# Patient Record
Sex: FEMALE | Race: WHITE | Employment: FULL TIME | URBAN - NONMETROPOLITAN AREA
[De-identification: names, ages, dates, MRNs, and addresses within clinical notes are randomized per-mention and may not be internally consistent; named-entity substitution may affect disease eponyms.]

---

## 2017-06-20 ENCOUNTER — OFFICE VISIT (OUTPATIENT)
Dept: NEUROLOGY | Age: 57
End: 2017-06-20
Payer: COMMERCIAL

## 2017-06-20 VITALS
OXYGEN SATURATION: 95 % | HEART RATE: 55 BPM | SYSTOLIC BLOOD PRESSURE: 106 MMHG | HEIGHT: 67 IN | BODY MASS INDEX: 45.99 KG/M2 | WEIGHT: 293 LBS | DIASTOLIC BLOOD PRESSURE: 73 MMHG

## 2017-06-20 DIAGNOSIS — Z99.89 CPAP (CONTINUOUS POSITIVE AIRWAY PRESSURE) DEPENDENCE: ICD-10-CM

## 2017-06-20 DIAGNOSIS — G47.61 PLMD (PERIODIC LIMB MOVEMENT DISORDER): ICD-10-CM

## 2017-06-20 DIAGNOSIS — G47.33 OSA (OBSTRUCTIVE SLEEP APNEA): Primary | ICD-10-CM

## 2017-06-20 PROCEDURE — 99213 OFFICE O/P EST LOW 20 MIN: CPT | Performed by: PHYSICIAN ASSISTANT

## 2017-08-21 RX ORDER — ATENOLOL 50 MG/1
50 TABLET ORAL DAILY
Qty: 30 TABLET | Refills: 3 | Status: SHIPPED | OUTPATIENT
Start: 2017-08-21 | End: 2017-12-15 | Stop reason: SDUPTHER

## 2017-09-07 ENCOUNTER — OFFICE VISIT (OUTPATIENT)
Dept: FAMILY MEDICINE CLINIC | Age: 57
End: 2017-09-07
Payer: COMMERCIAL

## 2017-09-07 ENCOUNTER — TELEPHONE (OUTPATIENT)
Dept: FAMILY MEDICINE CLINIC | Age: 57
End: 2017-09-07

## 2017-09-07 VITALS
HEART RATE: 56 BPM | SYSTOLIC BLOOD PRESSURE: 102 MMHG | HEIGHT: 67 IN | OXYGEN SATURATION: 98 % | DIASTOLIC BLOOD PRESSURE: 78 MMHG | BODY MASS INDEX: 45.99 KG/M2 | TEMPERATURE: 98 F | WEIGHT: 293 LBS | RESPIRATION RATE: 18 BRPM

## 2017-09-07 DIAGNOSIS — R19.7 DIARRHEA OF PRESUMED INFECTIOUS ORIGIN: ICD-10-CM

## 2017-09-07 DIAGNOSIS — R42 DIZZINESS: ICD-10-CM

## 2017-09-07 DIAGNOSIS — R11.0 NAUSEA: Primary | ICD-10-CM

## 2017-09-07 DIAGNOSIS — R11.0 NAUSEA: ICD-10-CM

## 2017-09-07 LAB
ALBUMIN SERPL-MCNC: 4.4 G/DL (ref 3.5–5.2)
ALP BLD-CCNC: 63 U/L (ref 35–104)
ALT SERPL-CCNC: 28 U/L (ref 5–33)
AMYLASE: 29 U/L (ref 28–100)
ANION GAP SERPL CALCULATED.3IONS-SCNC: 16 MMOL/L (ref 7–19)
AST SERPL-CCNC: 24 U/L (ref 5–32)
BILIRUB SERPL-MCNC: 0.6 MG/DL (ref 0.2–1.2)
BUN BLDV-MCNC: 13 MG/DL (ref 6–20)
CALCIUM SERPL-MCNC: 10.1 MG/DL (ref 8.6–10)
CHLORIDE BLD-SCNC: 101 MMOL/L (ref 98–111)
CO2: 26 MMOL/L (ref 22–29)
CREAT SERPL-MCNC: 0.8 MG/DL (ref 0.5–0.9)
GFR NON-AFRICAN AMERICAN: >60
GLUCOSE BLD-MCNC: 112 MG/DL (ref 74–109)
HCT VFR BLD CALC: 45.6 % (ref 37–47)
HEMOGLOBIN: 14.8 G/DL (ref 12–16)
LIPASE: 16 U/L (ref 13–60)
MCH RBC QN AUTO: 29.6 PG (ref 27–31)
MCHC RBC AUTO-ENTMCNC: 32.5 G/DL (ref 33–37)
MCV RBC AUTO: 91.2 FL (ref 81–99)
PDW BLD-RTO: 13.7 % (ref 11.5–14.5)
PLATELET # BLD: 248 K/UL (ref 130–400)
PMV BLD AUTO: 11.3 FL (ref 9.4–12.3)
POTASSIUM SERPL-SCNC: 3.9 MMOL/L (ref 3.5–5)
RBC # BLD: 5 M/UL (ref 4.2–5.4)
SODIUM BLD-SCNC: 143 MMOL/L (ref 136–145)
TOTAL PROTEIN: 8.2 G/DL (ref 6.6–8.7)
WBC # BLD: 6.6 K/UL (ref 4.8–10.8)

## 2017-09-07 PROCEDURE — 99213 OFFICE O/P EST LOW 20 MIN: CPT | Performed by: CLINICAL NURSE SPECIALIST

## 2017-09-07 ASSESSMENT — ENCOUNTER SYMPTOMS
FACIAL SWELLING: 0
SINUS PRESSURE: 0
EYE REDNESS: 0
DIARRHEA: 1
CHEST TIGHTNESS: 0
CONSTIPATION: 0
NAUSEA: 1
ABDOMINAL PAIN: 0
COUGH: 0
SHORTNESS OF BREATH: 0
ABDOMINAL DISTENTION: 0
WHEEZING: 0
VOMITING: 0
SORE THROAT: 1
EYE DISCHARGE: 0
EYE PAIN: 0
COLOR CHANGE: 0
BLOOD IN STOOL: 0
TROUBLE SWALLOWING: 0
BACK PAIN: 0

## 2017-09-12 ENCOUNTER — TELEPHONE (OUTPATIENT)
Dept: FAMILY MEDICINE CLINIC | Age: 57
End: 2017-09-12

## 2017-09-13 ENCOUNTER — OFFICE VISIT (OUTPATIENT)
Dept: FAMILY MEDICINE CLINIC | Age: 57
End: 2017-09-13
Payer: COMMERCIAL

## 2017-09-13 VITALS
DIASTOLIC BLOOD PRESSURE: 100 MMHG | SYSTOLIC BLOOD PRESSURE: 134 MMHG | TEMPERATURE: 98.2 F | OXYGEN SATURATION: 98 % | HEART RATE: 50 BPM

## 2017-09-13 DIAGNOSIS — K52.9 ACUTE GASTROENTERITIS: Primary | ICD-10-CM

## 2017-09-13 PROCEDURE — 99213 OFFICE O/P EST LOW 20 MIN: CPT | Performed by: CLINICAL NURSE SPECIALIST

## 2017-09-13 RX ORDER — METRONIDAZOLE 500 MG/1
500 TABLET ORAL 3 TIMES DAILY
Qty: 21 TABLET | Refills: 0 | Status: SHIPPED | OUTPATIENT
Start: 2017-09-13 | End: 2017-09-20

## 2017-09-13 RX ORDER — ONDANSETRON 4 MG/1
4 TABLET, FILM COATED ORAL EVERY 8 HOURS PRN
Qty: 30 TABLET | Refills: 0 | Status: SHIPPED | OUTPATIENT
Start: 2017-09-13 | End: 2018-04-04

## 2017-09-13 RX ORDER — PANTOPRAZOLE SODIUM 40 MG/1
40 TABLET, DELAYED RELEASE ORAL 2 TIMES DAILY
Qty: 28 TABLET | Refills: 0 | Status: SHIPPED | OUTPATIENT
Start: 2017-09-13 | End: 2017-09-28 | Stop reason: SDUPTHER

## 2017-09-13 ASSESSMENT — ENCOUNTER SYMPTOMS
SORE THROAT: 0
EYE PAIN: 0
BACK PAIN: 0
ABDOMINAL PAIN: 0
SINUS PRESSURE: 0
EYE REDNESS: 0
CHEST TIGHTNESS: 0
DIARRHEA: 1
EYE DISCHARGE: 0
COLOR CHANGE: 0
COUGH: 0
NAUSEA: 1
WHEEZING: 0
VOMITING: 1
TROUBLE SWALLOWING: 0
ABDOMINAL DISTENTION: 0
SHORTNESS OF BREATH: 0
CONSTIPATION: 0

## 2017-09-20 DIAGNOSIS — R11.0 NAUSEA: ICD-10-CM

## 2017-09-20 DIAGNOSIS — R19.7 DIARRHEA OF PRESUMED INFECTIOUS ORIGIN: Primary | ICD-10-CM

## 2017-09-27 ENCOUNTER — HOSPITAL ENCOUNTER (OUTPATIENT)
Dept: GENERAL RADIOLOGY | Age: 57
Discharge: HOME | End: 2017-09-27
Payer: COMMERCIAL

## 2017-09-27 ENCOUNTER — HOSPITAL ENCOUNTER (OUTPATIENT)
Dept: ULTRASOUND IMAGING | Age: 57
Discharge: HOME | End: 2017-09-27
Payer: COMMERCIAL

## 2017-09-27 DIAGNOSIS — R11.0 NAUSEA: ICD-10-CM

## 2017-09-27 DIAGNOSIS — R19.7 DIARRHEA OF PRESUMED INFECTIOUS ORIGIN: ICD-10-CM

## 2017-09-27 PROBLEM — K44.9 HIATAL HERNIA: Status: ACTIVE | Noted: 2017-09-27

## 2017-09-27 PROBLEM — K80.20 CALCULUS OF GALLBLADDER WITHOUT CHOLECYSTITIS: Status: ACTIVE | Noted: 2017-09-27

## 2017-09-27 PROCEDURE — 76705 ECHO EXAM OF ABDOMEN: CPT

## 2017-09-27 PROCEDURE — 74241 FL UGI WITH KUB: CPT

## 2017-09-28 DIAGNOSIS — K80.21 CALCULUS OF GALLBLADDER WITH BILIARY OBSTRUCTION BUT WITHOUT CHOLECYSTITIS: Primary | ICD-10-CM

## 2017-09-28 DIAGNOSIS — K52.9 ACUTE GASTROENTERITIS: ICD-10-CM

## 2017-09-28 DIAGNOSIS — K44.9 HIATAL HERNIA: ICD-10-CM

## 2017-09-28 RX ORDER — PANTOPRAZOLE SODIUM 40 MG/1
40 TABLET, DELAYED RELEASE ORAL DAILY
Qty: 90 TABLET | Refills: 3 | Status: SHIPPED | OUTPATIENT
Start: 2017-09-28 | End: 2018-09-07 | Stop reason: SDUPTHER

## 2017-10-13 ENCOUNTER — OFFICE VISIT (OUTPATIENT)
Dept: SURGERY | Age: 57
End: 2017-10-13
Payer: COMMERCIAL

## 2017-10-13 VITALS
HEIGHT: 67 IN | DIASTOLIC BLOOD PRESSURE: 80 MMHG | WEIGHT: 293 LBS | SYSTOLIC BLOOD PRESSURE: 138 MMHG | BODY MASS INDEX: 45.99 KG/M2 | HEART RATE: 82 BPM | TEMPERATURE: 98 F

## 2017-10-13 DIAGNOSIS — K80.21 CALCULUS OF GALLBLADDER WITH BILIARY OBSTRUCTION BUT WITHOUT CHOLECYSTITIS: Primary | ICD-10-CM

## 2017-10-13 DIAGNOSIS — G47.33 OSA (OBSTRUCTIVE SLEEP APNEA): ICD-10-CM

## 2017-10-13 PROCEDURE — 99203 OFFICE O/P NEW LOW 30 MIN: CPT | Performed by: PHYSICIAN ASSISTANT

## 2017-10-20 ENCOUNTER — HOSPITAL ENCOUNTER (OUTPATIENT)
Dept: PREADMISSION TESTING | Age: 57
Discharge: HOME | End: 2017-10-20
Payer: COMMERCIAL

## 2017-10-20 VITALS — BODY MASS INDEX: 45.99 KG/M2 | HEIGHT: 67 IN | WEIGHT: 293 LBS

## 2017-10-20 PROCEDURE — 93005 ELECTROCARDIOGRAM TRACING: CPT

## 2017-10-20 RX ORDER — FEXOFENADINE HCL 180 MG/1
180 TABLET ORAL DAILY
COMMUNITY

## 2017-10-20 RX ORDER — LISINOPRIL 2.5 MG/1
2.5 TABLET ORAL DAILY
COMMUNITY
End: 2018-02-19 | Stop reason: SDUPTHER

## 2017-10-21 LAB
EKG P AXIS: 15 DEGREES
EKG P-R INTERVAL: 156 MS
EKG Q-T INTERVAL: 462 MS
EKG QRS DURATION: 100 MS
EKG QTC CALCULATION (BAZETT): 447 MS
EKG T AXIS: 23 DEGREES

## 2017-10-24 PROBLEM — K80.21 CALCULUS OF GALLBLADDER WITH BILIARY OBSTRUCTION BUT WITHOUT CHOLECYSTITIS: Status: ACTIVE | Noted: 2017-10-24

## 2017-10-24 NOTE — PROGRESS NOTES
 lisinopril (PRINIVIL;ZESTRIL) 2.5 MG tablet Take 2.5 mg by mouth daily      fexofenadine (ALLEGRA ALLERGY) 180 MG tablet Take 180 mg by mouth daily      atorvastatin (LIPITOR) 10 MG tablet TAKE 1/2 TABLET BY MOUTH DAILY (Patient taking differently: TAKE 1/2 TABLET BY MOUTH DAILY in the evening) 15 tablet 11     No current facility-administered medications for this visit. Allergies: Review of patient's allergies indicates no known allergies. Past Medical History:   Diagnosis Date    Allergic rhinitis     Calculus of gallbladder without cholecystitis 9/27/2017    CPAP (continuous positive airway pressure) dependence     12cm    Diabetes ([address]     Hiatal hernia 9/27/2017    Hypertension     Hypothyroidism     Murmur     Obesity     ZOILA (obstructive sleep apnea)     AHI:  28.5 per PSG, 9/2014    PLMD (periodic limb movement disorder)     Thyroid disease     Type II or unspecified type diabetes mellitus without mention of complication, not stated as uncontrolled      Past Surgical History:   Procedure Laterality Date    BREAST BIOPSY Right 2012    CARPAL TUNNEL RELEASE      bilateral    HYSTERECTOMY       Family History   Problem Relation Age of Onset    Diabetes Mother     Cancer Mother      breast    Heart Disease Father     Cancer Sister      breast    Cancer Maternal Grandmother      breast    Diabetes Maternal Grandfather     Hypertension Other     Elevated Lipids Other     Coronary Art Dis Other      Social History   Substance Use Topics    Smoking status: Never Smoker    Smokeless tobacco: Never Used    Alcohol use No       REVIEW OF SYSTEMS:  14 point ROS reviewed and positive for the above    PHYSICAL EXAMINATION:    Blood pressure 138/80, pulse 82, temperature 98 °F (36.7 °C), temperature source Temporal, height 5' 7\" (1.702 m), weight (!) 354 lb 11.2 oz (160.9 kg), not currently breastfeeding.     GENERAL:  Reveals a 65 y.o. female that  appears to be in no acute distress. HEENT:  Head is normocephalic and atraumatic. NECK:  Neck is supple without masses or carotid bruits. No obvious thyromegaly is grossly noted. CHEST:  Patient has normal respiratory effort. Chest is clear bilaterally with good thoracic expansion. HEART:  Heart demonstrated a regular rhythm and rate with no cardiac murmurs, gallops or rubs noted to auscultation. ABDOMEN:  Inspection of the abdomen demonstrated the patient to have normal bowel sounds present. The abdomen is protuberant, soft and nontender. EXTREMITIES:  Extremities demonstrated no cyanosis or pitting edema bilaterally. PSYCHIATRIC:  Patient is oriented to time, place and person. The patient's mood and affect are normal.    IMPRESSION:  Chronic Cholecystitis with Cholelithiasis  Obesity  Obstructive sleep apnea  Diabetes mellitus  Hypertension      PLAN:  The risks, benefits, and options were discussed with the patient. She  is willing to proceed with surgery.

## 2017-10-25 ENCOUNTER — TELEPHONE (OUTPATIENT)
Dept: SURGERY | Age: 57
End: 2017-10-25

## 2017-10-25 NOTE — TELEPHONE ENCOUNTER
Left a message that 10/26/17 sx has been rs'd to 11/3/17.   Rs'd per Dr Bailey Yee and Gabriel Persaud

## 2017-10-31 ENCOUNTER — ANESTHESIA (OUTPATIENT)
Dept: OPERATING ROOM | Age: 57
End: 2017-10-31
Payer: COMMERCIAL

## 2017-10-31 ENCOUNTER — HOSPITAL ENCOUNTER (OUTPATIENT)
Age: 57
Setting detail: OUTPATIENT SURGERY
Discharge: HOME | End: 2017-10-31
Attending: SURGERY | Admitting: SURGERY
Payer: COMMERCIAL

## 2017-10-31 ENCOUNTER — ANESTHESIA EVENT (OUTPATIENT)
Dept: OPERATING ROOM | Age: 57
End: 2017-10-31
Payer: COMMERCIAL

## 2017-10-31 ENCOUNTER — APPOINTMENT (OUTPATIENT)
Dept: GENERAL RADIOLOGY | Age: 57
End: 2017-10-31
Attending: SURGERY
Payer: COMMERCIAL

## 2017-10-31 VITALS
TEMPERATURE: 98 F | HEIGHT: 67 IN | HEART RATE: 58 BPM | SYSTOLIC BLOOD PRESSURE: 111 MMHG | BODY MASS INDEX: 45.99 KG/M2 | RESPIRATION RATE: 20 BRPM | WEIGHT: 293 LBS | OXYGEN SATURATION: 100 % | DIASTOLIC BLOOD PRESSURE: 53 MMHG

## 2017-10-31 VITALS
DIASTOLIC BLOOD PRESSURE: 43 MMHG | SYSTOLIC BLOOD PRESSURE: 109 MMHG | OXYGEN SATURATION: 85 % | RESPIRATION RATE: 2 BRPM | TEMPERATURE: 96 F

## 2017-10-31 LAB
GLUCOSE BLD-MCNC: 149 MG/DL (ref 70–99)
PERFORMED ON: ABNORMAL

## 2017-10-31 PROCEDURE — 6360000002 HC RX W HCPCS: Performed by: ANESTHESIOLOGY

## 2017-10-31 PROCEDURE — 7100000011 HC PHASE II RECOVERY - ADDTL 15 MIN: Performed by: SURGERY

## 2017-10-31 PROCEDURE — 7100000000 HC PACU RECOVERY - FIRST 15 MIN: Performed by: SURGERY

## 2017-10-31 PROCEDURE — 3209999900 FLUORO FOR SURGICAL PROCEDURES

## 2017-10-31 PROCEDURE — 7100000010 HC PHASE II RECOVERY - FIRST 15 MIN: Performed by: SURGERY

## 2017-10-31 PROCEDURE — 94640 AIRWAY INHALATION TREATMENT: CPT

## 2017-10-31 PROCEDURE — 2720000001 HC MISC SURG SUPPLY STERILE $51-500: Performed by: SURGERY

## 2017-10-31 PROCEDURE — A4364 ADHESIVE, LIQUID OR EQUAL: HCPCS | Performed by: SURGERY

## 2017-10-31 PROCEDURE — 3700000001 HC ADD 15 MINUTES (ANESTHESIA): Performed by: SURGERY

## 2017-10-31 PROCEDURE — 6370000000 HC RX 637 (ALT 250 FOR IP): Performed by: ANESTHESIOLOGY

## 2017-10-31 PROCEDURE — 3700000000 HC ANESTHESIA ATTENDED CARE: Performed by: SURGERY

## 2017-10-31 PROCEDURE — 6360000002 HC RX W HCPCS: Performed by: NURSE ANESTHETIST, CERTIFIED REGISTERED

## 2017-10-31 PROCEDURE — 2500000003 HC RX 250 WO HCPCS: Performed by: SURGERY

## 2017-10-31 PROCEDURE — 2720000010 HC SURG SUPPLY STERILE: Performed by: SURGERY

## 2017-10-31 PROCEDURE — 2580000003 HC RX 258: Performed by: NURSE ANESTHETIST, CERTIFIED REGISTERED

## 2017-10-31 PROCEDURE — 2500000003 HC RX 250 WO HCPCS: Performed by: NURSE ANESTHETIST, CERTIFIED REGISTERED

## 2017-10-31 PROCEDURE — 2500000003 HC RX 250 WO HCPCS: Performed by: ANESTHESIOLOGY

## 2017-10-31 PROCEDURE — C1729 CATH, DRAINAGE: HCPCS | Performed by: SURGERY

## 2017-10-31 PROCEDURE — 88304 TISSUE EXAM BY PATHOLOGIST: CPT

## 2017-10-31 PROCEDURE — 82948 REAGENT STRIP/BLOOD GLUCOSE: CPT

## 2017-10-31 PROCEDURE — 47563 LAPARO CHOLECYSTECTOMY/GRAPH: CPT | Performed by: PHYSICIAN ASSISTANT

## 2017-10-31 PROCEDURE — 3600000004 HC SURGERY LEVEL 4 BASE: Performed by: SURGERY

## 2017-10-31 PROCEDURE — 3600000014 HC SURGERY LEVEL 4 ADDTL 15MIN: Performed by: SURGERY

## 2017-10-31 PROCEDURE — 6370000000 HC RX 637 (ALT 250 FOR IP): Performed by: SURGERY

## 2017-10-31 PROCEDURE — 47563 LAPARO CHOLECYSTECTOMY/GRAPH: CPT | Performed by: SURGERY

## 2017-10-31 PROCEDURE — 7100000001 HC PACU RECOVERY - ADDTL 15 MIN: Performed by: SURGERY

## 2017-10-31 PROCEDURE — S0028 INJECTION, FAMOTIDINE, 20 MG: HCPCS | Performed by: ANESTHESIOLOGY

## 2017-10-31 PROCEDURE — 74300 X-RAY BILE DUCTS/PANCREAS: CPT

## 2017-10-31 RX ORDER — SODIUM CHLORIDE, SODIUM LACTATE, POTASSIUM CHLORIDE, CALCIUM CHLORIDE 600; 310; 30; 20 MG/100ML; MG/100ML; MG/100ML; MG/100ML
INJECTION, SOLUTION INTRAVENOUS CONTINUOUS PRN
Status: DISCONTINUED | OUTPATIENT
Start: 2017-10-31 | End: 2017-10-31 | Stop reason: SDUPTHER

## 2017-10-31 RX ORDER — SODIUM CHLORIDE, SODIUM LACTATE, POTASSIUM CHLORIDE, CALCIUM CHLORIDE 600; 310; 30; 20 MG/100ML; MG/100ML; MG/100ML; MG/100ML
INJECTION, SOLUTION INTRAVENOUS CONTINUOUS
Status: DISCONTINUED | OUTPATIENT
Start: 2017-10-31 | End: 2017-10-31 | Stop reason: HOSPADM

## 2017-10-31 RX ORDER — MIDAZOLAM HYDROCHLORIDE 1 MG/ML
INJECTION INTRAMUSCULAR; INTRAVENOUS PRN
Status: DISCONTINUED | OUTPATIENT
Start: 2017-10-31 | End: 2017-10-31 | Stop reason: SDUPTHER

## 2017-10-31 RX ORDER — HYDROCODONE BITARTRATE AND ACETAMINOPHEN 5; 325 MG/1; MG/1
TABLET ORAL
Qty: 30 TABLET | Refills: 0 | Status: SHIPPED | OUTPATIENT
Start: 2017-10-31 | End: 2017-11-22 | Stop reason: ALTCHOICE

## 2017-10-31 RX ORDER — PROMETHAZINE HYDROCHLORIDE 25 MG/ML
6.25 INJECTION, SOLUTION INTRAMUSCULAR; INTRAVENOUS
Status: DISCONTINUED | OUTPATIENT
Start: 2017-10-31 | End: 2017-10-31 | Stop reason: HOSPADM

## 2017-10-31 RX ORDER — HYDRALAZINE HYDROCHLORIDE 20 MG/ML
5 INJECTION INTRAMUSCULAR; INTRAVENOUS EVERY 10 MIN PRN
Status: DISCONTINUED | OUTPATIENT
Start: 2017-10-31 | End: 2017-10-31 | Stop reason: HOSPADM

## 2017-10-31 RX ORDER — METOCLOPRAMIDE HYDROCHLORIDE 5 MG/ML
10 INJECTION INTRAMUSCULAR; INTRAVENOUS
Status: COMPLETED | OUTPATIENT
Start: 2017-10-31 | End: 2017-10-31

## 2017-10-31 RX ORDER — MIDAZOLAM HYDROCHLORIDE 1 MG/ML
2 INJECTION INTRAMUSCULAR; INTRAVENOUS
Status: DISCONTINUED | OUTPATIENT
Start: 2017-10-31 | End: 2017-10-31 | Stop reason: HOSPADM

## 2017-10-31 RX ORDER — LIDOCAINE HYDROCHLORIDE 10 MG/ML
INJECTION, SOLUTION EPIDURAL; INFILTRATION; INTRACAUDAL; PERINEURAL PRN
Status: DISCONTINUED | OUTPATIENT
Start: 2017-10-31 | End: 2017-10-31 | Stop reason: SDUPTHER

## 2017-10-31 RX ORDER — LABETALOL HYDROCHLORIDE 5 MG/ML
5 INJECTION, SOLUTION INTRAVENOUS EVERY 10 MIN PRN
Status: DISCONTINUED | OUTPATIENT
Start: 2017-10-31 | End: 2017-10-31 | Stop reason: HOSPADM

## 2017-10-31 RX ORDER — SODIUM CHLORIDE 0.9 % (FLUSH) 0.9 %
10 SYRINGE (ML) INJECTION EVERY 12 HOURS SCHEDULED
Status: DISCONTINUED | OUTPATIENT
Start: 2017-10-31 | End: 2017-10-31 | Stop reason: HOSPADM

## 2017-10-31 RX ORDER — MORPHINE SULFATE 4 MG/ML
4 INJECTION, SOLUTION INTRAMUSCULAR; INTRAVENOUS
Status: DISCONTINUED | OUTPATIENT
Start: 2017-10-31 | End: 2017-10-31 | Stop reason: HOSPADM

## 2017-10-31 RX ORDER — MEPERIDINE HYDROCHLORIDE 50 MG/ML
12.5 INJECTION INTRAMUSCULAR; INTRAVENOUS; SUBCUTANEOUS EVERY 5 MIN PRN
Status: DISCONTINUED | OUTPATIENT
Start: 2017-10-31 | End: 2017-10-31 | Stop reason: HOSPADM

## 2017-10-31 RX ORDER — SODIUM CHLORIDE 0.9 % (FLUSH) 0.9 %
10 SYRINGE (ML) INJECTION PRN
Status: DISCONTINUED | OUTPATIENT
Start: 2017-10-31 | End: 2017-10-31 | Stop reason: HOSPADM

## 2017-10-31 RX ORDER — SUCCINYLCHOLINE CHLORIDE 20 MG/ML
INJECTION INTRAMUSCULAR; INTRAVENOUS PRN
Status: DISCONTINUED | OUTPATIENT
Start: 2017-10-31 | End: 2017-10-31 | Stop reason: SDUPTHER

## 2017-10-31 RX ORDER — MORPHINE SULFATE 10 MG/ML
4 INJECTION, SOLUTION INTRAMUSCULAR; INTRAVENOUS EVERY 5 MIN PRN
Status: DISCONTINUED | OUTPATIENT
Start: 2017-10-31 | End: 2017-10-31 | Stop reason: HOSPADM

## 2017-10-31 RX ORDER — PROPOFOL 10 MG/ML
INJECTION, EMULSION INTRAVENOUS PRN
Status: DISCONTINUED | OUTPATIENT
Start: 2017-10-31 | End: 2017-10-31 | Stop reason: SDUPTHER

## 2017-10-31 RX ORDER — ONDANSETRON 2 MG/ML
INJECTION INTRAMUSCULAR; INTRAVENOUS PRN
Status: DISCONTINUED | OUTPATIENT
Start: 2017-10-31 | End: 2017-10-31 | Stop reason: SDUPTHER

## 2017-10-31 RX ORDER — SCOLOPAMINE TRANSDERMAL SYSTEM 1 MG/1
1 PATCH, EXTENDED RELEASE TRANSDERMAL
Status: DISCONTINUED | OUTPATIENT
Start: 2017-10-31 | End: 2017-10-31 | Stop reason: HOSPADM

## 2017-10-31 RX ORDER — DIPHENHYDRAMINE HYDROCHLORIDE 50 MG/ML
12.5 INJECTION INTRAMUSCULAR; INTRAVENOUS
Status: DISCONTINUED | OUTPATIENT
Start: 2017-10-31 | End: 2017-10-31 | Stop reason: HOSPADM

## 2017-10-31 RX ORDER — MORPHINE SULFATE 10 MG/ML
2 INJECTION, SOLUTION INTRAMUSCULAR; INTRAVENOUS EVERY 5 MIN PRN
Status: DISCONTINUED | OUTPATIENT
Start: 2017-10-31 | End: 2017-10-31 | Stop reason: HOSPADM

## 2017-10-31 RX ORDER — LIDOCAINE HYDROCHLORIDE 10 MG/ML
1 INJECTION, SOLUTION EPIDURAL; INFILTRATION; INTRACAUDAL; PERINEURAL
Status: DISCONTINUED | OUTPATIENT
Start: 2017-10-31 | End: 2017-10-31 | Stop reason: HOSPADM

## 2017-10-31 RX ORDER — HYDROCODONE BITARTRATE AND ACETAMINOPHEN 5; 325 MG/1; MG/1
2 TABLET ORAL
Status: COMPLETED | OUTPATIENT
Start: 2017-10-31 | End: 2017-10-31

## 2017-10-31 RX ORDER — FAMOTIDINE 20 MG/1
20 TABLET, FILM COATED ORAL
Status: DISCONTINUED | OUTPATIENT
Start: 2017-10-31 | End: 2017-10-31 | Stop reason: HOSPADM

## 2017-10-31 RX ORDER — ROCURONIUM BROMIDE 10 MG/ML
INJECTION, SOLUTION INTRAVENOUS PRN
Status: DISCONTINUED | OUTPATIENT
Start: 2017-10-31 | End: 2017-10-31 | Stop reason: SDUPTHER

## 2017-10-31 RX ORDER — FENTANYL CITRATE 50 UG/ML
50 INJECTION, SOLUTION INTRAMUSCULAR; INTRAVENOUS
Status: DISCONTINUED | OUTPATIENT
Start: 2017-10-31 | End: 2017-10-31 | Stop reason: HOSPADM

## 2017-10-31 RX ORDER — FENTANYL CITRATE 50 UG/ML
INJECTION, SOLUTION INTRAMUSCULAR; INTRAVENOUS PRN
Status: DISCONTINUED | OUTPATIENT
Start: 2017-10-31 | End: 2017-10-31 | Stop reason: SDUPTHER

## 2017-10-31 RX ORDER — ALBUTEROL SULFATE 2.5 MG/3ML
2.5 SOLUTION RESPIRATORY (INHALATION) ONCE
Status: COMPLETED | OUTPATIENT
Start: 2017-10-31 | End: 2017-10-31

## 2017-10-31 RX ORDER — BUPIVACAINE HYDROCHLORIDE 2.5 MG/ML
INJECTION, SOLUTION INFILTRATION; PERINEURAL PRN
Status: DISCONTINUED | OUTPATIENT
Start: 2017-10-31 | End: 2017-10-31 | Stop reason: HOSPADM

## 2017-10-31 RX ORDER — METOCLOPRAMIDE 10 MG/1
10 TABLET ORAL ONCE
Status: DISCONTINUED | OUTPATIENT
Start: 2017-10-31 | End: 2017-10-31 | Stop reason: HOSPADM

## 2017-10-31 RX ADMIN — SODIUM CHLORIDE, SODIUM LACTATE, POTASSIUM CHLORIDE, AND CALCIUM CHLORIDE: 600; 310; 30; 20 INJECTION, SOLUTION INTRAVENOUS at 11:06

## 2017-10-31 RX ADMIN — FENTANYL CITRATE 50 MCG: 50 INJECTION, SOLUTION INTRAMUSCULAR; INTRAVENOUS at 11:34

## 2017-10-31 RX ADMIN — FAMOTIDINE 20 MG: 10 INJECTION, SOLUTION INTRAVENOUS at 09:29

## 2017-10-31 RX ADMIN — ROCURONIUM BROMIDE 25 MG: 10 INJECTION INTRAVENOUS at 11:16

## 2017-10-31 RX ADMIN — ONDANSETRON HYDROCHLORIDE 4 MG: 2 SOLUTION INTRAMUSCULAR; INTRAVENOUS at 11:58

## 2017-10-31 RX ADMIN — ALBUTEROL SULFATE 2.5 MG: 2.5 SOLUTION RESPIRATORY (INHALATION) at 13:05

## 2017-10-31 RX ADMIN — MIDAZOLAM HYDROCHLORIDE 2 MG: 1 INJECTION, SOLUTION INTRAMUSCULAR; INTRAVENOUS at 11:03

## 2017-10-31 RX ADMIN — SUCCINYLCHOLINE CHLORIDE 140 MG: 20 INJECTION, SOLUTION INTRAMUSCULAR; INTRAVENOUS; PARENTERAL at 11:13

## 2017-10-31 RX ADMIN — METOCLOPRAMIDE 10 MG: 5 INJECTION, SOLUTION INTRAMUSCULAR; INTRAVENOUS at 09:29

## 2017-10-31 RX ADMIN — HYDROCODONE BITARTRATE AND ACETAMINOPHEN 2 TABLET: 5; 325 TABLET ORAL at 14:06

## 2017-10-31 RX ADMIN — FENTANYL CITRATE 100 MCG: 50 INJECTION, SOLUTION INTRAMUSCULAR; INTRAVENOUS at 11:13

## 2017-10-31 RX ADMIN — PROPOFOL 50 MG: 10 INJECTION, EMULSION INTRAVENOUS at 12:05

## 2017-10-31 RX ADMIN — PROPOFOL 200 MG: 10 INJECTION, EMULSION INTRAVENOUS at 11:13

## 2017-10-31 RX ADMIN — SUGAMMADEX 320 MG: 100 INJECTION, SOLUTION INTRAVENOUS at 12:21

## 2017-10-31 RX ADMIN — ROCURONIUM BROMIDE 5 MG: 10 INJECTION INTRAVENOUS at 11:13

## 2017-10-31 RX ADMIN — Medication 2 G: at 11:21

## 2017-10-31 RX ADMIN — ROCURONIUM BROMIDE 20 MG: 10 INJECTION INTRAVENOUS at 11:25

## 2017-10-31 RX ADMIN — FENTANYL CITRATE 50 MCG: 50 INJECTION, SOLUTION INTRAMUSCULAR; INTRAVENOUS at 12:00

## 2017-10-31 RX ADMIN — SODIUM CHLORIDE, SODIUM LACTATE, POTASSIUM CHLORIDE, AND CALCIUM CHLORIDE: 600; 310; 30; 20 INJECTION, SOLUTION INTRAVENOUS at 11:56

## 2017-10-31 RX ADMIN — LIDOCAINE HYDROCHLORIDE 50 MG: 10 INJECTION, SOLUTION EPIDURAL; INFILTRATION; INTRACAUDAL; PERINEURAL at 11:13

## 2017-10-31 ASSESSMENT — PAIN DESCRIPTION - LOCATION: LOCATION: ABDOMEN;UMBILICUS

## 2017-10-31 ASSESSMENT — PAIN SCALES - GENERAL
PAINLEVEL_OUTOF10: 3
PAINLEVEL_OUTOF10: 5
PAINLEVEL_OUTOF10: 4

## 2017-10-31 ASSESSMENT — PAIN DESCRIPTION - DESCRIPTORS: DESCRIPTORS: BURNING;SORE;TENDER

## 2017-10-31 ASSESSMENT — PAIN DESCRIPTION - PAIN TYPE
TYPE: SURGICAL PAIN
TYPE: SURGICAL PAIN

## 2017-10-31 NOTE — H&P
HISTORY OF PRESENT ILLNESS:  Gonzalo Matias comes to the office today to discuss possible cholecystectomy. She reports that in September she experienced some  postprandial abdominal pain and it was associated with nausea and vomiting. She denied fevers chills or jaundice. She saw her primary care physician and subsequent studies have been ordered. Ultrasound of the gallbladder demonstrated cholelithiasis. The common bile duct was normal in diameter and there is no mention of gallbladder wall thickening.  She was noted to have a fatty liver.          Patient Active Problem List     Diagnosis Date Noted    Calculus of gallbladder without cholecystitis 09/27/2017    Hiatal hernia 09/27/2017    Acute gastroenteritis 09/13/2017    Nausea 09/07/2017    Diarrhea of presumed infectious origin 09/07/2017    Dizziness 09/07/2017    ZOILA (obstructive sleep apnea)      PLMD (periodic limb movement disorder)      CPAP (continuous positive airway pressure) dependence        Current Facility-Administered Medications   Current Outpatient Prescriptions   Medication Sig Dispense Refill    pantoprazole (PROTONIX) 40 MG tablet Take 1 tablet by mouth daily 90 tablet 3    ondansetron (ZOFRAN) 4 MG tablet Take 1 tablet by mouth every 8 hours as needed for Nausea or Vomiting 30 tablet 0    atenolol (TENORMIN) 50 MG tablet Take 1 tablet by mouth daily 1/2 every am 1/2 every pm 30 tablet 3    CARTIA  MG extended release capsule TAKE ONE CAPSULE BY MOUTH EVERY DAY 30 capsule 5    levothyroxine (SYNTHROID) 100 MCG tablet TAKE 1 TABLET BY MOUTH EVERY DAY FOR THYROID 30 tablet 5    furosemide (LASIX) 20 MG tablet TAKE 1 TABLET BY MOUTH DAILY AS NEEDED 30 tablet 5    metFORMIN ER (GLUCOPHAGE-XR) 500 MG XR tablet Take 1,000 mg by mouth 2 times daily    11    Omega-3 Fatty Acids (FISH OIL) 1000 MG CAPS Take 3,000 mg by mouth 2 times daily        aspirin 81 MG tablet Take 81 mg by mouth daily 3 days a wk        multivitamin [facility]) per tablet Take 1 tablet by mouth daily.          lisinopril (PRINIVIL;ZESTRIL) 2.5 MG tablet Take 2.5 mg by mouth daily        fexofenadine (ALLEGRA ALLERGY) 180 MG tablet Take 180 mg by mouth daily        atorvastatin (LIPITOR) 10 MG tablet TAKE 1/2 TABLET BY MOUTH DAILY (Patient taking differently: TAKE 1/2 TABLET BY MOUTH DAILY in the evening) 15 tablet 11      No current facility-administered medications for this visit.          Allergies: Review of patient's allergies indicates no known allergies.    Past Medical History        Past Medical History:   Diagnosis Date    Allergic rhinitis      Calculus of gallbladder without cholecystitis 9/27/2017    CPAP (continuous positive airway pressure) dependence       12cm    Diabetes ([address]      Hiatal hernia 9/27/2017    Hypertension      Hypothyroidism      Murmur      Obesity      ZOILA (obstructive sleep apnea)       AHI:  28.5 per PSG, 9/2014    PLMD (periodic limb movement disorder)      Thyroid disease      Type II or unspecified type diabetes mellitus without mention of complication, not stated as uncontrolled           Past Surgical History         Past Surgical History:   Procedure Laterality Date    BREAST BIOPSY Right 2012    CARPAL TUNNEL RELEASE         bilateral    HYSTERECTOMY             Family History          Family History   Problem Relation Age of Onset    Diabetes Mother      Cancer Mother         breast    Heart Disease Father      Cancer Sister         breast    Cancer Maternal Grandmother         breast    Diabetes Maternal Grandfather      Hypertension Other      Elevated Lipids Other      Coronary Art Dis Other                Social History   Substance Use Topics    Smoking status: Never Smoker    Smokeless tobacco: Never Used    Alcohol use No         REVIEW OF SYSTEMS:  14 point ROS reviewed and positive for the above     PHYSICAL EXAMINATION:     Blood pressure 138/80, pulse 82, temperature 98 °F

## 2017-10-31 NOTE — ANESTHESIA POSTPROCEDURE EVALUATION
Department of Anesthesiology  Postprocedure Note    Patient: [place]  MRN: [id number]  YOB: 1960  Date of evaluation: 10/31/2017  Time:  1:04 PM     Procedure Summary     Date:  10/31/17 Room / Location:  [facility] OR  / [facility] OR    Anesthesia Start:  1106 Anesthesia Stop:  1303    Procedure:  CHOLECYSTECTOMY LAPAROSCOPIC (N/A Abdomen) Diagnosis:  (CHOLECYSTITIS WITH CHOLELITHIASIS)    Surgeon:  Celia Marroquin MD Responsible Provider:  Donnell Isaac CRNA    Anesthesia Type:  general ASA Status:  3          Anesthesia Type: general    Mick Phase I: Mick Score: 8    Mick Phase II:      Last vitals: Reviewed and per EMR flowsheets. Anesthesia Post Evaluation    Patient location during evaluation: PACU  Patient participation: complete - patient participated  Level of consciousness: awake  Pain score: 0  Airway patency: patent  Nausea & Vomiting: no nausea and no vomiting  Complications: no  Cardiovascular status: blood pressure returned to baseline  Respiratory status: acceptable  Hydration status: stable  Comments: Patient awake and alert; ordered neb treatment; patient comfortable; alert and oriented.

## 2017-10-31 NOTE — DISCHARGE INSTR - ACTIVITY
TAKE IT EASY FOR 1-2 WEEKS, AVOID LIFTING OVER 15 POUNDS. YOUR INCISIONS WILL STING AND BURN IF YOU DO TOO MUCH.

## 2017-11-09 DIAGNOSIS — L53.9 ERYTHEMA: Primary | ICD-10-CM

## 2017-11-09 RX ORDER — SULFAMETHOXAZOLE AND TRIMETHOPRIM 800; 160 MG/1; MG/1
1 TABLET ORAL 2 TIMES DAILY
Qty: 20 TABLET | Refills: 0 | Status: SHIPPED | OUTPATIENT
Start: 2017-11-09 | End: 2017-11-19

## 2017-11-22 ENCOUNTER — TELEPHONE (OUTPATIENT)
Dept: FAMILY MEDICINE CLINIC | Age: 57
End: 2017-11-22

## 2017-11-22 ENCOUNTER — OFFICE VISIT (OUTPATIENT)
Dept: SURGERY | Age: 57
End: 2017-11-22

## 2017-11-22 VITALS
TEMPERATURE: 97.8 F | HEIGHT: 67 IN | BODY MASS INDEX: 45.99 KG/M2 | DIASTOLIC BLOOD PRESSURE: 74 MMHG | WEIGHT: 293 LBS | SYSTOLIC BLOOD PRESSURE: 120 MMHG

## 2017-11-22 DIAGNOSIS — Z00.00 ANNUAL PHYSICAL EXAM: Primary | ICD-10-CM

## 2017-11-22 DIAGNOSIS — Z90.49 S/P LAPAROSCOPIC CHOLECYSTECTOMY: Primary | ICD-10-CM

## 2017-11-22 PROCEDURE — 99024 POSTOP FOLLOW-UP VISIT: CPT | Performed by: PHYSICIAN ASSISTANT

## 2017-11-27 DIAGNOSIS — E11.42 WELL CONTROLLED TYPE 2 DIABETES MELLITUS WITH PERIPHERAL NEUROPATHY (HCC): Primary | ICD-10-CM

## 2017-11-27 DIAGNOSIS — I10 ESSENTIAL (PRIMARY) HYPERTENSION: ICD-10-CM

## 2017-11-27 DIAGNOSIS — E78.01 FAMILIAL HYPERCHOLESTEROLEMIA: ICD-10-CM

## 2017-11-27 DIAGNOSIS — E03.9 PRIMARY HYPOTHYROIDISM: ICD-10-CM

## 2017-12-06 NOTE — PROGRESS NOTES
11    Omega-3 Fatty Acids (FISH OIL) 1000 MG CAPS Take 3,000 mg by mouth 2 times daily      aspirin 81 MG tablet Take 81 mg by mouth daily 3 days a wk      multivitamin (THERAGRAN) per tablet Take 1 tablet by mouth daily. No current facility-administered medications for this visit. Allergies: Review of patient's allergies indicates no known allergies. Past Medical History:   Diagnosis Date    Allergic rhinitis     Calculus of gallbladder without cholecystitis 9/27/2017    CPAP (continuous positive airway pressure) dependence     12cm    Diabetes ([address]     Hiatal hernia 9/27/2017    Hypertension     Hypothyroidism     Murmur     Obesity     ZOILA (obstructive sleep apnea)     AHI:  28.5 per PSG, 9/2014    PLMD (periodic limb movement disorder)     Thyroid disease     Type II or unspecified type diabetes mellitus without mention of complication, not stated as uncontrolled      Past Surgical History:   Procedure Laterality Date    BREAST BIOPSY Right 2012    CARPAL TUNNEL RELEASE      bilateral    HYSTERECTOMY      SC LAP,CHOLECYSTECTOMY/GRAPH N/A 10/31/2017    CHOLECYSTECTOMY LAPAROSCOPIC performed by Shailesh Garcia MD at [facility] History   Problem Relation Age of Onset    Diabetes Mother     Cancer Mother      breast    Heart Disease Father     Cancer Sister      breast    Cancer Maternal Grandmother      breast    Diabetes Maternal Grandfather     Hypertension Other     Elevated Lipids Other     Coronary Art Dis Other      Social History   Substance Use Topics    Smoking status: Never Smoker    Smokeless tobacco: Never Used    Alcohol use No       EXAMINATION:   Blood pressure 120/74, temperature 97.8 °F (36.6 °C), temperature source Temporal, height 5' 7\" (1.702 m), weight (!) 351 lb (159.2 kg), not currently breastfeeding. On examination her incisions have healed nicely with no evidence of hernia defects.   She has a large hernia of the umbilicus with no evidence of strangulation    IMPRESSION:    1. Doing well status post laparoscopic cholecystectomy for chronic cholecystitis with cholelithiasis. 2. umbilical hernia     RECOMMENDATION:  We will see [place] back in the office on a prn basis. She will contact us if she becomes symptomatic from the umbilical hernia.

## 2017-12-18 RX ORDER — ATENOLOL 50 MG/1
TABLET ORAL
Qty: 30 TABLET | Refills: 2 | Status: SHIPPED | OUTPATIENT
Start: 2017-12-18 | End: 2018-03-09 | Stop reason: SDUPTHER

## 2017-12-29 ENCOUNTER — TELEPHONE (OUTPATIENT)
Dept: FAMILY MEDICINE CLINIC | Age: 57
End: 2017-12-29

## 2018-02-12 DIAGNOSIS — E78.01 FAMILIAL HYPERCHOLESTEROLEMIA: ICD-10-CM

## 2018-02-12 DIAGNOSIS — E03.9 PRIMARY HYPOTHYROIDISM: ICD-10-CM

## 2018-02-12 DIAGNOSIS — E11.42 WELL CONTROLLED TYPE 2 DIABETES MELLITUS WITH PERIPHERAL NEUROPATHY (HCC): ICD-10-CM

## 2018-02-12 LAB
ALBUMIN SERPL-MCNC: 3.8 G/DL (ref 3.5–5.2)
ALP BLD-CCNC: 79 U/L (ref 35–104)
ALT SERPL-CCNC: 29 U/L (ref 5–33)
ANION GAP SERPL CALCULATED.3IONS-SCNC: 16 MMOL/L (ref 7–19)
AST SERPL-CCNC: 26 U/L (ref 5–32)
BILIRUB SERPL-MCNC: 0.5 MG/DL (ref 0.2–1.2)
BUN BLDV-MCNC: 16 MG/DL (ref 6–20)
CALCIUM SERPL-MCNC: 9.8 MG/DL (ref 8.6–10)
CHLORIDE BLD-SCNC: 103 MMOL/L (ref 98–111)
CHOLESTEROL, TOTAL: 152 MG/DL (ref 160–199)
CO2: 25 MMOL/L (ref 22–29)
CREAT SERPL-MCNC: 0.8 MG/DL (ref 0.5–0.9)
CREATININE URINE: 158.3 MG/DL (ref 4.2–622)
GFR NON-AFRICAN AMERICAN: >60
GLUCOSE BLD-MCNC: 161 MG/DL (ref 74–109)
HBA1C MFR BLD: 7.7 %
HDLC SERPL-MCNC: 52 MG/DL (ref 65–121)
LDL CHOLESTEROL CALCULATED: 78 MG/DL
MICROALBUMIN UR-MCNC: <1.2 MG/DL (ref 0–19)
MICROALBUMIN/CREAT UR-RTO: NORMAL MG/G
POTASSIUM SERPL-SCNC: 4.1 MMOL/L (ref 3.5–5)
SODIUM BLD-SCNC: 144 MMOL/L (ref 136–145)
TOTAL PROTEIN: 7.5 G/DL (ref 6.6–8.7)
TRIGL SERPL-MCNC: 111 MG/DL (ref 0–149)
TSH SERPL DL<=0.05 MIU/L-ACNC: 5.1 UIU/ML (ref 0.27–4.2)

## 2018-02-12 RX ORDER — DILTIAZEM HYDROCHLORIDE 180 MG/1
180 CAPSULE, COATED, EXTENDED RELEASE ORAL DAILY
Qty: 30 CAPSULE | Refills: 5 | Status: SHIPPED | OUTPATIENT
Start: 2018-02-12 | End: 2018-07-24 | Stop reason: SDUPTHER

## 2018-02-12 RX ORDER — FUROSEMIDE 20 MG/1
20 TABLET ORAL DAILY
Qty: 30 TABLET | Refills: 5 | Status: SHIPPED | OUTPATIENT
Start: 2018-02-12 | End: 2018-08-09 | Stop reason: SDUPTHER

## 2018-02-16 RX ORDER — LEVOTHYROXINE SODIUM 0.1 MG/1
100 TABLET ORAL DAILY
Qty: 30 TABLET | Refills: 5 | Status: SHIPPED | OUTPATIENT
Start: 2018-02-16 | End: 2018-02-19 | Stop reason: SDUPTHER

## 2018-02-19 ENCOUNTER — OFFICE VISIT (OUTPATIENT)
Dept: FAMILY MEDICINE CLINIC | Age: 58
End: 2018-02-19
Payer: COMMERCIAL

## 2018-02-19 ENCOUNTER — TELEPHONE (OUTPATIENT)
Dept: FAMILY MEDICINE CLINIC | Age: 58
End: 2018-02-19

## 2018-02-19 VITALS
TEMPERATURE: 97.6 F | DIASTOLIC BLOOD PRESSURE: 72 MMHG | HEART RATE: 54 BPM | RESPIRATION RATE: 18 BRPM | OXYGEN SATURATION: 95 % | SYSTOLIC BLOOD PRESSURE: 112 MMHG | WEIGHT: 293 LBS | BODY MASS INDEX: 55.44 KG/M2

## 2018-02-19 DIAGNOSIS — E78.01 FAMILIAL HYPERCHOLESTEROLEMIA: ICD-10-CM

## 2018-02-19 DIAGNOSIS — I10 ESSENTIAL (PRIMARY) HYPERTENSION: ICD-10-CM

## 2018-02-19 DIAGNOSIS — K21.9 GASTROESOPHAGEAL REFLUX DISEASE WITHOUT ESOPHAGITIS: ICD-10-CM

## 2018-02-19 DIAGNOSIS — E03.9 PRIMARY HYPOTHYROIDISM: ICD-10-CM

## 2018-02-19 DIAGNOSIS — E11.42 WELL CONTROLLED TYPE 2 DIABETES MELLITUS WITH PERIPHERAL NEUROPATHY (HCC): Primary | ICD-10-CM

## 2018-02-19 PROCEDURE — 99214 OFFICE O/P EST MOD 30 MIN: CPT | Performed by: FAMILY MEDICINE

## 2018-02-19 RX ORDER — LISINOPRIL 2.5 MG/1
2.5 TABLET ORAL DAILY
Qty: 30 TABLET | Refills: 11 | Status: SHIPPED | OUTPATIENT
Start: 2018-02-19 | End: 2019-03-01 | Stop reason: SDUPTHER

## 2018-02-19 RX ORDER — LEVOTHYROXINE SODIUM 112 UG/1
112 TABLET ORAL DAILY
Qty: 30 TABLET | Refills: 11 | Status: SHIPPED | OUTPATIENT
Start: 2018-02-19 | End: 2018-11-20 | Stop reason: SDUPTHER

## 2018-02-19 ASSESSMENT — ENCOUNTER SYMPTOMS
COUGH: 0
SHORTNESS OF BREATH: 0
CONSTIPATION: 0
ANAL BLEEDING: 0
DIARRHEA: 0
NAUSEA: 0
CHEST TIGHTNESS: 0
ABDOMINAL PAIN: 0

## 2018-02-19 NOTE — PROGRESS NOTES
mouth daily. No current facility-administered medications for this visit. Allergies   Allergen Reactions    Adhesive Tape        Health Maintenance   Topic Date Due    Hepatitis C screen  1960    Diabetic foot exam  02/28/1971    HIV screen  02/28/1976    Pneumococcal med risk (1 of 1 - PPSV23) 02/28/1980    Cervical cancer screen  02/28/1982    Colon cancer screen colonoscopy  02/28/2011    DTaP/Tdap/Td vaccine (1 - Tdap) 01/01/2019 (Originally 2/28/1980)    Diabetic retinal exam  01/15/2019    A1C test (Diabetic or Prediabetic)  02/12/2019    Diabetic microalbuminuria test  02/12/2019    Lipid screen  02/12/2019    Potassium monitoring  02/12/2019    Creatinine monitoring  02/12/2019    Breast cancer screen  12/22/2019    Flu vaccine  Completed       Subjective:      Review of Systems   Constitutional: Negative for chills and fever. HENT: Negative for congestion. Respiratory: Negative for cough, chest tightness and shortness of breath. Cardiovascular: Negative for chest pain, palpitations and leg swelling. Gastrointestinal: Negative for abdominal pain, anal bleeding, constipation, diarrhea and nausea. Genitourinary: Negative for difficulty urinating. Psychiatric/Behavioral: Negative. See HPI for visit specific review of symptoms. All others negative      Objective:   /72   Pulse 54   Temp 97.6 °F (36.4 °C) (Temporal)   Resp 18   Wt (!) 354 lb (160.6 kg)   SpO2 95%   BMI 55.44 kg/m²   Physical Exam   Constitutional: She appears well-developed. She does not appear ill. Eyes: Pupils are equal, round, and reactive to light. Neck: Normal range of motion. Neck supple. Cardiovascular: Normal rate and regular rhythm. Exam reveals no friction rub. No murmur heard. Pulmonary/Chest: Effort normal and breath sounds normal. No respiratory distress. She has no wheezes. She has no rales. Abdominal: Soft.  Bowel sounds are normal. She exhibits no

## 2018-03-19 ENCOUNTER — TELEPHONE (OUTPATIENT)
Dept: FAMILY MEDICINE CLINIC | Age: 58
End: 2018-03-19

## 2018-03-19 NOTE — TELEPHONE ENCOUNTER
Blood sugar has been high after cortisone shot in ankle on 2/26. Readings have been Over 300 on some days after eating.  Do you want he to discuss with NP?

## 2018-03-20 ENCOUNTER — OFFICE VISIT (OUTPATIENT)
Dept: FAMILY MEDICINE CLINIC | Age: 58
End: 2018-03-20
Payer: COMMERCIAL

## 2018-03-20 VITALS
OXYGEN SATURATION: 98 % | DIASTOLIC BLOOD PRESSURE: 74 MMHG | HEART RATE: 64 BPM | BODY MASS INDEX: 45.99 KG/M2 | HEIGHT: 67 IN | SYSTOLIC BLOOD PRESSURE: 124 MMHG | WEIGHT: 293 LBS

## 2018-03-20 DIAGNOSIS — E11.9 TYPE 2 DIABETES MELLITUS WITHOUT COMPLICATION, WITHOUT LONG-TERM CURRENT USE OF INSULIN (HCC): Primary | ICD-10-CM

## 2018-03-20 PROCEDURE — 99213 OFFICE O/P EST LOW 20 MIN: CPT | Performed by: NURSE PRACTITIONER

## 2018-03-20 RX ORDER — METFORMIN HYDROCHLORIDE 500 MG/1
TABLET, EXTENDED RELEASE ORAL
Qty: 90 TABLET | Refills: 5 | Status: SHIPPED | OUTPATIENT
Start: 2018-03-20 | End: 2018-04-04 | Stop reason: SDUPTHER

## 2018-03-20 ASSESSMENT — ENCOUNTER SYMPTOMS
SORE THROAT: 0
DIARRHEA: 0
CHEST TIGHTNESS: 0
COUGH: 0
WHEEZING: 0
NAUSEA: 0
SHORTNESS OF BREATH: 0
ABDOMINAL PAIN: 0

## 2018-04-04 ENCOUNTER — OFFICE VISIT (OUTPATIENT)
Dept: OBGYN | Age: 58
End: 2018-04-04
Payer: COMMERCIAL

## 2018-04-04 ENCOUNTER — OFFICE VISIT (OUTPATIENT)
Dept: FAMILY MEDICINE CLINIC | Age: 58
End: 2018-04-04
Payer: COMMERCIAL

## 2018-04-04 VITALS
DIASTOLIC BLOOD PRESSURE: 78 MMHG | HEART RATE: 58 BPM | WEIGHT: 293 LBS | SYSTOLIC BLOOD PRESSURE: 120 MMHG | BODY MASS INDEX: 45.99 KG/M2 | OXYGEN SATURATION: 98 % | HEIGHT: 67 IN | TEMPERATURE: 97.6 F

## 2018-04-04 VITALS
WEIGHT: 293 LBS | HEART RATE: 59 BPM | SYSTOLIC BLOOD PRESSURE: 151 MMHG | HEIGHT: 67 IN | DIASTOLIC BLOOD PRESSURE: 84 MMHG | BODY MASS INDEX: 45.99 KG/M2

## 2018-04-04 DIAGNOSIS — Z01.419 WOMEN'S ANNUAL ROUTINE GYNECOLOGICAL EXAMINATION: Primary | ICD-10-CM

## 2018-04-04 DIAGNOSIS — E11.9 TYPE 2 DIABETES MELLITUS WITHOUT COMPLICATION, WITHOUT LONG-TERM CURRENT USE OF INSULIN (HCC): Primary | ICD-10-CM

## 2018-04-04 DIAGNOSIS — Z12.4 SCREENING FOR CERVICAL CANCER: ICD-10-CM

## 2018-04-04 DIAGNOSIS — Z80.3 FAMILY HISTORY OF BREAST CANCER: ICD-10-CM

## 2018-04-04 PROCEDURE — 99213 OFFICE O/P EST LOW 20 MIN: CPT | Performed by: NURSE PRACTITIONER

## 2018-04-04 PROCEDURE — 99386 PREV VISIT NEW AGE 40-64: CPT | Performed by: OBSTETRICS & GYNECOLOGY

## 2018-04-04 RX ORDER — METFORMIN HYDROCHLORIDE 1000 MG/1
1000 TABLET, FILM COATED, EXTENDED RELEASE ORAL 2 TIMES DAILY
Qty: 60 TABLET | Refills: 5 | Status: SHIPPED | OUTPATIENT
Start: 2018-04-04 | End: 2018-04-11 | Stop reason: ALTCHOICE

## 2018-04-04 ASSESSMENT — ENCOUNTER SYMPTOMS
EYES NEGATIVE: 1
GASTROINTESTINAL NEGATIVE: 1
SORE THROAT: 0
SHORTNESS OF BREATH: 0
ABDOMINAL PAIN: 0
RESPIRATORY NEGATIVE: 1
NAUSEA: 0
CHEST TIGHTNESS: 0
COUGH: 0
DIARRHEA: 0
WHEEZING: 0

## 2018-04-06 ENCOUNTER — TELEPHONE (OUTPATIENT)
Dept: FAMILY MEDICINE CLINIC | Age: 58
End: 2018-04-06

## 2018-04-16 ENCOUNTER — PATIENT MESSAGE (OUTPATIENT)
Dept: FAMILY MEDICINE CLINIC | Age: 58
End: 2018-04-16

## 2018-04-16 ENCOUNTER — OFFICE VISIT (OUTPATIENT)
Dept: FAMILY MEDICINE CLINIC | Age: 58
End: 2018-04-16
Payer: COMMERCIAL

## 2018-04-16 VITALS
BODY MASS INDEX: 55.91 KG/M2 | SYSTOLIC BLOOD PRESSURE: 122 MMHG | TEMPERATURE: 97.7 F | HEART RATE: 68 BPM | OXYGEN SATURATION: 98 % | WEIGHT: 293 LBS | RESPIRATION RATE: 18 BRPM | DIASTOLIC BLOOD PRESSURE: 72 MMHG

## 2018-04-16 DIAGNOSIS — I10 ESSENTIAL (PRIMARY) HYPERTENSION: ICD-10-CM

## 2018-04-16 DIAGNOSIS — E11.9 TYPE 2 DIABETES MELLITUS WITHOUT COMPLICATION, WITHOUT LONG-TERM CURRENT USE OF INSULIN (HCC): Primary | ICD-10-CM

## 2018-04-16 DIAGNOSIS — N30.01 ACUTE CYSTITIS WITH HEMATURIA: ICD-10-CM

## 2018-04-16 DIAGNOSIS — K21.9 GASTROESOPHAGEAL REFLUX DISEASE WITHOUT ESOPHAGITIS: ICD-10-CM

## 2018-04-16 LAB
APPEARANCE FLUID: ABNORMAL
BILIRUBIN, POC: ABNORMAL
BLOOD URINE, POC: ABNORMAL
CLARITY, POC: ABNORMAL
COLOR, POC: YELLOW
GLUCOSE URINE, POC: ABNORMAL
KETONES, POC: ABNORMAL
LEUKOCYTE EST, POC: ABNORMAL
NITRITE, POC: ABNORMAL
PH, POC: 6.5
PROTEIN, POC: ABNORMAL
SPECIFIC GRAVITY, POC: 1.01
UROBILINOGEN, POC: 0.2

## 2018-04-16 PROCEDURE — 99214 OFFICE O/P EST MOD 30 MIN: CPT | Performed by: FAMILY MEDICINE

## 2018-04-16 RX ORDER — GLIMEPIRIDE 2 MG/1
2 TABLET ORAL EVERY MORNING
Qty: 30 TABLET | Refills: 3 | Status: SHIPPED | OUTPATIENT
Start: 2018-04-16 | End: 2018-04-30 | Stop reason: SDUPTHER

## 2018-04-16 RX ORDER — CIPROFLOXACIN 500 MG/1
500 TABLET, FILM COATED ORAL 2 TIMES DAILY
Qty: 14 TABLET | Refills: 0 | Status: SHIPPED | OUTPATIENT
Start: 2018-04-16 | End: 2018-04-23

## 2018-04-16 ASSESSMENT — ENCOUNTER SYMPTOMS
CONSTIPATION: 0
NAUSEA: 0
CHEST TIGHTNESS: 0
COUGH: 0
ANAL BLEEDING: 0
ABDOMINAL PAIN: 0
DIARRHEA: 0
SHORTNESS OF BREATH: 0

## 2018-04-30 DIAGNOSIS — E11.9 TYPE 2 DIABETES MELLITUS WITHOUT COMPLICATION, WITHOUT LONG-TERM CURRENT USE OF INSULIN (HCC): ICD-10-CM

## 2018-04-30 RX ORDER — GLIMEPIRIDE 4 MG/1
4 TABLET ORAL EVERY MORNING
Qty: 30 TABLET | Refills: 5 | Status: SHIPPED | OUTPATIENT
Start: 2018-04-30 | End: 2018-10-25

## 2018-05-03 ENCOUNTER — OFFICE VISIT (OUTPATIENT)
Dept: FAMILY MEDICINE CLINIC | Age: 58
End: 2018-05-03
Payer: COMMERCIAL

## 2018-05-03 VITALS
HEART RATE: 94 BPM | RESPIRATION RATE: 20 BRPM | TEMPERATURE: 96.7 F | WEIGHT: 293 LBS | DIASTOLIC BLOOD PRESSURE: 72 MMHG | BODY MASS INDEX: 55.44 KG/M2 | SYSTOLIC BLOOD PRESSURE: 110 MMHG | OXYGEN SATURATION: 96 %

## 2018-05-03 DIAGNOSIS — I10 ESSENTIAL (PRIMARY) HYPERTENSION: ICD-10-CM

## 2018-05-03 DIAGNOSIS — E11.9 TYPE 2 DIABETES MELLITUS WITHOUT COMPLICATION, WITHOUT LONG-TERM CURRENT USE OF INSULIN (HCC): Primary | ICD-10-CM

## 2018-05-03 PROCEDURE — 99214 OFFICE O/P EST MOD 30 MIN: CPT | Performed by: FAMILY MEDICINE

## 2018-05-18 ENCOUNTER — TELEPHONE (OUTPATIENT)
Dept: FAMILY MEDICINE CLINIC | Age: 58
End: 2018-05-18

## 2018-06-21 ENCOUNTER — OFFICE VISIT (OUTPATIENT)
Dept: NEUROLOGY | Age: 58
End: 2018-06-21
Payer: COMMERCIAL

## 2018-06-21 VITALS
WEIGHT: 293 LBS | DIASTOLIC BLOOD PRESSURE: 80 MMHG | BODY MASS INDEX: 45.99 KG/M2 | HEIGHT: 67 IN | OXYGEN SATURATION: 94 % | SYSTOLIC BLOOD PRESSURE: 125 MMHG | HEART RATE: 63 BPM

## 2018-06-21 DIAGNOSIS — Z99.89 CPAP (CONTINUOUS POSITIVE AIRWAY PRESSURE) DEPENDENCE: ICD-10-CM

## 2018-06-21 DIAGNOSIS — G47.61 PLMD (PERIODIC LIMB MOVEMENT DISORDER): ICD-10-CM

## 2018-06-21 DIAGNOSIS — G47.33 OSA (OBSTRUCTIVE SLEEP APNEA): Primary | ICD-10-CM

## 2018-06-21 PROCEDURE — 99213 OFFICE O/P EST LOW 20 MIN: CPT | Performed by: PHYSICIAN ASSISTANT

## 2018-08-08 ENCOUNTER — OFFICE VISIT (OUTPATIENT)
Dept: FAMILY MEDICINE CLINIC | Age: 58
End: 2018-08-08
Payer: COMMERCIAL

## 2018-08-08 VITALS
TEMPERATURE: 97.7 F | SYSTOLIC BLOOD PRESSURE: 114 MMHG | WEIGHT: 293 LBS | BODY MASS INDEX: 56.01 KG/M2 | OXYGEN SATURATION: 97 % | DIASTOLIC BLOOD PRESSURE: 76 MMHG | HEART RATE: 50 BPM

## 2018-08-08 DIAGNOSIS — K42.9 UMBILICAL HERNIA WITHOUT OBSTRUCTION AND WITHOUT GANGRENE: Primary | ICD-10-CM

## 2018-08-08 DIAGNOSIS — R10.33 PERIUMBILICAL ABDOMINAL PAIN: ICD-10-CM

## 2018-08-08 PROCEDURE — 99214 OFFICE O/P EST MOD 30 MIN: CPT | Performed by: CLINICAL NURSE SPECIALIST

## 2018-08-08 ASSESSMENT — ENCOUNTER SYMPTOMS
ABDOMINAL PAIN: 1
SORE THROAT: 0
BACK PAIN: 0
TROUBLE SWALLOWING: 0
ABDOMINAL DISTENTION: 0
WHEEZING: 0
COLOR CHANGE: 0
VOMITING: 0
CONSTIPATION: 0
SINUS PRESSURE: 0
DIARRHEA: 1
NAUSEA: 1
CHEST TIGHTNESS: 0
SHORTNESS OF BREATH: 0
COUGH: 0

## 2018-08-08 NOTE — PROGRESS NOTES
SUBJECTIVE:  Han Momin is a 63 y.o. who presents today for Abdominal Pain (Patient reports she is having pressure and discomfort around her navel. Patient has history of umbilical hernia.)      HPI  Ms Osmel Galvez presents today with periumbilical abdominal pain. She reports for the last 1.5 weeks she has had abdominal pain, increased gas, some urgency with BM and looser stools. She noticed increased pain after coughing the other night. Typically she could lay down and her pain would improve but this week pain is not resolved with rest.  She had chills one day, but no known fever. She underwent lap cholecystectomy last year and was noted to have a large umbilical hernia but was asymptomatic at the time. She has had some bleeding from her umbilicus this week as well, once. No urinary symptoms.      Past Medical History:   Diagnosis Date    Allergic rhinitis     Calculus of gallbladder without cholecystitis 9/27/2017    CPAP (continuous positive airway pressure) dependence     12cm    Diabetes ([address]     Hiatal hernia 9/27/2017    Hyperlipidemia     Hypertension     Hypothyroidism     Murmur     Obesity     ZOILA (obstructive sleep apnea)     AHI:  28.5 per PSG, 9/2014    PLMD (periodic limb movement disorder)     Thyroid disease     Type 2 diabetes mellitus without complication ([address]     Type II or unspecified type diabetes mellitus without mention of complication, not stated as uncontrolled      Past Surgical History:   Procedure Laterality Date    BREAST BIOPSY Right 2012    CARPAL TUNNEL RELEASE      bilateral    GALLBLADDER SURGERY      HYSTERECTOMY      NM LAP,CHOLECYSTECTOMY/GRAPH N/A 10/31/2017    CHOLECYSTECTOMY LAPAROSCOPIC performed by Prabhakar Chaudhari MD at [facility] History   Problem Relation Age of Onset    Diabetes Mother     Cancer Mother         breast    Heart Disease Father     Cancer Father         colon, over 79    Cancer Sister 43        breast    Cancer Maternal Grandmother 76        breast    Diabetes Maternal Grandmother     Heart Attack Maternal Grandfather     Hypertension Other     Elevated Lipids Other     Coronary Art Dis Other     Heart Attack Paternal Grandmother      Social History   Substance Use Topics    Smoking status: Never Smoker    Smokeless tobacco: Never Used    Alcohol use No     Current Outpatient Prescriptions   Medication Sig Dispense Refill    CARTIA  MG extended release capsule TAKE 1 CAPSULE BY MOUTH DAILY 30 capsule 2    insulin detemir (LEVEMIR FLEXTOUCH) 100 UNIT/ML injection pen Inject 20 Units into the skin nightly 5 pen 3    Multiple Vitamins-Minerals (MULTIPLE VITAMINS/WOMENS PO) Take by mouth      Insulin Pen Needle 31G X 6 MM MISC 1 each by Does not apply route daily 100 each 3    glucose blood VI test strips (PAULINE CONTOUR NEXT TEST) strip 1 each by In Vitro route 5 times daily As needed. 200 each 11    glimepiride (AMARYL) 4 MG tablet Take 1 tablet by mouth every morning 30 tablet 5    glucose blood VI test strips (ASCENSIA AUTODISC VI;ONE TOUCH ULTRA TEST VI) strip 1 each by In Vitro route daily As needed.  100 each 3    atenolol (TENORMIN) 50 MG tablet TAKE ONE-HALF TABLET BY MOUTH EVERY MORNING AND ONE-HALF TABLET IN THE EVENING 30 tablet 5    lisinopril (PRINIVIL;ZESTRIL) 2.5 MG tablet Take 1 tablet by mouth daily 30 tablet 11    levothyroxine (SYNTHROID) 112 MCG tablet Take 1 tablet by mouth daily 30 tablet 11    furosemide (LASIX) 20 MG tablet Take 1 tablet by mouth daily 30 tablet 5    fexofenadine (ALLEGRA ALLERGY) 180 MG tablet Take 180 mg by mouth daily      atorvastatin (LIPITOR) 10 MG tablet TAKE 1/2 TABLET BY MOUTH DAILY (Patient taking differently: TAKE 1/2 TABLET BY MOUTH DAILY in the evening) 15 tablet 11    pantoprazole (PROTONIX) 40 MG tablet Take 1 tablet by mouth daily 90 tablet 3    Omega-3 Fatty Acids (FISH OIL) 1000 MG CAPS Take 3,000 mg by mouth 2 times daily      aspirin 81 MG tablet Take 81 mg by mouth daily 3 days a wk       No current facility-administered medications for this visit. Allergies   Allergen Reactions    Adhesive Tape        Review of Systems   Constitutional: Positive for chills. Negative for appetite change, diaphoresis, fatigue and fever. HENT: Negative for congestion, postnasal drip, sinus pressure, sore throat and trouble swallowing. Respiratory: Negative for cough, chest tightness, shortness of breath and wheezing. Cardiovascular: Negative for chest pain and palpitations. Gastrointestinal: Positive for abdominal pain, diarrhea and nausea. Negative for abdominal distention, constipation and vomiting. Genitourinary: Negative for difficulty urinating, dysuria, frequency and urgency. Musculoskeletal: Negative for arthralgias, back pain, joint swelling and neck pain. Skin: Negative for color change and rash. Neurological: Negative for dizziness, syncope, weakness, light-headedness and headaches. Hematological: Negative for adenopathy. Psychiatric/Behavioral: Negative for confusion. The patient is not nervous/anxious. OBJECTIVE:  /76   Pulse 50   Temp 97.7 °F (36.5 °C) (Tympanic)   Wt (!) 357 lb 9.6 oz (162.2 kg)   LMP 12/01/2001   SpO2 97%   BMI 56.01 kg/m²    Physical Exam   Constitutional: She is oriented to person, place, and time. She appears well-developed and well-nourished. HENT:   Head: Normocephalic and atraumatic. Mouth/Throat: Oropharynx is clear and moist.   Eyes: Conjunctivae are normal. Pupils are equal, round, and reactive to light. Right eye exhibits no discharge. Left eye exhibits no discharge. Cardiovascular: Normal rate and regular rhythm. No murmur heard. Pulmonary/Chest: Effort normal and breath sounds normal. No respiratory distress. She has no wheezes. She has no rales. Abdominal: Soft. Bowel sounds are normal. She exhibits no distension. There is tenderness in the periumbilical area.

## 2018-08-15 ENCOUNTER — HOSPITAL ENCOUNTER (OUTPATIENT)
Dept: CT IMAGING | Age: 58
Discharge: HOME | End: 2018-08-15
Payer: COMMERCIAL

## 2018-08-15 DIAGNOSIS — N28.89 RENAL MASS: ICD-10-CM

## 2018-08-15 DIAGNOSIS — K42.9 UMBILICAL HERNIA WITHOUT OBSTRUCTION AND WITHOUT GANGRENE: ICD-10-CM

## 2018-08-15 DIAGNOSIS — R10.33 PERIUMBILICAL ABDOMINAL PAIN: ICD-10-CM

## 2018-08-15 DIAGNOSIS — E11.42 WELL CONTROLLED TYPE 2 DIABETES MELLITUS WITH PERIPHERAL NEUROPATHY (HCC): ICD-10-CM

## 2018-08-15 LAB
ALBUMIN SERPL-MCNC: 4 G/DL (ref 3.5–5.2)
ALP BLD-CCNC: 72 U/L (ref 35–104)
ALT SERPL-CCNC: 18 U/L (ref 5–33)
ANION GAP SERPL CALCULATED.3IONS-SCNC: 21 MMOL/L (ref 7–19)
AST SERPL-CCNC: 18 U/L (ref 5–32)
BILIRUB SERPL-MCNC: 0.6 MG/DL (ref 0.2–1.2)
BUN BLDV-MCNC: 14 MG/DL (ref 6–20)
CALCIUM SERPL-MCNC: 9.4 MG/DL (ref 8.6–10)
CHLORIDE BLD-SCNC: 103 MMOL/L (ref 98–111)
CO2: 21 MMOL/L (ref 22–29)
CREAT SERPL-MCNC: 0.8 MG/DL (ref 0.5–0.9)
CREATININE URINE: 33.6 MG/DL (ref 4.2–622)
GFR NON-AFRICAN AMERICAN: >60
GLUCOSE BLD-MCNC: 118 MG/DL (ref 74–109)
HBA1C MFR BLD: 7.3 % (ref 4–6)
MICROALBUMIN UR-MCNC: <1.2 MG/DL (ref 0–19)
MICROALBUMIN/CREAT UR-RTO: NORMAL MG/G
POTASSIUM SERPL-SCNC: 4 MMOL/L (ref 3.5–5)
SODIUM BLD-SCNC: 145 MMOL/L (ref 136–145)
TOTAL PROTEIN: 7.2 G/DL (ref 6.6–8.7)

## 2018-08-15 PROCEDURE — 74177 CT ABD & PELVIS W/CONTRAST: CPT

## 2018-08-15 PROCEDURE — 6360000004 HC RX CONTRAST MEDICATION: Performed by: CLINICAL NURSE SPECIALIST

## 2018-08-15 RX ADMIN — IOPAMIDOL 75 ML: 755 INJECTION, SOLUTION INTRAVENOUS at 09:58

## 2018-08-20 ENCOUNTER — OFFICE VISIT (OUTPATIENT)
Dept: FAMILY MEDICINE CLINIC | Age: 58
End: 2018-08-20
Payer: COMMERCIAL

## 2018-08-20 VITALS
DIASTOLIC BLOOD PRESSURE: 86 MMHG | TEMPERATURE: 96.8 F | BODY MASS INDEX: 55.44 KG/M2 | SYSTOLIC BLOOD PRESSURE: 124 MMHG | RESPIRATION RATE: 18 BRPM | OXYGEN SATURATION: 94 % | HEART RATE: 74 BPM | WEIGHT: 293 LBS

## 2018-08-20 DIAGNOSIS — E11.9 TYPE 2 DIABETES MELLITUS WITHOUT COMPLICATION, WITHOUT LONG-TERM CURRENT USE OF INSULIN (HCC): Primary | ICD-10-CM

## 2018-08-20 DIAGNOSIS — K21.9 GASTROESOPHAGEAL REFLUX DISEASE WITHOUT ESOPHAGITIS: ICD-10-CM

## 2018-08-20 DIAGNOSIS — E78.01 FAMILIAL HYPERCHOLESTEROLEMIA: ICD-10-CM

## 2018-08-20 DIAGNOSIS — L84 CALLUS: ICD-10-CM

## 2018-08-20 DIAGNOSIS — E03.9 PRIMARY HYPOTHYROIDISM: ICD-10-CM

## 2018-08-20 DIAGNOSIS — E11.42 TYPE 2 DIABETES MELLITUS WITH DIABETIC POLYNEUROPATHY, WITHOUT LONG-TERM CURRENT USE OF INSULIN (HCC): ICD-10-CM

## 2018-08-20 DIAGNOSIS — E11.42 DIABETIC PERIPHERAL NEUROPATHY (HCC): ICD-10-CM

## 2018-08-20 DIAGNOSIS — I10 ESSENTIAL (PRIMARY) HYPERTENSION: ICD-10-CM

## 2018-08-20 PROCEDURE — 99214 OFFICE O/P EST MOD 30 MIN: CPT | Performed by: FAMILY MEDICINE

## 2018-08-25 PROBLEM — E11.42 TYPE 2 DIABETES MELLITUS WITH DIABETIC POLYNEUROPATHY, WITHOUT LONG-TERM CURRENT USE OF INSULIN (HCC): Status: ACTIVE | Noted: 2018-03-20

## 2018-08-29 ENCOUNTER — OFFICE VISIT (OUTPATIENT)
Dept: SURGERY | Age: 58
End: 2018-08-29
Payer: COMMERCIAL

## 2018-08-29 VITALS
HEIGHT: 67 IN | SYSTOLIC BLOOD PRESSURE: 136 MMHG | BODY MASS INDEX: 45.99 KG/M2 | DIASTOLIC BLOOD PRESSURE: 76 MMHG | WEIGHT: 293 LBS

## 2018-08-29 DIAGNOSIS — K42.9 UMBILICAL HERNIA WITHOUT OBSTRUCTION AND WITHOUT GANGRENE: Primary | ICD-10-CM

## 2018-08-29 PROCEDURE — 99213 OFFICE O/P EST LOW 20 MIN: CPT | Performed by: PHYSICIAN ASSISTANT

## 2018-08-30 ENCOUNTER — OFFICE VISIT (OUTPATIENT)
Dept: UROLOGY | Age: 58
End: 2018-08-30
Payer: COMMERCIAL

## 2018-08-30 ENCOUNTER — TELEPHONE (OUTPATIENT)
Dept: SURGERY | Age: 58
End: 2018-08-30

## 2018-08-30 VITALS
HEIGHT: 67 IN | HEART RATE: 53 BPM | BODY MASS INDEX: 45.99 KG/M2 | WEIGHT: 293 LBS | DIASTOLIC BLOOD PRESSURE: 79 MMHG | SYSTOLIC BLOOD PRESSURE: 134 MMHG | TEMPERATURE: 97.1 F

## 2018-08-30 DIAGNOSIS — Q63.1: ICD-10-CM

## 2018-08-30 DIAGNOSIS — N28.89 LEFT RENAL MASS: Primary | ICD-10-CM

## 2018-08-30 DIAGNOSIS — Z80.51 FAMILY HISTORY OF KIDNEY CANCER: ICD-10-CM

## 2018-08-30 PROCEDURE — 99244 OFF/OP CNSLTJ NEW/EST MOD 40: CPT | Performed by: UROLOGY

## 2018-08-30 ASSESSMENT — ENCOUNTER SYMPTOMS
HEARTBURN: 0
DOUBLE VISION: 0
BACK PAIN: 1
SORE THROAT: 0
SHORTNESS OF BREATH: 0
NAUSEA: 0
BLURRED VISION: 0
WHEEZING: 0

## 2018-08-30 NOTE — PROGRESS NOTES
Social History    Marital status:      Spouse name: N/A    Number of children: N/A    Years of education: N/A     Social History Main Topics    Smoking status: Never Smoker    Smokeless tobacco: Never Used    Alcohol use No    Drug use: No    Sexual activity: Yes     Other Topics Concern    None     Social History Narrative    None       Family History   Problem Relation Age of Onset    Diabetes Mother     Cancer Mother         breast    Heart Disease Father     Cancer Father         colon, over 79    Cancer Sister 43        breast    Cancer Maternal Grandmother 76        breast    Diabetes Maternal Grandmother     Heart Attack Maternal Grandfather     Hypertension Other     Elevated Lipids Other     Coronary Art Dis Other     Heart Attack Paternal Grandmother        REVIEW OF SYSTEMS:  Review of Systems   Constitutional: Negative for chills and fever. HENT: Negative for congestion and sore throat. Eyes: Negative for blurred vision and double vision. Respiratory: Negative for shortness of breath and wheezing. Cardiovascular: Negative for chest pain and palpitations. Gastrointestinal: Negative for heartburn and nausea. Genitourinary: Negative for dysuria, flank pain, frequency, hematuria and urgency. Musculoskeletal: Positive for back pain. Negative for falls and neck pain. Skin: Negative for itching and rash. Neurological: Negative for dizziness and tingling. Endo/Heme/Allergies: Does not bruise/bleed easily. Psychiatric/Behavioral: The patient does not have insomnia. PHYSICAL EXAM:  /79   Pulse 53   Temp 97.1 °F (36.2 °C) (Temporal)   Ht 5' 7\" (1.702 m)   Wt (!) 352 lb (159.7 kg)   LMP 12/01/2001   BMI 55.13 kg/m²   Physical Exam   Constitutional: She is oriented to person, place, and time. She appears well-developed and well-nourished. Obese   HENT:   Head: Normocephalic and atraumatic.    Eyes: Pupils are equal, round, and reactive to light. Conjunctivae and EOM are normal. No scleral icterus. Neck: Normal range of motion. Neck supple. Cardiovascular: Normal rate, regular rhythm and intact distal pulses. Pulmonary/Chest: Effort normal and breath sounds normal. No respiratory distress. She exhibits no tenderness. Abdominal: Soft. She exhibits no distension and no mass. There is no tenderness. A hernia is present. Hernia confirmed positive in the ventral area. Musculoskeletal: Normal range of motion. She exhibits no edema or tenderness. Lymphadenopathy:     She has no cervical adenopathy. Neurological: She is alert and oriented to person, place, and time. Skin: Skin is warm and dry. Psychiatric: She has a normal mood and affect. Her behavior is normal.   Vitals reviewed. DATA:  CMP:    Lab Results   Component Value Date     08/15/2018    K 4.0 08/15/2018     08/15/2018    CO2 21 08/15/2018    BUN 14 08/15/2018    CREATININE 0.8 08/15/2018    LABGLOM >60 08/15/2018    GLUCOSE 118 08/15/2018    PROT 7.2 08/15/2018    LABALBU 4.0 08/15/2018    CALCIUM 9.4 08/15/2018    BILITOT 0.6 08/15/2018    ALKPHOS 72 08/15/2018    AST 18 08/15/2018    ALT 18 08/15/2018     No results found for this visit on 08/30/18. No results found for: PSA          IMAGING:   I reviewed the CT scan done with contrast done 08/15/18 this was a single phase contrasted study it does show a what appears to be her hypertrophied lobulation of the upper pole of the left kidney. This is not well demarcated from the rest renal parenchyma is isointense comparative dresses of the renal parenchyma and is centrally located laterally on the coronal images appears to be consistent with hypertrophied 5 lobulation not a true renal mass however this disease be further characterized with a CT scan for renal mass protocol.     1. Left renal mass  Probably just   hypertrophied  lobulation of normal renal parenchyma and not a true mass however this disease be

## 2018-08-30 NOTE — TELEPHONE ENCOUNTER
Patient calls today stating she met with Dr Lyssa Ventura and it is okay to proceed with hernia surgery    Cc:  Enrique Sandoval PA-C

## 2018-09-07 DIAGNOSIS — K44.9 HIATAL HERNIA: ICD-10-CM

## 2018-09-10 RX ORDER — PANTOPRAZOLE SODIUM 40 MG/1
40 TABLET, DELAYED RELEASE ORAL DAILY
Qty: 90 TABLET | Refills: 0 | Status: SHIPPED | OUTPATIENT
Start: 2018-09-10 | End: 2018-12-05 | Stop reason: SDUPTHER

## 2018-09-13 ENCOUNTER — HOSPITAL ENCOUNTER (OUTPATIENT)
Dept: CT IMAGING | Age: 58
Discharge: HOME | End: 2018-09-13
Payer: COMMERCIAL

## 2018-09-13 ENCOUNTER — HOSPITAL ENCOUNTER (OUTPATIENT)
Dept: PREADMISSION TESTING | Age: 58
Discharge: HOME | End: 2018-09-17
Payer: COMMERCIAL

## 2018-09-13 VITALS — WEIGHT: 293 LBS | HEIGHT: 67 IN | BODY MASS INDEX: 45.99 KG/M2

## 2018-09-13 DIAGNOSIS — Q63.1: ICD-10-CM

## 2018-09-13 DIAGNOSIS — Z80.51 FAMILY HISTORY OF KIDNEY CANCER: ICD-10-CM

## 2018-09-13 DIAGNOSIS — N28.89 LEFT RENAL MASS: ICD-10-CM

## 2018-09-13 LAB
BASOPHILS ABSOLUTE: 0 K/UL (ref 0–0.2)
BASOPHILS RELATIVE PERCENT: 0.6 % (ref 0–1)
EOSINOPHILS ABSOLUTE: 0.1 K/UL (ref 0–0.6)
EOSINOPHILS RELATIVE PERCENT: 2 % (ref 0–5)
HCT VFR BLD CALC: 42.5 % (ref 37–47)
HEMOGLOBIN: 13.5 G/DL (ref 12–16)
LYMPHOCYTES ABSOLUTE: 1.6 K/UL (ref 1.1–4.5)
LYMPHOCYTES RELATIVE PERCENT: 23.1 % (ref 20–40)
MCH RBC QN AUTO: 28.6 PG (ref 27–31)
MCHC RBC AUTO-ENTMCNC: 31.8 G/DL (ref 33–37)
MCV RBC AUTO: 90 FL (ref 81–99)
MONOCYTES ABSOLUTE: 0.4 K/UL (ref 0–0.9)
MONOCYTES RELATIVE PERCENT: 5.4 % (ref 0–10)
NEUTROPHILS ABSOLUTE: 4.7 K/UL (ref 1.5–7.5)
NEUTROPHILS RELATIVE PERCENT: 68.6 % (ref 50–65)
PDW BLD-RTO: 13.4 % (ref 11.5–14.5)
PLATELET # BLD: 192 K/UL (ref 130–400)
PMV BLD AUTO: 11.6 FL (ref 9.4–12.3)
RBC # BLD: 4.72 M/UL (ref 4.2–5.4)
WBC # BLD: 6.8 K/UL (ref 4.8–10.8)

## 2018-09-13 PROCEDURE — 6360000004 HC RX CONTRAST MEDICATION: Performed by: UROLOGY

## 2018-09-13 PROCEDURE — 93005 ELECTROCARDIOGRAM TRACING: CPT

## 2018-09-13 PROCEDURE — 74178 CT ABD&PLV WO CNTR FLWD CNTR: CPT

## 2018-09-13 PROCEDURE — 87081 CULTURE SCREEN ONLY: CPT

## 2018-09-13 PROCEDURE — 85025 COMPLETE CBC W/AUTO DIFF WBC: CPT

## 2018-09-13 RX ADMIN — IOPAMIDOL 75 ML: 755 INJECTION, SOLUTION INTRAVENOUS at 09:20

## 2018-09-14 LAB
EKG P AXIS: 0 DEGREES
EKG P-R INTERVAL: 152 MS
EKG Q-T INTERVAL: 498 MS
EKG QRS DURATION: 102 MS
EKG QTC CALCULATION (BAZETT): 481 MS
EKG T AXIS: 11 DEGREES
MRSA CULTURE ONLY: NORMAL

## 2018-09-14 RX ORDER — ATENOLOL 50 MG/1
TABLET ORAL
Qty: 30 TABLET | Refills: 4 | Status: SHIPPED | OUTPATIENT
Start: 2018-09-14 | End: 2019-02-16 | Stop reason: SDUPTHER

## 2018-09-17 ENCOUNTER — OFFICE VISIT (OUTPATIENT)
Dept: UROLOGY | Age: 58
End: 2018-09-17
Payer: COMMERCIAL

## 2018-09-17 VITALS — TEMPERATURE: 98.1 F | WEIGHT: 293 LBS | BODY MASS INDEX: 45.99 KG/M2 | HEIGHT: 67 IN

## 2018-09-17 DIAGNOSIS — N28.89 LEFT RENAL MASS: ICD-10-CM

## 2018-09-17 DIAGNOSIS — N28.89 LEFT RENAL MASS: Primary | ICD-10-CM

## 2018-09-17 LAB
APTT: 27.8 SEC (ref 26–36.2)
BACTERIA URINE, POC: ABNORMAL
BILIRUBIN URINE: 0 MG/DL
BLOOD, URINE: POSITIVE
CASTS URINE, POC: 0
CLARITY: CLEAR
COLOR: YELLOW
CRYSTALS URINE, POC: 0
EPI CELLS URINE, POC: 4
GLUCOSE URINE: ABNORMAL
INR BLD: 0.93 (ref 0.88–1.18)
KETONES, URINE: NEGATIVE
LEUKOCYTE EST, POC: ABNORMAL
NITRITE, URINE: NEGATIVE
PH UA: 6.5 (ref 4.5–8)
PROTEIN UA: NEGATIVE
PROTHROMBIN TIME: 12.4 SEC (ref 12–14.6)
RBC URINE, POC: 0
SPECIFIC GRAVITY UA: 1.02 (ref 1–1.03)
UROBILINOGEN, URINE: NORMAL
WBC URINE, POC: 4
YEAST URINE, POC: 0

## 2018-09-17 PROCEDURE — 81001 URINALYSIS AUTO W/SCOPE: CPT | Performed by: UROLOGY

## 2018-09-17 PROCEDURE — 99214 OFFICE O/P EST MOD 30 MIN: CPT | Performed by: UROLOGY

## 2018-09-17 ASSESSMENT — ENCOUNTER SYMPTOMS
SHORTNESS OF BREATH: 0
BLURRED VISION: 0
DOUBLE VISION: 0
NAUSEA: 0
HEARTBURN: 0
SORE THROAT: 0
WHEEZING: 0

## 2018-09-17 NOTE — LETTER
[facility]  [address] [place] [address], [address] [address]  Phone: [phone]  Fax: [phone]    Catarina Landaverde MD        September 20, 2018     Jenny Cerda MD  [address] [place] [address] [facility]      Patient: Lubna Coronel   MR Number: [id number]   YOB: 1960   Date of Visit: 9/17/2018       Dear Provider: Thank you for referring Lubna Coronel to me for evaluation. Below are the relevant portions of my assessment and plan of care. If you have questions, please do not hesitate to call me. I look forward to following [place] along with you.     Sincerely,        Catarina Landaverde MD

## 2018-09-17 NOTE — PROGRESS NOTES
Jian Wright is a 63 y.o. female who presents today   Chief Complaint   Patient presents with    Follow-up     I'm here for f/u on left renal mass with xray study     Renal mass:  The patient is here for evaluation of Left Renal Mass(es) that was diagnosed 1  month(s). The mass is  solid. The mass is not cystic. The mass is not fat containing. The mass is not simple. The mass is not complex appears to be very homogeneous and not well demarcated from the normal renal parenchyma except for an early arterial phase is. . The mass does enhance with contrast on imaging studies. The mass can be described as 3.8 x 3.3 x 3.1 cm center of a discrete oval area centrally located in the upper pole the left kidney . The Patient has has had a previous biopsy showing this mass is   Malignant consistent with renal cell carcinoma this was done after this visit therefore we made the documentation pending this was done on September 19. . The patient does not have  associated symptoms of flank pain,  The patient  does not have gross hematuria. The patient  does not have weight loss, and  does not have  bone pain. The  Patient has a ventral hernia which prompted the CT scan and had seen Dr. Yazmin Rahman for plans for repair of a ventral hernia. I spoke with Dr. Yazmin Rahman this is been canceled given the findings of renal mass.          Past Medical History:   Diagnosis Date    Allergic rhinitis     Ankle fracture     3 year ago; increasing difficulty walking; has bone fragments    Arthritis     sees dr. Rosemary Steinberg as ambulation aid     right foot per dr. Dangelo Mercado    CPAP (continuous positive airway pressure) dependence     12cm    Diabetes ([address]     Hiatal hernia 9/27/2017    Hyperlipidemia     Hypertension     Hypothyroidism     Murmur     Obesity     ZOILA (obstructive sleep apnea)     AHI:  28.5 per PSG, 9/2014    PLMD (periodic limb movement disorder)     Renal mass     cat scan done 9/13/18; to see dr. Dagoberto Crews coming up Negative for shortness of breath and wheezing. Cardiovascular: Negative for chest pain and palpitations. Gastrointestinal: Negative for heartburn and nausea. Genitourinary: Negative for dysuria, flank pain, frequency, hematuria and urgency. Musculoskeletal: Negative for falls and neck pain. Skin: Negative for itching and rash. Neurological: Negative for dizziness and tingling. Endo/Heme/Allergies: Does not bruise/bleed easily. Psychiatric/Behavioral: The patient does not have insomnia. PHYSICAL EXAM:  Temp 98.1 °F (36.7 °C) (Temporal)   Ht 5' 7\" (1.702 m)   Wt (!) 355 lb (161 kg)   LMP 12/01/2001   BMI 55.60 kg/m²   Physical Exam   Constitutional: She is oriented to person, place, and time. She appears well-developed and well-nourished. No distress. Obese   HENT:   Head: Normocephalic and atraumatic. Eyes: EOM are normal. No scleral icterus. Neck: Neck supple. Cardiovascular: Normal rate, regular rhythm and intact distal pulses. Pulmonary/Chest: Effort normal. No respiratory distress. Abdominal: Soft. Bowel sounds are normal. She exhibits no distension and no mass. There is no tenderness. A hernia is present. Hernia confirmed positive in the ventral area (right at and below the umbilicus). Hernia confirmed negative in the right inguinal area and confirmed negative in the left inguinal area. Genitourinary: Rectum normal and vagina normal. There is no lesion on the right labia. There is no lesion on the left labia. Genitourinary Comments: Adnexa, cervix, uterus surgically abscent   Neurological: She is alert and oriented to person, place, and time. Skin: Skin is warm and dry. Psychiatric: She has a normal mood and affect. Her behavior is normal.   Nursing note and vitals reviewed.           DATA:  CMP:    Lab Results   Component Value Date     08/15/2018    K 4.0 08/15/2018     08/15/2018    CO2 21 08/15/2018    BUN 14 08/15/2018    CREATININE 0.8 08/15/2018 MARTINEZ ROBERTS               Completed:     09/20/2018  Perform Phys: Mignondimitris Jael    FINAL DIAGNOSIS:    Kidney, left renal fine-needle core biopsy with touch imprints: Renal  cell carcinoma, conventional/clear cell type, Elliott nuclear grade 2. COMMENT:   The histologic grade of the tumor is based upon a relatively  limited sample and may change upon receipt of the final excision  specimen.      CBG/CBG    1. Left renal mass  Suspicious for renal cell carcinoma however I felt a biopsy would be helpful as this does not have the typical appearance because should this be benign she go forward with her ventral hernia repair we can watch it however after this visit she did have a biopsy done September 19 which confirmed renal cell carcinoma therefore she was contacted this is a telephone note and she'll be scheduled for a left hand-assisted laparoscopic nephrectomy. I've also discussed with Dr. Davis Love previously that he does not want to do the ventral hernia repair at the same time so he'll be contacted to postpone this. She will need to see me for preop visit to go over the procedure as well as informed consent regarding the surgery. She was contacted in these regards and this will be arranged  - POCT Urinalysis Dipstick w/ Micro (Auto)  - Protime-INR; Future  - APTT; Future      Orders Placed This Encounter   Procedures    Protime-INR     Standing Status:   Future     Number of Occurrences:   1     Standing Expiration Date:   9/17/2019     Order Specific Question:   Daily Coumadin Dose? Answer:   0    APTT     Standing Status:   Future     Number of Occurrences:   1     Standing Expiration Date:   9/17/2019     Order Specific Question:   Daily Heparin Dose? Answer:   0    POCT Urinalysis Dipstick w/ Micro (Auto)        Return for PT to be scheduled for Surgery.

## 2018-09-18 ENCOUNTER — TELEPHONE (OUTPATIENT)
Dept: UROLOGY | Age: 58
End: 2018-09-18

## 2018-09-18 NOTE — TELEPHONE ENCOUNTER
Per Dr Moris Sloan orders I scheduled pt for a ct guided renal biopsy tomorrow( 9/19/18) at 9:00 . I called patient and let her know to register at outpatient @ 8:30 and nothing to eat or drink after midnight. Pt voiced understanding. I also let her know that pending the results of the bx Dr April Peters will postpone her hernia surgery.

## 2018-09-18 NOTE — TELEPHONE ENCOUNTER
Patient left voicemail wanting you to call her to go over meds to stop taking prior to bx. Wants to know if its the same as what she was told for sx.  Please call

## 2018-09-19 ENCOUNTER — HOSPITAL ENCOUNTER (OUTPATIENT)
Dept: CT IMAGING | Age: 58
Discharge: HOME | End: 2018-09-19
Payer: COMMERCIAL

## 2018-09-19 VITALS
WEIGHT: 293 LBS | OXYGEN SATURATION: 100 % | TEMPERATURE: 97.8 F | SYSTOLIC BLOOD PRESSURE: 118 MMHG | RESPIRATION RATE: 18 BRPM | DIASTOLIC BLOOD PRESSURE: 55 MMHG | HEART RATE: 51 BPM | BODY MASS INDEX: 54.82 KG/M2

## 2018-09-19 DIAGNOSIS — N28.89 KIDNEY MASS: Primary | ICD-10-CM

## 2018-09-19 DIAGNOSIS — N28.89 KIDNEY MASS: ICD-10-CM

## 2018-09-19 LAB
APTT: 28.9 SEC (ref 26–36.2)
INR BLD: 0.92 (ref 0.88–1.18)
PLATELET # BLD: 204 K/UL (ref 130–400)
PROTHROMBIN TIME: 12.3 SEC (ref 12–14.6)

## 2018-09-19 PROCEDURE — 88333 PATH CONSLTJ SURG CYTO XM 1: CPT

## 2018-09-19 PROCEDURE — 88329 PATH CONSLTJ DRG SURG: CPT

## 2018-09-19 PROCEDURE — 88334 PATH CONSLTJ SURG CYTO XM EA: CPT

## 2018-09-19 PROCEDURE — 6360000002 HC RX W HCPCS: Performed by: RADIOLOGY

## 2018-09-19 PROCEDURE — 77012 CT SCAN FOR NEEDLE BIOPSY: CPT

## 2018-09-19 PROCEDURE — 85730 THROMBOPLASTIN TIME PARTIAL: CPT

## 2018-09-19 PROCEDURE — 88305 TISSUE EXAM BY PATHOLOGIST: CPT

## 2018-09-19 PROCEDURE — 85610 PROTHROMBIN TIME: CPT

## 2018-09-19 PROCEDURE — 85049 AUTOMATED PLATELET COUNT: CPT

## 2018-09-19 RX ORDER — FENTANYL CITRATE 50 UG/ML
INJECTION, SOLUTION INTRAMUSCULAR; INTRAVENOUS
Status: COMPLETED | OUTPATIENT
Start: 2018-09-19 | End: 2018-09-19

## 2018-09-19 RX ORDER — MIDAZOLAM HYDROCHLORIDE 1 MG/ML
INJECTION INTRAMUSCULAR; INTRAVENOUS
Status: DISPENSED
Start: 2018-09-19 | End: 2018-09-19

## 2018-09-19 RX ORDER — SODIUM CHLORIDE 0.9 % (FLUSH) 0.9 %
10 SYRINGE (ML) INJECTION PRN
Status: DISCONTINUED | OUTPATIENT
Start: 2018-09-19 | End: 2018-09-21 | Stop reason: HOSPADM

## 2018-09-19 RX ORDER — FENTANYL CITRATE 50 UG/ML
INJECTION, SOLUTION INTRAMUSCULAR; INTRAVENOUS
Status: DISPENSED
Start: 2018-09-19 | End: 2018-09-19

## 2018-09-19 RX ORDER — MIDAZOLAM HYDROCHLORIDE 1 MG/ML
INJECTION INTRAMUSCULAR; INTRAVENOUS
Status: COMPLETED | OUTPATIENT
Start: 2018-09-19 | End: 2018-09-19

## 2018-09-19 RX ADMIN — MIDAZOLAM HYDROCHLORIDE 1 MG: 1 INJECTION, SOLUTION INTRAMUSCULAR; INTRAVENOUS at 10:48

## 2018-09-19 RX ADMIN — FENTANYL CITRATE 100 MCG: 50 INJECTION INTRAMUSCULAR; INTRAVENOUS at 10:48

## 2018-09-19 ASSESSMENT — PAIN - FUNCTIONAL ASSESSMENT
PAIN_FUNCTIONAL_ASSESSMENT: 0-10

## 2018-09-19 ASSESSMENT — PAIN SCALES - GENERAL: PAINLEVEL_OUTOF10: 0

## 2018-09-19 NOTE — PROGRESS NOTES
Patient here for CT guided renal biopsy. Patient oriented to room and call light and procedure confirmed and explained. Patient labs drawn from iv and sent awaiting results. kaykay score completed and allergies verified. Activity level for home instruction reviewed with the patient and she verbalized understanding.  here and left to eat will return.   ANIBAL TRAVIS

## 2018-09-19 NOTE — PROGRESS NOTES
Patient tolerated procedure well. Patient back to room and connected to bedside monitor that is monitored at nurses desk. Patient  at bedside and activity level has been instructed to patient and spouse and both verbalized understanding. Vitals are stable and puncture site without complications. Report given to felipe at bedside.   ANIBAL TRAVIS

## 2018-09-20 ENCOUNTER — TELEPHONE (OUTPATIENT)
Dept: UROLOGY | Age: 58
End: 2018-09-20

## 2018-09-20 ENCOUNTER — TELEPHONE (OUTPATIENT)
Dept: SURGERY | Age: 58
End: 2018-09-20

## 2018-09-20 DIAGNOSIS — C64.2 RENAL CELL CARCINOMA OF LEFT KIDNEY (HCC): ICD-10-CM

## 2018-09-20 DIAGNOSIS — N28.89 LEFT RENAL MASS: Primary | ICD-10-CM

## 2018-09-21 ENCOUNTER — TELEPHONE (OUTPATIENT)
Dept: UROLOGY | Age: 58
End: 2018-09-21

## 2018-09-24 ENCOUNTER — OFFICE VISIT (OUTPATIENT)
Dept: UROLOGY | Age: 58
End: 2018-09-24
Payer: COMMERCIAL

## 2018-09-24 ENCOUNTER — HOSPITAL ENCOUNTER (OUTPATIENT)
Dept: GENERAL RADIOLOGY | Age: 58
Discharge: HOME | End: 2018-09-24
Payer: COMMERCIAL

## 2018-09-24 VITALS — TEMPERATURE: 96.6 F | BODY MASS INDEX: 45.99 KG/M2 | WEIGHT: 293 LBS | HEIGHT: 67 IN

## 2018-09-24 DIAGNOSIS — N28.89 LEFT RENAL MASS: ICD-10-CM

## 2018-09-24 DIAGNOSIS — C64.2 RENAL CELL CARCINOMA OF LEFT KIDNEY (HCC): Primary | ICD-10-CM

## 2018-09-24 PROCEDURE — 71046 X-RAY EXAM CHEST 2 VIEWS: CPT

## 2018-09-24 PROCEDURE — 99214 OFFICE O/P EST MOD 30 MIN: CPT | Performed by: UROLOGY

## 2018-09-24 ASSESSMENT — ENCOUNTER SYMPTOMS
HEARTBURN: 0
BLURRED VISION: 0
DOUBLE VISION: 0
WHEEZING: 0
SORE THROAT: 0
NAUSEA: 0
SHORTNESS OF BREATH: 0

## 2018-09-24 NOTE — PROGRESS NOTES
tablet Take 180 mg by mouth daily      Omega-3 Fatty Acids (FISH OIL) 1000 MG CAPS Take 3,000 mg by mouth 2 times daily      aspirin 81 MG tablet Take 81 mg by mouth daily 3 days a wk      atorvastatin (LIPITOR) 10 MG tablet TAKE 1/2 TABLET BY MOUTH DAILY 15 tablet 5     No current facility-administered medications for this visit. Allergies   Allergen Reactions    Adhesive Tape Other (See Comments)     Post op incision infection    Dermabond Other (See Comments)     Post op incision infection       Social History     Social History    Marital status:      Spouse name: N/A    Number of children: N/A    Years of education: N/A     Social History Main Topics    Smoking status: Never Smoker    Smokeless tobacco: Never Used    Alcohol use No    Drug use: No    Sexual activity: Yes     Other Topics Concern    None     Social History Narrative    None       Family History   Problem Relation Age of Onset    Diabetes Mother     Cancer Mother         breast    Heart Disease Father     Cancer Father         colon, over 79    Cancer Sister 43        breast    Cancer Maternal Grandmother 76        breast    Diabetes Maternal Grandmother     Heart Attack Maternal Grandfather     Hypertension Other     Elevated Lipids Other     Coronary Art Dis Other     Heart Attack Paternal Grandmother        REVIEW OF SYSTEMS:  Review of Systems   Constitutional: Negative for chills and fever. HENT: Negative for congestion and sore throat. Eyes: Negative for blurred vision and double vision. Respiratory: Negative for shortness of breath and wheezing. Cardiovascular: Negative for chest pain and palpitations. Gastrointestinal: Negative for heartburn and nausea. Genitourinary: Negative for dysuria, flank pain, frequency, hematuria and urgency. Musculoskeletal: Negative for falls and neck pain. Skin: Negative for itching and rash. Neurological: Negative for dizziness and tingling. 08/15/2018    BILITOT 0.6 08/15/2018    ALKPHOS 72 08/15/2018    AST 18 08/15/2018    ALT 18 08/15/2018       IMAGING:   CT scan for renal mass protocol done on 16 deftly shows a area of central located upper pole left kidney concerning for enhancing solid mass of 3.8 x 3.3 x 3.1 cm. It does not have the typical radiographic appearance of a renal cell carcinoma however as on the later phases almost homogeneous or almost similar contrast enhancement is a surrounding parenchyma.     Therefore I ordered a CT-guided renal biopsy which was performed after this visit that I put into the record now this was done on 18. Pathology is as above below  Lyla Farrell  Department of Pathology  FINAL SURGICAL PATHOLOGY REPORT  Patient Name: Roger Machado      Accession No:  [id number]   Age Sex:   1960    58 Y F        Pt Type: I     KLCTS                                             Location:  Account #     [de-identified]                 Collected:     2018  Med Rec #      [id number]                    Received:      2018  Attend Phys:   MARTINEZ ROBERTS               Completed:     2018  Perform Phys: Chrissy Araya    FINAL DIAGNOSIS:    Kidney, left renal fine-needle core biopsy with touch imprints: Renal  cell carcinoma, conventional/clear cell type, Elliott nuclear grade 2. COMMENT:   The histologic grade of the tumor is based upon a relatively  limited sample and may change upon receipt of the final excision  specimen.      CBG/CBG     1. Renal cell carcinoma of left kidney ([address]  Biopsy proven left renal mass consistent with clear cell type renal cell carcinoma. She has normal contralateral right kidney normal creatinine and no evidence of distant metastasis therefore we'll plan for left hand-assisted laparoscopic nephrectomy.  Because she has an umbilical hernia that contains fat is in the umbilicus will make her hand port incision up high above this in the midline    We discussed the risks and complications including the possibility that we may need to convert this to an open procedure. She does understand that her obesity and of itself does complicate the procedure. And make sure at increased risk for perioperative and postoperative complications such as pneumonia and DVT wound infection, pulmonary or cardiac issues. We discussed the risks of trocar injury injury to adjacent organs and need for transfusion etc. she understands and agrees to proceed      No orders of the defined types were placed in this encounter. Return for PT to be scheduled for Surgery.

## 2018-09-25 RX ORDER — ATORVASTATIN CALCIUM 10 MG/1
TABLET, FILM COATED ORAL
Qty: 15 TABLET | Refills: 5 | Status: SHIPPED | OUTPATIENT
Start: 2018-09-25 | End: 2019-03-01 | Stop reason: SDUPTHER

## 2018-09-27 ENCOUNTER — TELEPHONE (OUTPATIENT)
Dept: UROLOGY | Age: 58
End: 2018-09-27

## 2018-10-01 ENCOUNTER — ANESTHESIA EVENT (OUTPATIENT)
Dept: OPERATING ROOM | Age: 58
DRG: 657 | End: 2018-10-01
Payer: COMMERCIAL

## 2018-10-03 ENCOUNTER — HOSPITAL ENCOUNTER (INPATIENT)
Age: 58
LOS: 4 days | Discharge: HOME | DRG: 657 | End: 2018-10-07
Attending: UROLOGY | Admitting: UROLOGY
Payer: COMMERCIAL

## 2018-10-03 ENCOUNTER — ANESTHESIA (OUTPATIENT)
Dept: OPERATING ROOM | Age: 58
DRG: 657 | End: 2018-10-03
Payer: COMMERCIAL

## 2018-10-03 VITALS
RESPIRATION RATE: 1 BRPM | DIASTOLIC BLOOD PRESSURE: 46 MMHG | SYSTOLIC BLOOD PRESSURE: 114 MMHG | OXYGEN SATURATION: 99 % | TEMPERATURE: 97.7 F

## 2018-10-03 DIAGNOSIS — G89.18 POST-OP PAIN: Primary | ICD-10-CM

## 2018-10-03 PROBLEM — N28.89 LEFT RENAL MASS: Status: ACTIVE | Noted: 2018-10-03

## 2018-10-03 LAB
ABO/RH: NORMAL
ANION GAP SERPL CALCULATED.3IONS-SCNC: 11 MMOL/L (ref 7–19)
ANTIBODY SCREEN: NORMAL
BASOPHILS ABSOLUTE: 0 K/UL (ref 0–0.2)
BASOPHILS RELATIVE PERCENT: 0.1 % (ref 0–1)
BUN BLDV-MCNC: 9 MG/DL (ref 6–20)
CALCIUM SERPL-MCNC: 9.8 MG/DL (ref 8.6–10)
CHLORIDE BLD-SCNC: 105 MMOL/L (ref 98–111)
CO2: 26 MMOL/L (ref 22–29)
CREAT SERPL-MCNC: 0.8 MG/DL (ref 0.5–0.9)
EOSINOPHILS ABSOLUTE: 0 K/UL (ref 0–0.6)
EOSINOPHILS RELATIVE PERCENT: 0 % (ref 0–5)
GFR NON-AFRICAN AMERICAN: >60
GLUCOSE BLD-MCNC: 136 MG/DL (ref 74–109)
GLUCOSE BLD-MCNC: 156 MG/DL (ref 70–99)
HCT VFR BLD CALC: 40.3 % (ref 37–47)
HEMOGLOBIN: 12.8 G/DL (ref 12–16)
LYMPHOCYTES ABSOLUTE: 0.3 K/UL (ref 1.1–4.5)
LYMPHOCYTES RELATIVE PERCENT: 2.5 % (ref 20–40)
MCH RBC QN AUTO: 28.8 PG (ref 27–31)
MCHC RBC AUTO-ENTMCNC: 31.8 G/DL (ref 33–37)
MCV RBC AUTO: 90.8 FL (ref 81–99)
MONOCYTES ABSOLUTE: 0.8 K/UL (ref 0–0.9)
MONOCYTES RELATIVE PERCENT: 5.5 % (ref 0–10)
NEUTROPHILS ABSOLUTE: 12.4 K/UL (ref 1.5–7.5)
NEUTROPHILS RELATIVE PERCENT: 91.5 % (ref 50–65)
PDW BLD-RTO: 13.4 % (ref 11.5–14.5)
PERFORMED ON: ABNORMAL
PLATELET # BLD: 201 K/UL (ref 130–400)
PMV BLD AUTO: 10.8 FL (ref 9.4–12.3)
POTASSIUM SERPL-SCNC: 3.8 MMOL/L (ref 3.5–4.9)
RBC # BLD: 4.44 M/UL (ref 4.2–5.4)
SODIUM BLD-SCNC: 142 MMOL/L (ref 136–145)
TSH SERPL DL<=0.05 MIU/L-ACNC: 1.96 UIU/ML (ref 0.27–4.2)
WBC # BLD: 13.6 K/UL (ref 4.8–10.8)

## 2018-10-03 PROCEDURE — 6370000000 HC RX 637 (ALT 250 FOR IP): Performed by: UROLOGY

## 2018-10-03 PROCEDURE — 86900 BLOOD TYPING SEROLOGIC ABO: CPT

## 2018-10-03 PROCEDURE — 2780000010 HC IMPLANT OTHER: Performed by: UROLOGY

## 2018-10-03 PROCEDURE — C1760 CLOSURE DEV, VASC: HCPCS | Performed by: UROLOGY

## 2018-10-03 PROCEDURE — 2700000000 HC OXYGEN THERAPY PER DAY

## 2018-10-03 PROCEDURE — 6360000002 HC RX W HCPCS: Performed by: UROLOGY

## 2018-10-03 PROCEDURE — 2500000003 HC RX 250 WO HCPCS: Performed by: NURSE ANESTHETIST, CERTIFIED REGISTERED

## 2018-10-03 PROCEDURE — 1210000000 HC MED SURG R&B

## 2018-10-03 PROCEDURE — 85025 COMPLETE CBC W/AUTO DIFF WBC: CPT

## 2018-10-03 PROCEDURE — 6360000002 HC RX W HCPCS: Performed by: NURSE ANESTHETIST, CERTIFIED REGISTERED

## 2018-10-03 PROCEDURE — 6370000000 HC RX 637 (ALT 250 FOR IP): Performed by: ANESTHESIOLOGY

## 2018-10-03 PROCEDURE — 2709999900 HC NON-CHARGEABLE SUPPLY: Performed by: UROLOGY

## 2018-10-03 PROCEDURE — 3600000004 HC SURGERY LEVEL 4 BASE: Performed by: UROLOGY

## 2018-10-03 PROCEDURE — 88307 TISSUE EXAM BY PATHOLOGIST: CPT

## 2018-10-03 PROCEDURE — 7100000001 HC PACU RECOVERY - ADDTL 15 MIN: Performed by: UROLOGY

## 2018-10-03 PROCEDURE — 99222 1ST HOSP IP/OBS MODERATE 55: CPT | Performed by: INTERNAL MEDICINE

## 2018-10-03 PROCEDURE — 36415 COLL VENOUS BLD VENIPUNCTURE: CPT

## 2018-10-03 PROCEDURE — 3700000000 HC ANESTHESIA ATTENDED CARE: Performed by: UROLOGY

## 2018-10-03 PROCEDURE — 3700000001 HC ADD 15 MINUTES (ANESTHESIA): Performed by: UROLOGY

## 2018-10-03 PROCEDURE — 50545 LAPARO RADICAL NEPHRECTOMY: CPT | Performed by: UROLOGY

## 2018-10-03 PROCEDURE — 7100000000 HC PACU RECOVERY - FIRST 15 MIN: Performed by: UROLOGY

## 2018-10-03 PROCEDURE — 6360000002 HC RX W HCPCS: Performed by: ANESTHESIOLOGY

## 2018-10-03 PROCEDURE — 86901 BLOOD TYPING SEROLOGIC RH(D): CPT

## 2018-10-03 PROCEDURE — 2580000003 HC RX 258: Performed by: NURSE ANESTHETIST, CERTIFIED REGISTERED

## 2018-10-03 PROCEDURE — 2500000003 HC RX 250 WO HCPCS: Performed by: UROLOGY

## 2018-10-03 PROCEDURE — 86850 RBC ANTIBODY SCREEN: CPT

## 2018-10-03 PROCEDURE — 80048 BASIC METABOLIC PNL TOTAL CA: CPT

## 2018-10-03 PROCEDURE — 94664 DEMO&/EVAL PT USE INHALER: CPT

## 2018-10-03 PROCEDURE — 0TT10ZZ RESECTION OF LEFT KIDNEY, OPEN APPROACH: ICD-10-PCS | Performed by: UROLOGY

## 2018-10-03 PROCEDURE — 2580000003 HC RX 258: Performed by: UROLOGY

## 2018-10-03 PROCEDURE — 82948 REAGENT STRIP/BLOOD GLUCOSE: CPT

## 2018-10-03 PROCEDURE — 2580000003 HC RX 258: Performed by: ANESTHESIOLOGY

## 2018-10-03 PROCEDURE — 3600000014 HC SURGERY LEVEL 4 ADDTL 15MIN: Performed by: UROLOGY

## 2018-10-03 PROCEDURE — 2720000010 HC SURG SUPPLY STERILE: Performed by: UROLOGY

## 2018-10-03 PROCEDURE — 84443 ASSAY THYROID STIM HORMONE: CPT

## 2018-10-03 DEVICE — IMPLANTABLE DEVICE: Type: IMPLANTABLE DEVICE | Site: ABDOMEN | Status: FUNCTIONAL

## 2018-10-03 DEVICE — AGENT HEMSTAT 8ML FLX TIP MTRX + DISP SURGIFLO: Type: IMPLANTABLE DEVICE | Site: ABDOMEN | Status: FUNCTIONAL

## 2018-10-03 RX ORDER — ASPIRIN 81 MG/1
81 TABLET ORAL DAILY
Status: DISCONTINUED | OUTPATIENT
Start: 2018-10-04 | End: 2018-10-07 | Stop reason: HOSPADM

## 2018-10-03 RX ORDER — OXYCODONE HYDROCHLORIDE AND ACETAMINOPHEN 5; 325 MG/1; MG/1
2 TABLET ORAL EVERY 4 HOURS PRN
Status: DISCONTINUED | OUTPATIENT
Start: 2018-10-03 | End: 2018-10-07 | Stop reason: HOSPADM

## 2018-10-03 RX ORDER — SODIUM CHLORIDE, SODIUM LACTATE, POTASSIUM CHLORIDE, CALCIUM CHLORIDE 600; 310; 30; 20 MG/100ML; MG/100ML; MG/100ML; MG/100ML
INJECTION, SOLUTION INTRAVENOUS CONTINUOUS
Status: DISCONTINUED | OUTPATIENT
Start: 2018-10-03 | End: 2018-10-03

## 2018-10-03 RX ORDER — LABETALOL HYDROCHLORIDE 5 MG/ML
5 INJECTION, SOLUTION INTRAVENOUS EVERY 10 MIN PRN
Status: DISCONTINUED | OUTPATIENT
Start: 2018-10-03 | End: 2018-10-03 | Stop reason: HOSPADM

## 2018-10-03 RX ORDER — LEVOTHYROXINE SODIUM 112 UG/1
112 TABLET ORAL DAILY
Status: DISCONTINUED | OUTPATIENT
Start: 2018-10-04 | End: 2018-10-07 | Stop reason: HOSPADM

## 2018-10-03 RX ORDER — SODIUM CHLORIDE 0.9 % (FLUSH) 0.9 %
10 SYRINGE (ML) INJECTION PRN
Status: DISCONTINUED | OUTPATIENT
Start: 2018-10-03 | End: 2018-10-03 | Stop reason: HOSPADM

## 2018-10-03 RX ORDER — NICOTINE POLACRILEX 4 MG
15 LOZENGE BUCCAL PRN
Status: DISCONTINUED | OUTPATIENT
Start: 2018-10-03 | End: 2018-10-07 | Stop reason: HOSPADM

## 2018-10-03 RX ORDER — MEPERIDINE HYDROCHLORIDE 50 MG/ML
12.5 INJECTION INTRAMUSCULAR; INTRAVENOUS; SUBCUTANEOUS EVERY 5 MIN PRN
Status: DISCONTINUED | OUTPATIENT
Start: 2018-10-03 | End: 2018-10-03 | Stop reason: HOSPADM

## 2018-10-03 RX ORDER — FENTANYL CITRATE 50 UG/ML
50 INJECTION, SOLUTION INTRAMUSCULAR; INTRAVENOUS
Status: DISCONTINUED | OUTPATIENT
Start: 2018-10-03 | End: 2018-10-03 | Stop reason: HOSPADM

## 2018-10-03 RX ORDER — DEXTROSE MONOHYDRATE 50 MG/ML
100 INJECTION, SOLUTION INTRAVENOUS PRN
Status: DISCONTINUED | OUTPATIENT
Start: 2018-10-03 | End: 2018-10-07 | Stop reason: HOSPADM

## 2018-10-03 RX ORDER — PANTOPRAZOLE SODIUM 40 MG/1
40 TABLET, DELAYED RELEASE ORAL DAILY
Status: DISCONTINUED | OUTPATIENT
Start: 2018-10-03 | End: 2018-10-07 | Stop reason: HOSPADM

## 2018-10-03 RX ORDER — DEXTROSE MONOHYDRATE 25 G/50ML
12.5 INJECTION, SOLUTION INTRAVENOUS PRN
Status: DISCONTINUED | OUTPATIENT
Start: 2018-10-03 | End: 2018-10-07 | Stop reason: HOSPADM

## 2018-10-03 RX ORDER — SODIUM CHLORIDE 9 MG/ML
INJECTION, SOLUTION INTRAVENOUS CONTINUOUS
Status: DISCONTINUED | OUTPATIENT
Start: 2018-10-03 | End: 2018-10-06

## 2018-10-03 RX ORDER — ONDANSETRON 2 MG/ML
4 INJECTION INTRAMUSCULAR; INTRAVENOUS EVERY 4 HOURS PRN
Status: DISCONTINUED | OUTPATIENT
Start: 2018-10-03 | End: 2018-10-07 | Stop reason: HOSPADM

## 2018-10-03 RX ORDER — ACETAMINOPHEN 500 MG
1000 TABLET ORAL EVERY 6 HOURS PRN
Status: DISCONTINUED | OUTPATIENT
Start: 2018-10-03 | End: 2018-10-07 | Stop reason: HOSPADM

## 2018-10-03 RX ORDER — DIPHENHYDRAMINE HYDROCHLORIDE 50 MG/ML
12.5 INJECTION INTRAMUSCULAR; INTRAVENOUS
Status: DISCONTINUED | OUTPATIENT
Start: 2018-10-03 | End: 2018-10-03 | Stop reason: HOSPADM

## 2018-10-03 RX ORDER — DILTIAZEM HYDROCHLORIDE 180 MG/1
180 CAPSULE, COATED, EXTENDED RELEASE ORAL DAILY
Status: DISCONTINUED | OUTPATIENT
Start: 2018-10-04 | End: 2018-10-05

## 2018-10-03 RX ORDER — ONDANSETRON 2 MG/ML
INJECTION INTRAMUSCULAR; INTRAVENOUS PRN
Status: DISCONTINUED | OUTPATIENT
Start: 2018-10-03 | End: 2018-10-03 | Stop reason: SDUPTHER

## 2018-10-03 RX ORDER — MIDAZOLAM HYDROCHLORIDE 1 MG/ML
INJECTION INTRAMUSCULAR; INTRAVENOUS PRN
Status: DISCONTINUED | OUTPATIENT
Start: 2018-10-03 | End: 2018-10-03 | Stop reason: SDUPTHER

## 2018-10-03 RX ORDER — LISINOPRIL 2.5 MG/1
2.5 TABLET ORAL DAILY
Status: DISCONTINUED | OUTPATIENT
Start: 2018-10-03 | End: 2018-10-07 | Stop reason: HOSPADM

## 2018-10-03 RX ORDER — FUROSEMIDE 20 MG/1
20 TABLET ORAL DAILY
Status: DISCONTINUED | OUTPATIENT
Start: 2018-10-03 | End: 2018-10-07 | Stop reason: HOSPADM

## 2018-10-03 RX ORDER — DEXAMETHASONE SODIUM PHOSPHATE 10 MG/ML
INJECTION INTRAMUSCULAR; INTRAVENOUS PRN
Status: DISCONTINUED | OUTPATIENT
Start: 2018-10-03 | End: 2018-10-03 | Stop reason: SDUPTHER

## 2018-10-03 RX ORDER — MIDAZOLAM HYDROCHLORIDE 1 MG/ML
2 INJECTION INTRAMUSCULAR; INTRAVENOUS
Status: DISCONTINUED | OUTPATIENT
Start: 2018-10-03 | End: 2018-10-03 | Stop reason: HOSPADM

## 2018-10-03 RX ORDER — SUCCINYLCHOLINE CHLORIDE 20 MG/ML
INJECTION INTRAMUSCULAR; INTRAVENOUS PRN
Status: DISCONTINUED | OUTPATIENT
Start: 2018-10-03 | End: 2018-10-03 | Stop reason: SDUPTHER

## 2018-10-03 RX ORDER — ATORVASTATIN CALCIUM 10 MG/1
10 TABLET, FILM COATED ORAL DAILY
Status: DISCONTINUED | OUTPATIENT
Start: 2018-10-03 | End: 2018-10-07 | Stop reason: HOSPADM

## 2018-10-03 RX ORDER — CEFAZOLIN SODIUM 1 G/50ML
1 INJECTION, SOLUTION INTRAVENOUS EVERY 8 HOURS
Status: COMPLETED | OUTPATIENT
Start: 2018-10-03 | End: 2018-10-04

## 2018-10-03 RX ORDER — SODIUM CHLORIDE 0.9 % (FLUSH) 0.9 %
10 SYRINGE (ML) INJECTION EVERY 12 HOURS SCHEDULED
Status: DISCONTINUED | OUTPATIENT
Start: 2018-10-03 | End: 2018-10-03 | Stop reason: HOSPADM

## 2018-10-03 RX ORDER — LIDOCAINE HYDROCHLORIDE 10 MG/ML
INJECTION, SOLUTION INFILTRATION; PERINEURAL PRN
Status: DISCONTINUED | OUTPATIENT
Start: 2018-10-03 | End: 2018-10-03 | Stop reason: SDUPTHER

## 2018-10-03 RX ORDER — PROMETHAZINE HYDROCHLORIDE 25 MG/ML
6.25 INJECTION, SOLUTION INTRAMUSCULAR; INTRAVENOUS
Status: DISCONTINUED | OUTPATIENT
Start: 2018-10-03 | End: 2018-10-03 | Stop reason: HOSPADM

## 2018-10-03 RX ORDER — ROCURONIUM BROMIDE 10 MG/ML
INJECTION, SOLUTION INTRAVENOUS PRN
Status: DISCONTINUED | OUTPATIENT
Start: 2018-10-03 | End: 2018-10-03 | Stop reason: SDUPTHER

## 2018-10-03 RX ORDER — LIDOCAINE HYDROCHLORIDE 10 MG/ML
1 INJECTION, SOLUTION EPIDURAL; INFILTRATION; INTRACAUDAL; PERINEURAL
Status: DISCONTINUED | OUTPATIENT
Start: 2018-10-03 | End: 2018-10-03 | Stop reason: HOSPADM

## 2018-10-03 RX ORDER — SCOLOPAMINE TRANSDERMAL SYSTEM 1 MG/1
1 PATCH, EXTENDED RELEASE TRANSDERMAL ONCE
Status: DISCONTINUED | OUTPATIENT
Start: 2018-10-03 | End: 2018-10-06

## 2018-10-03 RX ORDER — PROPOFOL 10 MG/ML
INJECTION, EMULSION INTRAVENOUS PRN
Status: DISCONTINUED | OUTPATIENT
Start: 2018-10-03 | End: 2018-10-03 | Stop reason: SDUPTHER

## 2018-10-03 RX ORDER — HYDRALAZINE HYDROCHLORIDE 20 MG/ML
5 INJECTION INTRAMUSCULAR; INTRAVENOUS EVERY 10 MIN PRN
Status: DISCONTINUED | OUTPATIENT
Start: 2018-10-03 | End: 2018-10-03 | Stop reason: HOSPADM

## 2018-10-03 RX ORDER — CETIRIZINE HYDROCHLORIDE 10 MG/1
10 TABLET ORAL DAILY
Status: DISCONTINUED | OUTPATIENT
Start: 2018-10-03 | End: 2018-10-03

## 2018-10-03 RX ORDER — MORPHINE SULFATE/0.9% NACL/PF 1 MG/ML
4 SYRINGE (ML) INJECTION EVERY 5 MIN PRN
Status: DISCONTINUED | OUTPATIENT
Start: 2018-10-03 | End: 2018-10-03 | Stop reason: HOSPADM

## 2018-10-03 RX ORDER — METOCLOPRAMIDE HYDROCHLORIDE 5 MG/ML
10 INJECTION INTRAMUSCULAR; INTRAVENOUS
Status: DISCONTINUED | OUTPATIENT
Start: 2018-10-03 | End: 2018-10-03 | Stop reason: HOSPADM

## 2018-10-03 RX ORDER — MORPHINE SULFATE 4 MG/ML
4 INJECTION, SOLUTION INTRAMUSCULAR; INTRAVENOUS
Status: DISCONTINUED | OUTPATIENT
Start: 2018-10-03 | End: 2018-10-03 | Stop reason: HOSPADM

## 2018-10-03 RX ORDER — GLYCOPYRROLATE 0.2 MG/ML
INJECTION INTRAMUSCULAR; INTRAVENOUS PRN
Status: DISCONTINUED | OUTPATIENT
Start: 2018-10-03 | End: 2018-10-03 | Stop reason: SDUPTHER

## 2018-10-03 RX ORDER — FENTANYL CITRATE 50 UG/ML
INJECTION, SOLUTION INTRAMUSCULAR; INTRAVENOUS PRN
Status: DISCONTINUED | OUTPATIENT
Start: 2018-10-03 | End: 2018-10-03 | Stop reason: SDUPTHER

## 2018-10-03 RX ORDER — SODIUM CHLORIDE, SODIUM LACTATE, POTASSIUM CHLORIDE, CALCIUM CHLORIDE 600; 310; 30; 20 MG/100ML; MG/100ML; MG/100ML; MG/100ML
INJECTION, SOLUTION INTRAVENOUS CONTINUOUS PRN
Status: DISCONTINUED | OUTPATIENT
Start: 2018-10-03 | End: 2018-10-03 | Stop reason: SDUPTHER

## 2018-10-03 RX ORDER — ATENOLOL 50 MG/1
50 TABLET ORAL DAILY
Status: DISCONTINUED | OUTPATIENT
Start: 2018-10-04 | End: 2018-10-05

## 2018-10-03 RX ORDER — MORPHINE SULFATE/0.9% NACL/PF 1 MG/ML
2 SYRINGE (ML) INJECTION EVERY 5 MIN PRN
Status: DISCONTINUED | OUTPATIENT
Start: 2018-10-03 | End: 2018-10-03 | Stop reason: HOSPADM

## 2018-10-03 RX ORDER — SODIUM CHLORIDE 0.9 % (FLUSH) 0.9 %
10 SYRINGE (ML) INJECTION PRN
Status: DISCONTINUED | OUTPATIENT
Start: 2018-10-03 | End: 2018-10-07 | Stop reason: HOSPADM

## 2018-10-03 RX ORDER — OXYCODONE HYDROCHLORIDE AND ACETAMINOPHEN 5; 325 MG/1; MG/1
1 TABLET ORAL EVERY 4 HOURS PRN
Status: DISCONTINUED | OUTPATIENT
Start: 2018-10-03 | End: 2018-10-07 | Stop reason: HOSPADM

## 2018-10-03 RX ORDER — SODIUM CHLORIDE 0.9 % (FLUSH) 0.9 %
10 SYRINGE (ML) INJECTION EVERY 12 HOURS SCHEDULED
Status: DISCONTINUED | OUTPATIENT
Start: 2018-10-03 | End: 2018-10-07 | Stop reason: HOSPADM

## 2018-10-03 RX ADMIN — Medication 0.5 MG: at 13:16

## 2018-10-03 RX ADMIN — SODIUM CHLORIDE: 9 INJECTION, SOLUTION INTRAVENOUS at 15:32

## 2018-10-03 RX ADMIN — ROCURONIUM BROMIDE 45 MG: 10 INJECTION INTRAVENOUS at 08:10

## 2018-10-03 RX ADMIN — ONDANSETRON HYDROCHLORIDE 4 MG: 2 INJECTION, SOLUTION INTRAMUSCULAR; INTRAVENOUS at 12:12

## 2018-10-03 RX ADMIN — SODIUM CHLORIDE, POTASSIUM CHLORIDE, SODIUM LACTATE AND CALCIUM CHLORIDE: 600; 310; 30; 20 INJECTION, SOLUTION INTRAVENOUS at 07:18

## 2018-10-03 RX ADMIN — Medication 0.5 MG: at 13:26

## 2018-10-03 RX ADMIN — CEFAZOLIN SODIUM 1 G: 1 INJECTION, SOLUTION INTRAVENOUS at 22:08

## 2018-10-03 RX ADMIN — MIDAZOLAM 2 MG: 1 INJECTION INTRAMUSCULAR; INTRAVENOUS at 07:55

## 2018-10-03 RX ADMIN — CEFAZOLIN SODIUM 1 G: 1 INJECTION, SOLUTION INTRAVENOUS at 16:03

## 2018-10-03 RX ADMIN — ROCURONIUM BROMIDE 10 MG: 10 INJECTION INTRAVENOUS at 09:20

## 2018-10-03 RX ADMIN — SUCCINYLCHOLINE CHLORIDE 120 MG: 20 INJECTION, SOLUTION INTRAMUSCULAR; INTRAVENOUS; PARENTERAL at 08:02

## 2018-10-03 RX ADMIN — ATORVASTATIN CALCIUM 10 MG: 10 TABLET, FILM COATED ORAL at 22:07

## 2018-10-03 RX ADMIN — OXYCODONE AND ACETAMINOPHEN 2 TABLET: 5; 325 TABLET ORAL at 15:33

## 2018-10-03 RX ADMIN — ROCURONIUM BROMIDE 10 MG: 10 INJECTION INTRAVENOUS at 10:36

## 2018-10-03 RX ADMIN — Medication 2 G: at 08:06

## 2018-10-03 RX ADMIN — DEXAMETHASONE SODIUM PHOSPHATE 10 MG: 10 INJECTION INTRAMUSCULAR; INTRAVENOUS at 08:08

## 2018-10-03 RX ADMIN — FENTANYL CITRATE 100 MCG: 50 INJECTION INTRAMUSCULAR; INTRAVENOUS at 08:02

## 2018-10-03 RX ADMIN — SODIUM CHLORIDE, SODIUM LACTATE, POTASSIUM CHLORIDE, AND CALCIUM CHLORIDE: 600; 310; 30; 20 INJECTION, SOLUTION INTRAVENOUS at 07:57

## 2018-10-03 RX ADMIN — FENTANYL CITRATE 50 MCG: 50 INJECTION INTRAMUSCULAR; INTRAVENOUS at 10:50

## 2018-10-03 RX ADMIN — PROPOFOL 200 MG: 10 INJECTION, EMULSION INTRAVENOUS at 08:02

## 2018-10-03 RX ADMIN — GLYCOPYRROLATE 0.2 MG: 0.2 INJECTION INTRAMUSCULAR; INTRAVENOUS at 09:22

## 2018-10-03 RX ADMIN — FENTANYL CITRATE 50 MCG: 50 INJECTION INTRAMUSCULAR; INTRAVENOUS at 08:56

## 2018-10-03 RX ADMIN — SUGAMMADEX 300 MG: 100 INJECTION, SOLUTION INTRAVENOUS at 12:41

## 2018-10-03 RX ADMIN — PANTOPRAZOLE SODIUM 40 MG: 40 TABLET, DELAYED RELEASE ORAL at 22:07

## 2018-10-03 RX ADMIN — Medication 1 MG: at 13:40

## 2018-10-03 RX ADMIN — ROCURONIUM BROMIDE 10 MG: 10 INJECTION INTRAVENOUS at 09:52

## 2018-10-03 RX ADMIN — FENTANYL CITRATE 50 MCG: 50 INJECTION INTRAMUSCULAR; INTRAVENOUS at 09:06

## 2018-10-03 RX ADMIN — ROCURONIUM BROMIDE 5 MG: 10 INJECTION INTRAVENOUS at 08:02

## 2018-10-03 RX ADMIN — LIDOCAINE HYDROCHLORIDE 50 MG: 10 INJECTION, SOLUTION INFILTRATION; PERINEURAL at 08:02

## 2018-10-03 RX ADMIN — HYDROMORPHONE HYDROCHLORIDE 1 MG: 1 INJECTION, SOLUTION INTRAMUSCULAR; INTRAVENOUS; SUBCUTANEOUS at 12:33

## 2018-10-03 RX ADMIN — SODIUM CHLORIDE, SODIUM LACTATE, POTASSIUM CHLORIDE, AND CALCIUM CHLORIDE: 600; 310; 30; 20 INJECTION, SOLUTION INTRAVENOUS at 08:46

## 2018-10-03 RX ADMIN — Medication 0.5 MG: at 22:53

## 2018-10-03 RX ADMIN — OXYCODONE AND ACETAMINOPHEN 2 TABLET: 5; 325 TABLET ORAL at 22:07

## 2018-10-03 RX ADMIN — ROCURONIUM BROMIDE 40 MG: 10 INJECTION INTRAVENOUS at 08:53

## 2018-10-03 ASSESSMENT — LIFESTYLE VARIABLES: SMOKING_STATUS: 0

## 2018-10-03 ASSESSMENT — PAIN SCALES - GENERAL
PAINLEVEL_OUTOF10: 7
PAINLEVEL_OUTOF10: 9
PAINLEVEL_OUTOF10: 7
PAINLEVEL_OUTOF10: 10
PAINLEVEL_OUTOF10: 10
PAINLEVEL_OUTOF10: 7

## 2018-10-03 ASSESSMENT — ENCOUNTER SYMPTOMS: SHORTNESS OF BREATH: 0

## 2018-10-03 ASSESSMENT — PAIN - FUNCTIONAL ASSESSMENT: PAIN_FUNCTIONAL_ASSESSMENT: 0-10

## 2018-10-03 NOTE — OP NOTE
the umbilicus. Then this would allow  getting a camera port. I then could put my hand with a mini lap pad  through the gel port and hand port. I then placed a second 12 mm trocar  just lateral to this, about the axillary line, slightly above the  previously placed trocar and then a 5 mm trocar was placed for suction  irrigation and retraction. I was able to get enough room with insufflation  and I was then able to mobilize the left colon off the anterior surface of  Gerota's fascia in the perinephric fat and once the colon was mobilized, I  was able then to mobilize this medially, the splenic flexure was also  mobilized to expose the upper pole. I then was able to get a nice plane  and extend the dissection down to the hilum. I then was able to identify  the gonadal vein and this was dissected down and then I doubly clipped it  and divided the gonadal vein. Then behind this, I was able to identify the  ureter, which was doubly clipped and divided. Then I was able to divide  the tail of Gerota's fascia, the perinephric fat off the lower pole and  extended this laterally, tried to mobilize the lower pole and lateral  portion of the kidney; somewhat of a challenge again just because of her  size and the length of my arm trying to reach up to this point. Once I got  this mobilized then I then followed the gonadal vein to the left renal  vein, which was easily identified. There was some part of unusual  branching of the left renal vein, there was a large branch coming off the  vein just at the margin of the aorta towards the hilum of the kidney and  then this actually had a posterior lumbar and then this actually re-entered  the renal vein, and the left renal artery was behind all of this, so it was  somewhat difficult to dissect. Once I had kind of freed up and dissected  the vein and got the anatomy identified, I then I was able to divide the  lumbar vein.   I did use a suction  to place lateral

## 2018-10-03 NOTE — H&P
RDW 13.4 2018      2018     MPV 11.6 2018      CMP:          Lab Results   Component Value Date      08/15/2018     K 4.0 08/15/2018      08/15/2018     CO2 21 08/15/2018     BUN 14 08/15/2018     CREATININE 0.8 08/15/2018     LABGLOM >60 08/15/2018     GLUCOSE 118 08/15/2018     PROT 7.2 08/15/2018     LABALBU 4.0 08/15/2018     CALCIUM 9.4 08/15/2018     BILITOT 0.6 08/15/2018     ALKPHOS 72 08/15/2018     AST 18 08/15/2018     ALT 18 08/15/2018         IMAGING:   CT scan for renal mass protocol done on 16 deftly shows a area of central located upper pole left kidney concerning for enhancing solid mass of 3.8 x 3.3 x 3.1 cm. It does not have the typical radiographic appearance of a renal cell carcinoma however as on the later phases almost homogeneous or almost similar contrast enhancement is a surrounding parenchyma.     Therefore I ordered a CT-guided renal biopsy which was performed after this visit that I put into the record now this was done on 18. Pathology is as above below  [address] [place] [address]  [address]  Department of Pathology  FINAL SURGICAL PATHOLOGY REPORT  Patient Name: Everlina Angelucci          Accession No:  [id number]   Age Sex:   1960    59 Y F        Pt Type: Hakan Wharton                                             UEXOUNCI:  Account # [de-identified]                 AUGVWTNFR:     2018  [facility] Rec # [id number]                    TBZPOOYH:      2018  Attend Phys: Jeremiah Cochran               VGNFCVUBT:     2018  Perform Phys: Chrissy Araya    FINAL DIAGNOSIS:    Kidney, left renal fine-needle core biopsy with touch imprints: Renal  cell carcinoma, conventional/clear cell type, Elliott nuclear grade 2.     COMMENT:   The histologic grade of the tumor is based upon a relatively  limited sample

## 2018-10-03 NOTE — CONSULTS
reports that she has never smoked. She has never used smokeless tobacco.  Alcohol:   reports that she does not drink alcohol. Illicit Drugs: Denies use      Family History:  Family History   Problem Relation Age of Onset    Diabetes Mother     Cancer Mother         breast    Heart Disease Father     Cancer Father         colon, over 79    Cancer Sister 43        breast    Cancer Maternal Grandmother 76        breast    Diabetes Maternal Grandmother     Heart Attack Maternal Grandfather     Hypertension Other     Elevated Lipids Other     Coronary Art Dis Other     Heart Attack Paternal Grandmother          Allergies:    Adhesive tape and Dermabond      Physical Examination:  /74   Pulse 91   Temp 99.5 °F (37.5 °C) (Temporal)   Resp 16   Ht 5' 7\" (1.702 m)   Wt (!) 352 lb (159.7 kg)   LMP 12/01/2001   SpO2 91%   Breastfeeding?  No   BMI 55.13 kg/m²   Constitutional - Obese,  Appropriately groomed, good nutritional status  Psychiatric - AOX3, baseline mood  Eyes - EOMI, conjunctivae and lids intact  ENMT - lips, teeth, gums intact; hearing intact  Respiratory - CTAB, no accessory muscle use  Cardiovascular - Clear S1, S2 no gross m/r/g; pedal pulses intact  Abd - Soft, Mild L sided tenderness  MSK - UE and LE joints intact, no gross clubbing  Skin - No rashes or wounds; no gross capillary refill defects  Neurologic - CN 2-12 grossly intact, sensation intact      Labs/Diagnostic imaging:  Cr 0.8    Problem list:  Active Problems:    ZOILA (obstructive sleep apnea)    PLMD (periodic limb movement disorder)    CPAP (continuous positive airway pressure) dependence    Nausea    Diarrhea of presumed infectious origin    Dizziness    Acute gastroenteritis    Calculus of gallbladder without cholecystitis    Hiatal hernia    Calculus of gallbladder with biliary obstruction but without cholecystitis    Type 2 diabetes mellitus with diabetic polyneuropathy, without long-term current use of insulin

## 2018-10-03 NOTE — BRIEF OP NOTE
Urology Brief Op Note    Patient Name: Virginia    : 1960    MRN: [id number]    Pre-operative Diagnosis: LEFT RENAL MASS    Post-operative Diagnosis: Same     Procedure: Procedure(s):  LEFT HAND ASSISTED LAPAROSCOPIC NEPHRECTOMY. Anesthesia: General    Surgeon: Wilian Yan MD    Assistants: Suzanne Scrub: Hillary Davies  Scrub Person First: Surekha Flores  Scrub Person Second: ANDRADE Vargas; Ce Melissa RN    Estimated Blood Loss: 250 (units unknown)    Complications: none    Findings: GROSSLY CONFINED TO KDINEY SOME BENIGN APPEARING [address]IL[address]    Specimens:    ID Type Source Tests Collected by Time Destination   A : LEFT KIDNEY Tissue Kidney SURGICAL PATHOLOGY Wilian Yan MD 10/3/2018 0932    B : LEFT HILAR LYMPH NODE Tissue Abdomen SURGICAL PATHOLOGY Wilian Yan MD 10/3/2018 1114        Disposition/Plan: to PACU then to [facility].      Wilian Yan MD  10/3/2018  12:48 PM

## 2018-10-03 NOTE — ANESTHESIA POSTPROCEDURE EVALUATION
Department of Anesthesiology  Postprocedure Note    Patient: Virginia  MRN: [id number]  YOB: 1960  Date of evaluation: 10/3/2018  Time:  12:59 PM     Procedure Summary     Date:  10/03/18 Room / Location:  [facility] OR  / [facility] OR    Anesthesia Start:  1671 Anesthesia Stop:      Procedure:  NEPHRECTOMY LAPAROSCOPIC HAND ASSISTED (Left ) Diagnosis:  (LEFT RENAL MASS)    Surgeon:  Chuyita Ford MD Responsible Provider:  CHERYLE Barry CRNA    Anesthesia Type:  general ASA Status:  3          Anesthesia Type: general    Mick Phase I:      Mick Phase II:      Last vitals: Reviewed and per EMR flowsheets.        Anesthesia Post Evaluation    Patient location during evaluation: PACU  Patient participation: complete - patient participated  Level of consciousness: awake and alert  Pain score: 0  Airway patency: patent  Nausea & Vomiting: no nausea and no vomiting  Complications: no  Cardiovascular status: blood pressure returned to baseline  Respiratory status: acceptable and spontaneous ventilation  Hydration status: stable

## 2018-10-04 LAB
ALBUMIN SERPL-MCNC: 3.4 G/DL (ref 3.5–5.2)
ALP BLD-CCNC: 65 U/L (ref 35–104)
ALT SERPL-CCNC: 17 U/L (ref 5–33)
ANION GAP SERPL CALCULATED.3IONS-SCNC: 11 MMOL/L (ref 7–19)
AST SERPL-CCNC: 25 U/L (ref 5–32)
BASOPHILS ABSOLUTE: 0 K/UL (ref 0–0.2)
BASOPHILS RELATIVE PERCENT: 0.2 % (ref 0–1)
BILIRUB SERPL-MCNC: 0.6 MG/DL (ref 0.2–1.2)
BUN BLDV-MCNC: 15 MG/DL (ref 6–20)
CALCIUM SERPL-MCNC: 8.8 MG/DL (ref 8.6–10)
CHLORIDE BLD-SCNC: 102 MMOL/L (ref 98–111)
CO2: 25 MMOL/L (ref 22–29)
CREAT SERPL-MCNC: 1.3 MG/DL (ref 0.5–0.9)
EOSINOPHILS ABSOLUTE: 0 K/UL (ref 0–0.6)
EOSINOPHILS RELATIVE PERCENT: 0 % (ref 0–5)
GFR NON-AFRICAN AMERICAN: 42
GLUCOSE BLD-MCNC: 122 MG/DL (ref 70–99)
GLUCOSE BLD-MCNC: 126 MG/DL (ref 70–99)
GLUCOSE BLD-MCNC: 132 MG/DL (ref 70–99)
GLUCOSE BLD-MCNC: 149 MG/DL (ref 70–99)
GLUCOSE BLD-MCNC: 175 MG/DL (ref 74–109)
HBA1C MFR BLD: 7.1 % (ref 4–6)
HCT VFR BLD CALC: 38.9 % (ref 37–47)
HEMOGLOBIN: 12.5 G/DL (ref 12–16)
LYMPHOCYTES ABSOLUTE: 0.5 K/UL (ref 1.1–4.5)
LYMPHOCYTES RELATIVE PERCENT: 4.3 % (ref 20–40)
MCH RBC QN AUTO: 29 PG (ref 27–31)
MCHC RBC AUTO-ENTMCNC: 32.1 G/DL (ref 33–37)
MCV RBC AUTO: 90.3 FL (ref 81–99)
MONOCYTES ABSOLUTE: 1.1 K/UL (ref 0–0.9)
MONOCYTES RELATIVE PERCENT: 8.9 % (ref 0–10)
NEUTROPHILS ABSOLUTE: 10.6 K/UL (ref 1.5–7.5)
NEUTROPHILS RELATIVE PERCENT: 86.3 % (ref 50–65)
PDW BLD-RTO: 13.4 % (ref 11.5–14.5)
PERFORMED ON: ABNORMAL
PLATELET # BLD: 195 K/UL (ref 130–400)
PMV BLD AUTO: 10.7 FL (ref 9.4–12.3)
POTASSIUM REFLEX MAGNESIUM: 4.4 MMOL/L (ref 3.5–5)
POTASSIUM SERPL-SCNC: 4.4 MMOL/L (ref 3.5–5)
RBC # BLD: 4.31 M/UL (ref 4.2–5.4)
SODIUM BLD-SCNC: 138 MMOL/L (ref 136–145)
TOTAL PROTEIN: 6.6 G/DL (ref 6.6–8.7)
WBC # BLD: 12.2 K/UL (ref 4.8–10.8)

## 2018-10-04 PROCEDURE — 85025 COMPLETE CBC W/AUTO DIFF WBC: CPT

## 2018-10-04 PROCEDURE — 36415 COLL VENOUS BLD VENIPUNCTURE: CPT

## 2018-10-04 PROCEDURE — 1210000000 HC MED SURG R&B

## 2018-10-04 PROCEDURE — 82948 REAGENT STRIP/BLOOD GLUCOSE: CPT

## 2018-10-04 PROCEDURE — 6360000002 HC RX W HCPCS: Performed by: UROLOGY

## 2018-10-04 PROCEDURE — 6370000000 HC RX 637 (ALT 250 FOR IP): Performed by: UROLOGY

## 2018-10-04 PROCEDURE — 83036 HEMOGLOBIN GLYCOSYLATED A1C: CPT

## 2018-10-04 PROCEDURE — 2580000003 HC RX 258: Performed by: UROLOGY

## 2018-10-04 PROCEDURE — 80053 COMPREHEN METABOLIC PANEL: CPT

## 2018-10-04 PROCEDURE — 99024 POSTOP FOLLOW-UP VISIT: CPT | Performed by: UROLOGY

## 2018-10-04 PROCEDURE — 99232 SBSQ HOSP IP/OBS MODERATE 35: CPT | Performed by: HOSPITALIST

## 2018-10-04 RX ADMIN — OXYCODONE AND ACETAMINOPHEN 2 TABLET: 5; 325 TABLET ORAL at 06:05

## 2018-10-04 RX ADMIN — LEVOTHYROXINE SODIUM 112 MCG: 112 TABLET ORAL at 09:37

## 2018-10-04 RX ADMIN — SODIUM CHLORIDE: 9 INJECTION, SOLUTION INTRAVENOUS at 23:26

## 2018-10-04 RX ADMIN — CEFAZOLIN SODIUM 1 G: 1 INJECTION, SOLUTION INTRAVENOUS at 07:49

## 2018-10-04 RX ADMIN — OXYCODONE AND ACETAMINOPHEN 2 TABLET: 5; 325 TABLET ORAL at 18:18

## 2018-10-04 RX ADMIN — Medication 0.5 MG: at 15:01

## 2018-10-04 RX ADMIN — Medication 0.5 MG: at 11:09

## 2018-10-04 RX ADMIN — LISINOPRIL 2.5 MG: 2.5 TABLET ORAL at 09:37

## 2018-10-04 RX ADMIN — ATENOLOL 25 MG: 50 TABLET ORAL at 09:39

## 2018-10-04 RX ADMIN — SODIUM CHLORIDE: 9 INJECTION, SOLUTION INTRAVENOUS at 07:49

## 2018-10-04 RX ADMIN — ATORVASTATIN CALCIUM 10 MG: 10 TABLET, FILM COATED ORAL at 22:26

## 2018-10-04 RX ADMIN — PANTOPRAZOLE SODIUM 40 MG: 40 TABLET, DELAYED RELEASE ORAL at 22:26

## 2018-10-04 RX ADMIN — Medication 0.5 MG: at 03:14

## 2018-10-04 RX ADMIN — OXYCODONE AND ACETAMINOPHEN 2 TABLET: 5; 325 TABLET ORAL at 10:04

## 2018-10-04 RX ADMIN — OXYCODONE AND ACETAMINOPHEN 2 TABLET: 5; 325 TABLET ORAL at 14:18

## 2018-10-04 RX ADMIN — OXYCODONE AND ACETAMINOPHEN 2 TABLET: 5; 325 TABLET ORAL at 01:53

## 2018-10-04 RX ADMIN — DILTIAZEM HYDROCHLORIDE 180 MG: 180 CAPSULE, COATED, EXTENDED RELEASE ORAL at 09:39

## 2018-10-04 RX ADMIN — Medication 0.5 MG: at 07:12

## 2018-10-04 RX ADMIN — FUROSEMIDE 20 MG: 20 TABLET ORAL at 09:39

## 2018-10-04 RX ADMIN — OXYCODONE AND ACETAMINOPHEN 2 TABLET: 5; 325 TABLET ORAL at 22:26

## 2018-10-04 RX ADMIN — ENOXAPARIN SODIUM 40 MG: 40 INJECTION SUBCUTANEOUS at 09:39

## 2018-10-04 ASSESSMENT — PAIN SCALES - GENERAL
PAINLEVEL_OUTOF10: 7
PAINLEVEL_OUTOF10: 6
PAINLEVEL_OUTOF10: 7
PAINLEVEL_OUTOF10: 8
PAINLEVEL_OUTOF10: 7
PAINLEVEL_OUTOF10: 6
PAINLEVEL_OUTOF10: 7
PAINLEVEL_OUTOF10: 6
PAINLEVEL_OUTOF10: 7

## 2018-10-05 LAB
ALBUMIN SERPL-MCNC: 3 G/DL (ref 3.5–5.2)
ALP BLD-CCNC: 63 U/L (ref 35–104)
ALT SERPL-CCNC: 12 U/L (ref 5–33)
ANION GAP SERPL CALCULATED.3IONS-SCNC: 12 MMOL/L (ref 7–19)
AST SERPL-CCNC: 20 U/L (ref 5–32)
BASOPHILS ABSOLUTE: 0.1 K/UL (ref 0–0.2)
BASOPHILS RELATIVE PERCENT: 0.4 % (ref 0–1)
BILIRUB SERPL-MCNC: 0.8 MG/DL (ref 0.2–1.2)
BUN BLDV-MCNC: 13 MG/DL (ref 6–20)
CALCIUM SERPL-MCNC: 8.4 MG/DL (ref 8.6–10)
CHLORIDE BLD-SCNC: 103 MMOL/L (ref 98–111)
CO2: 23 MMOL/L (ref 22–29)
CREAT SERPL-MCNC: 1.4 MG/DL (ref 0.5–0.9)
EOSINOPHILS ABSOLUTE: 0.2 K/UL (ref 0–0.6)
EOSINOPHILS RELATIVE PERCENT: 1.6 % (ref 0–5)
GFR NON-AFRICAN AMERICAN: 39
GLUCOSE BLD-MCNC: 106 MG/DL (ref 70–99)
GLUCOSE BLD-MCNC: 119 MG/DL (ref 70–99)
GLUCOSE BLD-MCNC: 119 MG/DL (ref 74–109)
GLUCOSE BLD-MCNC: 130 MG/DL (ref 70–99)
GLUCOSE BLD-MCNC: 134 MG/DL (ref 70–99)
HCT VFR BLD CALC: 33.2 % (ref 37–47)
HEMOGLOBIN: 10.5 G/DL (ref 12–16)
LYMPHOCYTES ABSOLUTE: 1.3 K/UL (ref 1.1–4.5)
LYMPHOCYTES RELATIVE PERCENT: 10.4 % (ref 20–40)
MCH RBC QN AUTO: 29.2 PG (ref 27–31)
MCHC RBC AUTO-ENTMCNC: 31.6 G/DL (ref 33–37)
MCV RBC AUTO: 92.2 FL (ref 81–99)
MONOCYTES ABSOLUTE: 1 K/UL (ref 0–0.9)
MONOCYTES RELATIVE PERCENT: 8.3 % (ref 0–10)
NEUTROPHILS ABSOLUTE: 9.6 K/UL (ref 1.5–7.5)
NEUTROPHILS RELATIVE PERCENT: 78.8 % (ref 50–65)
PDW BLD-RTO: 13.7 % (ref 11.5–14.5)
PERFORMED ON: ABNORMAL
PLATELET # BLD: 164 K/UL (ref 130–400)
PMV BLD AUTO: 10.9 FL (ref 9.4–12.3)
POTASSIUM REFLEX MAGNESIUM: 4.3 MMOL/L (ref 3.5–5)
POTASSIUM SERPL-SCNC: 4.2 MMOL/L (ref 3.5–5)
RBC # BLD: 3.6 M/UL (ref 4.2–5.4)
SODIUM BLD-SCNC: 138 MMOL/L (ref 136–145)
TOTAL PROTEIN: 5.9 G/DL (ref 6.6–8.7)
WBC # BLD: 12.2 K/UL (ref 4.8–10.8)

## 2018-10-05 PROCEDURE — 85025 COMPLETE CBC W/AUTO DIFF WBC: CPT

## 2018-10-05 PROCEDURE — 80053 COMPREHEN METABOLIC PANEL: CPT

## 2018-10-05 PROCEDURE — 6370000000 HC RX 637 (ALT 250 FOR IP): Performed by: HOSPITALIST

## 2018-10-05 PROCEDURE — 2580000003 HC RX 258: Performed by: UROLOGY

## 2018-10-05 PROCEDURE — 6360000002 HC RX W HCPCS: Performed by: UROLOGY

## 2018-10-05 PROCEDURE — 36415 COLL VENOUS BLD VENIPUNCTURE: CPT

## 2018-10-05 PROCEDURE — 6370000000 HC RX 637 (ALT 250 FOR IP): Performed by: UROLOGY

## 2018-10-05 PROCEDURE — 1210000000 HC MED SURG R&B

## 2018-10-05 PROCEDURE — 99024 POSTOP FOLLOW-UP VISIT: CPT | Performed by: UROLOGY

## 2018-10-05 PROCEDURE — 82948 REAGENT STRIP/BLOOD GLUCOSE: CPT

## 2018-10-05 RX ORDER — ATENOLOL 25 MG/1
12.5 TABLET ORAL DAILY
Status: DISCONTINUED | OUTPATIENT
Start: 2018-10-05 | End: 2018-10-07 | Stop reason: HOSPADM

## 2018-10-05 RX ORDER — FUROSEMIDE 10 MG/ML
20 INJECTION INTRAMUSCULAR; INTRAVENOUS ONCE
Status: COMPLETED | OUTPATIENT
Start: 2018-10-05 | End: 2018-10-05

## 2018-10-05 RX ORDER — DILTIAZEM HYDROCHLORIDE 120 MG/1
120 CAPSULE, COATED, EXTENDED RELEASE ORAL DAILY
Status: DISCONTINUED | OUTPATIENT
Start: 2018-10-05 | End: 2018-10-07 | Stop reason: HOSPADM

## 2018-10-05 RX ADMIN — DILTIAZEM HYDROCHLORIDE 120 MG: 120 CAPSULE, COATED, EXTENDED RELEASE ORAL at 09:44

## 2018-10-05 RX ADMIN — OXYCODONE AND ACETAMINOPHEN 2 TABLET: 5; 325 TABLET ORAL at 02:40

## 2018-10-05 RX ADMIN — ENOXAPARIN SODIUM 40 MG: 40 INJECTION SUBCUTANEOUS at 09:45

## 2018-10-05 RX ADMIN — Medication 0.25 MG: at 06:00

## 2018-10-05 RX ADMIN — Medication 10 ML: at 21:27

## 2018-10-05 RX ADMIN — PANTOPRAZOLE SODIUM 40 MG: 40 TABLET, DELAYED RELEASE ORAL at 21:27

## 2018-10-05 RX ADMIN — OXYCODONE AND ACETAMINOPHEN 2 TABLET: 5; 325 TABLET ORAL at 23:25

## 2018-10-05 RX ADMIN — Medication 0.25 MG: at 01:21

## 2018-10-05 RX ADMIN — ASPIRIN 81 MG: 81 TABLET, COATED ORAL at 09:43

## 2018-10-05 RX ADMIN — ATORVASTATIN CALCIUM 10 MG: 10 TABLET, FILM COATED ORAL at 21:26

## 2018-10-05 RX ADMIN — ATENOLOL 12.5 MG: 25 TABLET ORAL at 09:44

## 2018-10-05 RX ADMIN — LEVOTHYROXINE SODIUM 112 MCG: 112 TABLET ORAL at 09:43

## 2018-10-05 RX ADMIN — OXYCODONE AND ACETAMINOPHEN 2 TABLET: 5; 325 TABLET ORAL at 09:44

## 2018-10-05 RX ADMIN — SODIUM CHLORIDE: 9 INJECTION, SOLUTION INTRAVENOUS at 19:05

## 2018-10-05 RX ADMIN — OXYCODONE AND ACETAMINOPHEN 2 TABLET: 5; 325 TABLET ORAL at 19:05

## 2018-10-05 RX ADMIN — FUROSEMIDE 20 MG: 10 INJECTION, SOLUTION INTRAMUSCULAR; INTRAVENOUS at 09:45

## 2018-10-05 ASSESSMENT — PAIN SCALES - GENERAL
PAINLEVEL_OUTOF10: 6
PAINLEVEL_OUTOF10: 4
PAINLEVEL_OUTOF10: 4
PAINLEVEL_OUTOF10: 6

## 2018-10-06 LAB
ALBUMIN SERPL-MCNC: 2.8 G/DL (ref 3.5–5.2)
ALP BLD-CCNC: 75 U/L (ref 35–104)
ALT SERPL-CCNC: 12 U/L (ref 5–33)
ANION GAP SERPL CALCULATED.3IONS-SCNC: 11 MMOL/L (ref 7–19)
AST SERPL-CCNC: 21 U/L (ref 5–32)
BASOPHILS ABSOLUTE: 0 K/UL (ref 0–0.2)
BASOPHILS RELATIVE PERCENT: 0.4 % (ref 0–1)
BILIRUB SERPL-MCNC: 0.7 MG/DL (ref 0.2–1.2)
BUN BLDV-MCNC: 10 MG/DL (ref 6–20)
CALCIUM SERPL-MCNC: 8.3 MG/DL (ref 8.6–10)
CHLORIDE BLD-SCNC: 102 MMOL/L (ref 98–111)
CO2: 25 MMOL/L (ref 22–29)
CREAT SERPL-MCNC: 1.2 MG/DL (ref 0.5–0.9)
EOSINOPHILS ABSOLUTE: 0.3 K/UL (ref 0–0.6)
EOSINOPHILS RELATIVE PERCENT: 3.3 % (ref 0–5)
GFR NON-AFRICAN AMERICAN: 46
GLUCOSE BLD-MCNC: 117 MG/DL (ref 74–109)
GLUCOSE BLD-MCNC: 119 MG/DL (ref 70–99)
GLUCOSE BLD-MCNC: 138 MG/DL (ref 70–99)
GLUCOSE BLD-MCNC: 160 MG/DL (ref 70–99)
GLUCOSE BLD-MCNC: 166 MG/DL (ref 70–99)
HCT VFR BLD CALC: 32.7 % (ref 37–47)
HEMOGLOBIN: 10.5 G/DL (ref 12–16)
LYMPHOCYTES ABSOLUTE: 1.4 K/UL (ref 1.1–4.5)
LYMPHOCYTES RELATIVE PERCENT: 14 % (ref 20–40)
MCH RBC QN AUTO: 29.1 PG (ref 27–31)
MCHC RBC AUTO-ENTMCNC: 32.1 G/DL (ref 33–37)
MCV RBC AUTO: 90.6 FL (ref 81–99)
MONOCYTES ABSOLUTE: 0.7 K/UL (ref 0–0.9)
MONOCYTES RELATIVE PERCENT: 6.9 % (ref 0–10)
NEUTROPHILS ABSOLUTE: 7.6 K/UL (ref 1.5–7.5)
NEUTROPHILS RELATIVE PERCENT: 74.9 % (ref 50–65)
PDW BLD-RTO: 13.2 % (ref 11.5–14.5)
PERFORMED ON: ABNORMAL
PLATELET # BLD: 160 K/UL (ref 130–400)
PMV BLD AUTO: 11 FL (ref 9.4–12.3)
POTASSIUM SERPL-SCNC: 3.4 MMOL/L (ref 3.5–5)
RBC # BLD: 3.61 M/UL (ref 4.2–5.4)
SODIUM BLD-SCNC: 138 MMOL/L (ref 136–145)
TOTAL PROTEIN: 6.1 G/DL (ref 6.6–8.7)
WBC # BLD: 10.2 K/UL (ref 4.8–10.8)

## 2018-10-06 PROCEDURE — 1210000000 HC MED SURG R&B

## 2018-10-06 PROCEDURE — 2580000003 HC RX 258: Performed by: UROLOGY

## 2018-10-06 PROCEDURE — 6360000002 HC RX W HCPCS: Performed by: UROLOGY

## 2018-10-06 PROCEDURE — 99024 POSTOP FOLLOW-UP VISIT: CPT | Performed by: UROLOGY

## 2018-10-06 PROCEDURE — 99232 SBSQ HOSP IP/OBS MODERATE 35: CPT | Performed by: INTERNAL MEDICINE

## 2018-10-06 PROCEDURE — 36415 COLL VENOUS BLD VENIPUNCTURE: CPT

## 2018-10-06 PROCEDURE — 82948 REAGENT STRIP/BLOOD GLUCOSE: CPT

## 2018-10-06 PROCEDURE — 80053 COMPREHEN METABOLIC PANEL: CPT

## 2018-10-06 PROCEDURE — 85025 COMPLETE CBC W/AUTO DIFF WBC: CPT

## 2018-10-06 PROCEDURE — 6370000000 HC RX 637 (ALT 250 FOR IP): Performed by: UROLOGY

## 2018-10-06 PROCEDURE — 6370000000 HC RX 637 (ALT 250 FOR IP): Performed by: HOSPITALIST

## 2018-10-06 RX ORDER — SODIUM CHLORIDE AND POTASSIUM CHLORIDE .9; .15 G/100ML; G/100ML
SOLUTION INTRAVENOUS CONTINUOUS
Status: DISCONTINUED | OUTPATIENT
Start: 2018-10-06 | End: 2018-10-07 | Stop reason: HOSPADM

## 2018-10-06 RX ADMIN — Medication 10 ML: at 20:34

## 2018-10-06 RX ADMIN — FUROSEMIDE 20 MG: 20 TABLET ORAL at 10:29

## 2018-10-06 RX ADMIN — ATENOLOL 1.5 MG: 25 TABLET ORAL at 10:29

## 2018-10-06 RX ADMIN — POTASSIUM CHLORIDE AND SODIUM CHLORIDE: 900; 150 INJECTION, SOLUTION INTRAVENOUS at 10:29

## 2018-10-06 RX ADMIN — OXYCODONE AND ACETAMINOPHEN 1 TABLET: 5; 325 TABLET ORAL at 16:28

## 2018-10-06 RX ADMIN — PANTOPRAZOLE SODIUM 40 MG: 40 TABLET, DELAYED RELEASE ORAL at 20:34

## 2018-10-06 RX ADMIN — OXYCODONE AND ACETAMINOPHEN 2 TABLET: 5; 325 TABLET ORAL at 05:40

## 2018-10-06 RX ADMIN — OXYCODONE AND ACETAMINOPHEN 2 TABLET: 5; 325 TABLET ORAL at 20:34

## 2018-10-06 RX ADMIN — ASPIRIN 81 MG: 81 TABLET, COATED ORAL at 10:41

## 2018-10-06 RX ADMIN — LISINOPRIL 2.5 MG: 2.5 TABLET ORAL at 10:29

## 2018-10-06 RX ADMIN — ATORVASTATIN CALCIUM 10 MG: 10 TABLET, FILM COATED ORAL at 20:34

## 2018-10-06 RX ADMIN — LEVOTHYROXINE SODIUM 112 MCG: 112 TABLET ORAL at 10:29

## 2018-10-06 RX ADMIN — DILTIAZEM HYDROCHLORIDE 120 MG: 120 CAPSULE, COATED, EXTENDED RELEASE ORAL at 10:29

## 2018-10-06 RX ADMIN — ENOXAPARIN SODIUM 40 MG: 40 INJECTION SUBCUTANEOUS at 10:29

## 2018-10-06 RX ADMIN — OXYCODONE AND ACETAMINOPHEN 2 TABLET: 5; 325 TABLET ORAL at 10:41

## 2018-10-06 ASSESSMENT — PAIN SCALES - GENERAL
PAINLEVEL_OUTOF10: 7
PAINLEVEL_OUTOF10: 5
PAINLEVEL_OUTOF10: 3
PAINLEVEL_OUTOF10: 3

## 2018-10-06 NOTE — PROGRESS NOTES
Urology Progress Note      SUBJECTIVE:  Tolerating Diet having flatus    OBJECTIVE:  POD #3    REVIEW OF SYSTEMS:   ROS      Physical  VITALS:  /82   Pulse 84   Temp 97.3 °F (36.3 °C) (Temporal)   Resp 16   Ht 5' 7\" (1.702 m)   Wt (!) 352 lb (159.7 kg)   LMP 2001   SpO2 97%   Breastfeeding? No   BMI 55.13 kg/m²   TEMPERATURE:  Current - Temp: 97.3 °F (36.3 °C); Max - Temp  Av.7 °F (37.1 °C)  Min: 97.3 °F (36.3 °C)  Max: 99.5 °F (37.5 °C)   24 HR I&O   Intake/Output Summary (Last 24 hours) at 10/06/18 1337  Last data filed at 10/06/18 1205   Gross per 24 hour   Intake          3344.98 ml   Output             5800 ml   Net         -2455.02 ml     BACK: inspection of back is normal  ABDOMEN:  normal bowel sounds, soft, non-distended and incisional tenderness  HEART:  normal rate and regular rhythm  CHEST:  Clear Auscultation  GENITAL/URINARY:  normal    Data       CBC:   Recent Labs      10/04/18   0317  10/05/18   0328  10/06/18   0215   WBC  12.2*  12.2*  10.2   HGB  12.5  10.5*  10.5*   HCT  38.9  33.2*  32.7*   PLT  195  164  160     BMP:  Recent Labs      10/04/18   0317  10/05/18   0328  10/06/18   0215   NA  138  138  138   K  4.4  4.4  4.2  4.3  3.4*   CL  102  103  102   CO2  25  23  25   BUN  15  13  10   CREATININE  1.3*  1.4*  1.2*   GLUCOSE  175*  119*  [address] [place] [address]  Department of Pathology  FINAL SURGICAL PATHOLOGY REPORT  Patient Name: Cuba Ramos No:  [id number]   Age Sex:   1960    58 Y F        Pt Type:  I     KLORT 01                                             Location:  Account #     [de-identified]                 Collected:     10/03/2018  Med Rec #      [id number]                    Received:      10/04/2018  Attend Phys: Anabel Burton             Completed:     10/05/2018  Perform Phys: Deborah Culp but without cholecystitis    Type 2 diabetes mellitus with diabetic polyneuropathy, without long-term current use of insulin ([address]    Umbilical hernia without obstruction and without gangrene    Periumbilical abdominal pain    Renal mass    Renal cell carcinoma of left kidney ([address]    Left renal mass    BRE (acute kidney injury) ([address]       1. POD # 3 s/p Left HALN doing well pain OK, UOP ok, Creat down to 1.2, advanced to regular diet. probably home tomorrow. 2. Renal Cell CA final path reviewed focal trans capsular extension to renal sinus fat makes this T3. She may be a candidate for adjuvant Sutent. Will need Out Pt Oncology consult.      3. Hypokalemia add K+ to IVFs     Paulette House MD

## 2018-10-06 NOTE — PROGRESS NOTES
Subcutaneous Daily Kalyan Trent MD   40 mg at 10/05/18 0945    acetaminophen (TYLENOL) tablet 1,000 mg  1,000 mg Oral Q6H PRN Kalyan Trent MD        oxyCODONE-acetaminophen (PERCOCET) 5-325 MG per tablet 1 tablet  1 tablet Oral Q4H PRN Kalyan Trent MD        Or    oxyCODONE-acetaminophen (PERCOCET) 5-325 MG per tablet 2 tablet  2 tablet Oral Q4H PRN Kalyan Trent MD   2 tablet at 10/05/18 1905    HYDROmorphone (DILAUDID) injection 0.25 mg  0.25 mg Intravenous Q3H PRN Kalyan Trent MD   0.25 mg at 10/05/18 0600    Or    HYDROmorphone (DILAUDID) injection 0.5 mg  0.5 mg Intravenous Q3H PRN Kalyan Trent MD   0.5 mg at 10/04/18 1501    insulin lispro (HUMALOG) injection vial 0-6 Units  0-6 Units Subcutaneous TID WC Robin Romeo MD        insulin lispro (HUMALOG) injection vial 0-3 Units  0-3 Units Subcutaneous Nightly Robin Romeo MD        glucose (GLUTOSE) 40 % oral gel 15 g  15 g Oral PRN Robin Romeo MD        dextrose 50 % solution 12.5 g  12.5 g Intravenous PRN Roibn Romeo MD        glucagon (rDNA) injection 1 mg  1 mg Intramuscular PRN Robin Romeo MD        dextrose 5 % solution  100 mL/hr Intravenous PRN Robin Romeo MD        influenza quadrivalent split vaccine (FLUZONE;FLUARIX;FLULAVAL;AFLURIA) injection 0.5 mL  0.5 mL Intramuscular Once Shannon Engel MD         DVT Prophylaxis: {Blank multiple:42185::\"Sequential Compression Devices\",\"RON hose\",\"Lovenox 30 mg sq daily\",\"Heparin 5000 units sq q 8h\",\"Lovenox 40 mg sq daily\"}    Continuous Infusions:   sodium chloride 100 mL/hr at 10/05/18 1905    dextrose         Intake/Output Summary (Last 24 hours) at 10/05/18 2109  Last data filed at 10/05/18 2049   Gross per 24 hour   Intake             3236 ml   Output             4775 ml   Net            -1539 ml     CBC:   Recent Labs      10/03/18   1914  10/04/18   0317  10/05/18   0328   WBC  13.6*  12.2*  12.2*   HGB  12.8  12.5  10.5*   PLT  201

## 2018-10-07 VITALS
WEIGHT: 293 LBS | TEMPERATURE: 98.2 F | HEART RATE: 77 BPM | SYSTOLIC BLOOD PRESSURE: 124 MMHG | DIASTOLIC BLOOD PRESSURE: 78 MMHG | HEIGHT: 67 IN | RESPIRATION RATE: 18 BRPM | BODY MASS INDEX: 45.99 KG/M2 | OXYGEN SATURATION: 99 %

## 2018-10-07 PROBLEM — G89.18 POST-OP PAIN: Status: ACTIVE | Noted: 2018-10-07

## 2018-10-07 LAB
ALBUMIN SERPL-MCNC: 2.7 G/DL (ref 3.5–5.2)
ALP BLD-CCNC: 70 U/L (ref 35–104)
ALT SERPL-CCNC: 12 U/L (ref 5–33)
ANION GAP SERPL CALCULATED.3IONS-SCNC: 12 MMOL/L (ref 7–19)
AST SERPL-CCNC: 17 U/L (ref 5–32)
BASOPHILS ABSOLUTE: 0 K/UL (ref 0–0.2)
BASOPHILS RELATIVE PERCENT: 0.4 % (ref 0–1)
BILIRUB SERPL-MCNC: 0.5 MG/DL (ref 0.2–1.2)
BUN BLDV-MCNC: 10 MG/DL (ref 6–20)
CALCIUM SERPL-MCNC: 8.3 MG/DL (ref 8.6–10)
CHLORIDE BLD-SCNC: 103 MMOL/L (ref 98–111)
CO2: 25 MMOL/L (ref 22–29)
CREAT SERPL-MCNC: 1.3 MG/DL (ref 0.5–0.9)
EOSINOPHILS ABSOLUTE: 0.4 K/UL (ref 0–0.6)
EOSINOPHILS RELATIVE PERCENT: 5.1 % (ref 0–5)
GFR NON-AFRICAN AMERICAN: 42
GLUCOSE BLD-MCNC: 122 MG/DL (ref 70–99)
GLUCOSE BLD-MCNC: 123 MG/DL (ref 70–99)
GLUCOSE BLD-MCNC: 126 MG/DL (ref 74–109)
HCT VFR BLD CALC: 30.3 % (ref 37–47)
HEMOGLOBIN: 10 G/DL (ref 12–16)
LYMPHOCYTES ABSOLUTE: 1.4 K/UL (ref 1.1–4.5)
LYMPHOCYTES RELATIVE PERCENT: 17 % (ref 20–40)
MCH RBC QN AUTO: 28.9 PG (ref 27–31)
MCHC RBC AUTO-ENTMCNC: 33 G/DL (ref 33–37)
MCV RBC AUTO: 87.6 FL (ref 81–99)
MONOCYTES ABSOLUTE: 0.6 K/UL (ref 0–0.9)
MONOCYTES RELATIVE PERCENT: 7.4 % (ref 0–10)
NEUTROPHILS ABSOLUTE: 5.7 K/UL (ref 1.5–7.5)
NEUTROPHILS RELATIVE PERCENT: 69.6 % (ref 50–65)
PDW BLD-RTO: 13.1 % (ref 11.5–14.5)
PERFORMED ON: ABNORMAL
PERFORMED ON: ABNORMAL
PLATELET # BLD: 176 K/UL (ref 130–400)
PMV BLD AUTO: 10.3 FL (ref 9.4–12.3)
POTASSIUM SERPL-SCNC: 3.4 MMOL/L (ref 3.5–5)
RBC # BLD: 3.46 M/UL (ref 4.2–5.4)
SODIUM BLD-SCNC: 140 MMOL/L (ref 136–145)
TOTAL PROTEIN: 5.9 G/DL (ref 6.6–8.7)
WBC # BLD: 8.1 K/UL (ref 4.8–10.8)

## 2018-10-07 PROCEDURE — 99024 POSTOP FOLLOW-UP VISIT: CPT | Performed by: UROLOGY

## 2018-10-07 PROCEDURE — 85025 COMPLETE CBC W/AUTO DIFF WBC: CPT

## 2018-10-07 PROCEDURE — 6370000000 HC RX 637 (ALT 250 FOR IP): Performed by: INTERNAL MEDICINE

## 2018-10-07 PROCEDURE — 99232 SBSQ HOSP IP/OBS MODERATE 35: CPT | Performed by: INTERNAL MEDICINE

## 2018-10-07 PROCEDURE — 6360000002 HC RX W HCPCS: Performed by: HOSPITALIST

## 2018-10-07 PROCEDURE — 90686 IIV4 VACC NO PRSV 0.5 ML IM: CPT | Performed by: HOSPITALIST

## 2018-10-07 PROCEDURE — 80053 COMPREHEN METABOLIC PANEL: CPT

## 2018-10-07 PROCEDURE — 36415 COLL VENOUS BLD VENIPUNCTURE: CPT

## 2018-10-07 PROCEDURE — 6360000002 HC RX W HCPCS: Performed by: UROLOGY

## 2018-10-07 PROCEDURE — 6370000000 HC RX 637 (ALT 250 FOR IP): Performed by: HOSPITALIST

## 2018-10-07 PROCEDURE — G0008 ADMIN INFLUENZA VIRUS VAC: HCPCS | Performed by: HOSPITALIST

## 2018-10-07 PROCEDURE — 6370000000 HC RX 637 (ALT 250 FOR IP): Performed by: UROLOGY

## 2018-10-07 PROCEDURE — 82948 REAGENT STRIP/BLOOD GLUCOSE: CPT

## 2018-10-07 RX ORDER — OXYCODONE HYDROCHLORIDE AND ACETAMINOPHEN 5; 325 MG/1; MG/1
1 TABLET ORAL EVERY 6 HOURS PRN
Qty: 30 TABLET | Refills: 0 | Status: SHIPPED | OUTPATIENT
Start: 2018-10-07 | End: 2018-10-14

## 2018-10-07 RX ORDER — POTASSIUM BICARBONATE 25 MEQ/1
25 TABLET, EFFERVESCENT ORAL ONCE
Status: COMPLETED | OUTPATIENT
Start: 2018-10-07 | End: 2018-10-07

## 2018-10-07 RX ADMIN — ATENOLOL 12.5 MG: 25 TABLET ORAL at 09:02

## 2018-10-07 RX ADMIN — LEVOTHYROXINE SODIUM 112 MCG: 112 TABLET ORAL at 09:02

## 2018-10-07 RX ADMIN — OXYCODONE AND ACETAMINOPHEN 2 TABLET: 5; 325 TABLET ORAL at 11:26

## 2018-10-07 RX ADMIN — POTASSIUM BICARBONATE 25 MEQ: 25 TABLET, EFFERVESCENT ORAL at 10:29

## 2018-10-07 RX ADMIN — ASPIRIN 81 MG: 81 TABLET, COATED ORAL at 09:03

## 2018-10-07 RX ADMIN — LISINOPRIL 2.5 MG: 2.5 TABLET ORAL at 09:03

## 2018-10-07 RX ADMIN — OXYCODONE AND ACETAMINOPHEN 2 TABLET: 5; 325 TABLET ORAL at 05:15

## 2018-10-07 RX ADMIN — DILTIAZEM HYDROCHLORIDE 120 MG: 120 CAPSULE, COATED, EXTENDED RELEASE ORAL at 09:04

## 2018-10-07 RX ADMIN — FUROSEMIDE 20 MG: 20 TABLET ORAL at 09:02

## 2018-10-07 RX ADMIN — ENOXAPARIN SODIUM 40 MG: 40 INJECTION SUBCUTANEOUS at 09:02

## 2018-10-07 RX ADMIN — INFLUENZA A VIRUS A/MICHIGAN/45/2015 X-275 (H1N1) ANTIGEN (FORMALDEHYDE INACTIVATED), INFLUENZA A VIRUS A/SINGAPORE/INFIMH-16-0019/2016 IVR-186 (H3N2) ANTIGEN (FORMALDEHYDE INACTIVATED), INFLUENZA B VIRUS B/PHUKET/3073/2013 ANTIGEN (FORMALDEHYDE INACTIVATED), AND INFLUENZA B VIRUS B/MARYLAND/15/2016 BX-69A ANTIGEN (FORMALDEHYDE INACTIVATED) 0.5 ML: 15; 15; 15; 15 INJECTION, SUSPENSION INTRAMUSCULAR at 13:55

## 2018-10-07 ASSESSMENT — PAIN SCALES - GENERAL
PAINLEVEL_OUTOF10: 4
PAINLEVEL_OUTOF10: 4

## 2018-10-07 NOTE — PROGRESS NOTES
(LOVENOX) injection 40 mg  40 mg Subcutaneous Daily Kassi Wilks MD   40 mg at 10/07/18 0902    acetaminophen (TYLENOL) tablet 1,000 mg  1,000 mg Oral Q6H PRN Kassi Wilks MD        oxyCODONE-acetaminophen (PERCOCET) 5-325 MG per tablet 1 tablet  1 tablet Oral Q4H PRN Kassi Wilks MD   1 tablet at 10/06/18 1628    Or    oxyCODONE-acetaminophen (PERCOCET) 5-325 MG per tablet 2 tablet  2 tablet Oral Q4H PRN Kassi Wilks MD   2 tablet at 10/07/18 0515    HYDROmorphone (DILAUDID) injection 0.25 mg  0.25 mg Intravenous Q3H PRN Kassi Wilks MD   0.25 mg at 10/05/18 0600    Or    HYDROmorphone (DILAUDID) injection 0.5 mg  0.5 mg Intravenous Q3H PRN Kassi Wilks MD   0.5 mg at 10/04/18 1501    insulin lispro (HUMALOG) injection vial 0-6 Units  0-6 Units Subcutaneous TID WC Caity Leone MD        insulin lispro (HUMALOG) injection vial 0-3 Units  0-3 Units Subcutaneous Nightly Caity Leone MD        glucose (GLUTOSE) 40 % oral gel 15 g  15 g Oral PRN Caity Leone MD        dextrose 50 % solution 12.5 g  12.5 g Intravenous PRN Caity Leone MD        glucagon (rDNA) injection 1 mg  1 mg Intramuscular PRN Caity Leone MD        dextrose 5 % solution  100 mL/hr Intravenous PRQUINN Leone MD        influenza quadrivalent split vaccine (FLUZONE;FLUARIX;FLULAVAL;AFLURIA) injection 0.5 mL  0.5 mL Intramuscular Once Kal Alvarez MD            Objective:   Vitals: BP (!) 148/86   Pulse 68   Temp 98.1 °F (36.7 °C) (Temporal)   Resp 18   Ht 5' 7\" (1.702 m)   Wt (!) 352 lb (159.7 kg)   LMP 12/01/2001   SpO2 92%   Breastfeeding?  No   BMI 55.13 kg/m²   24HR INTAKE/OUTPUT:      Intake/Output Summary (Last 24 hours) at 10/07/18 1011  Last data filed at 10/07/18 0855   Gross per 24 hour   Intake          1424.49 ml   Output             4650 ml   Net         -3225.51 ml     DIET GENERAL;  General appearance: alert, cooperative and no distress  Head:

## 2018-10-10 ENCOUNTER — NURSE ONLY (OUTPATIENT)
Dept: UROLOGY | Age: 58
End: 2018-10-10

## 2018-10-10 ENCOUNTER — OFFICE VISIT (OUTPATIENT)
Dept: FAMILY MEDICINE CLINIC | Age: 58
End: 2018-10-10
Payer: COMMERCIAL

## 2018-10-10 VITALS
WEIGHT: 293 LBS | DIASTOLIC BLOOD PRESSURE: 82 MMHG | BODY MASS INDEX: 54.82 KG/M2 | SYSTOLIC BLOOD PRESSURE: 118 MMHG | HEART RATE: 66 BPM | TEMPERATURE: 97.1 F | OXYGEN SATURATION: 95 % | RESPIRATION RATE: 20 BRPM

## 2018-10-10 DIAGNOSIS — Z79.4 TYPE 2 DIABETES MELLITUS WITH DIABETIC POLYNEUROPATHY, WITH LONG-TERM CURRENT USE OF INSULIN (HCC): ICD-10-CM

## 2018-10-10 DIAGNOSIS — C64.2 RENAL CELL CARCINOMA OF LEFT KIDNEY (HCC): Primary | ICD-10-CM

## 2018-10-10 DIAGNOSIS — D62 POSTOPERATIVE ANEMIA DUE TO ACUTE BLOOD LOSS: ICD-10-CM

## 2018-10-10 DIAGNOSIS — R30.0 DYSURIA: ICD-10-CM

## 2018-10-10 DIAGNOSIS — N17.9 AKI (ACUTE KIDNEY INJURY) (HCC): ICD-10-CM

## 2018-10-10 DIAGNOSIS — E11.42 TYPE 2 DIABETES MELLITUS WITH DIABETIC POLYNEUROPATHY, WITH LONG-TERM CURRENT USE OF INSULIN (HCC): ICD-10-CM

## 2018-10-10 DIAGNOSIS — I10 ESSENTIAL HYPERTENSION: ICD-10-CM

## 2018-10-10 DIAGNOSIS — N30.00 ACUTE CYSTITIS WITHOUT HEMATURIA: ICD-10-CM

## 2018-10-10 PROBLEM — R42 DIZZINESS: Status: RESOLVED | Noted: 2017-09-07 | Resolved: 2018-10-10

## 2018-10-10 PROBLEM — R19.7 DIARRHEA OF PRESUMED INFECTIOUS ORIGIN: Status: RESOLVED | Noted: 2017-09-07 | Resolved: 2018-10-10

## 2018-10-10 PROBLEM — R11.0 NAUSEA: Status: RESOLVED | Noted: 2017-09-07 | Resolved: 2018-10-10

## 2018-10-10 PROBLEM — K52.9 ACUTE GASTROENTERITIS: Status: RESOLVED | Noted: 2017-09-13 | Resolved: 2018-10-10

## 2018-10-10 PROBLEM — K80.21 CALCULUS OF GALLBLADDER WITH BILIARY OBSTRUCTION BUT WITHOUT CHOLECYSTITIS: Status: RESOLVED | Noted: 2017-10-24 | Resolved: 2018-10-10

## 2018-10-10 LAB
ALBUMIN SERPL-MCNC: 3.4 G/DL (ref 3.5–5.2)
ALP BLD-CCNC: 107 U/L (ref 35–104)
ALT SERPL-CCNC: 14 U/L (ref 5–33)
ANION GAP SERPL CALCULATED.3IONS-SCNC: 13 MMOL/L (ref 7–19)
APPEARANCE FLUID: ABNORMAL
AST SERPL-CCNC: 15 U/L (ref 5–32)
BILIRUB SERPL-MCNC: 0.5 MG/DL (ref 0.2–1.2)
BILIRUBIN, POC: ABNORMAL
BLOOD URINE, POC: ABNORMAL
BUN BLDV-MCNC: 14 MG/DL (ref 6–20)
CALCIUM SERPL-MCNC: 9.3 MG/DL (ref 8.6–10)
CHLORIDE BLD-SCNC: 98 MMOL/L (ref 98–111)
CLARITY, POC: ABNORMAL
CO2: 29 MMOL/L (ref 22–29)
COLOR, POC: ABNORMAL
CREAT SERPL-MCNC: 1.4 MG/DL (ref 0.5–0.9)
GFR NON-AFRICAN AMERICAN: 39
GLUCOSE BLD-MCNC: 151 MG/DL (ref 74–109)
GLUCOSE URINE, POC: 250
HCT VFR BLD CALC: 37.8 % (ref 37–47)
HEMOGLOBIN: 12.4 G/DL (ref 12–16)
KETONES, POC: 15
LEUKOCYTE EST, POC: ABNORMAL
MCH RBC QN AUTO: 29.1 PG (ref 27–31)
MCHC RBC AUTO-ENTMCNC: 32.8 G/DL (ref 33–37)
MCV RBC AUTO: 88.7 FL (ref 81–99)
NITRITE, POC: POSITIVE
PDW BLD-RTO: 13.4 % (ref 11.5–14.5)
PH, POC: 5.5
PLATELET # BLD: 294 K/UL (ref 130–400)
PMV BLD AUTO: 9.5 FL (ref 9.4–12.3)
POTASSIUM SERPL-SCNC: 4.2 MMOL/L (ref 3.5–5)
PROTEIN, POC: >=300
RBC # BLD: 4.26 M/UL (ref 4.2–5.4)
SODIUM BLD-SCNC: 140 MMOL/L (ref 136–145)
SPECIFIC GRAVITY, POC: 1.01
TOTAL PROTEIN: 7.5 G/DL (ref 6.6–8.7)
UROBILINOGEN, POC: >=8
WBC # BLD: 13.7 K/UL (ref 4.8–10.8)

## 2018-10-10 PROCEDURE — 81002 URINALYSIS NONAUTO W/O SCOPE: CPT | Performed by: CLINICAL NURSE SPECIALIST

## 2018-10-10 PROCEDURE — 99214 OFFICE O/P EST MOD 30 MIN: CPT | Performed by: CLINICAL NURSE SPECIALIST

## 2018-10-10 PROCEDURE — 99999 PR OFFICE/OUTPT VISIT,PROCEDURE ONLY: CPT | Performed by: UROLOGY

## 2018-10-10 RX ORDER — CIPROFLOXACIN 500 MG/1
500 TABLET, FILM COATED ORAL 2 TIMES DAILY
Qty: 10 TABLET | Refills: 0 | Status: SHIPPED | OUTPATIENT
Start: 2018-10-10 | End: 2018-10-15

## 2018-10-10 ASSESSMENT — ENCOUNTER SYMPTOMS
BACK PAIN: 0
EYE REDNESS: 0
ABDOMINAL PAIN: 1
CHEST TIGHTNESS: 0
TROUBLE SWALLOWING: 0
DIARRHEA: 0
SINUS PRESSURE: 0
EYE PAIN: 0
SHORTNESS OF BREATH: 0
COUGH: 1
COLOR CHANGE: 0
WHEEZING: 0
SORE THROAT: 0
CONSTIPATION: 0
NAUSEA: 1
FACIAL SWELLING: 0
EYE DISCHARGE: 0
VOMITING: 0

## 2018-10-10 NOTE — PROGRESS NOTES
Pt  Came in today for staple removal, I used a sterile technique removed pt staples and applied sterile strips .  Pt tolerated  Procedure well and will follow up with  Dr Yasmine Castillo

## 2018-10-12 LAB
ORGANISM: ABNORMAL
URINE CULTURE, ROUTINE: ABNORMAL
URINE CULTURE, ROUTINE: ABNORMAL

## 2018-10-25 ENCOUNTER — OFFICE VISIT (OUTPATIENT)
Dept: UROLOGY | Age: 58
End: 2018-10-25
Payer: COMMERCIAL

## 2018-10-25 VITALS — HEIGHT: 67 IN | BODY MASS INDEX: 45.99 KG/M2 | TEMPERATURE: 97.4 F | WEIGHT: 293 LBS

## 2018-10-25 DIAGNOSIS — C64.2 RENAL CELL CARCINOMA OF LEFT KIDNEY (HCC): ICD-10-CM

## 2018-10-25 DIAGNOSIS — C64.2 RENAL CELL CARCINOMA OF LEFT KIDNEY (HCC): Primary | ICD-10-CM

## 2018-10-25 LAB
ANION GAP SERPL CALCULATED.3IONS-SCNC: 18 MMOL/L (ref 7–19)
BASOPHILS ABSOLUTE: 0.1 K/UL (ref 0–0.2)
BASOPHILS RELATIVE PERCENT: 0.9 % (ref 0–1)
BILIRUBIN, POC: NORMAL
BLOOD URINE, POC: NORMAL
BUN BLDV-MCNC: 23 MG/DL (ref 6–20)
CALCIUM SERPL-MCNC: 10.1 MG/DL (ref 8.6–10)
CHLORIDE BLD-SCNC: 100 MMOL/L (ref 98–111)
CLARITY, POC: CLEAR
CO2: 24 MMOL/L (ref 22–29)
COLOR, POC: YELLOW
CREAT SERPL-MCNC: 1.5 MG/DL (ref 0.5–0.9)
EOSINOPHILS ABSOLUTE: 0.2 K/UL (ref 0–0.6)
EOSINOPHILS RELATIVE PERCENT: 2.2 % (ref 0–5)
GFR NON-AFRICAN AMERICAN: 36
GLUCOSE BLD-MCNC: 170 MG/DL (ref 74–109)
GLUCOSE URINE, POC: NORMAL
HCT VFR BLD CALC: 41.8 % (ref 37–47)
HEMOGLOBIN: 13.4 G/DL (ref 12–16)
KETONES, POC: NORMAL
LEUKOCYTE EST, POC: NORMAL
LYMPHOCYTES ABSOLUTE: 1.6 K/UL (ref 1.1–4.5)
LYMPHOCYTES RELATIVE PERCENT: 20.6 % (ref 20–40)
MCH RBC QN AUTO: 28.9 PG (ref 27–31)
MCHC RBC AUTO-ENTMCNC: 32.1 G/DL (ref 33–37)
MCV RBC AUTO: 90.3 FL (ref 81–99)
MONOCYTES ABSOLUTE: 0.5 K/UL (ref 0–0.9)
MONOCYTES RELATIVE PERCENT: 6.5 % (ref 0–10)
NEUTROPHILS ABSOLUTE: 5.3 K/UL (ref 1.5–7.5)
NEUTROPHILS RELATIVE PERCENT: 69.5 % (ref 50–65)
NITRITE, POC: NORMAL
PDW BLD-RTO: 14.2 % (ref 11.5–14.5)
PH, POC: 6
PLATELET # BLD: 312 K/UL (ref 130–400)
PMV BLD AUTO: 10.6 FL (ref 9.4–12.3)
POTASSIUM SERPL-SCNC: 4.9 MMOL/L (ref 3.5–5)
PROTEIN, POC: NORMAL
RBC # BLD: 4.63 M/UL (ref 4.2–5.4)
SODIUM BLD-SCNC: 142 MMOL/L (ref 136–145)
SPECIFIC GRAVITY, POC: 1.02
UROBILINOGEN, POC: 0.2
WBC # BLD: 7.6 K/UL (ref 4.8–10.8)

## 2018-10-25 PROCEDURE — 81003 URINALYSIS AUTO W/O SCOPE: CPT | Performed by: UROLOGY

## 2018-10-25 PROCEDURE — 99024 POSTOP FOLLOW-UP VISIT: CPT | Performed by: UROLOGY

## 2018-10-25 ASSESSMENT — ENCOUNTER SYMPTOMS
CONSTIPATION: 0
COLOR CHANGE: 0
ABDOMINAL DISTENTION: 0
SINUS PRESSURE: 0
COUGH: 0
VOMITING: 0
BACK PAIN: 1
EYE PAIN: 0
SORE THROAT: 0
DIARRHEA: 0
EYE REDNESS: 0
BLOOD IN STOOL: 0
NAUSEA: 0
FACIAL SWELLING: 0
RHINORRHEA: 0
ABDOMINAL PAIN: 0
WHEEZING: 0
EYE DISCHARGE: 0
SHORTNESS OF BREATH: 0

## 2018-10-25 NOTE — LETTER
[facility]  [address] [place] [address] Drive, [address] [address]  Phone: [phone]  Fax: [phone]    Mane Strickland MD        October 25, 2018     Sandra Weir MD  [address] [place] [address] Dr Jammie Alexander [address] [facility] [address]      Patient: Godfrey Domínguez   MR Number: [id number]   YOB: 1960   Date of Visit: 10/25/2018       Dear Provider: Thank you for referring Godfrey Domínguez to me for evaluation. Below are the relevant portions of my assessment and plan of care. If you have questions, please do not hesitate to call me. I look forward to following [facility] along with you.     Sincerely,        Mane Strickland MD

## 2018-10-26 DIAGNOSIS — C64.2 RENAL CELL CARCINOMA, LEFT (HCC): Primary | ICD-10-CM

## 2018-11-08 DIAGNOSIS — E11.9 TYPE 2 DIABETES MELLITUS WITHOUT COMPLICATION, WITHOUT LONG-TERM CURRENT USE OF INSULIN (HCC): ICD-10-CM

## 2018-11-08 DIAGNOSIS — E03.9 PRIMARY HYPOTHYROIDISM: ICD-10-CM

## 2018-11-08 DIAGNOSIS — E78.01 FAMILIAL HYPERCHOLESTEROLEMIA: ICD-10-CM

## 2018-11-08 LAB
ALBUMIN SERPL-MCNC: 3.8 G/DL (ref 3.5–5.2)
ALP BLD-CCNC: 81 U/L (ref 35–104)
ALT SERPL-CCNC: 14 U/L (ref 5–33)
ANION GAP SERPL CALCULATED.3IONS-SCNC: 15 MMOL/L (ref 7–19)
AST SERPL-CCNC: 16 U/L (ref 5–32)
BILIRUB SERPL-MCNC: 0.5 MG/DL (ref 0.2–1.2)
BUN BLDV-MCNC: 20 MG/DL (ref 6–20)
CALCIUM SERPL-MCNC: 9.9 MG/DL (ref 8.6–10)
CHLORIDE BLD-SCNC: 104 MMOL/L (ref 98–111)
CHOLESTEROL, TOTAL: 172 MG/DL (ref 160–199)
CO2: 24 MMOL/L (ref 22–29)
CREAT SERPL-MCNC: 1.4 MG/DL (ref 0.5–0.9)
GFR NON-AFRICAN AMERICAN: 39
GLUCOSE BLD-MCNC: 158 MG/DL (ref 74–109)
HBA1C MFR BLD: 6.9 % (ref 4–6)
HDLC SERPL-MCNC: 50 MG/DL (ref 65–121)
LDL CHOLESTEROL CALCULATED: 95 MG/DL
POTASSIUM SERPL-SCNC: 4.7 MMOL/L (ref 3.5–5)
SODIUM BLD-SCNC: 143 MMOL/L (ref 136–145)
TOTAL PROTEIN: 7.6 G/DL (ref 6.6–8.7)
TRIGL SERPL-MCNC: 134 MG/DL (ref 0–149)
TSH SERPL DL<=0.05 MIU/L-ACNC: 6.2 UIU/ML (ref 0.27–4.2)

## 2018-11-20 ENCOUNTER — OFFICE VISIT (OUTPATIENT)
Dept: FAMILY MEDICINE CLINIC | Age: 58
End: 2018-11-20
Payer: COMMERCIAL

## 2018-11-20 VITALS
SYSTOLIC BLOOD PRESSURE: 128 MMHG | BODY MASS INDEX: 56.7 KG/M2 | WEIGHT: 293 LBS | HEART RATE: 59 BPM | OXYGEN SATURATION: 98 % | DIASTOLIC BLOOD PRESSURE: 82 MMHG | TEMPERATURE: 98 F | RESPIRATION RATE: 20 BRPM

## 2018-11-20 DIAGNOSIS — K21.9 GASTROESOPHAGEAL REFLUX DISEASE WITHOUT ESOPHAGITIS: ICD-10-CM

## 2018-11-20 DIAGNOSIS — Z79.4 TYPE 2 DIABETES MELLITUS WITH DIABETIC POLYNEUROPATHY, WITH LONG-TERM CURRENT USE OF INSULIN (HCC): ICD-10-CM

## 2018-11-20 DIAGNOSIS — E03.9 PRIMARY HYPOTHYROIDISM: ICD-10-CM

## 2018-11-20 DIAGNOSIS — R53.83 OTHER FATIGUE: Primary | ICD-10-CM

## 2018-11-20 DIAGNOSIS — I10 ESSENTIAL (PRIMARY) HYPERTENSION: ICD-10-CM

## 2018-11-20 DIAGNOSIS — E11.42 TYPE 2 DIABETES MELLITUS WITH DIABETIC POLYNEUROPATHY, WITH LONG-TERM CURRENT USE OF INSULIN (HCC): ICD-10-CM

## 2018-11-20 DIAGNOSIS — I10 ESSENTIAL HYPERTENSION: ICD-10-CM

## 2018-11-20 DIAGNOSIS — D62 POSTOPERATIVE ANEMIA DUE TO ACUTE BLOOD LOSS: ICD-10-CM

## 2018-11-20 DIAGNOSIS — N17.9 AKI (ACUTE KIDNEY INJURY) (HCC): ICD-10-CM

## 2018-11-20 DIAGNOSIS — E78.01 FAMILIAL HYPERCHOLESTEROLEMIA: ICD-10-CM

## 2018-11-20 PROCEDURE — 99214 OFFICE O/P EST MOD 30 MIN: CPT | Performed by: FAMILY MEDICINE

## 2018-11-20 RX ORDER — LEVOTHYROXINE SODIUM 137 UG/1
137 TABLET ORAL DAILY
Qty: 30 TABLET | Refills: 5 | Status: SHIPPED | OUTPATIENT
Start: 2018-11-20 | End: 2019-04-11 | Stop reason: SDUPTHER

## 2018-11-20 ASSESSMENT — PATIENT HEALTH QUESTIONNAIRE - PHQ9
2. FEELING DOWN, DEPRESSED OR HOPELESS: 0
SUM OF ALL RESPONSES TO PHQ9 QUESTIONS 1 & 2: 0
1. LITTLE INTEREST OR PLEASURE IN DOING THINGS: 0
SUM OF ALL RESPONSES TO PHQ QUESTIONS 1-9: 0
SUM OF ALL RESPONSES TO PHQ QUESTIONS 1-9: 0

## 2018-11-20 NOTE — PROGRESS NOTES
Result Value Ref Range    Hemoglobin A1C 6.9 (H) 4.0 - 6.0 %   Lipid Panel    Collection Time: 11/08/18  8:31 AM   Result Value Ref Range    Cholesterol, Total 172 160 - 199 mg/dL    Triglycerides 134 0 - 149 mg/dL    HDL 50 (L) 65 - 121 mg/dL    LDL Calculated 95 <100 mg/dL   TSH without Reflex    Collection Time: 11/08/18  8:31 AM   Result Value Ref Range    TSH 6.200 (H) 0.270 - 4.200 uIU/mL               Assessment & Plan: The following diagnoses and conditions are stable with no further orders unlessindicated:  1. Primary hypothyroidism  Lab Results   Component Value Date    TSH 6.200 (H) 11/08/2018     Increase Synthroid to 137 µg daily  - levothyroxine (SYNTHROID) 137 MCG tablet; Take 1 tablet by mouth daily  Dispense: 30 tablet; Refill: 5  - TSH without Reflex; Future  - T4, Free; Future    2. Other fatigue  Check the following labs  - Comprehensive Metabolic Panel; Future    3. Type 2 diabetes mellitus with diabetic polyneuropathy, with long-term current use of insulin ([facility])  Lab Results   Component Value Date    LABA1C 6.9 (H) 11/08/2018     No results found for: EAG  Blood sugar improved considerably. Encouraged ADA diet and weight loss. Stable  Discontinue glimepiride  4. Postoperative anemia due to acute blood loss  Lab Results   Component Value Date    WBC 7.6 10/25/2018    HGB 13.4 10/25/2018    HCT 41.8 10/25/2018    MCV 90.3 10/25/2018     10/25/2018       5. BRE (acute kidney injury) [facility])  Lab Results   Component Value Date    CREATININE 1.4 (H) 11/08/2018     Lab Results   Component Value Date    BUN 20 11/08/2018         Reinforced goals of adequate hydration. Recommended he avoid NSAIDs such as naproxen and ibuprofen. 6. Essential hypertension  BP Readings from Last 3 Encounters:   11/20/18 128/82   10/10/18 118/82   10/07/18 124/78     Stable    7. Gastroesophageal reflux disease without esophagitis  Stable    8. Essential (primary) hypertension  Stable    9.  Familial

## 2018-12-20 DIAGNOSIS — E03.9 PRIMARY HYPOTHYROIDISM: ICD-10-CM

## 2018-12-20 DIAGNOSIS — R53.83 OTHER FATIGUE: ICD-10-CM

## 2018-12-20 LAB
ALBUMIN SERPL-MCNC: 3.8 G/DL (ref 3.5–5.2)
ALP BLD-CCNC: 84 U/L (ref 35–104)
ALT SERPL-CCNC: 19 U/L (ref 5–33)
ANION GAP SERPL CALCULATED.3IONS-SCNC: 15 MMOL/L (ref 7–19)
AST SERPL-CCNC: 16 U/L (ref 5–32)
BILIRUB SERPL-MCNC: 0.4 MG/DL (ref 0.2–1.2)
BUN BLDV-MCNC: 18 MG/DL (ref 6–20)
CALCIUM SERPL-MCNC: 9.3 MG/DL (ref 8.6–10)
CHLORIDE BLD-SCNC: 100 MMOL/L (ref 98–111)
CO2: 25 MMOL/L (ref 22–29)
CREAT SERPL-MCNC: 1.3 MG/DL (ref 0.5–0.9)
GFR NON-AFRICAN AMERICAN: 42
GLUCOSE BLD-MCNC: 171 MG/DL (ref 74–109)
POTASSIUM SERPL-SCNC: 4.1 MMOL/L (ref 3.5–5)
SODIUM BLD-SCNC: 140 MMOL/L (ref 136–145)
T4 FREE: 1.5 NG/DL (ref 0.9–1.7)
TOTAL PROTEIN: 7.6 G/DL (ref 6.6–8.7)
TSH SERPL DL<=0.05 MIU/L-ACNC: 1.26 UIU/ML (ref 0.27–4.2)

## 2018-12-24 ENCOUNTER — OFFICE VISIT (OUTPATIENT)
Dept: URGENT CARE | Age: 58
End: 2018-12-24
Payer: COMMERCIAL

## 2018-12-24 VITALS
HEIGHT: 67 IN | HEART RATE: 64 BPM | OXYGEN SATURATION: 97 % | DIASTOLIC BLOOD PRESSURE: 88 MMHG | TEMPERATURE: 98.5 F | SYSTOLIC BLOOD PRESSURE: 139 MMHG | RESPIRATION RATE: 20 BRPM | WEIGHT: 293 LBS | BODY MASS INDEX: 45.99 KG/M2

## 2018-12-24 DIAGNOSIS — J02.0 ACUTE STREPTOCOCCAL PHARYNGITIS: Primary | ICD-10-CM

## 2018-12-24 DIAGNOSIS — J02.9 SORE THROAT: ICD-10-CM

## 2018-12-24 LAB — S PYO AG THROAT QL: POSITIVE

## 2018-12-24 PROCEDURE — 87880 STREP A ASSAY W/OPTIC: CPT | Performed by: NURSE PRACTITIONER

## 2018-12-24 PROCEDURE — 99213 OFFICE O/P EST LOW 20 MIN: CPT | Performed by: NURSE PRACTITIONER

## 2018-12-24 RX ORDER — AMOXICILLIN 500 MG/1
500 CAPSULE ORAL 2 TIMES DAILY
Qty: 20 CAPSULE | Refills: 0 | Status: SHIPPED | OUTPATIENT
Start: 2018-12-24 | End: 2019-01-03

## 2018-12-24 ASSESSMENT — ENCOUNTER SYMPTOMS
SORE THROAT: 1
RESPIRATORY NEGATIVE: 1
EYES NEGATIVE: 1
GASTROINTESTINAL NEGATIVE: 1

## 2018-12-24 NOTE — PROGRESS NOTES
[address] [address]  Dept: [phone]  Loc: [phone]    Leesa Cordero is a 63 y.o. female who presents today for her medical conditions/complaintsas noted below.   Leesa Cordero is c/o of Pharyngitis and Cough        HPI:     HPI  61yo WF comes today with a 3 day history of severe sore throat, nasal congesiton and malaise, she denies fever, chills, difficulty swallowing, chest pain, shortness of breath, nausea, vomiting or diarrhea, she has treated with OTC meds without improvement,  Past Medical History:   Diagnosis Date    Allergic rhinitis     Ankle fracture     3 year ago; increasing difficulty walking; has bone fragments    Arthritis     sees dr. Girard Litten as ambulation aid     right foot per dr. Ethel Hoff    CPAP (continuous positive airway pressure) dependence     12cm    Diabetes ([facility] [address]     Hiatal hernia 9/27/2017    Hyperlipidemia     Hypertension     Hypothyroidism     Murmur     Obesity     ZOILA (obstructive sleep apnea)     AHI:  28.5 per PS[facility], 9/2014    PLMD (periodic limb movement disorder)     Renal mass     cat scan done 9/13/18; to see dr. Adriano Bearden coming up    Thyroid disease     Type 2 diabetes mellitus without complication ([facility] [address]     Type II or unspecified type diabetes mellitus without mention of complication, not stated as uncontrolled      Past Surgical History:   Procedure Laterality Date    BREAST BIOPSY Right 2012    CARPAL TUNNEL RELEASE      bilateral    CHOLECYSTECTOMY      HYSTERECTOMY      NE LAP, RADICAL NEPHRECTOMY Left 10/3/2018    NEPHRECTOMY LAPAROSCOPIC HAND ASSISTED performed by Jose Meneses MD at [address] LAP,CHOLECYSTECTOMY/GRAPH N/A 10/31/2017    CHOLECYSTECTOMY LAPAROSCOPIC performed by Luisana Knox MD at [facility] History   Problem Relation Age of Onset    Diabetes Mother     Cancer Mother         breast    Heart Disease 81 MG tablet Take 81 mg by mouth daily 3 days a wk       No current facility-administered medications for this visit. Allergies   Allergen Reactions    Adhesive Tape Other (See Comments)     Post op incision infection    Dermabond Other (See Comments)     Post op incision infection       Health Maintenance   Topic Date Due    Hepatitis C screen  1960    HIV screen  02/28/1976    Shingles Vaccine (1 of 2 - 2 Dose Series) 02/28/2011    Diabetic foot exam  01/01/2019 (Originally 2/28/1971)    DTaP/Tdap/Td vaccine (1 - Tdap) 01/01/2019 (Originally 2/28/1980)    Diabetic retinal exam  01/15/2019    Diabetic microalbuminuria test  08/15/2019    A1C test (Diabetic or Prediabetic)  11/08/2019    Lipid screen  11/08/2019    Potassium monitoring  12/20/2019    Creatinine monitoring  12/20/2019    Breast cancer screen  12/22/2019    Cervical cancer screen  04/08/2021    Colon cancer screen colonoscopy  07/18/2021    Flu vaccine  Completed    Pneumococcal med risk  Completed       Subjective:     Review of Systems   Constitutional: Positive for fatigue. HENT: Positive for congestion and sore throat. Eyes: Negative. Respiratory: Negative. Cardiovascular: Negative. Gastrointestinal: Negative. Musculoskeletal: Negative. Objective:     Physical Exam   Constitutional: Vital signs are normal. She appears well-developed and well-nourished. HENT:   Head: Normocephalic and atraumatic. Right Ear: Hearing, tympanic membrane, external ear and ear canal normal.   Left Ear: Hearing, tympanic membrane, external ear and ear canal normal.   Nose: Mucosal edema and rhinorrhea present. Mouth/Throat: Uvula is midline and mucous membranes are normal. Posterior oropharyngeal edema and posterior oropharyngeal erythema present. Tonsils are 1+ on the right. Tonsils are 1+ on the left. Cardiovascular: Normal rate, regular rhythm, S1 normal, S2 normal, normal heart sounds and normal pulses.

## 2018-12-27 DIAGNOSIS — N18.30 CKD (CHRONIC KIDNEY DISEASE) STAGE 3, GFR 30-59 ML/MIN (HCC): Primary | ICD-10-CM

## 2019-01-25 ENCOUNTER — HOSPITAL ENCOUNTER (OUTPATIENT)
Dept: GENERAL RADIOLOGY | Age: 59
Discharge: HOME | End: 2019-01-25
Payer: COMMERCIAL

## 2019-01-25 ENCOUNTER — HOSPITAL ENCOUNTER (OUTPATIENT)
Dept: CT IMAGING | Age: 59
Discharge: HOME | End: 2019-01-25
Payer: COMMERCIAL

## 2019-01-25 DIAGNOSIS — C64.2 RENAL CELL CARCINOMA OF LEFT KIDNEY (HCC): ICD-10-CM

## 2019-01-25 LAB
ALBUMIN SERPL-MCNC: 4.1 G/DL (ref 3.5–5.2)
ALP BLD-CCNC: 82 U/L (ref 35–104)
ALT SERPL-CCNC: 17 U/L (ref 5–33)
ANION GAP SERPL CALCULATED.3IONS-SCNC: 14 MMOL/L (ref 7–19)
AST SERPL-CCNC: 15 U/L (ref 5–32)
BILIRUB SERPL-MCNC: 0.4 MG/DL (ref 0.2–1.2)
BUN BLDV-MCNC: 24 MG/DL (ref 6–20)
CALCIUM SERPL-MCNC: 9.6 MG/DL (ref 8.6–10)
CHLORIDE BLD-SCNC: 109 MMOL/L (ref 98–111)
CO2: 24 MMOL/L (ref 22–29)
CREAT SERPL-MCNC: 1.5 MG/DL (ref 0.5–0.9)
GFR NON-AFRICAN AMERICAN: 36
GLUCOSE BLD-MCNC: 177 MG/DL (ref 74–109)
POTASSIUM SERPL-SCNC: 4.9 MMOL/L (ref 3.5–5)
SODIUM BLD-SCNC: 147 MMOL/L (ref 136–145)
TOTAL PROTEIN: 7.4 G/DL (ref 6.6–8.7)

## 2019-01-25 PROCEDURE — 71046 X-RAY EXAM CHEST 2 VIEWS: CPT

## 2019-01-25 PROCEDURE — 74176 CT ABD & PELVIS W/O CONTRAST: CPT

## 2019-02-01 ENCOUNTER — OFFICE VISIT (OUTPATIENT)
Dept: UROLOGY | Age: 59
End: 2019-02-01
Payer: COMMERCIAL

## 2019-02-01 VITALS — HEIGHT: 67 IN | BODY MASS INDEX: 45.99 KG/M2 | WEIGHT: 293 LBS | TEMPERATURE: 98.1 F

## 2019-02-01 DIAGNOSIS — C64.2 RENAL CELL CARCINOMA OF LEFT KIDNEY (HCC): Primary | ICD-10-CM

## 2019-02-01 LAB
BACTERIA URINE, POC: NORMAL
BILIRUBIN URINE: 0 MG/DL
BLOOD, URINE: NEGATIVE
CASTS URINE, POC: 0
CLARITY: NORMAL
COLOR: YELLOW
CRYSTALS URINE, POC: 0
EPI CELLS URINE, POC: 10
GLUCOSE URINE: NORMAL
KETONES, URINE: NEGATIVE
LEUKOCYTE EST, POC: POSITIVE
NITRITE, URINE: NEGATIVE
PH UA: 6.5 (ref 4.5–8)
PROTEIN UA: NEGATIVE
RBC URINE, POC: 0
SPECIFIC GRAVITY UA: 1.02 (ref 1–1.03)
UROBILINOGEN, URINE: NORMAL
WBC URINE, POC: 20
YEAST URINE, POC: 0

## 2019-02-01 PROCEDURE — 81001 URINALYSIS AUTO W/SCOPE: CPT | Performed by: UROLOGY

## 2019-02-01 PROCEDURE — 99214 OFFICE O/P EST MOD 30 MIN: CPT | Performed by: UROLOGY

## 2019-02-01 ASSESSMENT — ENCOUNTER SYMPTOMS
BACK PAIN: 0
COLOR CHANGE: 0
EYE REDNESS: 0
COUGH: 0
DIARRHEA: 0
CONSTIPATION: 0
RHINORRHEA: 0
ABDOMINAL PAIN: 0
BLOOD IN STOOL: 0
WHEEZING: 0
VOMITING: 0
NAUSEA: 0
EYE PAIN: 0
SHORTNESS OF BREATH: 0
SORE THROAT: 0
EYE DISCHARGE: 0
ABDOMINAL DISTENTION: 0
SINUS PRESSURE: 0
FACIAL SWELLING: 0

## 2019-02-14 DIAGNOSIS — C64.2 RENAL CELL CARCINOMA OF LEFT KIDNEY (HCC): ICD-10-CM

## 2019-02-14 LAB
ALBUMIN SERPL-MCNC: 4.1 G/DL (ref 3.5–5.2)
ALP BLD-CCNC: 90 U/L (ref 35–104)
ALT SERPL-CCNC: 20 U/L (ref 5–33)
ANION GAP SERPL CALCULATED.3IONS-SCNC: 15 MMOL/L (ref 7–19)
AST SERPL-CCNC: 17 U/L (ref 5–32)
BILIRUB SERPL-MCNC: 0.5 MG/DL (ref 0.2–1.2)
BUN BLDV-MCNC: 20 MG/DL (ref 6–20)
CALCIUM SERPL-MCNC: 9.4 MG/DL (ref 8.6–10)
CHLORIDE BLD-SCNC: 102 MMOL/L (ref 98–111)
CO2: 25 MMOL/L (ref 22–29)
CREAT SERPL-MCNC: 1.4 MG/DL (ref 0.5–0.9)
GFR NON-AFRICAN AMERICAN: 39
GLUCOSE BLD-MCNC: 185 MG/DL (ref 74–109)
POTASSIUM SERPL-SCNC: 4.6 MMOL/L (ref 3.5–5)
SODIUM BLD-SCNC: 142 MMOL/L (ref 136–145)
TOTAL PROTEIN: 7.3 G/DL (ref 6.6–8.7)

## 2019-02-18 RX ORDER — ATENOLOL 50 MG/1
TABLET ORAL
Qty: 30 TABLET | Refills: 3 | Status: SHIPPED | OUTPATIENT
Start: 2019-02-18 | End: 2019-04-11 | Stop reason: SDUPTHER

## 2019-02-21 ENCOUNTER — OFFICE VISIT (OUTPATIENT)
Dept: FAMILY MEDICINE CLINIC | Age: 59
End: 2019-02-21
Payer: COMMERCIAL

## 2019-02-21 VITALS
TEMPERATURE: 98.3 F | WEIGHT: 293 LBS | BODY MASS INDEX: 57.32 KG/M2 | HEART RATE: 54 BPM | OXYGEN SATURATION: 98 % | SYSTOLIC BLOOD PRESSURE: 132 MMHG | DIASTOLIC BLOOD PRESSURE: 86 MMHG

## 2019-02-21 DIAGNOSIS — N18.30 CKD (CHRONIC KIDNEY DISEASE) STAGE 3, GFR 30-59 ML/MIN (HCC): ICD-10-CM

## 2019-02-21 DIAGNOSIS — Z79.4 TYPE 2 DIABETES MELLITUS WITH DIABETIC POLYNEUROPATHY, WITH LONG-TERM CURRENT USE OF INSULIN (HCC): ICD-10-CM

## 2019-02-21 DIAGNOSIS — R06.00 DYSPNEA, UNSPECIFIED TYPE: Primary | ICD-10-CM

## 2019-02-21 DIAGNOSIS — E11.42 TYPE 2 DIABETES MELLITUS WITH DIABETIC POLYNEUROPATHY, WITH LONG-TERM CURRENT USE OF INSULIN (HCC): ICD-10-CM

## 2019-02-21 DIAGNOSIS — I10 ESSENTIAL HYPERTENSION: ICD-10-CM

## 2019-02-21 DIAGNOSIS — E03.9 PRIMARY HYPOTHYROIDISM: ICD-10-CM

## 2019-02-21 PROCEDURE — 99214 OFFICE O/P EST MOD 30 MIN: CPT | Performed by: FAMILY MEDICINE

## 2019-02-23 ASSESSMENT — ENCOUNTER SYMPTOMS
NAUSEA: 0
CHEST TIGHTNESS: 0
ANAL BLEEDING: 0
CONSTIPATION: 0
COUGH: 0
SHORTNESS OF BREATH: 1
ABDOMINAL PAIN: 0
DIARRHEA: 0

## 2019-03-01 ENCOUNTER — HOSPITAL ENCOUNTER (OUTPATIENT)
Dept: NON INVASIVE DIAGNOSTICS | Age: 59
Discharge: HOME | End: 2019-03-01
Payer: COMMERCIAL

## 2019-03-01 VITALS
DIASTOLIC BLOOD PRESSURE: 86 MMHG | SYSTOLIC BLOOD PRESSURE: 130 MMHG | WEIGHT: 293 LBS | BODY MASS INDEX: 54.9 KG/M2 | HEART RATE: 109 BPM

## 2019-03-01 DIAGNOSIS — R06.00 DYSPNEA, UNSPECIFIED TYPE: ICD-10-CM

## 2019-03-01 DIAGNOSIS — I10 ESSENTIAL (PRIMARY) HYPERTENSION: ICD-10-CM

## 2019-03-01 PROCEDURE — C8928 TTE W OR W/O FOL W/CON,STRES: HCPCS

## 2019-03-01 PROCEDURE — 2580000003 HC RX 258: Performed by: INTERNAL MEDICINE

## 2019-03-01 PROCEDURE — 6360000004 HC RX CONTRAST MEDICATION: Performed by: INTERNAL MEDICINE

## 2019-03-01 PROCEDURE — 2500000003 HC RX 250 WO HCPCS: Performed by: INTERNAL MEDICINE

## 2019-03-01 PROCEDURE — 6360000002 HC RX W HCPCS: Performed by: INTERNAL MEDICINE

## 2019-03-01 RX ORDER — PEN NEEDLE, DIABETIC 31 GX3/16"
NEEDLE, DISPOSABLE MISCELLANEOUS
Qty: 100 EACH | Refills: 1 | Status: SHIPPED | OUTPATIENT
Start: 2019-03-01 | End: 2019-09-23 | Stop reason: SDUPTHER

## 2019-03-01 RX ORDER — ATROPINE SULFATE 0.1 MG/ML
1 INJECTION INTRAVENOUS PRN
Status: DISCONTINUED | OUTPATIENT
Start: 2019-03-01 | End: 2019-03-02 | Stop reason: HOSPADM

## 2019-03-01 RX ORDER — METOPROLOL TARTRATE 5 MG/5ML
5 INJECTION INTRAVENOUS PRN
Status: DISCONTINUED | OUTPATIENT
Start: 2019-03-01 | End: 2019-03-02 | Stop reason: HOSPADM

## 2019-03-01 RX ORDER — LISINOPRIL 2.5 MG/1
2.5 TABLET ORAL DAILY
Qty: 30 TABLET | Refills: 0 | Status: SHIPPED | OUTPATIENT
Start: 2019-03-01 | End: 2019-04-11 | Stop reason: SDUPTHER

## 2019-03-01 RX ORDER — DOBUTAMINE HYDROCHLORIDE 200 MG/100ML
10 INJECTION INTRAVENOUS CONTINUOUS PRN
Status: DISCONTINUED | OUTPATIENT
Start: 2019-03-01 | End: 2019-03-02 | Stop reason: HOSPADM

## 2019-03-01 RX ORDER — INSULIN DETEMIR 100 [IU]/ML
INJECTION, SOLUTION SUBCUTANEOUS
Qty: 15 ML | Refills: 1 | Status: SHIPPED | OUTPATIENT
Start: 2019-03-01 | End: 2019-03-22

## 2019-03-01 RX ORDER — SODIUM CHLORIDE 9 MG/ML
INJECTION, SOLUTION INTRAVENOUS
Status: COMPLETED | OUTPATIENT
Start: 2019-03-01 | End: 2019-03-01

## 2019-03-01 RX ORDER — LISINOPRIL 2.5 MG/1
2.5 TABLET ORAL DAILY
Qty: 30 TABLET | Refills: 1 | Status: SHIPPED | OUTPATIENT
Start: 2019-03-01 | End: 2019-05-23 | Stop reason: SDUPTHER

## 2019-03-01 RX ORDER — ATORVASTATIN CALCIUM 10 MG/1
TABLET, FILM COATED ORAL
Qty: 15 TABLET | Refills: 1 | Status: SHIPPED | OUTPATIENT
Start: 2019-03-01 | End: 2019-05-23 | Stop reason: SDUPTHER

## 2019-03-01 RX ADMIN — SODIUM CHLORIDE: 9 INJECTION, SOLUTION INTRAVENOUS at 15:45

## 2019-03-01 RX ADMIN — METOPROLOL TARTRATE 1 MG: 1 INJECTION, SOLUTION INTRAVENOUS at 16:05

## 2019-03-01 RX ADMIN — DOBUTAMINE HYDROCHLORIDE 10 MCG/KG/MIN: 200 INJECTION INTRAVENOUS at 15:45

## 2019-03-01 RX ADMIN — PERFLUTREN 1.65 MG: 6.52 INJECTION, SUSPENSION INTRAVENOUS at 15:40

## 2019-03-01 RX ADMIN — ATROPINE SULFATE 1 MG: 0.1 INJECTION PARENTERAL at 15:56

## 2019-03-07 ENCOUNTER — HOSPITAL ENCOUNTER (OUTPATIENT)
Dept: GENERAL RADIOLOGY | Age: 59
Discharge: HOME | End: 2019-03-07
Payer: COMMERCIAL

## 2019-03-07 DIAGNOSIS — C64.2 RENAL CELL CARCINOMA OF LEFT KIDNEY (HCC): ICD-10-CM

## 2019-03-19 ENCOUNTER — OFFICE VISIT (OUTPATIENT)
Dept: FAMILY MEDICINE CLINIC | Age: 59
End: 2019-03-19
Payer: COMMERCIAL

## 2019-03-19 VITALS
OXYGEN SATURATION: 99 % | BODY MASS INDEX: 57.14 KG/M2 | WEIGHT: 293 LBS | HEART RATE: 54 BPM | TEMPERATURE: 98 F | SYSTOLIC BLOOD PRESSURE: 110 MMHG | DIASTOLIC BLOOD PRESSURE: 88 MMHG

## 2019-03-19 DIAGNOSIS — R30.0 DYSURIA: ICD-10-CM

## 2019-03-19 DIAGNOSIS — N39.0 URINARY TRACT INFECTION WITHOUT HEMATURIA, SITE UNSPECIFIED: Primary | ICD-10-CM

## 2019-03-19 LAB
APPEARANCE FLUID: NORMAL
BILIRUBIN, POC: NEGATIVE
BLOOD URINE, POC: NEGATIVE
CLARITY, POC: NORMAL
COLOR, POC: NORMAL
GLUCOSE URINE, POC: NORMAL
KETONES, POC: NEGATIVE
LEUKOCYTE EST, POC: NORMAL
NITRITE, POC: POSITIVE
PH, POC: 5.5
PROTEIN, POC: NORMAL
SPECIFIC GRAVITY, POC: 1.01
UROBILINOGEN, POC: NORMAL

## 2019-03-19 PROCEDURE — 99213 OFFICE O/P EST LOW 20 MIN: CPT | Performed by: CLINICAL NURSE SPECIALIST

## 2019-03-19 PROCEDURE — 81002 URINALYSIS NONAUTO W/O SCOPE: CPT | Performed by: CLINICAL NURSE SPECIALIST

## 2019-03-19 RX ORDER — CIPROFLOXACIN 500 MG/1
500 TABLET, FILM COATED ORAL 2 TIMES DAILY
Qty: 14 TABLET | Refills: 0 | Status: SHIPPED | OUTPATIENT
Start: 2019-03-19 | End: 2019-07-08 | Stop reason: SDUPTHER

## 2019-03-19 RX ORDER — PHENAZOPYRIDINE HYDROCHLORIDE 200 MG/1
200 TABLET, FILM COATED ORAL 3 TIMES DAILY PRN
Qty: 9 TABLET | Refills: 2 | Status: SHIPPED | OUTPATIENT
Start: 2019-03-19 | End: 2019-03-22

## 2019-03-19 ASSESSMENT — ENCOUNTER SYMPTOMS
VOMITING: 0
BACK PAIN: 1
ABDOMINAL PAIN: 0
NAUSEA: 0

## 2019-03-21 LAB
ORGANISM: ABNORMAL
URINE CULTURE, ROUTINE: ABNORMAL
URINE CULTURE, ROUTINE: ABNORMAL

## 2019-04-08 ENCOUNTER — OFFICE VISIT (OUTPATIENT)
Dept: OBGYN | Age: 59
End: 2019-04-08
Payer: COMMERCIAL

## 2019-04-08 VITALS — WEIGHT: 293 LBS | HEIGHT: 67 IN | BODY MASS INDEX: 45.99 KG/M2

## 2019-04-08 DIAGNOSIS — Z12.39 BREAST CANCER SCREENING OTHER THAN MAMMOGRAM: ICD-10-CM

## 2019-04-08 DIAGNOSIS — N89.8 VAGINAL ITCHING: ICD-10-CM

## 2019-04-08 DIAGNOSIS — Z12.4 SCREENING FOR CERVICAL CANCER: ICD-10-CM

## 2019-04-08 DIAGNOSIS — Z01.419 WOMEN'S ANNUAL ROUTINE GYNECOLOGICAL EXAMINATION: Primary | ICD-10-CM

## 2019-04-08 LAB
CLUE CELLS: NORMAL
HYPHAL YEAST: NORMAL
TRICHOMONAS: NORMAL
WET PREP: NORMAL
WHITE BLOOD CELLS: NORMAL

## 2019-04-08 PROCEDURE — 99396 PREV VISIT EST AGE 40-64: CPT | Performed by: OBSTETRICS & GYNECOLOGY

## 2019-04-08 PROCEDURE — 87210 SMEAR WET MOUNT SALINE/INK: CPT | Performed by: OBSTETRICS & GYNECOLOGY

## 2019-04-08 ASSESSMENT — ENCOUNTER SYMPTOMS
ALLERGIC/IMMUNOLOGIC NEGATIVE: 1
EYES NEGATIVE: 1
RESPIRATORY NEGATIVE: 1
GASTROINTESTINAL NEGATIVE: 1

## 2019-04-08 NOTE — PROGRESS NOTES
Pt is here for breast exam and pap smear. Last mammogram:    Last pap smear:    Contraception:  hyst due to fibroid tumor, has partial cervix  :  2  Para:  2  AB:  0  Last bone density:  na  Last colonoscopy: On a schedule    GYN:  10 / 30 / 6.

## 2019-04-08 NOTE — PATIENT INSTRUCTIONS
feel your breast tissue before moving on to the next spot. ? Check your entire breast, moving up and down as if following a strip from the collarbone to the bra line, and from the armpit to the ribs. Repeat until you have covered the entire breast.  ? Repeat this procedure for your left breast, using the pads of the 3 middle fingers of your right hand. · To examine your breasts while in the shower:  ? Place one arm over your head and lightly soap your breast on that side. ? Using the pads of your fingers, gently move your hand over your breast (in the strip pattern described above), feeling carefully for any lumps or changes. ? Repeat for the other breast.  · Have your doctor inspect anything you notice to see if you need further testing. Where can you learn more? Go to [email or web address]"Hey, [email or web address]!"[email or web address].[email or web address] org and sign in to your [employer] account. Enter P148 in the [employer] box to learn more about \"Breast Self-Exam: Care Instructions. \"     If you do not have an account, please click on the \"Sign Up Now\" link. Current as of: March 27, 2018  Content Version: 11.9  © 9320-8175 [employer]. Care instructions adapted under license by [facility] ([facility]). If you have questions about a medical condition or this instruction, always ask your healthcare professional. [address] any warranty or liability for your use of this information.

## 2019-04-08 NOTE — PROGRESS NOTES
of Onset    Diabetes Mother     Cancer Mother         breast    Heart Disease Father     Cancer Father         colon, over 79    Cancer Sister 43        breast, negative genetic testing    Cancer Maternal Grandmother 76        breast    Diabetes Maternal Grandmother     Heart Attack Maternal Grandfather     Hypertension Other     Elevated Lipids Other     Coronary Art Dis Other     Heart Attack Paternal Grandmother        Social History     Socioeconomic History    Marital status:      Spouse name: Not on file    Number of children: Not on file    Years of education: Not on file    Highest education level: Not on file   Occupational History    Not on file   Social Needs    Financial resource strain: Not on file    Food insecurity:     Worry: Not on file     Inability: Not on file    Transportation needs:     Medical: Not on file     Non-medical: Not on file   Tobacco Use    Smoking status: Never Smoker    Smokeless tobacco: Never Used   Substance and Sexual Activity    Alcohol use: No    Drug use: No    Sexual activity: Yes   Lifestyle    Physical activity:     Days per week: Not on file     Minutes per session: Not on file    Stress: Not on file   Relationships    Social connections:     Talks on phone: Not on file     Gets together: Not on file     Attends Hindu service: Not on file     Active member of club or organization: Not on file     Attends meetings of clubs or organizations: Not on file     Relationship status: Not on file    Intimate partner violence:     Fear of current or ex partner: Not on file     Emotionally abused: Not on file     Physically abused: Not on file     Forced sexual activity: Not on file   Other Topics Concern    Not on file   Social History Narrative    Not on file         Current Outpatient Medications:     atorvastatin (LIPITOR) 10 MG tablet, TAKE 1/2 TABLET BY MOUTH DAILY, Disp: 15 tablet, Rfl: 1    TRUEPLUS PEN NEEDLES 31G X 5 MM MISC, USE DAILY AS DIRECTED, Disp: 100 each, Rfl: 1    lisinopril (PRINIVIL;ZESTRIL) 2.5 MG tablet, TAKE 1 TABLET BY MOUTH DAILY, Disp: 30 tablet, Rfl: 1    pantoprazole (PROTONIX) 40 MG tablet, TAKE 1 TABLET BY MOUTH DAILY, Disp: 90 tablet, Rfl: 3    Misc. Devices MISC, Diabetic Shotes ICD 10: E11.9, Disp: 1 Device, Rfl: 0    Multiple Vitamins-Minerals (MULTIPLE VITAMINS/WOMENS PO), Take 1 tablet by mouth daily , Disp: , Rfl:     glucose blood VI test strips (PAULINE CONTOUR NEXT TEST) strip, 1 each by In Vitro route 5 times daily As needed. , Disp: 200 each, Rfl: 11    glucose blood VI test strips (ASCENSIA AUTODISC VI;ONE TOUCH ULTRA TEST VI) strip, 1 each by In Vitro route daily As needed. , Disp: 100 each, Rfl: 3    fexofenadine (ALLEGRA ALLERGY) 180 MG tablet, Take 180 mg by mouth daily, Disp: , Rfl:     Omega-3 Fatty Acids (FISH OIL) 1000 MG CAPS, Take 1,000 mg by mouth 2 times daily , Disp: , Rfl:     aspirin 81 MG tablet, Take 81 mg by mouth daily 3 days a wk, Disp: , Rfl:     insulin aspart (NOVOLOG FLEXPEN) 100 UNIT/ML injection pen, 6 units with bf, lunch, 10 units with dinner., Disp: 5 pen, Rfl: 3    insulin detemir (LEVEMIR FLEXTOUCH) 100 UNIT/ML injection pen, Inject 46 Units into the skin nightly, Disp: 15 mL, Rfl: 1    diltiazem (CARTIA XT) 180 MG extended release capsule, Take 1 capsule by mouth daily, Disp: 30 capsule, Rfl: 5    levothyroxine (SYNTHROID) 137 MCG tablet, Take 1 tablet by mouth daily Indications: Underactive Thyroid, Disp: 30 tablet, Rfl: 5    atenolol (TENORMIN) 50 MG tablet, TAKE ONE-HALF TABLET BY MOUTH EVERY MORNING AND ONE-HALF TABLET IN THE EVENING, Disp: 30 tablet, Rfl: 5    Allergies   Allergen Reactions    Adhesive Tape Other (See Comments)     Post op incision infection    Dermabond Other (See Comments)     Post op incision infection       Ht 5' 7\" (1.702 m)   Wt (!) 363 lb (164.7 kg)   LMP 01/01/2002   BMI 56.85 kg/m²   Physical Exam   Constitutional: She is oriented to person, place, and time. She appears well-developed and well-nourished. No distress. HENT:   Head: Normocephalic and atraumatic. Mouth/Throat: Oropharynx is clear and moist.   Eyes: Pupils are equal, round, and reactive to light. Conjunctivae and EOM are normal.   Neck: Normal range of motion. Neck supple. No tracheal deviation present. No thyromegaly present. Cardiovascular: Normal rate and regular rhythm. Pulmonary/Chest: Effort normal and breath sounds normal. No respiratory distress. Right breast exhibits no inverted nipple, no mass, no nipple discharge, no skin change and no tenderness. Left breast exhibits no inverted nipple, no mass, no nipple discharge, no skin change and no tenderness. Breasts are symmetrical.   Abdominal: Soft. Bowel sounds are normal. She exhibits no distension and no mass. There is no tenderness. There is no rebound and no guarding. Genitourinary: Rectum normal and vagina normal. There is no rash, tenderness or lesion on the right labia. There is no rash, tenderness or lesion on the left labia. Right adnexum displays no mass, no tenderness and no fullness. Left adnexum displays no mass, no tenderness and no fullness. Genitourinary Comments: Uterus and cervix surgically absent   Musculoskeletal: Normal range of motion. She exhibits no edema or tenderness. Lymphadenopathy:     She has no cervical adenopathy. She has no axillary adenopathy. Neurological: She is alert and oriented to person, place, and time. No cranial nerve deficit. Skin: Skin is warm and dry. No rash noted. Irritation of inner thighs noted. Psychiatric: She has a normal mood and affect. Her speech is normal and behavior is normal. Judgment and thought content normal. Cognition and memory are normal.       Assessment   Diagnosis Orders   1. Women's annual routine gynecological examination  Human papillomavirus (HPV) DNA probe thin prep high risk    PAP SMEAR   2.  Breast cancer screening other than mammogram     3. Vaginal itching  POCT Wet Prep    Miscellaneous Sendout 1   4. Screening for cervical cancer  Human papillomavirus (HPV) DNA probe thin prep high risk    PAP SMEAR       Plan     1. Pap smear and HPV, vaginitis panel  2. Pt up to date on mammogram  3. RTC one year or prn.   4. Wet prep  I Gilda Koo, am scribing for and in the presence of Dr. Jeanna Lyn. I, Dr. Jeanna Lyn, personally performed the services described in this documentation as scribed by Gilda Koo in my presence, and it is both accurate and complete.

## 2019-04-11 ENCOUNTER — OFFICE VISIT (OUTPATIENT)
Dept: FAMILY MEDICINE CLINIC | Age: 59
End: 2019-04-11
Payer: COMMERCIAL

## 2019-04-11 VITALS
TEMPERATURE: 97.4 F | DIASTOLIC BLOOD PRESSURE: 80 MMHG | SYSTOLIC BLOOD PRESSURE: 122 MMHG | WEIGHT: 293 LBS | BODY MASS INDEX: 57.64 KG/M2 | HEART RATE: 60 BPM | OXYGEN SATURATION: 98 %

## 2019-04-11 DIAGNOSIS — E11.42 TYPE 2 DIABETES MELLITUS WITH DIABETIC POLYNEUROPATHY, WITH LONG-TERM CURRENT USE OF INSULIN (HCC): Primary | ICD-10-CM

## 2019-04-11 DIAGNOSIS — N18.30 CKD (CHRONIC KIDNEY DISEASE) STAGE 3, GFR 30-59 ML/MIN (HCC): ICD-10-CM

## 2019-04-11 DIAGNOSIS — K21.9 GASTROESOPHAGEAL REFLUX DISEASE WITHOUT ESOPHAGITIS: ICD-10-CM

## 2019-04-11 DIAGNOSIS — Z79.4 TYPE 2 DIABETES MELLITUS WITH DIABETIC POLYNEUROPATHY, WITH LONG-TERM CURRENT USE OF INSULIN (HCC): Primary | ICD-10-CM

## 2019-04-11 DIAGNOSIS — E78.01 FAMILIAL HYPERCHOLESTEROLEMIA: ICD-10-CM

## 2019-04-11 DIAGNOSIS — I10 ESSENTIAL (PRIMARY) HYPERTENSION: ICD-10-CM

## 2019-04-11 DIAGNOSIS — E03.9 PRIMARY HYPOTHYROIDISM: ICD-10-CM

## 2019-04-11 LAB — HBA1C MFR BLD: 8.8 %

## 2019-04-11 PROCEDURE — 99214 OFFICE O/P EST MOD 30 MIN: CPT | Performed by: FAMILY MEDICINE

## 2019-04-11 PROCEDURE — 83036 HEMOGLOBIN GLYCOSYLATED A1C: CPT | Performed by: FAMILY MEDICINE

## 2019-04-11 RX ORDER — LEVOTHYROXINE SODIUM 137 UG/1
137 TABLET ORAL DAILY
Qty: 30 TABLET | Refills: 5 | Status: SHIPPED | OUTPATIENT
Start: 2019-04-11 | End: 2019-09-27 | Stop reason: SDUPTHER

## 2019-04-11 RX ORDER — ATENOLOL 50 MG/1
TABLET ORAL
Qty: 30 TABLET | Refills: 5 | Status: SHIPPED | OUTPATIENT
Start: 2019-04-11 | End: 2019-05-13 | Stop reason: SDUPTHER

## 2019-04-11 RX ORDER — ATENOLOL 50 MG/1
TABLET ORAL
Qty: 30 TABLET | Refills: 5 | Status: SHIPPED | OUTPATIENT
Start: 2019-04-11 | End: 2019-04-11 | Stop reason: SDUPTHER

## 2019-04-11 RX ORDER — DILTIAZEM HYDROCHLORIDE 180 MG/1
180 CAPSULE, COATED, EXTENDED RELEASE ORAL DAILY
Qty: 30 CAPSULE | Refills: 5 | Status: SHIPPED | OUTPATIENT
Start: 2019-04-11 | End: 2019-10-08 | Stop reason: SDUPTHER

## 2019-04-11 ASSESSMENT — PATIENT HEALTH QUESTIONNAIRE - PHQ9
SUM OF ALL RESPONSES TO PHQ9 QUESTIONS 1 & 2: 0
SUM OF ALL RESPONSES TO PHQ QUESTIONS 1-9: 0
1. LITTLE INTEREST OR PLEASURE IN DOING THINGS: 0
2. FEELING DOWN, DEPRESSED OR HOPELESS: 0
SUM OF ALL RESPONSES TO PHQ QUESTIONS 1-9: 0

## 2019-04-11 NOTE — PROGRESS NOTES
[address] [place] [address] [facility]  Dept: [phone]  Dept Fax: [phone]  Loc: [phone]    Cash May is a 63 y.o. female who presents today for her medical conditions/complaints as noted below. Cash May is here for No chief complaint on file. HPI:   CC: Here today to discuss the following:    Diabetes Mellitus  Has been compliant with medications. No side effects of medications since last visit. No polyuria, polydipsia, or vision changes since last visit. No symptomatic episodes of hypoglycemia. Confirm Levemir 42 units at bedtime and   Novolog 6 units before largest meal:       Hypothyroidism  Symptoms are currently well controlled. No temperature intolerance, mood issues, or fatigue reported. Tolerating current medication without adverse effects. Hypertension  Compliant with medications. No adverse effects from medication. No lightheadedness, palpitations, or chest pain. Gastroesophageal Reflux Disease  Symptoms currently under control. Medication adequately controls his symptoms. No hematochezia or melena. Hyperlipidemia  Tolerating current cholesterol medication without side effects. No body aches. Attempting to reduce processed sugar and cholesterol from diet.                   HPI    Past Medical History:   Diagnosis Date    Allergic rhinitis     Ankle fracture     3 year ago; increasing difficulty walking; has bone fragments    Arthritis     sees dr. Casey Interiano as ambulation aid     right foot per dr. Rao Weir CPAP (continuous positive airway pressure) dependence     12cm    Diabetes ([facility] [address]     Hiatal hernia 9/27/2017    History of kidney cancer     Hyperlipidemia     Hypertension     Hypothyroidism     Murmur     Obesity     ZOILA (obstructive sleep apnea)     AHI:  28.5 per PSG, 9/2014    PLMD (periodic limb movement disorder)     Renal mass cat scan done 9/13/18; to see dr. Srikanth Bolanos coming up    Thyroid disease     Type 2 diabetes mellitus without complication ([facility] [address]     Type II or unspecified type diabetes mellitus without mention of complication, not stated as uncontrolled       Past Surgical History:   Procedure Laterality Date    BREAST BIOPSY Right 2012    CARPAL TUNNEL RELEASE      bilateral    CHOLECYSTECTOMY      HYSTERECTOMY      [place] LAP, RADICAL NEPHRECTOMY Left 10/3/2018    NEPHRECTOMY LAPAROSCOPIC HAND ASSISTED performed by Kaitlin Short MD at [facility] [address] LAP,CHOLECYSTECTOMY/GRAPH N/A 10/31/2017    CHOLECYSTECTOMY LAPAROSCOPIC performed by Osiris Campbell MD at [facility] History   Problem Relation Age of Onset    Diabetes Mother     Cancer Mother         breast    Heart Disease Father     Cancer Father         colon, over 79    Cancer Sister 43        breast, negative genetic testing    Cancer Maternal Grandmother 76        breast    Diabetes Maternal Grandmother     Heart Attack Maternal Grandfather     Hypertension Other     Elevated Lipids Other     Coronary Art Dis Other     Heart Attack Paternal Grandmother        Social History     Tobacco Use    Smoking status: Never Smoker    Smokeless tobacco: Never Used   Substance Use Topics    Alcohol use: No     Current Outpatient Medications   Medication Sig Dispense Refill    diltiazem (CARTIA XT) 180 MG extended release capsule Take 1 capsule by mouth daily 30 capsule 5    levothyroxine (SYNTHROID) 137 MCG tablet Take 1 tablet by mouth daily Indications: Underactive Thyroid 30 tablet 5    atenolol (TENORMIN) 50 MG tablet TAKE ONE-HALF TABLET BY MOUTH EVERY MORNING AND ONE-HALF TABLET IN THE EVENING 30 tablet 5    atorvastatin (LIPITOR) 10 MG tablet TAKE 1/2 TABLET BY MOUTH DAILY 15 tablet 1    TRUEPLUS PEN NEEDLES 31G X 5 MM MISC USE DAILY AS DIRECTED 100 each 1    lisinopril (PRINIVIL;ZESTRIL) 2.5 MG tablet TAKE 1 TABLET BY MOUTH DAILY 30 tablet 1    pantoprazole (PROTONIX) 40 MG tablet TAKE 1 TABLET BY MOUTH DAILY 90 tablet 3    Misc. Devices MISC Diabetic Shotes  ICD 10: E11.9 1 Device 0    Multiple Vitamins-Minerals (MULTIPLE VITAMINS/WOMENS PO) Take 1 tablet by mouth daily       glucose blood VI test strips (PAULINE CONTOUR NEXT TEST) strip 1 each by In Vitro route 5 times daily As needed. 200 each 11    glucose blood VI test strips (ASCENSIA AUTODISC VI;ONE TOUCH ULTRA TEST VI) strip 1 each by In Vitro route daily As needed. 100 each 3    fexofenadine (ALLEGRA ALLERGY) 180 MG tablet Take 180 mg by mouth daily      Omega-3 Fatty Acids (FISH OIL) 1000 MG CAPS Take 1,000 mg by mouth 2 times daily       aspirin 81 MG tablet Take 81 mg by mouth daily 3 days a wk      insulin aspart (NOVOLOG FLEXPEN) 100 UNIT/ML injection pen 6 units with bf, lunch, 10 units with dinner. 5 pen 3    insulin detemir (LEVEMIR FLEXTOUCH) 100 UNIT/ML injection pen Inject 46 Units into the skin nightly 15 mL 1     No current facility-administered medications for this visit.       Allergies   Allergen Reactions    Adhesive Tape Other (See Comments)     Post op incision infection    Dermabond Other (See Comments)     Post op incision infection       Health Maintenance   Topic Date Due    Hepatitis C screen  1960    Diabetic foot exam  02/28/1971    HIV screen  02/28/1976    Hepatitis B Vaccine (1 of 3 - Risk 3-dose series) 02/28/1980    DTaP/Tdap/Td vaccine (1 - Tdap) 02/28/1980    Shingles Vaccine (1 of 2) 02/28/2011    Diabetic microalbuminuria test  08/15/2019    Lipid screen  11/08/2019    Diabetic retinal exam  01/22/2020    Potassium monitoring  02/14/2020    Creatinine monitoring  02/14/2020    A1C test (Diabetic or Prediabetic)  04/11/2020    Breast cancer screen  01/02/2021    Colon cancer screen colonoscopy  07/18/2021    Cervical cancer screen  04/08/2024    Flu vaccine  Completed    Pneumococcal 0-64 years Vaccine  Completed Subjective:      Review of Systems  Review of Systems   Constitutional: Negative for chills and fever. HENT: Negative for congestion. Respiratory: Negative for cough, chest tightness and shortness of breath. Cardiovascular: Negative for chest pain, palpitations and leg swelling. Gastrointestinal: Negative for abdominal pain, anal bleeding, constipation, diarrhea and nausea. Genitourinary: Negative for difficulty urinating. Psychiatric/Behavioral: Negative. SeeHPI for visit specific review of symptoms. All others negative      Objective:   /80   Pulse 60   Temp 97.4 °F (36.3 °C)   Wt (!) 368 lb (166.9 kg)   LMP 01/01/2002   SpO2 98%   BMI 57.64 kg/m²   Physical Exam  Physical Exam   Constitutional: She appears well-developed. Does not appear ill. Eyes: Pupils are equal, round, and reactive to light. Conjunctiva and Lids normal.  Neck: Normal range of motion. Neck supple. No masses. Neck Symmetric. Normal tracheal position. No thyroid enlargement  Cardiovascular: Normal rate and regular rhythm. Exam reveals no friction rub. Carotid arteries: no bruit observed. No murmur heard. Respiratory:  Effort normal and breath sounds normal. No respiratory distress. No wheezes. No rales. No use of accessory muscles or intercostal retractions. Abdominal: Soft. Bowel sounds are normal. exhibits no distension. There is no tenderness. There is no rebound and no guarding. Musculoskeletal: exhibits no edema. Normal gait. Neurological: alert. Psychiatric: normal mood and affect. Her behavior is normal. Normal judgement and insight observed.       Recent Results (from the past 672 hour(s))   Human papillomavirus (HPV) DNA probe thin prep high risk    Collection Time: 04/08/19  2:16 PM   Result Value Ref Range    Source endo     Other High Risk HPV Not Detected NOTDET    HPV TYPE 16 Not Detected NOTDET    HPV TYPE 18 Not Detected NOTDET    Interpretation SEE BELOW    POCT Wet Prep hydration. Recommended he avoid NSAIDs such as naproxen and ibuprofen. 5. Gastroesophageal reflux disease without esophagitis  Stable    6. Familial hypercholesterolemia  Stable check lipids next visit  - Lipid Panel; Future            Return in about 3 months (around 7/11/2019) for Routine follow up - 15 minute visit. Discussed use, benefit, and side effects of prescribed medications. All patient questions answered. Pt voiced understanding. Reviewed health maintenance. Instructedto continue current medications, diet and exercise. Patient agreed with treatmentplan. Follow up as directed.

## 2019-04-11 NOTE — PATIENT INSTRUCTIONS
Levemir 46 units at night  Novolo units with small meal,  6 units with medium meal, 10 units with large meal.

## 2019-04-15 DIAGNOSIS — E11.42 TYPE 2 DIABETES MELLITUS WITH DIABETIC POLYNEUROPATHY, WITH LONG-TERM CURRENT USE OF INSULIN (HCC): ICD-10-CM

## 2019-04-15 DIAGNOSIS — Z79.4 TYPE 2 DIABETES MELLITUS WITH DIABETIC POLYNEUROPATHY, WITH LONG-TERM CURRENT USE OF INSULIN (HCC): ICD-10-CM

## 2019-04-15 LAB
HPV TYPE 16: NOT DETECTED
HPV TYPE 18: NOT DETECTED
INTERPRETATION: NORMAL
OTHER HIGH RISK HPV: NOT DETECTED
SOURCE: NORMAL

## 2019-04-16 DIAGNOSIS — E11.42 TYPE 2 DIABETES MELLITUS WITH DIABETIC POLYNEUROPATHY, WITH LONG-TERM CURRENT USE OF INSULIN (HCC): ICD-10-CM

## 2019-04-16 DIAGNOSIS — Z79.4 TYPE 2 DIABETES MELLITUS WITH DIABETIC POLYNEUROPATHY, WITH LONG-TERM CURRENT USE OF INSULIN (HCC): ICD-10-CM

## 2019-05-13 DIAGNOSIS — I10 ESSENTIAL (PRIMARY) HYPERTENSION: ICD-10-CM

## 2019-05-13 RX ORDER — ATENOLOL 50 MG/1
TABLET ORAL
Qty: 30 TABLET | Refills: 5 | Status: SHIPPED | OUTPATIENT
Start: 2019-05-13 | End: 2019-10-25 | Stop reason: SDUPTHER

## 2019-06-14 DIAGNOSIS — Z79.4 TYPE 2 DIABETES MELLITUS WITH DIABETIC POLYNEUROPATHY, WITH LONG-TERM CURRENT USE OF INSULIN (HCC): ICD-10-CM

## 2019-06-14 DIAGNOSIS — E11.42 TYPE 2 DIABETES MELLITUS WITH DIABETIC POLYNEUROPATHY, WITH LONG-TERM CURRENT USE OF INSULIN (HCC): ICD-10-CM

## 2019-06-14 RX ORDER — INSULIN DETEMIR 100 [IU]/ML
INJECTION, SOLUTION SUBCUTANEOUS
Qty: 15 ML | Refills: 0 | Status: SHIPPED | OUTPATIENT
Start: 2019-06-14 | End: 2019-07-20 | Stop reason: SDUPTHER

## 2019-06-20 ENCOUNTER — TELEPHONE (OUTPATIENT)
Dept: NEUROLOGY | Age: 59
End: 2019-06-20

## 2019-06-20 NOTE — PROGRESS NOTES
Additional Contrast? Radiologist Recommendation; Future  - CBC Auto Differential; Future  - Basic Metabolic Panel; Future  - Comprehensive Metabolic Panel; Future  - XR CHEST STANDARD (2 VW); Future      Orders Placed This Encounter   Procedures    CT ABDOMEN PELVIS WO CONTRAST Additional Contrast? Radiologist Recommendation     Standing Status:   Future     Standing Expiration Date:   10/25/2019     Order Specific Question:   Additional Contrast?     Answer:   Radiologist Recommendation     Order Specific Question:   Reason for exam:     Answer:   Kidney cancer status post nephrectomy    XR CHEST STANDARD (2 VW)     Standing Status:   Future     Standing Expiration Date:   10/25/2019     Scheduling Instructions: In 3 months prior to next visit     Order Specific Question:   Reason for exam:     Answer:   Kidney cancer status post nephrectomy    CBC Auto Differential     Standing Status:   Future     Number of Occurrences:   1     Standing Expiration Date:   10/25/2019    Basic Metabolic Panel     Standing Status:   Future     Number of Occurrences:   1     Standing Expiration Date:   10/25/2019    Comprehensive Metabolic Panel     In 3 months prior to next visit     Standing Status:   Future     Standing Expiration Date:   10/25/2019    POCT Urinalysis No Micro (Auto)        Return in about 3 months (around 1/25/2019) for office visit after xray study, F/U after lab test.. EMR Dragon/transcription disclaimer: Much of this documentt is electronic  transcription/translation of spoken language to printed text. The  electronic translation of spoken language may be erroneous, or at times,  nonsensical words or phrases may be inadvertently transcribed.  Although I  have reviewed the document for such errors, some may still exist.
Pt arrives via EMS transfer from [place] for stroke rescue. Pt assessed at triage desk while on stretcher by examiner. Stroke code activated on arrival at triage desk and pt taken directly to CT for perfusion imaging. Afebrile. Awake and Alert. +HTN. Lungs CTA (Comfortable respirations on BiPAP). Heart tachy regular. Abdomen soft NTND. GCS 15. +2/5 strength LUE, +4/5 remaining limbs. No gross facial asymmetry noted although BiPAP limiting exam. Speech clear and articulate. Pupils equal and reactive to light. Pt admitted to Dr. Heard stroke unit after CT per protocol. Stroke team contacted regarding BP goals.

## 2019-06-20 NOTE — TELEPHONE ENCOUNTER
Called patient to confirm appointment for 06-21-19 with Pb Infante and to get a current DME dwnld report from Elizabet Evans on her sleep machine and bring the report to her appointment. Left a VM regarding this information.

## 2019-06-21 ENCOUNTER — OFFICE VISIT (OUTPATIENT)
Dept: NEUROLOGY | Age: 59
End: 2019-06-21
Payer: COMMERCIAL

## 2019-06-21 VITALS
OXYGEN SATURATION: 98 % | DIASTOLIC BLOOD PRESSURE: 86 MMHG | HEIGHT: 67 IN | SYSTOLIC BLOOD PRESSURE: 151 MMHG | BODY MASS INDEX: 57.64 KG/M2 | HEART RATE: 51 BPM

## 2019-06-21 DIAGNOSIS — G47.33 OSA (OBSTRUCTIVE SLEEP APNEA): Primary | ICD-10-CM

## 2019-06-21 DIAGNOSIS — Z99.89 CPAP (CONTINUOUS POSITIVE AIRWAY PRESSURE) DEPENDENCE: ICD-10-CM

## 2019-06-21 DIAGNOSIS — G47.61 PLMD (PERIODIC LIMB MOVEMENT DISORDER): ICD-10-CM

## 2019-06-21 PROCEDURE — 99213 OFFICE O/P EST LOW 20 MIN: CPT | Performed by: PHYSICIAN ASSISTANT

## 2019-06-21 NOTE — PROGRESS NOTES
[facility] Sleep Follow Up Encounter      Information:   Patient Name: Virginia  :   1960  Age:   63 y.o. MRN:   [id number]  Account #:  [de-identified]  Today:                19    Provider:  Santa Donnelly PA-C    Chief Complaint   Patient presents with    Sleep Apnea     pt states everything is going ok        Subjective:   Virginia is a 63 y.o. female  with a history of ZOILA and PLMD who comes in for a sleep clinic follow up. The PSG, 2014 revealed an AHI of 28.5. She is prescribed CPAP at 12cm of pressure. The compliance report indicates that she is averaging 9 hours of CPAP use per day. She averages 9 hours of sleep per night. She reports that consistent PAP use has alleviated the previous ZOILA symptoms.     Location or symptom:  ZOILA  Onset:  PS  Timing:  q hs  Severity:  Moderate  Associated:  Snoring, witnessed apneas, and excessive daytime somnolence  Alleviated:  CPAP      Objective:     Past Medical History:   Diagnosis Date    Allergic rhinitis     Ankle fracture     3 year ago; increasing difficulty walking; has bone fragments    Arthritis     sees dr. Mario Torres as ambulation aid     right foot per dr. Georgiana Carmichael    CPAP (continuous positive airway pressure) dependence     12cm    Diabetes ([facility] [address]     Hiatal hernia 2017    History of kidney cancer     Hyperlipidemia     Hypertension     Hypothyroidism     Murmur     Obesity     ZOILA (obstructive sleep apnea)     AHI:  28.5 per PSG, 2014    PLMD (periodic limb movement disorder)     Renal mass     cat scan done 18; to see dr. Chalo Mooney coming up    Thyroid disease     Type 2 diabetes mellitus without complication ([facility] [address]     Type II or unspecified type diabetes mellitus without mention of complication, not stated as uncontrolled        Past Surgical History:   Procedure Laterality Date    BREAST BIOPSY Right     CARPAL TUNNEL RELEASE      bilateral    CHOLECYSTECTOMY      HYSTERECTOMY      [place] LAP, RADICAL NEPHRECTOMY Left 10/3/2018    NEPHRECTOMY LAPAROSCOPIC HAND ASSISTED performed by Moises Ayala MD at [facility] LAP,CHOLECYSTECTOMY/GRAPH N/A 10/31/2017    CHOLECYSTECTOMY LAPAROSCOPIC performed by Jimi Yip MD at [facility]  ·     Significant Injuries  ·     Family History   Problem Relation Age of Onset    Diabetes Mother     Cancer Mother         breast    Heart Disease Father     Cancer Father         colon, over 79    Cancer Sister 43        breast, negative genetic testing    Cancer Maternal Grandmother 76        breast    Diabetes Maternal Grandmother     Heart Attack Maternal Grandfather     Hypertension Other     Elevated Lipids Other     Coronary Art Dis Other     Heart Attack Paternal Grandmother        Social History  Social History     Tobacco Use   Smoking Status Never Smoker   Smokeless Tobacco Never Used     Social History     Substance and Sexual Activity   Alcohol Use No     Social History     Substance and Sexual Activity   Drug Use No         Current Outpatient Medications   Medication Sig Dispense Refill    LEVEMIR FLEXTOUCH 100 UNIT/ML injection pen ADMINISTER 46 UNITS UNDER THE SKIN EVERY NIGHT 15 mL 0    lisinopril (PRINIVIL;ZESTRIL) 2.5 MG tablet TAKE 1 TABLET BY MOUTH DAILY 30 tablet 5    atorvastatin (LIPITOR) 10 MG tablet TAKE 1/2 TABLET BY MOUTH DAILY 15 tablet 5    CONTOUR NEXT TEST strip TEST FIVE TIMES DAILY 500 strip 5    atenolol (TENORMIN) 50 MG tablet TAKE ONE-HALF TABLET BY MOUTH EVERY MORNING AND ONE-HALF TABLET IN THE EVENING 30 tablet 5    insulin aspart (NOVOLOG FLEXPEN) 100 UNIT/ML injection pen 6 units with bf, lunch, 10 units with dinner.  5 pen 3    diltiazem (CARTIA XT) 180 MG extended release capsule Take 1 capsule by mouth daily 30 capsule 5    levothyroxine (SYNTHROID) 137 MCG tablet Take 1 tablet by mouth daily Indications: Underactive Thyroid 30 tablet 5    TRUEPLUS PEN NEEDLES 31G X 5 MM MISC USE DAILY AS DIRECTED 100 each 1    pantoprazole (PROTONIX) 40 MG tablet TAKE 1 TABLET BY MOUTH DAILY 90 tablet 3    Misc. Devices MISC Diabetic Shotes  ICD 10: E11.9 1 Device 0    Multiple Vitamins-Minerals (MULTIPLE VITAMINS/WOMENS PO) Take 1 tablet by mouth daily       fexofenadine (ALLEGRA ALLERGY) 180 MG tablet Take 180 mg by mouth daily      Omega-3 Fatty Acids (FISH OIL) 1000 MG CAPS Take 1,000 mg by mouth 2 times daily       aspirin 81 MG tablet Take 81 mg by mouth daily 3 days a wk       No current facility-administered medications for this visit.         Allergies:  Adhesive tape and Dermabond    REVIEW OF SYSTEMS     Constitutional: []Fever []Sweats []Chills [] Recent Injury   [x] Denies all unless marked  HENT:[]Headache  [] Head Injury  [] Sore Throat  [] Ear Pain  [] Dizziness [] Hearing Loss   [x] Denies all unless marked  Spine:  [] Neck pain  [] Back pain  [] Sciaticia  [x] Denies all unless marked  Cardiovascular:[]Chest Pain []Palpitations [] Heart Disease  [x] Denies all unless marked  Pulmonary: []Shortness of Breath []Cough   [x] Denies all unless marked  Gastrointestinal:  []Abdominal Pain  []Blood in Stool  []Diarrhea []Constipation []Nausea  []Vomiting  [x] Denies all unless marked  Genitourinary:  [] Dysuria [] Frequency  [] Incontinence [] Urgency   [x] Denies all unless marked  Musculoskeletal: [] Arthralgia  [] Myalgias [] Muscle cramps  [] Muscle twitches   [x] Denies all unless marked   Extremities:   [] Pain   [] Swelling   [x] Denies all unless marked  Skin:[] Rash  [] Color Change  [x] Denies all unless marked  Neurological:[] Visual Disturbance [] Double Vision [] Slurred Speech [] Trouble swallowing  [] Vertigo [] Tingling [] Numbness [] Weakness [] Loss of Balance   [] Loss of Consciousness [] Memory Loss [] Seizures  [x] Denies all unless marked  Psychiatric/Behavioral:[] Depression [] Anxiety  [x] Denies all unless marked  Sleep: []  Insomnia [] Sleep Disturbance [] Snoring [] Restless Legs [] Daytime Sleepiness [x] Sleep Apnea  [x] Denies all unless marked      The MA has completed the ROS with the patient. I have reviewed it in its' entirety with the patient and agree with the documentation. PHYSICAL EXAM  BP (!) 151/86   Pulse 51   Ht 5' 7\" (1.702 m)   LMP 01/01/2002   SpO2 98%   BMI 57.64 kg/m²      Constitutional - No acute distress    HEENT- Conjunctiva normal.  No scars, masses, or lesions over external nose or ears, no neck masses noted, no jugular vein distension, no bruit  Cardiac- Regular rate and rhythm  Pulmonary- Clear to auscultation, good expansion, normal effort without use of accessory muscles  Musculoskeletal - No significant wasting of muscles noted, no bony deformities  Extremities - No clubbing, cyanosis or edema  Skin - Warm, dry, and intact. No rash, erythema, or pallor  Psychiatric - Mood, affect, and behavior appear normal      Neurologic:  Extraocular movements are intact without nystagmus. Visual fields are full to confrontation. Facial movements are symmetrical and normal.  Speech is precise. Extremity strength is normal in both uppers and lowers. Deep tendon reflexes are intact and symmetrical.  Rapid alternating movements are unimpaired. Finger-to-nose testing is performed well, without dysmetria. Gait is normal.    I reviewed the following studies:      [  ]  :  Clinical laboratory test results    [  ]  :  Radiology reports    [  ]  :  Review and summarization of medical records and/or obtain medical records     [  ]  :  Previous/recent polysomnogram report(s)    [  ]  :  [place] Sleepiness Scale      [X]  :  Compliance download: The CPAP is set at a pressure of 12cm. Compliance download shows that she uses device: 100% of the time;  percentage of days with usage >=4 hours: 100%. AHI: 0.1    Assessment:       ICD-10-CM    1. ZOILA (obstructive sleep apnea) G47.33    2. PLMD (periodic limb movement disorder) G47.61    3.  CPAP (continuous positive airway pressure) dependence Z99.89           [  ]  :  Stable     [  ]  :  Improved                       [X]  :  Well controlled              [  ]  :  Resolving     [  ]  :  Resolved     [  ]  :  Inadequately controlled     [  ]  :  Worsening     [  ]  :  Additional workup planned    Patient is compliant and benefiting from therapy as indicated by compliance evaluation and patient report. Plan:     No orders of the defined types were placed in this encounter. 1.   Patient advised of the etiology,  pathophysiology, diagnosis, treatment options, and risks of untreated ZOILA. Risks may include, but are not limited to  hypertension, coronary artery disease, diabetes, stroke, weight gain, impaired cognition, daytime somnolence,  and motor vehicle accidents. Advised to abstain from driving or operating heavy machinery when drowsy and the use of respiratory suppressants. 2.  The following educational material has been included in this visit after visit summary for your review: ZOILA/PAP guidelines-Discussed with the patient and all questions fully answered. 3.  Continue CPAP  4. Follow up in 1 year        Note:  A total of >50% (>8 minutes) of 15 minutes was spent discussing the pathophysiology and treatment and/or coordination of care of the above diagnoses.

## 2019-06-21 NOTE — PATIENT INSTRUCTIONS
Patient education: Sleep apnea in adults       INTRODUCTION -- Normally during sleep, air moves through the throat and in and out of the lungs at a regular rhythm. In a person with sleep apnea, air movement is periodically diminished or stopped. There are two types of sleep apnea: obstructive sleep apnea and central sleep apnea. In obstructive sleep apnea, breathing is abnormal because of narrowing or closure of the throat. In central sleep apnea, breathing is abnormal because of a change in the breathing control and rhythm. Sleep apnea is a serious condition that can affect a person's ability to safely perform normal daily activities and can affect long term health. Approximately 25 percent of adults are at risk for sleep apnea of some degree. Men are more commonly affected than women. Other risk factors include middle and older age, being overweight or obese, and having a small mouth and throat. This topic review focuses on the most common type of sleep apnea in adults, obstructive sleep apnea (ZOILA). HOW SLEEP APNEA OCCURS -- The throat is surrounded by muscles that control the airway for speaking, swallowing, and breathing. During sleep, these muscles are less active, and this causes the throat to narrow. In most people, this narrowing does not affect breathing. In others, it can cause snoring, sometimes with reduced or completely blocked airflow. A completely blocked airway without airflow is called an obstructive apnea. Partial obstruction with diminished airflow is called a hypopnea. A person may have apnea and hypopnea during sleep. Insufficient breathing due to apnea or hypopnea causes oxygen levels to fall and carbon dioxide to rise. Because the airway is blocked, breathing faster or harder does not help to improve oxygen levels until the airway is reopened. Typically, the obstruction requires the person to awaken to activate the upper airway muscles.  Once the airway is opened, the person then takes several deep breaths to catch up on breathing. As the person awakens, he or she may move briefly, snort or snore, and take a deep breath. Less frequently, a person may awaken completely with a sensation of gasping, smothering, or choking. If the person falls back to sleep quickly, he or she will not remember the event. Many people with sleep apnea are unaware of their abnormal breathing in sleep, and all patients underestimate how often their sleep is interrupted. Awakening from sleep causes sleep to be unrefreshing and causes fatigue and daytime sleepiness. Anatomic causes of obstructive sleep apnea --  Most patients have ZOILA because of a small upper airway. As the bones of the face and skull develop, some people develop a small lower face, a small mouth, and a tongue that seems too large for the mouth. These features are genetically determined, which explains why ZOILA tends to cluster in families. Obesity is another major factor. Tonsil enlargement can be an important cause, especially in children. SLEEP APNEA SYMPTOMS -- The main symptoms of ZOILA are loud snoring, fatigue, and daytime sleepiness. However, some people have no symptoms. For example, if the person does not have a bed partner, he or she may not be aware of the snoring. Fatigue and sleepiness have many causes and are often attributed to overwork and increasing age. As a result, a person may be slow to recognize that they have a problem. A bed partner or spouse often prompts the patient to seek medical care. Other symptoms may include one or more of the following:  ?Restless sleep  ? Awakening with choking, gasping, or smothering  ? Morning headaches, dry mouth, or sore throat  ? Waking frequently to urinate  ? Awakening unrested, groggy  ? Low energy, difficulty concentrating, memory impairment    Risk factors -- Certain factors increase the risk of sleep apnea.   ?Increasing age - ZOILA occurs at all ages, but it is more common in middle and older age adults. ?Male sex - ZOILA is two times more common in men, especially in middle age. ?Obesity - The more obese a person is, the more likely he or she is to have ZOILA. ? Sedation from medication or alcohol - This interferes with the ability to awaken from sleep and can lengthen periods of apnea (no breathing), with potentially dangerous consequences. ? Abnormality of the airway. SLEEP APNEA CONSEQUENCES -- Complications of sleep apnea can include daytime sleepiness and difficulty concentrating. The consequence of this is an increased risk of accidents and errors in daily activities. Studies have shown that people with severe ZOILA are more than twice as likely to be involved in a motor vehicle accident as people without these conditions. People with ZOILA are encouraged to discuss options for driving, working, and performing other high-risk tasks with a healthcare provider. In addition, people with untreated ZOILA may have an increased risk of cardiovascular problems such as high blood pressure, heart attack, abnormal heart rhythms, or stroke. This risk may be due to changes in the heart rate and blood pressure that occur during sleep. SLEEP APNEA DIAGNOSIS -- The diagnosis of ZOILA is best made by a knowledgeable sleep medicine specialist who has an understanding of the individual's health issues. The diagnosis is usually based upon the person's medical history, physical examination, and testing, including:  ? A complaint of snoring and ineffective sleep  ? Neck size (greater than 16 inches in men or 14 inches in women) is associated with an increased risk of sleep apnea  ? A small upper airway: difficulty seeing the throat because of a tongue that is large for the mouth  ? High blood pressure, especially if it is resistant to treatment  ? If a bed partner has observed the patient during episodes of stopped breathing (apnea), choking, or gasping during sleep, there is a strong possibility of sleep another sleep test. While the treatment may seem uncomfortable, noisy, or bulky at first, most people accept the treatment after experiencing better sleep. However, difficulty with mask comfort and nasal congestion prevent up to 50 percent of people from using the treatment on a regular basis. Continued follow up with a healthcare provider helps to ensure that the treatment is effective and comfortable. Information from the CPAP machine is often used by physicians, therapists, and insurers to track the success of treatment. CPAP can be delivered with different features to improve comfort and solve problems that may come up during treatment. Changes in treatment may be needed if symptoms do not improve or if the persons condition changes, such as a gain or loss of weight. Adjust sleep position -- Adjusting sleep position (to stay off the back) may help improve sleep quality in people who have ZOILA when sleeping on the back. However, this is difficult to maintain throughout the night and is rarely an adequate solution. Weight loss -- Weight loss may be helpful for obese or overweight patients. Weight loss may be accomplished with dietary changes, exercise, and/or surgical treatment. However, it can be difficult to maintain weight loss; the five-year success of non-surgical weight loss is only 5 percent, meaning that 95 percent of people regain lost weight. Avoid alcohol and other sedatives -- Alcohol can worsen sleepiness, potentially increasing the risk of accidents or injury. People with ZOILA are often counseled to drink little to no alcohol, even during the daytime. Similarly, people who take anti-anxiety medications or sedatives to sleep should speak with their healthcare provider about the safety of these medications. People with ZOILA must notify all healthcare providers, including surgeons, about their condition and the potential risks of being sedated.  People with ZOILA who are given anesthesia and/or pain medications require special management and close monitoring to reduce the risk of a blocked airway. Dental devices -- A dental device, called an oral appliance or mandibular advancement device, can reposition the jaw (mandible), bringing the tongue and soft palate forward as well. This may relieve obstruction in some people. This treatment is excellent for reducing snoring, although the effect on ZOILA is sometimes more limited. As a result, dental devices are best used for mild cases of ZOILA when relief of snoring is the main goal. Failure to tolerate and accept CPAP is another indication for dental devices. While dental devices are not as effective as CPAP for ZOILA, some patients prefer a dental device to CPAP. Side effects of dental devices are generally minor but may include changes to the bite with prolonged use. Surgical treatment -- Surgery is an alternative therapy for patients who cannot tolerate or do not improve with nonsurgical treatments such as CPAP or oral devices. Surgery can also be used in combination with other nonsurgical treatments. Surgical procedures reshape structures in the upper airways or surgically reposition bone or soft tissue. Uvulopalatopharyngoplasty (UPPP) removes the uvula and excessive tissue in the throat, including the tonsils, if present. Other procedures, such as maxillomandibular advancement (MMA), address both the upper and lower pharyngeal airway more globally. UPPP alone has limited success rates (less than 50 percent) and people can relapse (when ZOILA symptoms return after surgery). As a result, this surgery is only recommended in a minority of people and should be considered with caution. MMA may have a higher success rate, particularly in people with abnormal jaw (maxilla and mandible) anatomy, but it is the most complicated procedure. A newer surgical approach, nerve stimulation to protrude the tongue, has promising success rates in very selected people.   Tracheostomy creates a permanent opening in the neck. It is reserved for people with severe disease in whom less drastic measures have failed or are inappropriate. Although it is always successful in eliminating obstructive sleep apnea, tracheostomy requires significant lifestyle changes and carries some serious risks (eg, infection, bleeding, blockage). All surgical treatments require discussions about the goals of treatment, the expected outcomes, and potential complications. Hypoglossal nerve stimulator- \"Inspire\" device    WHERE TO GET MORE INFORMATION -- Your healthcare provider is the best source of information for questions and concerns related to your medical problem. Organizations  American Sleep Apnea Association  Provides information about sleep apnea to the public, publishes a newsletter, and serves as an advocate for people with the disorder. [facility], [address], [address] [place] [address]   [email or web address]   [email or web address]   Tel: [phone]   Fax: [facility] organization that works to [employer] and safety by promoting public understanding of sleep and sleep disorders. Supports sleep-related education, research, and advocacy; produces and distributes educational materials to the public and healthcare professionals; and offers postdoctoral fellowships and grants for sleep researchers. [address]   [email or web address]   [email or web address] org   Tel: [phone]   Fax: [phone]    Important information:  Medicare/private insurance CPAP/BiPAP/APAP requirements:  Medicare/private insurance has specific requirements for PAP compliance that must be met during the first 90 days of use to continue coverage for CPAP/BiPAP/APAP  from day 91 and beyond.  The policy requires that patients use a PAP device 4 hours per 24 hour period, at least 70% of the time over a 30 day period. This data must be downloaded as a report direct from the PAP devices. This is called a compliance download. Your PAP supplier will assist you in this matter. Reminder:  Please bring a copy of the compliance download to your next office visit or have your supplier fax it to our office prior to your office visit. Note:  Where applicable, we will utilize PAP device efficiency reports, additional testing, and face-to-face  clinical evaluation subsequent to any treatment, changes in treatment, and continued treatment. Caution:  Please abstain from driving or engaging in other activities which may be hazardous in the presence of diminished alertness or daytime drowsiness. And avoid the use of sedatives or alcohol, which can worsen sleep apnea and daytime drowsiness. Mask suggestions:  - Resmed Airfit N20 (Nasal) or F20 (Full face mask). They conform to your face, thus decreasing the potential for mask leakage. You might like the [employer] (full face mask). It has a \"memory foam\" like cushion. The AirFit F30 is a smaller style full face mask designed to sit low on and cover less of your face for fewer facial marks. Respironics: You might also like to try a nasal mask called a Dreamwear nasal mask or the Dreamwear nasal pillow. Another suggestion is the [facility], it is a minimal contact full face mask. The [employer] incredible under the nose design makes it the only full face mask that won't cause red marks on the bridge of your nose when compared to other full face masks. The Dreamwear full face mask has a  soft feel, unique in-frame air-flow, and innovative air tube connection at the top of the head for the ultimate in sleep comfort. As of 2/2019 there is 1 additional Resmed masks: The AirFit A83c-nw has a top of the head tube with a nasal mask.

## 2019-07-08 ENCOUNTER — OFFICE VISIT (OUTPATIENT)
Dept: FAMILY MEDICINE CLINIC | Age: 59
End: 2019-07-08
Payer: COMMERCIAL

## 2019-07-08 VITALS
HEART RATE: 54 BPM | BODY MASS INDEX: 44.41 KG/M2 | DIASTOLIC BLOOD PRESSURE: 84 MMHG | SYSTOLIC BLOOD PRESSURE: 134 MMHG | WEIGHT: 293 LBS | HEIGHT: 68 IN | OXYGEN SATURATION: 96 % | TEMPERATURE: 98 F

## 2019-07-08 DIAGNOSIS — R30.0 DYSURIA: ICD-10-CM

## 2019-07-08 DIAGNOSIS — E78.01 FAMILIAL HYPERCHOLESTEROLEMIA: ICD-10-CM

## 2019-07-08 DIAGNOSIS — Z79.4 TYPE 2 DIABETES MELLITUS WITH DIABETIC POLYNEUROPATHY, WITH LONG-TERM CURRENT USE OF INSULIN (HCC): ICD-10-CM

## 2019-07-08 DIAGNOSIS — I10 ESSENTIAL HYPERTENSION: ICD-10-CM

## 2019-07-08 DIAGNOSIS — R30.0 DYSURIA: Primary | ICD-10-CM

## 2019-07-08 DIAGNOSIS — E03.9 PRIMARY HYPOTHYROIDISM: ICD-10-CM

## 2019-07-08 DIAGNOSIS — E11.42 TYPE 2 DIABETES MELLITUS WITH DIABETIC POLYNEUROPATHY, WITH LONG-TERM CURRENT USE OF INSULIN (HCC): ICD-10-CM

## 2019-07-08 DIAGNOSIS — N30.00 ACUTE CYSTITIS WITHOUT HEMATURIA: ICD-10-CM

## 2019-07-08 DIAGNOSIS — I10 ESSENTIAL (PRIMARY) HYPERTENSION: ICD-10-CM

## 2019-07-08 LAB
ALBUMIN SERPL-MCNC: 4.1 G/DL (ref 3.5–5.2)
ALP BLD-CCNC: 85 U/L (ref 35–104)
ALT SERPL-CCNC: 15 U/L (ref 5–33)
ANION GAP SERPL CALCULATED.3IONS-SCNC: 16 MMOL/L (ref 7–19)
APPEARANCE FLUID: ABNORMAL
AST SERPL-CCNC: 15 U/L (ref 5–32)
BASOPHILS ABSOLUTE: 0.1 K/UL (ref 0–0.2)
BASOPHILS RELATIVE PERCENT: 0.7 % (ref 0–1)
BILIRUB SERPL-MCNC: 0.6 MG/DL (ref 0.2–1.2)
BILIRUBIN, POC: ABNORMAL
BLOOD URINE, POC: ABNORMAL
BUN BLDV-MCNC: 21 MG/DL (ref 6–20)
CALCIUM SERPL-MCNC: 9.9 MG/DL (ref 8.6–10)
CHLORIDE BLD-SCNC: 102 MMOL/L (ref 98–111)
CHOLESTEROL, TOTAL: 155 MG/DL (ref 160–199)
CLARITY, POC: ABNORMAL
CO2: 23 MMOL/L (ref 22–29)
COLOR, POC: ABNORMAL
CREAT SERPL-MCNC: 1.4 MG/DL (ref 0.5–0.9)
EOSINOPHILS ABSOLUTE: 0.2 K/UL (ref 0–0.6)
EOSINOPHILS RELATIVE PERCENT: 2.1 % (ref 0–5)
GFR NON-AFRICAN AMERICAN: 39
GLUCOSE BLD-MCNC: 148 MG/DL (ref 74–109)
GLUCOSE URINE, POC: ABNORMAL
HBA1C MFR BLD: 7.5 % (ref 4–6)
HCT VFR BLD CALC: 41.4 % (ref 37–47)
HDLC SERPL-MCNC: 48 MG/DL (ref 65–121)
HEMOGLOBIN: 13.1 G/DL (ref 12–16)
KETONES, POC: ABNORMAL
LDL CHOLESTEROL CALCULATED: 85 MG/DL
LEUKOCYTE EST, POC: ABNORMAL
LYMPHOCYTES ABSOLUTE: 1.1 K/UL (ref 1.1–4.5)
LYMPHOCYTES RELATIVE PERCENT: 14.8 % (ref 20–40)
MCH RBC QN AUTO: 28.7 PG (ref 27–31)
MCHC RBC AUTO-ENTMCNC: 31.6 G/DL (ref 33–37)
MCV RBC AUTO: 90.8 FL (ref 81–99)
MONOCYTES ABSOLUTE: 0.5 K/UL (ref 0–0.9)
MONOCYTES RELATIVE PERCENT: 6.5 % (ref 0–10)
NEUTROPHILS ABSOLUTE: 5.5 K/UL (ref 1.5–7.5)
NEUTROPHILS RELATIVE PERCENT: 75.6 % (ref 50–65)
NITRITE, POC: POSITIVE
PDW BLD-RTO: 14.1 % (ref 11.5–14.5)
PH, POC: 5.5
PLATELET # BLD: 210 K/UL (ref 130–400)
PMV BLD AUTO: 11.5 FL (ref 9.4–12.3)
POTASSIUM SERPL-SCNC: 4.4 MMOL/L (ref 3.5–5)
PROTEIN, POC: ABNORMAL
RBC # BLD: 4.56 M/UL (ref 4.2–5.4)
SODIUM BLD-SCNC: 141 MMOL/L (ref 136–145)
SPECIFIC GRAVITY, POC: 1.01
TOTAL PROTEIN: 7.5 G/DL (ref 6.6–8.7)
TRIGL SERPL-MCNC: 112 MG/DL (ref 0–149)
TSH SERPL DL<=0.05 MIU/L-ACNC: 3.1 UIU/ML (ref 0.27–4.2)
UROBILINOGEN, POC: 2
WBC # BLD: 7.2 K/UL (ref 4.8–10.8)

## 2019-07-08 PROCEDURE — 99213 OFFICE O/P EST LOW 20 MIN: CPT | Performed by: NURSE PRACTITIONER

## 2019-07-08 PROCEDURE — 81002 URINALYSIS NONAUTO W/O SCOPE: CPT | Performed by: NURSE PRACTITIONER

## 2019-07-08 RX ORDER — CIPROFLOXACIN 500 MG/1
500 TABLET, FILM COATED ORAL 2 TIMES DAILY
Qty: 14 TABLET | Refills: 0 | Status: SHIPPED | OUTPATIENT
Start: 2019-07-08 | End: 2020-04-29 | Stop reason: SDUPTHER

## 2019-07-08 ASSESSMENT — ENCOUNTER SYMPTOMS
COUGH: 0
ABDOMINAL PAIN: 0
SHORTNESS OF BREATH: 0
DIARRHEA: 0
VOMITING: 0
SORE THROAT: 0
CHEST TIGHTNESS: 0
NAUSEA: 0
WHEEZING: 0
BACK PAIN: 0

## 2019-07-08 NOTE — PROGRESS NOTES
Davis Cannon is a 63 y.o. female who presents today for  Chief Complaint   Patient presents with    Other     possible UTI since saturday urinating alot burning        HPI:  She started feeling poorly 2 days ago with urinary frequency and just generally poorly. The following day she had frequent dysuria and urgency but not emptying her bladder as well. She continued to have the dysuria. No fever or chills. No back pain. No nausea or vomiting. She took some over-the-counter Azo which helped slightly. She had some Pyridium at home and started taking it in its place. This is helped the burning somewhat. She had a Klebsiella UTI in March which was treated with Cipro, sensitive. Blood pressure is stable. She is taking current medications as listed. She states blood sugar has been stable on her current dose of insulin. She sees Dr. Chamorro next week for her routine appointment. Review of Systems   Constitutional: Negative for chills and fever. HENT: Negative for congestion, ear pain and sore throat. Respiratory: Negative for cough, chest tightness, shortness of breath and wheezing. Cardiovascular: Negative for chest pain. Gastrointestinal: Negative for abdominal pain, diarrhea, nausea and vomiting. Genitourinary: Positive for decreased urine volume, dysuria, frequency and urgency. Negative for flank pain. Musculoskeletal: Negative for arthralgias, back pain and myalgias. Skin: Negative for rash.        Past Medical History:   Diagnosis Date    Allergic rhinitis     Ankle fracture     3 year ago; increasing difficulty walking; has bone fragments    Arthritis     sees dr. Steven Job as ambulation aid     right foot per dr. Claudette Chapman    CPAP (continuous positive airway pressure) dependence     12cm    Diabetes ([facility] [address]     Hiatal hernia 9/27/2017    History of kidney cancer     Hyperlipidemia     Hypertension     Hypothyroidism     Murmur     Obesity     ZOILA (obstructive sleep apnea)     AHI:  28.5 per PS[facility], 9/2014    PLMD (periodic limb movement disorder)     Renal mass     cat scan done 9/13/18; to see dr. Cherrie Romo coming up    Thyroid disease     Type 2 diabetes mellitus without complication ([facility] [address]     Type II or unspecified type diabetes mellitus without mention of complication, not stated as uncontrolled        Current Outpatient Medications   Medication Sig Dispense Refill    ciprofloxacin (CIPRO) 500 MG tablet Take 1 tablet by mouth 2 times daily for 7 days 14 tablet 0    LEVEMIR FLEXTOUCH 100 UNIT/ML injection pen ADMINISTER 46 UNITS UNDER THE SKIN EVERY NIGHT 15 mL 0    lisinopril (PRINIVIL;ZESTRIL) 2.5 MG tablet TAKE 1 TABLET BY MOUTH DAILY 30 tablet 5    atorvastatin (LIPITOR) 10 MG tablet TAKE 1/2 TABLET BY MOUTH DAILY 15 tablet 5    CONTOUR NEXT TEST strip TEST FIVE TIMES DAILY 500 strip 5    atenolol (TENORMIN) 50 MG tablet TAKE ONE-HALF TABLET BY MOUTH EVERY MORNING AND ONE-HALF TABLET IN THE EVENING 30 tablet 5    insulin aspart (NOVOLOG FLEXPEN) 100 UNIT/ML injection pen 6 units with bf, lunch, 10 units with dinner. 5 pen 3    diltiazem (CARTIA XT) 180 MG extended release capsule Take 1 capsule by mouth daily 30 capsule 5    levothyroxine (SYNTHROID) 137 MCG tablet Take 1 tablet by mouth daily Indications: Underactive Thyroid 30 tablet 5    TRUEPLUS PEN NEEDLES 31G X 5 MM MISC USE DAILY AS DIRECTED 100 each 1    pantoprazole (PROTONIX) 40 MG tablet TAKE 1 TABLET BY MOUTH DAILY 90 tablet 3    Misc. Devices MISC Diabetic Shotes  ICD 10: E11.9 1 Device 0    Multiple Vitamins-Minerals (MULTIPLE VITAMINS/WOMENS PO) Take 1 tablet by mouth daily       fexofenadine (ALLEGRA ALLERGY) 180 MG tablet Take 180 mg by mouth daily      Omega-3 Fatty Acids (FISH OIL) 1000 MG CAPS Take 1,000 mg by mouth 2 times daily       aspirin 81 MG tablet Take 81 mg by mouth daily 3 days a wk       No current facility-administered medications for this visit.         Allergies Allergen Reactions    Adhesive Tape Other (See Comments)     Post op incision infection    Dermabond Other (See Comments)     Post op incision infection          Past Surgical History:   Procedure Laterality Date    BREAST BIOPSY Right 2012    CARPAL TUNNEL RELEASE      bilateral    CHOLECYSTECTOMY      HYSTERECTOMY      CT LAP, RADICAL NEPHRECTOMY Left 10/3/2018    NEPHRECTOMY LAPAROSCOPIC HAND ASSISTED performed [facility]barra [facility] 43 LAP,CHOLECYSTECTOMY/GRAPH N/A 10/31/2017    CHOLECYSTECTOMY LAPAROSCOPIC performed by J Luis Rivers MD at [facility] 391 History     Tobacco Use    Smoking status: Never Smoker    Smokeless tobacco: Never Used   Substance Use Topics    Alcohol use: No    Drug use: No       Family History   Problem Relation Age of Onset    Diabetes Mother     Cancer Mother         breast    Heart Disease Father     Cancer Father         colon, over 79    Cancer Sister 43        breast, negative genetic testing    Cancer Maternal Grandmother 76        breast    Diabetes Maternal Grandmother     Heart Attack Maternal Grandfather     Hypertension Other     Elevated Lipids Other     Coronary Art Dis Other     Heart Attack Paternal Grandmother        /84   Pulse 54   Temp 98 °F (36.7 °C) (Temporal)   Ht 5' 8\" (1.727 m)   Wt (!) 382 lb (173.3 kg)   LMP 01/01/2002   SpO2 96%   BMI 58.08 kg/m²     Physical Exam   Constitutional: She appears well-developed and well-nourished. Eyes: Pupils are equal, round, and reactive to light. Conjunctivae and EOM are normal.   Neck: Normal range of motion. Neck supple. No JVD present. No tracheal deviation present. No thyromegaly present. Cardiovascular: Normal rate, regular rhythm, normal heart sounds and intact distal pulses. No murmur heard. Pulmonary/Chest: Effort normal and breath sounds normal. No respiratory distress. Abdominal: Soft. She exhibits no distension and no mass.  There is no tenderness. There is no rebound and no guarding. Musculoskeletal: Normal range of motion. She exhibits no edema. Lymphadenopathy:     She has no cervical adenopathy. Skin: Skin is warm and dry. No rash noted. Psychiatric: She has a normal mood and affect. Vitals reviewed. Assessment:    ICD-10-CM    1. Dysuria R30.0 POCT Urinalysis no Micro     Urine Culture     ciprofloxacin (CIPRO) 500 MG tablet   2. Type 2 diabetes mellitus with diabetic polyneuropathy, with long-term current use of insulin ([facility]) E11.42     Z79.4    3. Essential hypertension I10    4. Acute cystitis without hematuria N30.00        Plan:  - UA showed a large amount of bacteria, positive nitrites, trace blood all consistent with acute UTI. We will send for culture. - Treat with Cipro 500 mg twice daily x7 days. She is taking this in the past with no adverse effects.  - May continue Pyridium briefly as needed for pain/dysuria. - Advised to report any acute worsening or no improvement. - Continue routine medications for blood pressure and diabetes. Stable per her report.  -Keep appointment with Dr. Mini Bauer next week for her routine appointment with labs. [place] was seen today for other. Diagnoses and all orders for this visit:    Dysuria  -     POCT Urinalysis no Micro  -     Urine Culture; Future  -     ciprofloxacin (CIPRO) 500 MG tablet; Take 1 tablet by mouth 2 times daily for 7 days    Type 2 diabetes mellitus with diabetic polyneuropathy, with long-term current use of insulin ([facility])    Essential hypertension    Acute cystitis without hematuria      Medications Discontinued During This Encounter   Medication Reason    ciprofloxacin (CIPRO) 500 MG tablet REORDER     There are no Patient Instructions on file for this visit. Patient voicesunderstanding and agrees to plans along with risks and benefits of plan. Counseling:  Evaristo Tubbs's case, medications and options were discussed in detail.  Patient was instructed to call the office if she questionsregarding her treatment. Should her conditions worsen, she should return to office to be reassessed by CHERYLE Forrest. she Should to go the closest Emergency Department for any emergency. They verbalizedunderstanding the above instructions. No follow-ups on file.

## 2019-07-10 LAB
ORGANISM: ABNORMAL
URINE CULTURE, ROUTINE: ABNORMAL
URINE CULTURE, ROUTINE: ABNORMAL

## 2019-07-19 ENCOUNTER — OFFICE VISIT (OUTPATIENT)
Dept: FAMILY MEDICINE CLINIC | Age: 59
End: 2019-07-19
Payer: COMMERCIAL

## 2019-07-19 VITALS
HEART RATE: 76 BPM | DIASTOLIC BLOOD PRESSURE: 72 MMHG | WEIGHT: 293 LBS | BODY MASS INDEX: 57.47 KG/M2 | SYSTOLIC BLOOD PRESSURE: 112 MMHG | TEMPERATURE: 97.2 F | OXYGEN SATURATION: 98 %

## 2019-07-19 DIAGNOSIS — E03.9 PRIMARY HYPOTHYROIDISM: ICD-10-CM

## 2019-07-19 DIAGNOSIS — G47.33 OSA ON CPAP: ICD-10-CM

## 2019-07-19 DIAGNOSIS — E78.01 FAMILIAL HYPERCHOLESTEROLEMIA: ICD-10-CM

## 2019-07-19 DIAGNOSIS — E11.42 TYPE 2 DIABETES MELLITUS WITH DIABETIC POLYNEUROPATHY, WITH LONG-TERM CURRENT USE OF INSULIN (HCC): Primary | ICD-10-CM

## 2019-07-19 DIAGNOSIS — Z79.4 TYPE 2 DIABETES MELLITUS WITH DIABETIC POLYNEUROPATHY, WITH LONG-TERM CURRENT USE OF INSULIN (HCC): Primary | ICD-10-CM

## 2019-07-19 DIAGNOSIS — Z99.89 OSA ON CPAP: ICD-10-CM

## 2019-07-19 DIAGNOSIS — I10 ESSENTIAL (PRIMARY) HYPERTENSION: ICD-10-CM

## 2019-07-19 PROCEDURE — 99214 OFFICE O/P EST MOD 30 MIN: CPT | Performed by: FAMILY MEDICINE

## 2019-07-19 ASSESSMENT — ENCOUNTER SYMPTOMS
CHEST TIGHTNESS: 0
NAUSEA: 0
SHORTNESS OF BREATH: 0
ANAL BLEEDING: 0
DIARRHEA: 0
CONSTIPATION: 0
COUGH: 0
ABDOMINAL PAIN: 0

## 2019-07-19 NOTE — PROGRESS NOTES
[facility] [address] [place] [id number]  Dept: [phone]  Dept Fax: : [phone]    Caio Almeida is a 63 y.o. female who presents today for her medical conditions/complaints as noted below. Caio Almeida is here for 3 Month Follow-Up        HPI:   CC: Here today to discuss the following:    ZOILA on CPAP  Compliant with CPAP. No daytime somnolence. Feels rested for the most part when wearing CPAP. UTI symptoms have resolved. Diabetes Mellitus  Has been compliant with medications. No side effects of medications since last visit. No polyuria, polydipsia, or vision changes since last visit. No symptomatic episodes of hypoglycemia. Baseline:  46 units  Mealtime 10 units with largest meal.   6 units with smaller meals. Last a1c was 8.8 three months ago    Hypertension  Compliant with medications. No adverse effects from medication. No lightheadedness, palpitations, or chest pain. Hyperlipidemia  Tolerating current cholesterol medication without side effects. No body aches. Attempting to reduce processed sugar and cholesterol from diet. Hypothyroidism  Symptoms are currently well controlled. No temperature intolerance, mood issues, or fatigue reported. Tolerating current medication without adverse effects.            HPI    Past Medical History:   Diagnosis Date    Allergic rhinitis     Ankle fracture     3 year ago; increasing difficulty walking; has bone fragments    Arthritis     sees dr. Weston Shelley as ambulation aid     right foot per dr. Flo Meraz CPAP (continuous positive airway pressure) dependence     12cm    Diabetes ([address]     Hiatal hernia 9/27/2017    History of kidney cancer     Hyperlipidemia     Hypertension     Hypothyroidism     Murmur     Obesity     ZOILA (obstructive sleep apnea)     AHI:  28.5 per PSG, 9/2014    PLMD (periodic limb movement disorder)     Renal mass     cat scan done 9/13/18; to see dr. Devorah Goodell coming up    Thyroid disease     Type 2 diabetes mellitus without complication [facility] [address]     Type II or unspecified type diabetes mellitus without mention of complication, not stated as uncontrolled       Past Surgical History:   Procedure Laterality Date    BREAST BIOPSY Right 2012    CARPAL TUNNEL RELEASE      bilateral    CHOLECYSTECTOMY      HYSTERECTOMY      [place] LAP, RADICAL NEPHRECTOMY Left 10/3/2018    NEPHRECTOMY LAPAROSCOPIC HAND ASSISTED performed by Gian Tsai MD at [address] LAP,CHOLECYSTECTOMY/GRAPH N/A 10/31/2017    CHOLECYSTECTOMY LAPAROSCOPIC performed by Clarence Lopez MD at [facility] History   Problem Relation Age of Onset    Diabetes Mother     Cancer Mother         breast    Heart Disease Father     Cancer Father         colon, over 79    Cancer Sister 43        breast, negative genetic testing    Cancer Maternal Grandmother 76        breast    Diabetes Maternal Grandmother     Heart Attack Maternal Grandfather     Hypertension Other     Elevated Lipids Other     Coronary Art Dis Other     Heart Attack Paternal Grandmother        Social History     Tobacco Use    Smoking status: Never Smoker    Smokeless tobacco: Never Used   Substance Use Topics    Alcohol use: No     Current Outpatient Medications   Medication Sig Dispense Refill    LEVEMIR FLEXTOUCH 100 UNIT/ML injection pen ADMINISTER 46 UNITS UNDER THE SKIN EVERY NIGHT 15 mL 0    lisinopril (PRINIVIL;ZESTRIL) 2.5 MG tablet TAKE 1 TABLET BY MOUTH DAILY 30 tablet 5    atorvastatin (LIPITOR) 10 MG tablet TAKE 1/2 TABLET BY MOUTH DAILY 15 tablet 5    CONTOUR NEXT TEST strip TEST FIVE TIMES DAILY 500 strip 5    atenolol (TENORMIN) 50 MG tablet TAKE ONE-HALF TABLET BY MOUTH EVERY MORNING AND ONE-HALF TABLET IN THE EVENING 30 tablet 5    insulin aspart (NOVOLOG FLEXPEN) 100 UNIT/ML injection pen 6 units with bf, lunch, 10 units with dinner.  5 for chills and fever. HENT: Negative for congestion. Respiratory: Negative for cough, chest tightness and shortness of breath. Cardiovascular: Negative for chest pain, palpitations and leg swelling. Gastrointestinal: Negative for abdominal pain, anal bleeding, constipation, diarrhea and nausea. Genitourinary: Negative for difficulty urinating. Psychiatric/Behavioral: Negative. SeeHPI for visit specific review of symptoms. All others negative      Objective:   /72   Pulse 76   Temp 97.2 °F (36.2 °C)   Wt (!) 378 lb (171.5 kg)   LMP 01/01/2002   SpO2 98%   BMI 57.47 kg/m²   Physical Exam   Constitutional: She appears well-developed. She does not appear ill. Eyes: Pupils are equal, round, and reactive to light. Neck: Normal range of motion. Neck supple. Cardiovascular: Normal rate and regular rhythm. Exam reveals no friction rub. No murmur heard. Pulmonary/Chest: Effort normal and breath sounds normal. No respiratory distress. She has no wheezes. She has no rales. Abdominal: Soft. Bowel sounds are normal. She exhibits no distension. There is no tenderness. There is no rebound and no guarding. Musculoskeletal: She exhibits no edema. Neurological: She is alert. Psychiatric: She has a normal mood and affect.  Her behavior is normal.         Recent Results (from the past 672 hour(s))   POCT Urinalysis no Micro    Collection Time: 07/08/19  8:48 AM   Result Value Ref Range    Color, UA d yellow     Clarity, UA cloudy     Glucose, UA POC 100mg     Bilirubin, UA small     Ketones, UA trace     Spec Grav, UA 1.015     Blood, UA POC trace-intact     pH, UA 5.5     Protein, UA POC 100mg     Urobilinogen, UA 2.0     Leukocytes, UA large     Nitrite, UA positive     Appearance, Fluid Cloudy (A) Clear, Slightly Cloudy   TSH without Reflex    Collection Time: 07/08/19  9:04 AM   Result Value Ref Range    TSH 3.100 0.270 - 4.200 uIU/mL   CBC Auto Differential    Collection Time:

## 2019-07-20 DIAGNOSIS — E11.42 TYPE 2 DIABETES MELLITUS WITH DIABETIC POLYNEUROPATHY, WITH LONG-TERM CURRENT USE OF INSULIN (HCC): ICD-10-CM

## 2019-07-20 DIAGNOSIS — Z79.4 TYPE 2 DIABETES MELLITUS WITH DIABETIC POLYNEUROPATHY, WITH LONG-TERM CURRENT USE OF INSULIN (HCC): ICD-10-CM

## 2019-07-22 RX ORDER — INSULIN DETEMIR 100 [IU]/ML
INJECTION, SOLUTION SUBCUTANEOUS
Qty: 15 ML | Refills: 0 | Status: SHIPPED | OUTPATIENT
Start: 2019-07-22 | End: 2019-08-19 | Stop reason: SDUPTHER

## 2019-08-01 ENCOUNTER — HOSPITAL ENCOUNTER (OUTPATIENT)
Dept: GENERAL RADIOLOGY | Age: 59
Discharge: HOME | End: 2019-08-01
Payer: COMMERCIAL

## 2019-08-01 ENCOUNTER — HOSPITAL ENCOUNTER (OUTPATIENT)
Dept: CT IMAGING | Age: 59
Discharge: HOME | End: 2019-08-01
Payer: COMMERCIAL

## 2019-08-01 DIAGNOSIS — C64.2 RENAL CELL CARCINOMA OF LEFT KIDNEY (HCC): ICD-10-CM

## 2019-08-01 DIAGNOSIS — N28.89 LEFT RENAL MASS: ICD-10-CM

## 2019-08-01 LAB
ALBUMIN SERPL-MCNC: 4.1 G/DL (ref 3.5–5.2)
ALP BLD-CCNC: 81 U/L (ref 35–104)
ALT SERPL-CCNC: 16 U/L (ref 5–33)
ANION GAP SERPL CALCULATED.3IONS-SCNC: 11 MMOL/L (ref 7–19)
AST SERPL-CCNC: 17 U/L (ref 5–32)
BILIRUB SERPL-MCNC: 0.6 MG/DL (ref 0.2–1.2)
BUN BLDV-MCNC: 23 MG/DL (ref 6–20)
CALCIUM SERPL-MCNC: 10 MG/DL (ref 8.6–10)
CHLORIDE BLD-SCNC: 103 MMOL/L (ref 98–111)
CO2: 28 MMOL/L (ref 22–29)
CREAT SERPL-MCNC: 1.2 MG/DL (ref 0.5–0.9)
GFR NON-AFRICAN AMERICAN: 46
GLUCOSE BLD-MCNC: 148 MG/DL (ref 74–109)
POTASSIUM SERPL-SCNC: 5 MMOL/L (ref 3.5–5)
SODIUM BLD-SCNC: 142 MMOL/L (ref 136–145)
TOTAL PROTEIN: 7.7 G/DL (ref 6.6–8.7)

## 2019-08-01 PROCEDURE — 71046 X-RAY EXAM CHEST 2 VIEWS: CPT

## 2019-08-01 PROCEDURE — 74176 CT ABD & PELVIS W/O CONTRAST: CPT

## 2019-08-09 ENCOUNTER — OFFICE VISIT (OUTPATIENT)
Dept: UROLOGY | Age: 59
End: 2019-08-09
Payer: COMMERCIAL

## 2019-08-09 VITALS
HEIGHT: 68 IN | BODY MASS INDEX: 44.41 KG/M2 | DIASTOLIC BLOOD PRESSURE: 99 MMHG | HEART RATE: 71 BPM | TEMPERATURE: 97.7 F | WEIGHT: 293 LBS | SYSTOLIC BLOOD PRESSURE: 132 MMHG

## 2019-08-09 DIAGNOSIS — C64.2 RENAL CELL CARCINOMA OF LEFT KIDNEY (HCC): Primary | ICD-10-CM

## 2019-08-09 LAB
APPEARANCE FLUID: NORMAL
BILIRUBIN, POC: NORMAL
BLOOD URINE, POC: NORMAL
CLARITY, POC: CLEAR
COLOR, POC: YELLOW
GLUCOSE URINE, POC: NORMAL
KETONES, POC: NORMAL
LEUKOCYTE EST, POC: NORMAL
NITRITE, POC: NORMAL
PH, POC: 6.5
PROTEIN, POC: NORMAL
SPECIFIC GRAVITY, POC: 1.01
UROBILINOGEN, POC: 0.2

## 2019-08-09 PROCEDURE — 99213 OFFICE O/P EST LOW 20 MIN: CPT | Performed by: UROLOGY

## 2019-08-09 PROCEDURE — 81002 URINALYSIS NONAUTO W/O SCOPE: CPT | Performed by: UROLOGY

## 2019-08-09 ASSESSMENT — ENCOUNTER SYMPTOMS
NAUSEA: 0
ABDOMINAL PAIN: 0
COLOR CHANGE: 0
CONSTIPATION: 0
SHORTNESS OF BREATH: 0
RHINORRHEA: 0
DIARRHEA: 0
SINUS PRESSURE: 0
SORE THROAT: 0
EYE REDNESS: 0
WHEEZING: 0
BLOOD IN STOOL: 0
COUGH: 0
EYE PAIN: 0
ABDOMINAL DISTENTION: 0
BACK PAIN: 0
VOMITING: 0
EYE DISCHARGE: 0
FACIAL SWELLING: 0

## 2019-08-09 NOTE — PROGRESS NOTES
Lipids Other     Coronary Art Dis Other     Heart Attack Paternal Grandmother        REVIEW OF SYSTEMS:  Review of Systems   Constitutional: Negative for activity change, chills, fatigue and fever. HENT: Negative for congestion, ear discharge, ear pain, facial swelling, mouth sores, rhinorrhea, sinus pressure and sore throat. Eyes: Negative for pain, discharge and redness. Respiratory: Negative for cough, shortness of breath and wheezing. Cardiovascular: Negative for chest pain, palpitations and leg swelling. Gastrointestinal: Negative for abdominal distention, abdominal pain, blood in stool, constipation, diarrhea, nausea and vomiting. Endocrine: Negative for polydipsia, polyphagia and polyuria. Genitourinary: Negative for decreased urine volume, difficulty urinating, dysuria, enuresis, flank pain, frequency, genital sores, hematuria and urgency. Musculoskeletal: Negative for back pain, gait problem, joint swelling, neck pain and neck stiffness. Skin: Negative for color change, rash and wound. Allergic/Immunologic: Negative for environmental allergies and immunocompromised state. Neurological: Negative for dizziness, syncope, weakness, light-headedness, numbness and headaches. Psychiatric/Behavioral: Negative for agitation, confusion, dysphoric mood, self-injury, sleep disturbance and suicidal ideas. The patient is not hyperactive. PHYSICAL EXAM:  BP (!) 132/99   Pulse 71   Temp 97.7 °F (36.5 °C) (Temporal)   Ht 5' 8\" (1.727 m)   Wt (!) 375 lb (170.1 kg)   LMP 01/01/2002   BMI 57.02 kg/m²   Physical Exam   Constitutional: She is oriented to person, place, and time. She appears well-developed and well-nourished. HENT:   Head: Normocephalic and atraumatic. Eyes: Pupils are equal, round, and reactive to light. Conjunctivae and EOM are normal. No scleral icterus. Neck: Normal range of motion. Neck supple.    Cardiovascular: Normal rate, regular rhythm and intact distal no Micro  - CT ABDOMEN PELVIS WO CONTRAST Additional Contrast? Radiologist Recommendation; Future  - XR CHEST STANDARD (2 VW); Future      Orders Placed This Encounter   Procedures    CT ABDOMEN PELVIS WO CONTRAST Additional Contrast? Radiologist Recommendation     Standing Status:   Future     Standing Expiration Date:   8/9/2020     Scheduling Instructions: In 6 months     Order Specific Question:   Additional Contrast?     Answer:   Radiologist Recommendation     Order Specific Question:   Reason for exam:     Answer:   Status post left nephrectomy for renal cell carcinoma    XR CHEST STANDARD (2 VW)     Standing Status:   Future     Standing Expiration Date:   8/8/2020     Scheduling Instructions: In 6 months     Order Specific Question:   Reason for exam:     Answer:   History of renal cell carcinoma status post left nephrectomy    POCT Urinalysis no Micro        Return in about 6 months (around 2/9/2020) for office visit after xray study. EMR Dragon/transcription disclaimer: Much of this documentt is electronic  transcription/translation of spoken language to printed text. The  electronic translation of spoken language may be erroneous, or at times,  nonsensical words or phrases may be inadvertently transcribed.  Although I  have reviewed the document for such errors, some may still exist.

## 2019-08-19 DIAGNOSIS — E11.42 TYPE 2 DIABETES MELLITUS WITH DIABETIC POLYNEUROPATHY, WITH LONG-TERM CURRENT USE OF INSULIN (HCC): ICD-10-CM

## 2019-08-19 DIAGNOSIS — Z79.4 TYPE 2 DIABETES MELLITUS WITH DIABETIC POLYNEUROPATHY, WITH LONG-TERM CURRENT USE OF INSULIN (HCC): ICD-10-CM

## 2019-08-19 RX ORDER — INSULIN DETEMIR 100 [IU]/ML
INJECTION, SOLUTION SUBCUTANEOUS
Qty: 15 ML | Refills: 0 | Status: SHIPPED | OUTPATIENT
Start: 2019-08-19 | End: 2019-09-23 | Stop reason: SDUPTHER

## 2019-08-19 NOTE — TELEPHONE ENCOUNTER
[address] called to request a refill on her medication.       Last office visit : 7/19/2019   Next office visit : 10/21/2019     Requested Prescriptions     Signed Prescriptions Disp Refills    LEVEMIR FLEXTOUCH 100 UNIT/ML injection pen 15 mL 0     Sig: ADMINISTER [id number] SKIN EVERY NIGHT     Authorizing Provider: Phuong Damian     Ordering User: Mayra Spence

## 2019-08-23 ENCOUNTER — OFFICE VISIT (OUTPATIENT)
Dept: FAMILY MEDICINE CLINIC | Age: 59
End: 2019-08-23
Payer: COMMERCIAL

## 2019-08-23 VITALS
OXYGEN SATURATION: 98 % | SYSTOLIC BLOOD PRESSURE: 144 MMHG | BODY MASS INDEX: 57.32 KG/M2 | DIASTOLIC BLOOD PRESSURE: 94 MMHG | HEART RATE: 62 BPM | WEIGHT: 293 LBS | TEMPERATURE: 97.3 F

## 2019-08-23 DIAGNOSIS — R19.7 DIARRHEA, UNSPECIFIED TYPE: ICD-10-CM

## 2019-08-23 DIAGNOSIS — R19.7 DIARRHEA, UNSPECIFIED TYPE: Primary | ICD-10-CM

## 2019-08-23 LAB
ALBUMIN SERPL-MCNC: 4.1 G/DL (ref 3.5–5.2)
ALP BLD-CCNC: 84 U/L (ref 35–104)
ALT SERPL-CCNC: 16 U/L (ref 5–33)
ANION GAP SERPL CALCULATED.3IONS-SCNC: 12 MMOL/L (ref 7–19)
AST SERPL-CCNC: 16 U/L (ref 5–32)
BACTERIA: NEGATIVE /HPF
BASOPHILS ABSOLUTE: 0.1 K/UL (ref 0–0.2)
BASOPHILS RELATIVE PERCENT: 0.7 % (ref 0–1)
BILIRUB SERPL-MCNC: 0.7 MG/DL (ref 0.2–1.2)
BILIRUBIN URINE: NEGATIVE
BLOOD, URINE: NEGATIVE
BUN BLDV-MCNC: 16 MG/DL (ref 6–20)
CALCIUM SERPL-MCNC: 9.7 MG/DL (ref 8.6–10)
CHLORIDE BLD-SCNC: 106 MMOL/L (ref 98–111)
CLARITY: ABNORMAL
CO2: 27 MMOL/L (ref 22–29)
COLOR: YELLOW
CREAT SERPL-MCNC: 1.1 MG/DL (ref 0.5–0.9)
EOSINOPHILS ABSOLUTE: 0.2 K/UL (ref 0–0.6)
EOSINOPHILS RELATIVE PERCENT: 2 % (ref 0–5)
EPITHELIAL CELLS, UA: 6 /HPF (ref 0–5)
GFR NON-AFRICAN AMERICAN: 51
GLUCOSE BLD-MCNC: 124 MG/DL (ref 74–109)
GLUCOSE URINE: NEGATIVE MG/DL
HCT VFR BLD CALC: 42 % (ref 37–47)
HEMOGLOBIN: 13.6 G/DL (ref 12–16)
HYALINE CASTS: 3 /HPF (ref 0–8)
IMMATURE GRANULOCYTES #: 0 K/UL
KETONES, URINE: NEGATIVE MG/DL
LEUKOCYTE ESTERASE, URINE: NEGATIVE
LYMPHOCYTES ABSOLUTE: 1.6 K/UL (ref 1.1–4.5)
LYMPHOCYTES RELATIVE PERCENT: 21.4 % (ref 20–40)
MCH RBC QN AUTO: 29.6 PG (ref 27–31)
MCHC RBC AUTO-ENTMCNC: 32.4 G/DL (ref 33–37)
MCV RBC AUTO: 91.3 FL (ref 81–99)
MONOCYTES ABSOLUTE: 0.4 K/UL (ref 0–0.9)
MONOCYTES RELATIVE PERCENT: 5.4 % (ref 0–10)
NEUTROPHILS ABSOLUTE: 5.2 K/UL (ref 1.5–7.5)
NEUTROPHILS RELATIVE PERCENT: 70.2 % (ref 50–65)
NITRITE, URINE: NEGATIVE
PDW BLD-RTO: 14 % (ref 11.5–14.5)
PH UA: 6 (ref 5–8)
PLATELET # BLD: 211 K/UL (ref 130–400)
PMV BLD AUTO: 11.4 FL (ref 9.4–12.3)
POTASSIUM SERPL-SCNC: 4.7 MMOL/L (ref 3.5–5)
PROTEIN UA: NEGATIVE MG/DL
RBC # BLD: 4.6 M/UL (ref 4.2–5.4)
RBC UA: 3 /HPF (ref 0–4)
SODIUM BLD-SCNC: 145 MMOL/L (ref 136–145)
SPECIFIC GRAVITY UA: 1.02 (ref 1–1.03)
TOTAL PROTEIN: 7.7 G/DL (ref 6.6–8.7)
UROBILINOGEN, URINE: 0.2 E.U./DL
WBC # BLD: 7.4 K/UL (ref 4.8–10.8)
WBC UA: 3 /HPF (ref 0–5)

## 2019-08-23 PROCEDURE — 99213 OFFICE O/P EST LOW 20 MIN: CPT | Performed by: FAMILY MEDICINE

## 2019-08-28 ASSESSMENT — ENCOUNTER SYMPTOMS
COUGH: 0
CONSTIPATION: 0
ABDOMINAL PAIN: 0
ANAL BLEEDING: 0
SHORTNESS OF BREATH: 0
NAUSEA: 0
CHEST TIGHTNESS: 0
DIARRHEA: 1

## 2019-09-16 LAB
ALBUMIN SERPL-MCNC: 4.1 G/DL (ref 3.5–5.2)
ALP BLD-CCNC: 73 U/L (ref 35–104)
ALT SERPL-CCNC: 18 U/L (ref 5–33)
ANION GAP SERPL CALCULATED.3IONS-SCNC: 11 MMOL/L (ref 7–19)
AST SERPL-CCNC: 16 U/L (ref 5–32)
BASOPHILS ABSOLUTE: 0.1 K/UL (ref 0–0.2)
BASOPHILS RELATIVE PERCENT: 0.8 % (ref 0–1)
BILIRUB SERPL-MCNC: 0.3 MG/DL (ref 0.2–1.2)
BILIRUBIN URINE: NEGATIVE
BLOOD, URINE: NEGATIVE
BUN BLDV-MCNC: 21 MG/DL (ref 6–20)
CALCIUM SERPL-MCNC: 9.4 MG/DL (ref 8.6–10)
CHLORIDE BLD-SCNC: 104 MMOL/L (ref 98–111)
CLARITY: CLEAR
CO2: 24 MMOL/L (ref 22–29)
COLOR: YELLOW
CREAT SERPL-MCNC: 1.3 MG/DL (ref 0.5–0.9)
CREATININE URINE: 48.4 MG/DL (ref 4.2–622)
EOSINOPHILS ABSOLUTE: 0.1 K/UL (ref 0–0.6)
EOSINOPHILS RELATIVE PERCENT: 2 % (ref 0–5)
GFR NON-AFRICAN AMERICAN: 42
GLUCOSE BLD-MCNC: 120 MG/DL (ref 74–109)
GLUCOSE URINE: NEGATIVE MG/DL
HCT VFR BLD CALC: 40.7 % (ref 37–47)
HEMOGLOBIN: 13.3 G/DL (ref 12–16)
IMMATURE GRANULOCYTES #: 0 K/UL
KETONES, URINE: NEGATIVE MG/DL
LEUKOCYTE ESTERASE, URINE: NEGATIVE
LYMPHOCYTES ABSOLUTE: 1.6 K/UL (ref 1.1–4.5)
LYMPHOCYTES RELATIVE PERCENT: 25 % (ref 20–40)
MAGNESIUM: 1.9 MG/DL (ref 1.6–2.6)
MCH RBC QN AUTO: 29.4 PG (ref 27–31)
MCHC RBC AUTO-ENTMCNC: 32.7 G/DL (ref 33–37)
MCV RBC AUTO: 90 FL (ref 81–99)
MONOCYTES ABSOLUTE: 0.4 K/UL (ref 0–0.9)
MONOCYTES RELATIVE PERCENT: 6.4 % (ref 0–10)
NEUTROPHILS ABSOLUTE: 4.2 K/UL (ref 1.5–7.5)
NEUTROPHILS RELATIVE PERCENT: 65.5 % (ref 50–65)
NITRITE, URINE: NEGATIVE
PARATHYROID HORMONE INTACT: 83.2 PG/ML (ref 15–65)
PDW BLD-RTO: 13.9 % (ref 11.5–14.5)
PH UA: 6 (ref 5–8)
PHOSPHORUS: 3.5 MG/DL (ref 2.5–4.5)
PLATELET # BLD: 216 K/UL (ref 130–400)
PMV BLD AUTO: 11.5 FL (ref 9.4–12.3)
POTASSIUM SERPL-SCNC: 4.1 MMOL/L (ref 3.5–5)
PROTEIN PROTEIN: 4 MG/DL (ref 15–45)
PROTEIN UA: NEGATIVE MG/DL
RBC # BLD: 4.52 M/UL (ref 4.2–5.4)
SODIUM BLD-SCNC: 139 MMOL/L (ref 136–145)
SPECIFIC GRAVITY UA: 1.01 (ref 1–1.03)
TOTAL PROTEIN: 7.6 G/DL (ref 6.6–8.7)
URIC ACID, SERUM: 9.4 MG/DL (ref 2.4–5.7)
UROBILINOGEN, URINE: 0.2 E.U./DL
VITAMIN D 25-HYDROXY: 31.6 NG/ML
WBC # BLD: 6.4 K/UL (ref 4.8–10.8)

## 2019-09-27 DIAGNOSIS — E03.9 PRIMARY HYPOTHYROIDISM: ICD-10-CM

## 2019-09-30 RX ORDER — LEVOTHYROXINE SODIUM 137 UG/1
TABLET ORAL
Qty: 30 TABLET | Refills: 0 | Status: SHIPPED | OUTPATIENT
Start: 2019-09-30 | End: 2019-11-02 | Stop reason: SDUPTHER

## 2019-10-13 DIAGNOSIS — Z79.4 TYPE 2 DIABETES MELLITUS WITH DIABETIC POLYNEUROPATHY, WITH LONG-TERM CURRENT USE OF INSULIN (HCC): ICD-10-CM

## 2019-10-13 DIAGNOSIS — E11.42 TYPE 2 DIABETES MELLITUS WITH DIABETIC POLYNEUROPATHY, WITH LONG-TERM CURRENT USE OF INSULIN (HCC): ICD-10-CM

## 2019-10-21 ENCOUNTER — OFFICE VISIT (OUTPATIENT)
Dept: FAMILY MEDICINE CLINIC | Age: 59
End: 2019-10-21
Payer: COMMERCIAL

## 2019-10-21 VITALS
DIASTOLIC BLOOD PRESSURE: 82 MMHG | OXYGEN SATURATION: 98 % | BODY MASS INDEX: 59 KG/M2 | TEMPERATURE: 97.4 F | HEART RATE: 58 BPM | SYSTOLIC BLOOD PRESSURE: 134 MMHG | WEIGHT: 293 LBS

## 2019-10-21 DIAGNOSIS — Z23 NEED FOR INFLUENZA VACCINATION: ICD-10-CM

## 2019-10-21 DIAGNOSIS — G47.33 OSA ON CPAP: ICD-10-CM

## 2019-10-21 DIAGNOSIS — J30.89 CHRONIC NONSEASONAL ALLERGIC RHINITIS DUE TO POLLEN: ICD-10-CM

## 2019-10-21 DIAGNOSIS — E11.42 TYPE 2 DIABETES MELLITUS WITH DIABETIC POLYNEUROPATHY, WITH LONG-TERM CURRENT USE OF INSULIN (HCC): Primary | ICD-10-CM

## 2019-10-21 DIAGNOSIS — E78.00 HYPERCHOLESTEROLEMIA: ICD-10-CM

## 2019-10-21 DIAGNOSIS — Z13.220 LIPID SCREENING: ICD-10-CM

## 2019-10-21 DIAGNOSIS — R53.83 OTHER FATIGUE: ICD-10-CM

## 2019-10-21 DIAGNOSIS — I10 ESSENTIAL (PRIMARY) HYPERTENSION: ICD-10-CM

## 2019-10-21 DIAGNOSIS — Z79.4 TYPE 2 DIABETES MELLITUS WITH DIABETIC POLYNEUROPATHY, WITH LONG-TERM CURRENT USE OF INSULIN (HCC): Primary | ICD-10-CM

## 2019-10-21 DIAGNOSIS — E03.9 PRIMARY HYPOTHYROIDISM: ICD-10-CM

## 2019-10-21 DIAGNOSIS — Z99.89 OSA ON CPAP: ICD-10-CM

## 2019-10-21 PROCEDURE — 90471 IMMUNIZATION ADMIN: CPT | Performed by: FAMILY MEDICINE

## 2019-10-21 PROCEDURE — 99214 OFFICE O/P EST MOD 30 MIN: CPT | Performed by: FAMILY MEDICINE

## 2019-10-21 PROCEDURE — 90686 IIV4 VACC NO PRSV 0.5 ML IM: CPT | Performed by: FAMILY MEDICINE

## 2019-10-21 RX ORDER — LISINOPRIL 5 MG/1
5 TABLET ORAL DAILY
Qty: 30 TABLET | Refills: 5
Start: 2019-10-21 | End: 2021-05-13

## 2019-10-23 DIAGNOSIS — E11.42 TYPE 2 DIABETES MELLITUS WITH DIABETIC POLYNEUROPATHY, WITH LONG-TERM CURRENT USE OF INSULIN (HCC): Primary | ICD-10-CM

## 2019-10-23 DIAGNOSIS — Z79.4 TYPE 2 DIABETES MELLITUS WITH DIABETIC POLYNEUROPATHY, WITH LONG-TERM CURRENT USE OF INSULIN (HCC): Primary | ICD-10-CM

## 2019-11-02 DIAGNOSIS — E03.9 PRIMARY HYPOTHYROIDISM: ICD-10-CM

## 2019-11-04 RX ORDER — LEVOTHYROXINE SODIUM 137 UG/1
TABLET ORAL
Qty: 30 TABLET | Refills: 0 | Status: SHIPPED | OUTPATIENT
Start: 2019-11-04 | End: 2019-12-05 | Stop reason: SDUPTHER

## 2019-11-27 ENCOUNTER — OFFICE VISIT (OUTPATIENT)
Dept: FAMILY MEDICINE CLINIC | Age: 59
End: 2019-11-27
Payer: COMMERCIAL

## 2019-11-27 VITALS
HEART RATE: 56 BPM | WEIGHT: 293 LBS | OXYGEN SATURATION: 99 % | TEMPERATURE: 96.9 F | BODY MASS INDEX: 59.12 KG/M2 | SYSTOLIC BLOOD PRESSURE: 138 MMHG | DIASTOLIC BLOOD PRESSURE: 84 MMHG

## 2019-11-27 DIAGNOSIS — R30.0 DYSURIA: Primary | ICD-10-CM

## 2019-11-27 DIAGNOSIS — R35.0 URINARY FREQUENCY: ICD-10-CM

## 2019-11-27 LAB
APPEARANCE FLUID: NORMAL
BILIRUBIN, POC: NEGATIVE
BLOOD URINE, POC: NEGATIVE
CLARITY, POC: NORMAL
COLOR, POC: YELLOW
GLUCOSE URINE, POC: NEGATIVE
KETONES, POC: NEGATIVE
LEUKOCYTE EST, POC: NEGATIVE
NITRITE, POC: NEGATIVE
PH, POC: 6
PROTEIN, POC: NEGATIVE
SPECIFIC GRAVITY, POC: 1.01
UROBILINOGEN, POC: NORMAL

## 2019-11-27 PROCEDURE — 81002 URINALYSIS NONAUTO W/O SCOPE: CPT | Performed by: CLINICAL NURSE SPECIALIST

## 2019-11-27 PROCEDURE — 99213 OFFICE O/P EST LOW 20 MIN: CPT | Performed by: CLINICAL NURSE SPECIALIST

## 2019-11-27 ASSESSMENT — ENCOUNTER SYMPTOMS
BACK PAIN: 0
VOMITING: 0
NAUSEA: 0
ABDOMINAL PAIN: 0

## 2019-11-29 LAB — URINE CULTURE, ROUTINE: NORMAL

## 2019-12-03 DIAGNOSIS — R30.0 DYSURIA: Primary | ICD-10-CM

## 2019-12-04 DIAGNOSIS — R30.0 DYSURIA: ICD-10-CM

## 2019-12-04 LAB
BACTERIA: NEGATIVE /HPF
BILIRUBIN URINE: NEGATIVE
BLOOD, URINE: NEGATIVE
CLARITY: CLEAR
COLOR: YELLOW
EPITHELIAL CELLS, UA: 9 /HPF (ref 0–5)
GLUCOSE URINE: NEGATIVE MG/DL
HYALINE CASTS: 2 /HPF (ref 0–8)
KETONES, URINE: NEGATIVE MG/DL
LEUKOCYTE ESTERASE, URINE: NEGATIVE
NITRITE, URINE: NEGATIVE
PH UA: 6 (ref 5–8)
PROTEIN UA: NEGATIVE MG/DL
RBC UA: 0 /HPF (ref 0–4)
SPECIFIC GRAVITY UA: 1.01 (ref 1–1.03)
UROBILINOGEN, URINE: 0.2 E.U./DL
WBC UA: 3 /HPF (ref 0–5)

## 2019-12-05 DIAGNOSIS — E03.9 PRIMARY HYPOTHYROIDISM: ICD-10-CM

## 2019-12-06 LAB — URINE CULTURE, ROUTINE: NORMAL

## 2019-12-06 RX ORDER — LEVOTHYROXINE SODIUM 137 UG/1
TABLET ORAL
Qty: 30 TABLET | Refills: 1 | Status: SHIPPED | OUTPATIENT
Start: 2019-12-06 | End: 2020-02-10

## 2019-12-06 RX ORDER — CIPROFLOXACIN 500 MG/1
500 TABLET, FILM COATED ORAL 2 TIMES DAILY
Qty: 6 TABLET | Refills: 0 | Status: SHIPPED | OUTPATIENT
Start: 2019-12-06 | End: 2019-12-09

## 2020-01-17 DIAGNOSIS — Z13.220 LIPID SCREENING: ICD-10-CM

## 2020-01-17 DIAGNOSIS — R53.83 OTHER FATIGUE: ICD-10-CM

## 2020-01-17 DIAGNOSIS — Z79.4 TYPE 2 DIABETES MELLITUS WITH DIABETIC POLYNEUROPATHY, WITH LONG-TERM CURRENT USE OF INSULIN (HCC): ICD-10-CM

## 2020-01-17 DIAGNOSIS — I10 ESSENTIAL (PRIMARY) HYPERTENSION: ICD-10-CM

## 2020-01-17 DIAGNOSIS — E11.42 TYPE 2 DIABETES MELLITUS WITH DIABETIC POLYNEUROPATHY, WITH LONG-TERM CURRENT USE OF INSULIN (HCC): ICD-10-CM

## 2020-01-17 LAB
ALBUMIN SERPL-MCNC: 4.2 G/DL (ref 3.5–5.2)
ALP BLD-CCNC: 79 U/L (ref 35–104)
ALT SERPL-CCNC: 21 U/L (ref 5–33)
ANION GAP SERPL CALCULATED.3IONS-SCNC: 15 MMOL/L (ref 7–19)
AST SERPL-CCNC: 22 U/L (ref 5–32)
BASOPHILS ABSOLUTE: 0.1 K/UL (ref 0–0.2)
BASOPHILS RELATIVE PERCENT: 0.8 % (ref 0–1)
BILIRUB SERPL-MCNC: 0.6 MG/DL (ref 0.2–1.2)
BUN BLDV-MCNC: 18 MG/DL (ref 6–20)
CALCIUM SERPL-MCNC: 9.9 MG/DL (ref 8.6–10)
CHLORIDE BLD-SCNC: 106 MMOL/L (ref 98–111)
CHOLESTEROL, TOTAL: 141 MG/DL (ref 160–199)
CO2: 23 MMOL/L (ref 22–29)
CREAT SERPL-MCNC: 1.2 MG/DL (ref 0.5–0.9)
EOSINOPHILS ABSOLUTE: 0.1 K/UL (ref 0–0.6)
EOSINOPHILS RELATIVE PERCENT: 1.9 % (ref 0–5)
GFR NON-AFRICAN AMERICAN: 46
GLUCOSE BLD-MCNC: 134 MG/DL (ref 74–109)
HBA1C MFR BLD: 7.5 % (ref 4–6)
HCT VFR BLD CALC: 43.5 % (ref 37–47)
HDLC SERPL-MCNC: 54 MG/DL (ref 65–121)
HEMOGLOBIN: 13.8 G/DL (ref 12–16)
IMMATURE GRANULOCYTES #: 0 K/UL
LDL CHOLESTEROL CALCULATED: 68 MG/DL
LYMPHOCYTES ABSOLUTE: 1.3 K/UL (ref 1.1–4.5)
LYMPHOCYTES RELATIVE PERCENT: 19.7 % (ref 20–40)
MCH RBC QN AUTO: 29.2 PG (ref 27–31)
MCHC RBC AUTO-ENTMCNC: 31.7 G/DL (ref 33–37)
MCV RBC AUTO: 92.2 FL (ref 81–99)
MONOCYTES ABSOLUTE: 0.4 K/UL (ref 0–0.9)
MONOCYTES RELATIVE PERCENT: 6.9 % (ref 0–10)
NEUTROPHILS ABSOLUTE: 4.5 K/UL (ref 1.5–7.5)
NEUTROPHILS RELATIVE PERCENT: 70.5 % (ref 50–65)
PDW BLD-RTO: 13.9 % (ref 11.5–14.5)
PLATELET # BLD: 199 K/UL (ref 130–400)
PMV BLD AUTO: 11.5 FL (ref 9.4–12.3)
POTASSIUM SERPL-SCNC: 4.7 MMOL/L (ref 3.5–5)
RBC # BLD: 4.72 M/UL (ref 4.2–5.4)
SODIUM BLD-SCNC: 144 MMOL/L (ref 136–145)
TOTAL PROTEIN: 7.7 G/DL (ref 6.6–8.7)
TRIGL SERPL-MCNC: 96 MG/DL (ref 0–149)
TSH SERPL DL<=0.05 MIU/L-ACNC: 0.7 UIU/ML (ref 0.27–4.2)
WBC # BLD: 6.3 K/UL (ref 4.8–10.8)

## 2020-01-24 ENCOUNTER — OFFICE VISIT (OUTPATIENT)
Dept: FAMILY MEDICINE CLINIC | Age: 60
End: 2020-01-24
Payer: COMMERCIAL

## 2020-01-24 VITALS
OXYGEN SATURATION: 98 % | WEIGHT: 293 LBS | HEART RATE: 58 BPM | BODY MASS INDEX: 58.84 KG/M2 | TEMPERATURE: 97.1 F | DIASTOLIC BLOOD PRESSURE: 70 MMHG | SYSTOLIC BLOOD PRESSURE: 122 MMHG

## 2020-01-24 PROCEDURE — 99214 OFFICE O/P EST MOD 30 MIN: CPT | Performed by: FAMILY MEDICINE

## 2020-01-24 RX ORDER — CLOTRIMAZOLE 1 %
CREAM (GRAM) TOPICAL
Qty: 60 G | Refills: 1 | Status: SHIPPED | OUTPATIENT
Start: 2020-01-24 | End: 2020-01-31

## 2020-01-24 RX ORDER — TRIAMCINOLONE ACETONIDE 1 MG/G
CREAM TOPICAL
Qty: 45 G | Refills: 1 | Status: SHIPPED | OUTPATIENT
Start: 2020-01-24 | End: 2021-09-24

## 2020-01-24 NOTE — PATIENT INSTRUCTIONS
We are committed to providing you with the best care possible. In order to help us achieve these goals please remember to bring all medications, herbal products, and over the counter supplements with you to each visit. If your provider has ordered testing for you, please be sure to follow up with our office if you have not received results within 7 days after the testing took place. *If you receive a survey after visiting one of our offices, please take time to share your experience concerning your physician office visit. These surveys are confidential and no health information about you is shared. We are eager to improve for you and we are counting on your feedback to help make that happen.      _______________________________________________________________    For Skin Rash    Two prescriptions have been sent to your pharmacy:  · Triamcinolone (steroid cream)  · Clotrimazole (antifungal cream)    Mix both creams together in your palm and apply to affected area of the skin twice a day until rash resolves. May take 4 weeks or longer. If no improvement after 4 weeks, return to clinic.

## 2020-01-24 NOTE — PROGRESS NOTES
[facility] [address] [place] [address]  Dept: [phone]  Dept Fax: [phone]  Loc: [phone]    Krystyna Sarkar is a 63 y.o. female who presents today for her medical conditions/complaints as noted below. Krystyna Sarkar is here for 3 Month Follow-Up        HPI:   CC: Here today to discuss the following:    Hypertension  Compliant with medications. No adverse effects from medication. No lightheadedness, palpitations, or chest pain. Hyperlipidemia  Tolerating current cholesterol medication without side effects. No body aches. Attempting to reduce processed sugar and cholesterol from diet. Diabetes Mellitus  Has been compliant with medications. No side effects of medications since last visit. No polyuria, polydipsia, or vision changes since last visit. No symptomatic episodes of hypoglycemia. Hypothyroidism  Symptoms are stable. No temperature intolerance, fatigue, or mood disturbance from baseline. Complaint with current medication. ZOILA on CPAP  Compliant with CPAP. No daytime somnolence. Feels rested for the most part when wearing CPAP. She developed a rash on her right ankle due to wearing a brace. She is requesting referral back to general surgery for evaluation of her ventral hernia.         HPI    Past Medical History:   Diagnosis Date    Allergic rhinitis     Ankle fracture     3 year ago; increasing difficulty walking; has bone fragments    Arthritis     sees dr. Dalia Domínguez as ambulation aid     right foot per dr. Ena Prakash CPAP (continuous positive airway pressure) dependence     12cm    Diabetes ([address]     Hiatal hernia 9/27/2017    History of kidney cancer     Hyperlipidemia     Hypertension     Hypothyroidism     Murmur     Obesity     ZOILA (obstructive sleep apnea)     AHI:  28.5 per PSG, 9/2014    PLMD (periodic limb movement disorder)     Renal mass     cat scan done 9/13/18; to see dr. Ernesto Ho coming up    Thyroid disease     Type 2 diabetes mellitus without complication [facility]     Type II or unspecified type diabetes mellitus without mention of complication, not stated as uncontrolled       Past Surgical History:   Procedure Laterality Date    BREAST BIOPSY Right 2012    CARPAL TUNNEL RELEASE      bilateral    CHOLECYSTECTOMY      HYSTERECTOMY      [place] LAP, RADICAL NEPHRECTOMY Left 10/3/2018    NEPHRECTOMY LAPAROSCOPIC HAND ASSISTED performed by Shashank Herrera MD at [address] LAP,CHOLECYSTECTOMY/GRAPH N/A 10/31/2017    CHOLECYSTECTOMY LAPAROSCOPIC performed by Carter Lainez MD at [facility] History   Problem Relation Age of Onset    Diabetes Mother     Cancer Mother         breast    Heart Disease Father     Cancer Father         colon, over 79    Cancer Sister 43        breast, negative genetic testing    Cancer Maternal Grandmother 76        breast    Diabetes Maternal Grandmother     Heart Attack Maternal Grandfather     Hypertension Other     Elevated Lipids Other     Coronary Art Dis Other     Heart Attack Paternal Grandmother        Social History     Tobacco Use    Smoking status: Never Smoker    Smokeless tobacco: Never Used   Substance Use Topics    Alcohol use: No     Current Outpatient Medications   Medication Sig Dispense Refill    Insulin Pen Needle (TRUEPLUS PEN NEEDLES) 31G X 5 MM MISC Inject 1 each into the skin 4 times daily 400 each 3    Misc. Devices MISC Diabetic [place]otes ICD 10: E11.9 1 Device 0    triamcinolone (KENALOG) 0.1 % cream Apply topically 2 times daily. 45 g 1    clotrimazole (LOTRIMIN AF) 1 % cream Apply topically 2 times daily.  60 g 1    levothyroxine (SYNTHROID) 137 MCG tablet TAKE 1 TABLET BY MOUTH EVERY DAY 30 tablet 1    atorvastatin (LIPITOR) 10 MG tablet TAKE 1/2 TABLET BY MOUTH DAILY 15 tablet 2    pantoprazole (PROTONIX) 40 MG tablet TAKE 1 TABLET BY MOUTH DAILY 90 tablet 1    negative    Sensory exam:  Monofilament sensation: normal  (minimum of 5 random plantar locations tested, avoiding callused areas - > 1 area with absence of sensation is + for neuropathy)    Plus at least one of the following:  Pulses: normal,   Pinprick: Intact  Proprioception: Intact          Recent Results (from the past 672 hour(s))   Hemoglobin A1C    Collection Time: 01/17/20  9:25 AM   Result Value Ref Range    Hemoglobin A1C 7.5 (H) 4.0 - 6.0 %   Lipid Panel    Collection Time: 01/17/20  9:25 AM   Result Value Ref Range    Cholesterol, Total 141 (L) 160 - 199 mg/dL    Triglycerides 96 0 - 149 mg/dL    HDL 54 (L) 65 - 121 mg/dL    LDL Calculated 68 <100 mg/dL   Comprehensive Metabolic Panel    Collection Time: 01/17/20  9:25 AM   Result Value Ref Range    Sodium 144 136 - 145 mmol/L    Potassium 4.7 3.5 - 5.0 mmol/L    Chloride 106 98 - 111 mmol/L    CO2 23 22 - 29 mmol/L    Anion Gap 15 7 - 19 mmol/L    Glucose 134 (H) 74 - 109 mg/dL    BUN 18 6 - 20 mg/dL    CREATININE 1.2 (H) 0.5 - 0.9 mg/dL    GFR Non- 46 (A) >60    Calcium 9.9 8.6 - 10.0 mg/dL    Total Protein 7.7 6.6 - 8.7 g/dL    Alb 4.2 3.5 - 5.2 g/dL    Total Bilirubin 0.6 0.2 - 1.2 mg/dL    Alkaline Phosphatase 79 35 - 104 U/L    ALT 21 5 - 33 U/L    AST 22 5 - 32 U/L   CBC Auto Differential    Collection Time: 01/17/20  9:25 AM   Result Value Ref Range    WBC 6.3 4.8 - 10.8 K/uL    RBC 4.72 4.20 - 5.40 M/uL    Hemoglobin 13.8 12.0 - 16.0 g/dL    Hematocrit 43.5 37.0 - 47.0 %    MCV 92.2 81.0 - 99.0 fL    MCH 29.2 27.0 - 31.0 pg    MCHC 31.7 (L) 33.0 - 37.0 g/dL    RDW 13.9 11.5 - 14.5 %    Platelets 287 972 - 090 K/uL    MPV 11.5 9.4 - 12.3 fL    Neutrophils % 70.5 (H) 50.0 - 65.0 %    Lymphocytes % 19.7 (L) 20.0 - 40.0 %    Monocytes % 6.9 0.0 - 10.0 %    Eosinophils % 1.9 0.0 - 5.0 %    Basophils % 0.8 0.0 - 1.0 %    Neutrophils Absolute 4.5 1.5 - 7.5 K/uL    Immature Granulocytes # 0.0 K/uL    Lymphocytes Absolute 1.3 1.1 - 4.5 ordered testing for you, please be sure to follow up with our office if you have not received results within 7 days after the testing took place. *If you receive a survey after visiting one of our offices, please take time to share your experience concerning your physician office visit. These surveys are confidential and no health information about you is shared. We are eager to improve for you and we are counting on your feedback to help make that happen.      _______________________________________________________________    For Skin Rash    Two prescriptions have been sent to your pharmacy:  · Triamcinolone (steroid cream)  · Clotrimazole (antifungal cream)    Mix both creams together in your palm and apply to affected area of the skin twice a day until rash resolves. May take 4 weeks or longer. If no improvement after 4 weeks, return to clinic. Return in about 3 months (around 4/24/2020) for Yearly Physical - 30 minute visit. Discussed use, benefit, and side effects of prescribed medications. All patient questions answered. Pt voiced understanding. Reviewed health maintenance. Instructedto continue current medications, diet and exercise. Patient agreed with treatmentplan. Follow up as directed.        Note dictated using [id number] [employer]

## 2020-02-10 ENCOUNTER — HOSPITAL ENCOUNTER (OUTPATIENT)
Dept: GENERAL RADIOLOGY | Age: 60
Discharge: HOME | End: 2020-02-10
Payer: COMMERCIAL

## 2020-02-10 ENCOUNTER — HOSPITAL ENCOUNTER (OUTPATIENT)
Dept: CT IMAGING | Age: 60
Discharge: HOME | End: 2020-02-10
Payer: COMMERCIAL

## 2020-02-10 PROCEDURE — 71046 X-RAY EXAM CHEST 2 VIEWS: CPT

## 2020-02-10 PROCEDURE — 74176 CT ABD & PELVIS W/O CONTRAST: CPT

## 2020-02-10 RX ORDER — LEVOTHYROXINE SODIUM 137 UG/1
TABLET ORAL
Qty: 30 TABLET | Refills: 1 | Status: SHIPPED | OUTPATIENT
Start: 2020-02-10 | End: 2020-04-09

## 2020-02-14 ENCOUNTER — OFFICE VISIT (OUTPATIENT)
Dept: UROLOGY | Age: 60
End: 2020-02-14
Payer: COMMERCIAL

## 2020-02-14 VITALS
WEIGHT: 293 LBS | HEART RATE: 66 BPM | SYSTOLIC BLOOD PRESSURE: 170 MMHG | HEIGHT: 68 IN | DIASTOLIC BLOOD PRESSURE: 103 MMHG | BODY MASS INDEX: 44.41 KG/M2 | TEMPERATURE: 97.2 F

## 2020-02-14 LAB
BILIRUBIN, POC: NORMAL
BLOOD URINE, POC: NORMAL
CLARITY, POC: CLEAR
COLOR, POC: YELLOW
GLUCOSE URINE, POC: NORMAL
KETONES, POC: NORMAL
LEUKOCYTE EST, POC: NORMAL
NITRITE, POC: NORMAL
PH, POC: 6
PROTEIN, POC: NORMAL
SPECIFIC GRAVITY, POC: 1.01
UROBILINOGEN, POC: 0.2

## 2020-02-14 PROCEDURE — 99213 OFFICE O/P EST LOW 20 MIN: CPT | Performed by: UROLOGY

## 2020-02-14 PROCEDURE — 81003 URINALYSIS AUTO W/O SCOPE: CPT | Performed by: UROLOGY

## 2020-02-14 ASSESSMENT — ENCOUNTER SYMPTOMS
WHEEZING: 0
EYE DISCHARGE: 0
DIARRHEA: 0
BACK PAIN: 0
ABDOMINAL PAIN: 0
EYE REDNESS: 0
CONSTIPATION: 0
SHORTNESS OF BREATH: 0
COUGH: 0

## 2020-02-14 NOTE — PROGRESS NOTES
type diabetes mellitus without mention of complication, not stated as uncontrolled        Past Surgical History:   Procedure Laterality Date    BREAST BIOPSY Right 2012    CARPAL TUNNEL RELEASE      bilateral    CHOLECYSTECTOMY      HYSTERECTOMY      [place] LAP, RADICAL NEPHRECTOMY Left 10/3/2018    NEPHRECTOMY LAPAROSCOPIC HAND ASSISTED performed by Mario Ruiz MD at [address] LAP,CHOLECYSTECTOMY/GRAPH N/A 10/31/2017    CHOLECYSTECTOMY LAPAROSCOPIC performed by Toñito Davila MD at [facility] OR       Current Outpatient Medications   Medication Sig Dispense Refill    levothyroxine (SYNTHROID) 137 MCG tablet TAKE 1 TABLET BY MOUTH EVERY DAY 30 tablet 1    Insulin Pen Needle (TRUEPLUS PEN NEEDLES) 31G X 5 MM MISC Inject 1 each into the skin 4 times daily 400 each 3    Misc. Devices MISC Diabetic Shotes ICD 10: E11.9 1 Device 0    triamcinolone (KENALOG) 0.1 % cream Apply topically 2 times daily. 45 g 1    atorvastatin (LIPITOR) 10 MG tablet TAKE 1/2 TABLET BY MOUTH DAILY 15 tablet 2    pantoprazole (PROTONIX) 40 MG tablet TAKE 1 TABLET BY MOUTH DAILY 90 tablet 1    atenolol (TENORMIN) 50 MG tablet TAKE ONE-HALF TABLET BY MOUTH EVERY MORNING AND ONE-HALF TABLET EVERY EVENING.  90 tablet 3    lisinopril (PRINIVIL;ZESTRIL) 5 MG tablet Take 1 tablet by mouth daily Indications: High Blood Pressure Disorder 30 tablet 5    insulin aspart (NOVOLOG FLEXPEN) 100 UNIT/ML injection pen INJECT 6 UNITS UNDER THE SKIN WITH BREAKFAST, 6 UNITS WITH LUNCH, AND 10 UNITS WITH DINNER 15 mL 0    CARTIA  MG extended release capsule TAKE 1 CAPSULE BY MOUTH DAILY 30 capsule 5    LEVEMIR FLEXTOUCH 100 UNIT/ML injection pen ADMINISTER 46 UNITS UNDER THE SKIN EVERY NIGHT 15 mL 3    CONTOUR NEXT TEST strip TEST FIVE TIMES DAILY 500 strip 5    Multiple Vitamins-Minerals (MULTIPLE VITAMINS/WOMENS PO) Take 1 tablet by mouth daily       fexofenadine (ALLEGRA ALLERGY) 180 MG tablet Take 180 mg by mouth daily      Omega-3 Fatty Acids (FISH OIL) 1000 MG CAPS Take 1,000 mg by mouth 2 times daily       aspirin 81 MG tablet Take 81 mg by mouth daily 3 days a wk       No current facility-administered medications for this visit.         Allergies   Allergen Reactions    Adhesive Tape Other (See Comments)     Post op incision infection    Dermabond Other (See Comments)     Post op incision infection       Social History     Socioeconomic History    Marital status:      Spouse name: None    Number of children: None    Years of education: None    Highest education level: None   Occupational History    None   Social Needs    Financial resource strain: None    Food insecurity:     Worry: None     Inability: None    Transportation needs:     Medical: None     Non-medical: None   Tobacco Use    Smoking status: Never Smoker    Smokeless tobacco: Never Used   Substance and Sexual Activity    Alcohol use: No    Drug use: No    Sexual activity: Yes   Lifestyle    Physical activity:     Days per week: None     Minutes per session: None    Stress: None   Relationships    Social connections:     Talks on phone: None     Gets together: None     Attends L[employer] day service: None     Active member of club or organization: None     Attends meetings of clubs or organizations: None     Relationship status: None    Intimate partner violence:     Fear of current or ex partner: None     Emotionally abused: None     Physically abused: None     Forced sexual activity: None   Other Topics Concern    None   Social History Narrative    None       Family History   Problem Relation Age of Onset    Diabetes Mother     Cancer Mother         breast    Heart Disease Father     Cancer Father         colon, over 79    Cancer Sister 43        breast, negative genetic testing    Cancer Maternal Grandmother 76        breast    Diabetes Maternal Grandmother     Heart Attack Maternal Grandfather     Hypertension Other     Elevated Lipids Other abdominal tenderness. Hernia: A hernia is present. Hernia is present in the umbilical area and ventral area. Musculoskeletal: Normal range of motion. General: No tenderness. Lymphadenopathy:      Cervical: No cervical adenopathy. Skin:     General: Skin is warm and dry. Neurological:      Mental Status: She is alert and oriented to person, place, and time. Psychiatric:         Behavior: Behavior normal.             DATA:  CBC:   Lab Results   Component Value Date    WBC 6.3 01/17/2020    RBC 4.72 01/17/2020    HGB 13.8 01/17/2020    HCT 43.5 01/17/2020    MCV 92.2 01/17/2020    MCH 29.2 01/17/2020    MCHC 31.7 01/17/2020    RDW 13.9 01/17/2020     01/17/2020    MPV 11.5 01/17/2020     CMP:    Lab Results   Component Value Date     01/17/2020    K 4.7 01/17/2020    K 4.3 10/05/2018     01/17/2020    CO2 23 01/17/2020    BUN 18 01/17/2020    CREATININE 1.2 01/17/2020    LABGLOM 46 01/17/2020    GLUCOSE 134 01/17/2020    PROT 7.7 01/17/2020    LABALBU 4.2 01/17/2020    CALCIUM 9.9 01/17/2020    BILITOT 0.6 01/17/2020    ALKPHOS 79 01/17/2020    AST 22 01/17/2020    ALT 21 01/17/2020     Results for orders placed or performed in visit on 02/14/20   POCT Urinalysis No Micro (Auto)   Result Value Ref Range    Color, UA yellow     Clarity, UA clear     Glucose, UA POC neg     Bilirubin, UA neg     Ketones, UA neg     Spec Grav, UA 1.015     Blood, UA POC neg     pH, UA 6.0     Protein, UA POC neg     Urobilinogen, UA 0.2     Leukocytes, UA neg     Nitrite, UA neg      No results found for: PSA  No results found for: PSAFREEPCT          IMAGING:  CT scan of the abdomen pelvis and chest x-ray done 2/10/2020 shows no evidence of recurrent or metastatic disease.     1. Renal cell carcinoma of left kidney (HCC)  She will follow-up in 6 months with repeat cross-sectional imaging, chest x-ray and lab if this is negative I think then just annual follow-up would be needed  - POCT Urinalysis No Micro (Auto)  - CT ABDOMEN PELVIS W WO CONTRAST Additional Contrast? Radiologist Recommendation (renal mass protocol); Future  - Comprehensive Metabolic Panel; Future  - XR CHEST STANDARD (2 VW); Future      Orders Placed This Encounter   Procedures    CT ABDOMEN PELVIS W WO CONTRAST Additional Contrast? Radiologist Recommendation (renal mass protocol)     Renal mass protocol     Standing Status:   Future     Standing Expiration Date:   2/13/2021     Scheduling Instructions: In 6 months prior to the next visit     Order Specific Question:   Additional Contrast?     Answer:   Radiologist Recommendation     Comments:   renal mass protocol     Order Specific Question:   Reason for exam:     Answer:   Status post left nephrectomy for renal cell carcinoma    XR CHEST STANDARD (2 VW)     Standing Status:   Future     Standing Expiration Date:   2/13/2021     Scheduling Instructions: In 6 months prior to the next visit     Order Specific Question:   Reason for exam:     Answer:   History of renal cell carcinoma    Comprehensive Metabolic Panel     In 6 months prior to the next visit     Standing Status:   Future     Standing Expiration Date:   2/13/2021    POCT Urinalysis No Micro (Auto)        Return in about 6 months (around 8/14/2020) for office visit after xray study, F/U after lab test..    All information inputted into the note by the MA to include chief complaint, past medical history, past surgical history, medications, allergies, social and family history and review of systems has been reviewed and updated as needed by me. EMR Dragon/transcription disclaimer: Much of this documentt is electronic  transcription/translation of spoken language to printed text. The  electronic translation of spoken language may be erroneous, or at times,  nonsensical words or phrases may be inadvertently transcribed.  Although I  have reviewed the document for such errors, some may still exist.

## 2020-02-18 ENCOUNTER — OFFICE VISIT (OUTPATIENT)
Dept: FAMILY MEDICINE CLINIC | Age: 60
End: 2020-02-18
Payer: COMMERCIAL

## 2020-02-18 VITALS
HEIGHT: 68 IN | SYSTOLIC BLOOD PRESSURE: 118 MMHG | WEIGHT: 293 LBS | RESPIRATION RATE: 18 BRPM | HEART RATE: 68 BPM | BODY MASS INDEX: 44.41 KG/M2 | OXYGEN SATURATION: 98 % | TEMPERATURE: 97.6 F | DIASTOLIC BLOOD PRESSURE: 80 MMHG

## 2020-02-18 DIAGNOSIS — R19.7 DIARRHEA, UNSPECIFIED TYPE: ICD-10-CM

## 2020-02-18 LAB
ANION GAP SERPL CALCULATED.3IONS-SCNC: 12 MMOL/L (ref 7–19)
BASOPHILS ABSOLUTE: 0.1 K/UL (ref 0–0.2)
BASOPHILS RELATIVE PERCENT: 0.6 % (ref 0–1)
BUN BLDV-MCNC: 13 MG/DL (ref 6–20)
CALCIUM SERPL-MCNC: 9.5 MG/DL (ref 8.6–10)
CHLORIDE BLD-SCNC: 104 MMOL/L (ref 98–111)
CO2: 25 MMOL/L (ref 22–29)
CREAT SERPL-MCNC: 1.2 MG/DL (ref 0.5–0.9)
EOSINOPHILS ABSOLUTE: 0.2 K/UL (ref 0–0.6)
EOSINOPHILS RELATIVE PERCENT: 1.8 % (ref 0–5)
GFR NON-AFRICAN AMERICAN: 46
GLUCOSE BLD-MCNC: 110 MG/DL (ref 74–109)
HCT VFR BLD CALC: 44.8 % (ref 37–47)
HEMOGLOBIN: 14.2 G/DL (ref 12–16)
IMMATURE GRANULOCYTES #: 0 K/UL
LYMPHOCYTES ABSOLUTE: 1.3 K/UL (ref 1.1–4.5)
LYMPHOCYTES RELATIVE PERCENT: 15.7 % (ref 20–40)
MCH RBC QN AUTO: 29.1 PG (ref 27–31)
MCHC RBC AUTO-ENTMCNC: 31.7 G/DL (ref 33–37)
MCV RBC AUTO: 91.8 FL (ref 81–99)
MONOCYTES ABSOLUTE: 0.5 K/UL (ref 0–0.9)
MONOCYTES RELATIVE PERCENT: 6.1 % (ref 0–10)
NEUTROPHILS ABSOLUTE: 6.4 K/UL (ref 1.5–7.5)
NEUTROPHILS RELATIVE PERCENT: 75.6 % (ref 50–65)
PDW BLD-RTO: 14.1 % (ref 11.5–14.5)
PLATELET # BLD: 238 K/UL (ref 130–400)
PMV BLD AUTO: 11.6 FL (ref 9.4–12.3)
POTASSIUM SERPL-SCNC: 4.2 MMOL/L (ref 3.5–5)
RBC # BLD: 4.88 M/UL (ref 4.2–5.4)
SEDIMENTATION RATE, ERYTHROCYTE: 32 MM/HR (ref 0–25)
SODIUM BLD-SCNC: 141 MMOL/L (ref 136–145)
WBC # BLD: 8.5 K/UL (ref 4.8–10.8)

## 2020-02-18 PROCEDURE — 3051F HG A1C>EQUAL 7.0%<8.0%: CPT | Performed by: NURSE PRACTITIONER

## 2020-02-18 PROCEDURE — 99214 OFFICE O/P EST MOD 30 MIN: CPT | Performed by: NURSE PRACTITIONER

## 2020-02-18 RX ORDER — DIPHENOXYLATE HYDROCHLORIDE AND ATROPINE SULFATE 2.5; .025 MG/1; MG/1
1 TABLET ORAL 4 TIMES DAILY PRN
Qty: 20 TABLET | Refills: 0 | Status: SHIPPED | OUTPATIENT
Start: 2020-02-18 | End: 2020-02-23

## 2020-02-18 RX ORDER — ONDANSETRON 4 MG/1
4 TABLET, FILM COATED ORAL 3 TIMES DAILY PRN
Qty: 20 TABLET | Refills: 0 | Status: SHIPPED | OUTPATIENT
Start: 2020-02-18 | End: 2020-09-17

## 2020-02-18 RX ORDER — INSULIN DETEMIR 100 [IU]/ML
INJECTION, SOLUTION SUBCUTANEOUS
Qty: 15 ML | Refills: 3 | Status: SHIPPED | OUTPATIENT
Start: 2020-02-18 | End: 2020-06-11

## 2020-02-18 ASSESSMENT — ENCOUNTER SYMPTOMS
CHEST TIGHTNESS: 0
COUGH: 0
VOMITING: 0
NAUSEA: 0
SHORTNESS OF BREATH: 0
SORE THROAT: 0
ABDOMINAL PAIN: 1
DIARRHEA: 1
WHEEZING: 0

## 2020-02-18 NOTE — PROGRESS NOTES
Ania Orr is a 63 y.o. female who presents today for  Chief Complaint   Patient presents with    Diarrhea     unable to eat and has loose stools since friday night. Having trouble sleeping        HPI:  She developed acute diarrhea days ago with a few episodes. She had a few episodes of following day as well. She related it to eating extra vegetables, had gone out to eat both days with her . He has had no similar symptoms. 2 days ago she was doing better with no diarrhea during the day but developed significant diarrhea that night with several episodes throughout the evening. This occurred again last night with 8 episodes total.  Stool has been loose, not watery. She has had mild abdominal cramping. Mild nausea but no vomiting. No fever. No recent antibiotics. Urinating well. She is trying to drink fluids frequently. She has history of renal cell carcinoma status post left nephrectomy over a year ago. Renal function has been stable, recent creatinine 1.2. DM II has been stable. She is normally on Levemir 46 units. She decreased to 35 units last night due to decreased oral intake. Blood sugar this morning was 118. She typically takes NovoLog with meals 6 to 10 units depending on small versus large meal.  She has not taken her NovoLog in 2 days since not eating as much. BP has been stable/controlled on current medication. Review of Systems   Constitutional: Negative for chills and fever. HENT: Negative for congestion, ear pain and sore throat. Respiratory: Negative for cough, chest tightness, shortness of breath and wheezing. Cardiovascular: Negative for chest pain and palpitations. Gastrointestinal: Positive for abdominal pain (mild cramping) and diarrhea. Negative for nausea and vomiting. Genitourinary: Negative for decreased urine volume. Musculoskeletal: Negative for arthralgias and myalgias. Skin: Negative for rash.    Neurological: Negative for dizziness and light-headedness. Past Medical History:   Diagnosis Date    Allergic rhinitis     Ankle fracture     3 year ago; increasing difficulty walking; has bone fragments    Arthritis     sees dr. Chris Holloway as ambulation aid     right foot per dr. King     CPAP (continuous positive airway pressure) dependence     12cm    [facility]     Hiatal hernia 9/27/2017    History of kidney cancer     Hyperlipidemia     Hypertension     Hypothyroidism     Murmur     Obesity     ZOILA (obstructive sleep apnea)     AHI:  28.5 per PSG, 9/2014    PLMD (periodic limb movement disorder)     Renal mass     cat scan done 9/13/18; to see dr. Karina Rebollar coming up    Thyroid disease     Type 2 diabetes mellitus without complication ([facility]     Type II or unspecified type diabetes mellitus without mention of complication, not stated as uncontrolled        Current Outpatient Medications   Medication Sig Dispense Refill    diphenoxylate-atropine (LOMOTIL) 2.5-0.025 MG per tablet Take 1 tablet by mouth 4 times daily as needed for Diarrhea for up to 5 days. 20 tablet 0    ondansetron (ZOFRAN) 4 MG tablet Take 1 tablet by mouth 3 times daily as needed for Nausea or Vomiting 20 tablet 0    levothyroxine (SYNTHROID) 137 MCG tablet TAKE 1 TABLET BY MOUTH EVERY DAY 30 tablet 1    Insulin Pen Needle (TRUEPLUS PEN NEEDLES) 31G X 5 MM MISC Inject 1 each into the skin 4 times daily 400 each 3    Misc. Devices MISC Diabetic Shotes ICD 10: E11.9 1 Device 0    triamcinolone (KENALOG) 0.1 % cream Apply topically 2 times daily. 45 g 1    atorvastatin (LIPITOR) 10 MG tablet TAKE 1/2 TABLET BY MOUTH DAILY 15 tablet 2    pantoprazole (PROTONIX) 40 MG tablet TAKE 1 TABLET BY MOUTH DAILY 90 tablet 1    atenolol (TENORMIN) 50 MG tablet TAKE ONE-HALF TABLET BY MOUTH EVERY MORNING AND ONE-HALF TABLET EVERY EVENING.  90 tablet 3    lisinopril (PRINIVIL;ZESTRIL) 5 MG tablet Take 1 tablet by mouth daily Indications: High Blood Pressure Disorder 30 tablet 5    insulin aspart (NOVOLOG FLEXPEN) 100 UNIT/ML injection pen INJECT 6 UNITS UNDER THE SKIN WITH BREAKFAST, 6 UNITS WITH LUNCH, AND 10 UNITS WITH DINNER 15 mL 0    CARTIA  MG extended release capsule TAKE 1 CAPSULE BY MOUTH DAILY 30 capsule 5    LEVEMIR FLEXTOUCH 100 UNIT/ML injection pen ADMINISTER 46 UNITS UNDER THE SKIN EVERY NIGHT 15 mL 3    CONTOUR NEXT TEST strip TEST FIVE TIMES DAILY 500 strip 5    Multiple Vitamins-Minerals (MULTIPLE VITAMINS/WOMENS PO) Take 1 tablet by mouth daily       fexofenadine (ALLEGRA ALLERGY) 180 MG tablet Take 180 mg by mouth daily      Omega-3 Fatty Acids (FISH OIL) 1000 MG CAPS Take 1,000 mg by mouth 2 times daily       aspirin 81 MG tablet Take 81 mg by mouth daily 3 days a wk       No current facility-administered medications for this visit.         Allergies   Allergen Reactions    Adhesive Tape Other (See Comments)     Post op incision infection    Dermabond Other (See Comments)     Post op incision infection       Past Surgical History:   Procedure Laterality Date    BREAST BIOPSY Right 2012    CARPAL TUNNEL RELEASE      bilateral    CHOLECYSTECTOMY      HYSTERECTOMY      [place] LAP, RADICAL NEPHRECTOMY Left 10/3/2018    NEPHRECTOMY LAPAROSCOPIC HAND ASSISTED performed by Sylvester Marsh MD at [facility] 43 LAP,CHOLECYSTECTOMY/GRAPH N/A 10/31/2017    CHOLECYSTECTOMY LAPAROSCOPIC performed by Rebekah Arnett MD at [facility] History     Tobacco Use    Smoking status: Never Smoker    Smokeless tobacco: Never Used   Substance Use Topics    Alcohol use: No    Drug use: No       Family History   Problem Relation Age of Onset    Diabetes Mother     Cancer Mother         breast    Heart Disease Father     Cancer Father         colon, over 79    Cancer Sister 43        breast, negative genetic testing    Cancer Maternal Grandmother 76        breast    Diabetes Maternal Grandmother     Heart Attack Maternal Grandfather     Hypertension Other     Elevated Lipids Other     Coronary Art Dis Other     Heart Attack Paternal Grandmother        /80   Pulse 68   Temp 97.6 °F (36.4 °C) (Temporal)   Resp 18   Ht 5' 8\" (1.727 m)   Wt (!) 382 lb (173.3 kg)   LMP 01/01/2002   SpO2 98%   BMI 58.08 kg/m²     Physical Exam  Vitals signs reviewed. Constitutional:       General: She is not in acute distress. Appearance: Normal appearance. She is well-developed. HENT:      Head: Normocephalic. Nose: Nose normal.      Mouth/Throat:      Mouth: Mucous membranes are moist.      Pharynx: No oropharyngeal exudate or posterior oropharyngeal erythema. Comments: Tongue slightly tacky but postpharynx is moist  Eyes:      Conjunctiva/sclera: Conjunctivae normal.      Pupils: Pupils are equal, round, and reactive to light. Neck:      Musculoskeletal: Normal range of motion and neck supple. Thyroid: No thyromegaly. Vascular: No carotid bruit or JVD. Trachea: No tracheal deviation. Cardiovascular:      Rate and Rhythm: Normal rate and regular rhythm. Heart sounds: Normal heart sounds. No murmur. Pulmonary:      Effort: Pulmonary effort is normal. No respiratory distress. Breath sounds: Normal breath sounds. No wheezing or rhonchi. Abdominal:      General: Abdomen is flat. Bowel sounds are normal. There is no distension. Palpations: Abdomen is soft. There is no mass. Tenderness: There is no abdominal tenderness. There is no guarding or rebound. Hernia: No hernia is present. Musculoskeletal: Normal range of motion. Lymphadenopathy:      Cervical: No cervical adenopathy. Skin:     General: Skin is warm and dry. Findings: No rash. Neurological:      Mental Status: She is alert. ASSESSMENT/PLAN:  1. Diarrhea, unspecified type  - Likely acute gastroenteritis.   Check stool studies including C. difficile, culture, leukocytes, O&P.  - Check CBC, BMP and sed rate today, rule out acute infection. No significant abdominal pain but if this worsens or if white count is significantly elevated may need CT.  - Lomotil 1 tablet 4 times daily as needed for diarrhea. - Advised to continue frequent hydration, bland diet as tolerated  - CBC Auto Differential; Future  - Basic Metabolic Panel; Future  - Sedimentation Rate; Future  - Clostridium Difficile Toxin/Antigen; Future  - Culture Stool; Future  - Fecal leukocytes; Future  - O&P SCREEN(GIARDIA/CRYPTOSPORIDIUM) #1; Future  - diphenoxylate-atropine (LOMOTIL) 2.5-0.025 MG per tablet; Take 1 tablet by mouth 4 times daily as needed for Diarrhea for up to 5 days. Dispense: 20 tablet; Refill: 0    2. Renal cell carcinoma of left kidney (HCC)  - Stable, continue routine follow-up with urology. Renal function has been stable    3. Type 2 diabetes mellitus with diabetic polyneuropathy, with long-term current use of insulin ([facility])  - Continue decreased dose of Levemir since appetite has been down and not eating as much.  - May decrease NovoLog or leave off completely if not eating. She will monitor her blood sugar closely and report any problems    4. Essential hypertension  - Stable/controlled, continue current medication    F/U as scheduled, sooner with problems. No follow-ups on file. [place] was seen today for diarrhea. Diagnoses and all orders for this visit:    Diarrhea, unspecified type  -     CBC Auto Differential; Future  -     Basic Metabolic Panel; Future  -     Sedimentation Rate; Future  -     Clostridium Difficile Toxin/Antigen; Future  -     Culture Stool; Future  -     Fecal leukocytes; Future  -     O&P SCREEN(GIARDIA/CRYPTOSPORIDIUM) #1; Future  -     diphenoxylate-atropine (LOMOTIL) 2.5-0.025 MG per tablet; Take 1 tablet by mouth 4 times daily as needed for Diarrhea for up to 5 days.     Renal cell carcinoma of left kidney (HCC)    Type 2 diabetes mellitus with diabetic polyneuropathy, with long-term current use of insulin ([facility]    Essential hypertension    Other orders  -     ondansetron (ZOFRAN) 4 MG tablet; Take 1 tablet by mouth 3 times daily as needed for Nausea or Vomiting      There are no discontinued medications. There are no Patient Instructions on file for this visit. Patient voicesunderstanding and agrees to plans along with risks and benefits of plan. Counseling:  Evaristo Tubbs's case, medications and options were discussed in detail. Patient was instructed to call the office if she questionsregarding her treatment. Should her conditions worsen, she should return to office to be reassessed by CHERYLE Back. she Should to go the closest Emergency Department for any emergency. They verbalizedunderstanding the above instructions. No follow-ups on file.

## 2020-02-19 DIAGNOSIS — R19.7 DIARRHEA, UNSPECIFIED TYPE: ICD-10-CM

## 2020-02-19 LAB
C DIFF TOXIN/ANTIGEN: NORMAL
CRYPTOSPORIDIUM ANTIGEN STOOL: NORMAL
GIARDIA ANTIGEN STOOL: NORMAL

## 2020-02-21 ENCOUNTER — PATIENT MESSAGE (OUTPATIENT)
Dept: FAMILY MEDICINE CLINIC | Age: 60
End: 2020-02-21

## 2020-02-22 LAB
CAMPYLOBACTER ANTIGEN: ABNORMAL
CULTURE, STOOL: ABNORMAL
CULTURE, STOOL: ABNORMAL
E COLI SHIGA TOXIN ASSAY: ABNORMAL
ORGANISM: ABNORMAL

## 2020-03-02 LAB — LACTOFERRIN, FECAL: POSITIVE

## 2020-03-06 LAB
ALBUMIN SERPL-MCNC: 4.1 G/DL (ref 3.5–5.2)
ALP BLD-CCNC: 80 U/L (ref 35–104)
ALT SERPL-CCNC: 19 U/L (ref 5–33)
ANION GAP SERPL CALCULATED.3IONS-SCNC: 16 MMOL/L (ref 7–19)
AST SERPL-CCNC: 18 U/L (ref 5–32)
BASOPHILS ABSOLUTE: 0.1 K/UL (ref 0–0.2)
BASOPHILS RELATIVE PERCENT: 0.7 % (ref 0–1)
BILIRUB SERPL-MCNC: 0.4 MG/DL (ref 0.2–1.2)
BILIRUBIN URINE: NEGATIVE
BLOOD, URINE: NEGATIVE
BUN BLDV-MCNC: 16 MG/DL (ref 6–20)
CALCIUM SERPL-MCNC: 9.7 MG/DL (ref 8.6–10)
CHLORIDE BLD-SCNC: 104 MMOL/L (ref 98–111)
CLARITY: CLEAR
CO2: 24 MMOL/L (ref 22–29)
COLOR: YELLOW
CREAT SERPL-MCNC: 1.2 MG/DL (ref 0.5–0.9)
CREATININE URINE: 28.7 MG/DL (ref 4.2–622)
EOSINOPHILS ABSOLUTE: 0.1 K/UL (ref 0–0.6)
EOSINOPHILS RELATIVE PERCENT: 1.9 % (ref 0–5)
GFR NON-AFRICAN AMERICAN: 46
GLUCOSE BLD-MCNC: 161 MG/DL (ref 74–109)
GLUCOSE URINE: NEGATIVE MG/DL
HCT VFR BLD CALC: 40.5 % (ref 37–47)
HEMOGLOBIN: 13 G/DL (ref 12–16)
IMMATURE GRANULOCYTES #: 0 K/UL
KETONES, URINE: NEGATIVE MG/DL
LEUKOCYTE ESTERASE, URINE: NEGATIVE
LYMPHOCYTES ABSOLUTE: 1.6 K/UL (ref 1.1–4.5)
LYMPHOCYTES RELATIVE PERCENT: 23.9 % (ref 20–40)
MAGNESIUM: 1.9 MG/DL (ref 1.6–2.6)
MCH RBC QN AUTO: 29 PG (ref 27–31)
MCHC RBC AUTO-ENTMCNC: 32.1 G/DL (ref 33–37)
MCV RBC AUTO: 90.4 FL (ref 81–99)
MONOCYTES ABSOLUTE: 0.4 K/UL (ref 0–0.9)
MONOCYTES RELATIVE PERCENT: 6.5 % (ref 0–10)
NEUTROPHILS ABSOLUTE: 4.5 K/UL (ref 1.5–7.5)
NEUTROPHILS RELATIVE PERCENT: 66.7 % (ref 50–65)
NITRITE, URINE: NEGATIVE
PARATHYROID HORMONE INTACT: 77.8 PG/ML (ref 15–65)
PDW BLD-RTO: 14 % (ref 11.5–14.5)
PH UA: 6 (ref 5–8)
PHOSPHORUS: 3.4 MG/DL (ref 2.5–4.5)
PLATELET # BLD: 188 K/UL (ref 130–400)
PMV BLD AUTO: 11.8 FL (ref 9.4–12.3)
POTASSIUM SERPL-SCNC: 4.7 MMOL/L (ref 3.5–5)
PROTEIN PROTEIN: <4 MG/DL (ref 15–45)
PROTEIN UA: NEGATIVE MG/DL
RBC # BLD: 4.48 M/UL (ref 4.2–5.4)
SODIUM BLD-SCNC: 144 MMOL/L (ref 136–145)
SPECIFIC GRAVITY UA: 1 (ref 1–1.03)
TOTAL PROTEIN: 7.5 G/DL (ref 6.6–8.7)
URIC ACID, SERUM: 8.2 MG/DL (ref 2.4–5.7)
UROBILINOGEN, URINE: 0.2 E.U./DL
VITAMIN D 25-HYDROXY: 29.7 NG/ML
WBC # BLD: 6.7 K/UL (ref 4.8–10.8)

## 2020-03-11 ENCOUNTER — OFFICE VISIT (OUTPATIENT)
Dept: SURGERY | Age: 60
End: 2020-03-11
Payer: COMMERCIAL

## 2020-03-11 VITALS
SYSTOLIC BLOOD PRESSURE: 120 MMHG | DIASTOLIC BLOOD PRESSURE: 68 MMHG | HEIGHT: 67 IN | WEIGHT: 293 LBS | OXYGEN SATURATION: 98 % | TEMPERATURE: 98.1 F | HEART RATE: 81 BPM | BODY MASS INDEX: 45.99 KG/M2

## 2020-03-11 PROCEDURE — 99213 OFFICE O/P EST LOW 20 MIN: CPT | Performed by: PHYSICIAN ASSISTANT

## 2020-03-11 NOTE — LETTER
Preop Orders:     Patient: Opal Doran  : 1960    Hospital:  [facility]      Admitting Physician:            Diagnosis:   Ventral Hernia    Procedure:  Ventral Hernia Repair With Mesh    Anesthesia: General    Admission:Outpatient    Date:   20    Labs:per anesthesia    Pre-Op Meds:  Kefzol 2grm IV    Latex Allergy: no    Beta Blocker: yes, patient advised to take    Medication Instructions: No plavix for 2 weeks prior to procedure; no asprin for 3 days prior to procedure    This has been electronically signed by Ez Brower M.D.

## 2020-03-16 RX ORDER — ATORVASTATIN CALCIUM 10 MG/1
TABLET, FILM COATED ORAL
Qty: 15 TABLET | Refills: 2 | Status: SHIPPED | OUTPATIENT
Start: 2020-03-16 | End: 2020-06-18

## 2020-03-16 NOTE — PROGRESS NOTES
HISTORY OF PRESENT ILLNESS:  [facility] a 61-year-old female well-known to our services who presents with a umbilical hernia. Is been present for several years. Does have some mild pain associated with it. There is been no strangulation or incarceration. We have actually scheduled to repair it in the past however she had to have surgery for a kidney cancer. At this time her pain is been mild. Is worse when standing. There is been no evidence of strangulation of the hernia. Patient Active Problem List    Diagnosis Date Noted    Postoperative anemia due to acute blood loss 10/10/2018    Hypertension     Post-op pain 10/07/2018    BRE [facility]) [facility]     Left renal mass 10/03/2018    Renal cell carcinoma of left kidney ([facility] 28/93/9724    Umbilical hernia without obstruction and without gangrene 15/46/8907    Periumbilical abdominal pain 08/08/2018    Type 2 diabetes mellitus with diabetic polyneuropathy, with long-term current use of insulin ([facility] 03/20/2018    Calculus of gallbladder without cholecystitis 09/27/2017    Hiatal hernia 09/27/2017    ZOILA (obstructive sleep apnea)     PLMD (periodic limb movement disorder)     CPAP (continuous positive airway pressure) dependence      Current Outpatient Medications   Medication Sig Dispense Refill    mupirocin (BACTROBAN) 2 % ointment Apply to each nostril BID x 5 days prior to surgery. 1 Tube 0    LEVEMIR FLEXTOUCH 100 UNIT/ML injection pen ADMINISTER 46 UNITS UNDER THE SKIN EVERY NIGHT 15 mL 3    levothyroxine (SYNTHROID) 137 MCG tablet TAKE 1 TABLET BY MOUTH EVERY DAY 30 tablet 1    Insulin Pen Needle (TRUEPLUS PEN NEEDLES) 31G X 5 MM MISC Inject 1 each into the skin 4 times daily 400 each 3    Misc. Devices MISC Diabetic Shotes ICD 10: E11.9 1 Device 0    triamcinolone (KENALOG) 0.1 % cream Apply topically 2 times daily.  45 g 1    atorvastatin (LIPITOR) 10 MG tablet TAKE 1/2 TABLET BY MOUTH DAILY 15 tablet 2    pantoprazole (PROTONIX) 40 MG tablet TAKE 1 TABLET BY MOUTH DAILY 90 tablet 1    atenolol (TENORMIN) 50 MG tablet TAKE ONE-HALF TABLET BY MOUTH EVERY MORNING AND ONE-HALF TABLET EVERY EVENING. 90 tablet 3    lisinopril (PRINIVIL;ZESTRIL) 5 MG tablet Take 1 tablet by mouth daily Indications: High Blood Pressure Disorder 30 tablet 5    insulin aspart (NOVOLOG FLEXPEN) 100 UNIT/ML injection pen INJECT 6 UNITS UNDER THE SKIN WITH BREAKFAST, 6 UNITS WITH LUNCH, AND 10 UNITS WITH DINNER 15 mL 0    CARTIA  MG extended release capsule TAKE 1 CAPSULE BY MOUTH DAILY 30 capsule 5    CONTOUR NEXT TEST strip TEST FIVE TIMES DAILY 500 strip 5    Multiple Vitamins-Minerals (MULTIPLE VITAMINS/WOMENS PO) Take 1 tablet by mouth daily       fexofenadine (ALLEGRA ALLERGY) 180 MG tablet Take 180 mg by mouth daily      Omega-3 Fatty Acids (FISH OIL) 1000 MG CAPS Take 1,000 mg by mouth 2 times daily       aspirin 81 MG tablet Take 81 mg by mouth daily 3 days a wk      ondansetron (ZOFRAN) 4 MG tablet Take 1 tablet by mouth 3 times daily as needed for Nausea or Vomiting (Patient not taking: Reported on 3/11/2020) 20 tablet 0     No current facility-administered medications for this visit.       Allergies: Adhesive tape and Dermabond     Past Medical History:   Diagnosis Date    Allergic rhinitis     Ankle fracture     3 year ago; increasing difficulty walking; has bone fragments    Arthritis     sees dr. Radha Tomlin as ambulation aid     right foot per dr. Aminata Domingo CPAP (continuous positive airway pressure) dependence     12cm    Diabetes ([facility]     Hiatal hernia 9/27/2017    History of kidney cancer     Hyperlipidemia     Hypertension     Hypothyroidism     Murmur     Obesity     ZOILA (obstructive sleep apnea)     AHI:  28.5 per PSG, 9/2014    PLMD (periodic limb movement disorder)     Renal mass     cat scan done 9/13/18; to see dr. Saba Ferrara coming up    Thyroid disease     Type 2 diabetes mellitus with no cardiac murmurs, gallops or rubs noted to auscultation. ABDOMEN:  Inspection of the abdomen demonstrated the patient to have normal bowel sounds present. She has a large umbilical hernia which is not fully reducible. There is no evidence of strangulation. Abdomen is protuberant. EXTREMITIES:  Extremities demonstrated no cyanosis or pitting edema bilaterally. PSYCHIATRIC:  Patient is oriented to time, place and person. The patient's mood and affect are normal.      IMPRESSION:  Umbilical hernia without evidence of strangulation  Obesity BMI 60.42    PLAN:  The risks, benefits, and options were discussed with the patient. She  is willing to proceed with surgery.

## 2020-04-09 RX ORDER — LEVOTHYROXINE SODIUM 137 UG/1
TABLET ORAL
Qty: 30 TABLET | Refills: 1 | Status: SHIPPED | OUTPATIENT
Start: 2020-04-09 | End: 2020-06-05

## 2020-04-17 RX ORDER — INSULIN ASPART 100 [IU]/ML
INJECTION, SOLUTION INTRAVENOUS; SUBCUTANEOUS
Qty: 3 PEN | Refills: 5 | Status: SHIPPED | OUTPATIENT
Start: 2020-04-17 | End: 2020-10-06

## 2020-04-27 ENCOUNTER — TELEMEDICINE (OUTPATIENT)
Dept: FAMILY MEDICINE CLINIC | Age: 60
End: 2020-04-27
Payer: COMMERCIAL

## 2020-04-27 PROCEDURE — 99214 OFFICE O/P EST MOD 30 MIN: CPT | Performed by: FAMILY MEDICINE

## 2020-04-27 PROCEDURE — 3051F HG A1C>EQUAL 7.0%<8.0%: CPT | Performed by: FAMILY MEDICINE

## 2020-04-27 NOTE — PROGRESS NOTES
2020    TELEHEALTH EVALUATION -- Audio/Visual (During AOCOB-40 public health emergency)    HPI:    [facility] 77. (:  1960) has requested an audio/video evaluation for the following concern(s):    Diabetes Mellitus  Has been compliant with medications. No side effects of medications since last visit. No polyuria, polydipsia, or vision changes since last visit. No symptomatic episodes of hypoglycemia. Average blood sugars in the morning 135  Average blood sugars at lunch 130  Average glucose at night 159  Average for 90 days:  137    She saw her nephrologist in March. She has a history of chronic kidney disease. No medication changes. Lifestyle changes were suggested. Hypertension  Compliant with medications. No adverse effects from medication. No lightheadedness, palpitations, or chest pain. BP: 103/72      Hyperlipidemia  Tolerating current cholesterol medication without side effects. No body aches. Attempting to reduce processed sugar and cholesterol from diet. Hypothyroidism  Symptoms are stable. No temperature intolerance, fatigue, or mood disturbance from baseline. Complaint with current medication. ZOILA on CPAP  Compliant with CPAP. No daytime somnolence. Feels rested for the most part when wearing CPAP. Gastroesophageal Reflux Disease  Symptoms currently under control. Medication adequately controls his symptoms. No hematochezia or melena. Review of Systems  Review of Systems   Constitutional: Negative for chills and fever. HENT: Negative for congestion. Respiratory: Negative for cough, chest tightness and shortness of breath. Cardiovascular: Negative for chest pain, palpitations and leg swelling. Gastrointestinal: Negative for abdominal pain, anal bleeding, constipation, diarrhea and nausea. Genitourinary: Negative for difficulty urinating. Psychiatric/Behavioral: Negative. Prior to Visit Medications    Medication Sig Taking? evaluated by a Virtual Visit (video visit) encounter to address concerns as mentioned above. A caregiver was present when appropriate. Due to this being a TeleHealth encounter (During JZNHJ-05 public health emergency), evaluation of the following organ systems was limited: Vitals/Constitutional/EENT/Resp/CV/GI//MS/Neuro/Skin/Heme-Lymph-Imm. Pursuant to the emergency declaration under the [address] [place] [address], [address] [place] [address] and the [employer] Act, this Virtual Visit was conducted with patient's (and/or legal guardian's) consent, to reduce the patient's risk of exposure to COVID-19 and provide necessary medical care. The patient (and/or legal guardian) has also been advised to contact this office for worsening conditions or problems, and seek emergency medical treatment and/or call 911 if deemed necessary. Patient identification was verified at the start of the visit: Yes    Total time spent on this encounter: Not billed by time    Services were provided through a video synchronous discussion virtually to substitute for in-person clinic visit. Patient and provider were located at their individual homes. --Flory Carnes MD on 4/30/2020 at 12:26 PM    An electronic signature was used to authenticate this note.

## 2020-04-29 ENCOUNTER — VIRTUAL VISIT (OUTPATIENT)
Dept: FAMILY MEDICINE CLINIC | Age: 60
End: 2020-04-29
Payer: COMMERCIAL

## 2020-04-29 PROCEDURE — 99213 OFFICE O/P EST LOW 20 MIN: CPT | Performed by: FAMILY MEDICINE

## 2020-04-29 RX ORDER — CIPROFLOXACIN 500 MG/1
500 TABLET, FILM COATED ORAL 2 TIMES DAILY
Qty: 14 TABLET | Refills: 0 | Status: SHIPPED | OUTPATIENT
Start: 2020-04-29 | End: 2020-05-06

## 2020-04-30 RX ORDER — PANTOPRAZOLE SODIUM 40 MG/1
40 TABLET, DELAYED RELEASE ORAL DAILY
Qty: 90 TABLET | Refills: 1 | Status: SHIPPED | OUTPATIENT
Start: 2020-04-30 | End: 2020-10-29 | Stop reason: SDUPTHER

## 2020-04-30 RX ORDER — DILTIAZEM HYDROCHLORIDE 180 MG/1
CAPSULE, COATED, EXTENDED RELEASE ORAL
Qty: 90 CAPSULE | Refills: 3 | Status: SHIPPED | OUTPATIENT
Start: 2020-04-30 | End: 2021-05-07

## 2020-05-04 ASSESSMENT — ENCOUNTER SYMPTOMS
CHEST TIGHTNESS: 0
DIARRHEA: 0
NAUSEA: 0
CONSTIPATION: 0
SHORTNESS OF BREATH: 0
COUGH: 0
ABDOMINAL PAIN: 0
ANAL BLEEDING: 0

## 2020-06-05 RX ORDER — LEVOTHYROXINE SODIUM 137 UG/1
TABLET ORAL
Qty: 30 TABLET | Refills: 1 | Status: SHIPPED | OUTPATIENT
Start: 2020-06-05 | End: 2020-07-31

## 2020-06-11 RX ORDER — INSULIN DETEMIR 100 [IU]/ML
INJECTION, SOLUTION SUBCUTANEOUS
Qty: 15 ML | Refills: 3 | Status: SHIPPED | OUTPATIENT
Start: 2020-06-11 | End: 2020-10-05

## 2020-06-18 RX ORDER — ATORVASTATIN CALCIUM 10 MG/1
TABLET, FILM COATED ORAL
Qty: 15 TABLET | Refills: 2 | Status: SHIPPED | OUTPATIENT
Start: 2020-06-18 | End: 2020-09-15

## 2020-06-26 ENCOUNTER — OFFICE VISIT (OUTPATIENT)
Dept: OBGYN | Age: 60
End: 2020-06-26
Payer: COMMERCIAL

## 2020-06-26 ENCOUNTER — OFFICE VISIT (OUTPATIENT)
Dept: NEUROLOGY | Age: 60
End: 2020-06-26
Payer: COMMERCIAL

## 2020-06-26 VITALS
HEIGHT: 68 IN | BODY MASS INDEX: 44.41 KG/M2 | WEIGHT: 293 LBS | DIASTOLIC BLOOD PRESSURE: 85 MMHG | SYSTOLIC BLOOD PRESSURE: 157 MMHG | HEART RATE: 65 BPM

## 2020-06-26 VITALS
HEART RATE: 64 BPM | RESPIRATION RATE: 20 BRPM | WEIGHT: 293 LBS | HEIGHT: 68 IN | SYSTOLIC BLOOD PRESSURE: 137 MMHG | DIASTOLIC BLOOD PRESSURE: 72 MMHG | BODY MASS INDEX: 44.41 KG/M2

## 2020-06-26 PROCEDURE — 99396 PREV VISIT EST AGE 40-64: CPT | Performed by: OBSTETRICS & GYNECOLOGY

## 2020-06-26 PROCEDURE — 99213 OFFICE O/P EST LOW 20 MIN: CPT | Performed by: PHYSICIAN ASSISTANT

## 2020-06-26 ASSESSMENT — ENCOUNTER SYMPTOMS
EYES NEGATIVE: 1
RESPIRATORY NEGATIVE: 1
GASTROINTESTINAL NEGATIVE: 1
ALLERGIC/IMMUNOLOGIC NEGATIVE: 1

## 2020-06-26 NOTE — PROGRESS NOTES
Thought Content: Thought content normal.         Judgment: Judgment normal.               Assessment   Diagnosis Orders   1. Women's annual routine gynecological examination     2. Screening for HPV (human papillomavirus)     3. Screening for malignant neoplasm of vagina after total hysterectomy     4. Encounter for screening mammogram for breast cancer  CECY Screening Bilateral       Plan     1. Pap smear not indicated  2. Mammogram order in chart  3. RTC one year or prn. Reji Rangel, am scribing for and in the presence of Dr. Loraine Davis. I, Dr. Loraine Davis, personally performed the services described in this documentation as scribed by Rodríguez Cox in my presence, and it is both accurate and complete.

## 2020-06-26 NOTE — PATIENT INSTRUCTIONS
takes several deep breaths to catch up on breathing. As the person awakens, he or she may move briefly, snort or snore, and take a deep breath. Less frequently, a person may awaken completely with a sensation of gasping, smothering, or choking. If the person falls back to sleep quickly, he or she will not remember the event. Many people with sleep apnea are unaware of their abnormal breathing in sleep, and all patients underestimate how often their sleep is interrupted. Awakening from sleep causes sleep to be unrefreshing and causes fatigue and daytime sleepiness. Anatomic causes of obstructive sleep apnea --  Most patients have ZOILA because of a small upper airway. As the bones of the face and skull develop, some people develop a small lower face, a small mouth, and a tongue that seems too large for the mouth. These features are genetically determined, which explains why ZOILA tends to cluster in families. Obesity is another major factor. Tonsil enlargement can be an important cause, especially in children. SLEEP APNEA SYMPTOMS -- The main symptoms of ZOILA are loud snoring, fatigue, and daytime sleepiness. However, some people have no symptoms. For example, if the person does not have a bed partner, he or she may not be aware of the snoring. Fatigue and sleepiness have many causes and are often attributed to overwork and increasing age. As a result, a person may be slow to recognize that they have a problem. A bed partner or spouse often prompts the patient to seek medical care. Other symptoms may include one or more of the following:  ?Restless sleep  ? Awakening with choking, gasping, or smothering  ? Morning headaches, dry mouth, or sore throat  ? Waking frequently to urinate  ? Awakening unrested, groggy  ? Low energy, difficulty concentrating, memory impairment    Risk factors -- Certain factors increase the risk of sleep apnea.   ?Increasing age - ZOILA occurs at all ages, but it is more common in middle and Other procedures, such as maxillomandibular advancement (MMA), address both the upper and lower pharyngeal airway more globally. UPPP alone has limited success rates (less than 50 percent) and people can relapse (when ZOILA symptoms return after surgery). As a result, this surgery is only recommended in a minority of people and should be considered with caution. MMA may have a higher success rate, particularly in people with abnormal jaw (maxilla and mandible) anatomy, but it is the most complicated procedure. A newer surgical approach, nerve stimulation to protrude the tongue, has promising success rates in very selected people. Tracheostomy creates a permanent opening in the neck. It is reserved for people with severe disease in whom less drastic measures have failed or are inappropriate. Although it is always successful in eliminating obstructive sleep apnea, tracheostomy requires significant lifestyle changes and carries some serious risks (eg, infection, bleeding, blockage). All surgical treatments require discussions about the goals of treatment, the expected outcomes, and potential complications. Hypoglossal nerve stimulator- \"Inspire\" device    PAP treatment failure:  Possible causes of treatment failure include nonadherence or suboptimal adherence, weight gain, an inappropriate level of prescribed positive pressure, or an additional disorder causing sleepiness (eg, narcolepsy) that may require alterations in the therapeutic regimen. A review of medications should also be undertaken since many drugs may lead to sleepiness. Inadequate sleep time may also negate the expected effects from treatment of ZOILA. Also, pt's can have persistent hypersomnolence associated with sleep apnea even in the presence of adequate therapy and at those times Provigil or Nuvigil or other stimulants may be indicated.     Once the patient's positive airway pressure therapy has been optimized and symptoms resolved, a regimen of long-term follow-up should be established. Annual visits are reasonable, with more frequent visits in between if new issues arise. The purpose of long-term follow-up is to assess usage and monitor for recurrent ZOILA, new side effects, air leakage, and fluctuations in body weight. WHERE TO GET MORE INFORMATION -- Your healthcare provider is the best source of information for questions and concerns related to your medical problem. Organizations  American Sleep Apnea Association  Provides information about sleep apnea to the public, publishes a newsletter, and serves as an advocate for people with the disorder. Giovana, [address], [address] [place] [address]   Jinny@[email or web address]. org   [employer].. org   Tel: [phone]   Fax: [facility] organization that works to [employer] and safety by promoting public understanding of sleep and sleep disorders. Supports sleep-related education, research, and advocacy; produces and distributes educational materials to the public and healthcare professionals; and offers postdoctoral fellowships and grants for sleep researchers. Shoshana Osman [address]   Debra@"[employer]. org   [employer]. org   Tel: [phone]   Fax: [phone]    Important information:  Medicare/private insurance CPAP/BiPAP/APAP requirements:  Medicare/private insurance has specific requirements for PAP compliance that must be met during the first 90 days of use to continue coverage for CPAP/BiPAP/APAP  from day 91 and beyond. The policy requires that patients use a PAP device 4 hours per 24 hour period, at least 70% of the time over a 30 day period. This data must be downloaded as a report direct from the PAP devices. This is called a compliance download. Your PAP supplier will assist you in this matter.      Reminder:  Please bring a copy of the compliance download to your next office visit or have your supplier fax it to our office prior to your office visit. Note:  Where applicable, we will utilize PAP device efficiency reports, additional testing, and face-to-face  clinical evaluation subsequent to any treatment, changes in treatment, and continued treatment. Caution:  Please abstain from driving or engaging in other activities which may be hazardous in the presence of diminished alertness or daytime drowsiness. And avoid the use of sedatives or alcohol, which can worsen sleep apnea and daytime drowsiness. Mask suggestions:  -     Resmed Airfit N20 (Nasal) or F20 (Full face mask). They conform to your face, thus decreasing the potential for mask leakage. You might like the [employer] (full face mask). It has a \"memory foam\" like cushion. The AirFit F30 is a smaller style full face mask designed to sit low on and cover less of your face for fewer facial marks. AirFit N30i has a top of the head tube with a nasal mask. AirFit P10 reported to be the most comfortable nasal pillow mask. Resmed Mirage FX reported to be the most comfortable nasal mask. Resmed Mirage [place] reported to be the most comfortable hybrid mask. Respironics: You might also like to try a nasal mask called a Dreamwear nasal mask or the Dreamwear nasal pillow. Another suggestion is the [facility], it is a minimal contact full face mask. The Anthony Lemmings incredible under the nose design makes it the only full face mask that won't cause red marks on the bridge of your nose when compared to other full face masks. The Dreamwear full face mask has a  soft feel, unique in-frame air-flow, and innovative air tube connection at the top of the head for the ultimate in sleep comfort. Comfort Gel Blue. Dreamwear gel pillows. Thien & Liam: Brevida nasal pillow mask and Simplus FFM    The use of a memory foam CPAP pillow supports the head and neck throughout the night.

## 2020-06-26 NOTE — PROGRESS NOTES
[facility]  [address] [place] [address] Drive, [address]  [place], [address]  Phone [phone]  Fax [phone]       [facility] Sleep Follow Up Encounter      Information:   Patient Name: Paty Hebert  :   1960  Age:   62 y.o. MRN:   [id number]  Account #:  [de-identified]  Today:                20    Provider:  Gloria Brizuela PA-C    Chief Complaint   Patient presents with    Follow-up    Sleep Apnea        Subjective:   Paty Hebert is a 62 y.o. female  with a history of ZOILA and PLMD who comes in for a sleep clinic follow up. The PSG, 2014 revealed an AHI of 28.5. She is prescribed CPAP at 12cm of pressure. The compliance report indicates that she is averaging 9 hours of CPAP use per day. She reports that consistent PAP use has alleviated the previous ZOILA symptoms.  The PLMD is stable.      Location or symptom:  ZOILA  Onset:  PS  Timing:  q hs  Severity:  Moderate  Associated:  Snoring, witnessed apneas, and excessive daytime somnolence  Alleviated:  CPAP      Objective:     Past Medical History:   Diagnosis Date    Allergic rhinitis     Ankle fracture     3 year ago; increasing difficulty walking; has bone fragments    Arthritis     sees dr. Chance Mention as ambulation aid     right foot per dr. Nely Naik    CPAP (continuous positive airway pressure) dependence     12cm    Diabetes ([facility] [address]     Hiatal hernia 2017    History of kidney cancer     Hyperlipidemia     Hypertension     Hypothyroidism     Murmur     Obesity     ZOILA (obstructive sleep apnea)     AHI:  28.5 per PSG, 2014    PLMD (periodic limb movement disorder)     Renal mass     cat scan done 18; to see dr. Bryson Carry coming up    Thyroid disease     Type 2 diabetes mellitus without complication ([address]     Type II or unspecified type diabetes mellitus without mention of complication, not stated as uncontrolled        Past Surgical History:   Procedure Laterality Date    BREAST BIOPSY Right performed well, without dysmetria. Gait is normal.    I reviewed the following studies:       []  :  Clinical laboratory test results     []  :  Radiology reports                    [x]  :  Review and summarization of medical records and/or obtain medical records        []  :  Previous/recent polysomnogram report(s)     []  :  [place] Sleepiness Scale       []  :  Compliance download: The CPAP is set at a pressure of 12cm. Compliance download shows that she uses device: 100% of the time;  percentage of days with usage >=4 hours: 100%. AHI: 0.2    Assessment:       ICD-10-CM    1. ZOILA (obstructive sleep apnea) G47.33    2. PLMD (periodic limb movement disorder) G47.61    3. CPAP (continuous positive airway pressure) dependence Z99.89           []  :  Stable     []  :  Improved                       [x]  :  Well controlled              []  :  Resolving     []  :  Resolved     []  :  Inadequately controlled     []  :  Worsening     []  :  Additional workup planned    Patient is compliant and benefiting from therapy as indicated by compliance evaluation and patient report. Plan:     No orders of the defined types were placed in this encounter. 1.   Patient advised of the etiology,  pathophysiology, diagnosis, treatment options, and risks of untreated ZOILA. Risks may include, but are not limited to  hypertension, coronary artery disease, diabetes, stroke, weight gain, impaired cognition, daytime somnolence,  and motor vehicle accidents. Advised to abstain from driving or operating heavy machinery when drowsy and the use of respiratory suppressants. Will evaluate for clinical benefit and and compliance during a 30 day period within the preceding 90 days. 2.  The following educational material has been included in this visit after visit summary for your review: ZOILA/PAP guidelines-Discussed with the patient and all questions fully answered. 3.  Continue CPAP  4.   Follow up in 1 year      Note:  A total of

## 2020-06-26 NOTE — PROGRESS NOTES

## 2020-06-26 NOTE — PATIENT INSTRUCTIONS
feel your breast tissue before moving on to the next spot. ? Check your entire breast, moving up and down as if following a strip from the collarbone to the bra line, and from the armpit to the ribs. Repeat until you have covered the entire breast.  ? Repeat this procedure for your left breast, using the pads of the 3 middle fingers of your right hand. · To examine your breasts while in the shower:  ? Place one arm over your head and lightly soap your breast on that side. ? Using the pads of your fingers, gently move your hand over your breast (in the strip pattern described above), feeling carefully for any lumps or changes. ? Repeat for the other breast.  · Have your doctor inspect anything you notice to see if you need further testing. Where can you learn more? Go to [email or web address]://[employer] [email or web address] org and sign in to your Lightbox account. Enter P148 in the [employer] box to learn more about \"Breast Self-Exam: Care Instructions. \"     If you do not have an account, please click on the \"Sign Up Now\" link. Current as of: August 22, 2019               Content Version: 12.5  © 1916-4813 Healthwise, Incorporated. Care instructions adapted under license by [facility] ([facility]). If you have questions about a medical condition or this instruction, always ask your healthcare professional. [address] any warranty or liability for your use of this information.

## 2020-07-24 ENCOUNTER — TELEMEDICINE (OUTPATIENT)
Dept: FAMILY MEDICINE CLINIC | Age: 60
End: 2020-07-24

## 2020-07-24 NOTE — PROGRESS NOTES
2020    TELEHEALTH EVALUATION -- Audio/Visual (During XUQHT-68 public health emergency)    HPI:    [address] (:  1960) has requested an audio/video evaluation for the following concern(s):    Diabetes Mellitus  Has been compliant with medications. No side effects of medications since last visit. No polyuria, polydipsia, or vision changes since last visit. No symptomatic episodes of hypoglycemia.  average glucose bf/lunch/dinner: 142/130/154  July average glucose bf/lunch/dinner: 135/120/147    Hypothyroidism  Symptoms are stable. No temperature intolerance, fatigue, or mood disturbance from baseline. Complaint with current medication. Hypertension  Compliant with medications. No adverse effects from medication. No lightheadedness, palpitations, or chest pain. Hyperlipidemia  Tolerating current cholesterol medication without side effects. No body aches. Attempting to reduce processed sugar and cholesterol from diet. ZOILA on CPAP  Compliant with CPAP. No daytime somnolence. Feels rested for the most part when wearing CPAP. Gastroesophageal Reflux Disease  Symptoms currently under control. Medication adequately controls his symptoms. No hematochezia or melena. Review of Systems  Review of Systems   Constitutional: Negative for chills and fever. HENT: Negative for congestion. Respiratory: Negative for cough, chest tightness and shortness of breath. Cardiovascular: Negative for chest pain, palpitations and leg swelling. Gastrointestinal: Negative for abdominal pain, anal bleeding, constipation, diarrhea and nausea. Genitourinary: Negative for difficulty urinating. Psychiatric/Behavioral: Negative. Prior to Visit Medications    Medication Sig Taking?  Authorizing Provider   atorvastatin (LIPITOR) 10 MG tablet TAKE 1/2 TABLET BY MOUTH DAILY  Yamile Lopez MD   LEVJOEIR FLEXTOUCH 100 UNIT/ML injection pen ADMINISTER 46 UNITS UNDER THE SKIN EVERY NIGHT  Colonel Casa MD   levothyroxine (SYNTHROID) 137 MCG tablet TAKE 1 TABLET BY MOUTH EVERY DAY  Colonel Casa MD   pantoprazole (PROTONIX) 40 MG tablet Take 1 tablet by mouth daily  Colonel Casa MD   dilTIAZem (CARTIA XT) 180 MG extended release capsule TAKE 1 CAPSULE BY MOUTH DAILY  Colonel Casa MD   insulin aspart (NOVOLOG FLEXPEN) 100 UNIT/ML injection pen INJECT 6 UNITS UNDER THE SKIN WITH BREAKFAST, 6 UNITS WITH LUNCH, AND 10 UNITS WITH DINNER  Colonel Casa MD   ondansetron (ZOFRAN) 4 MG tablet Take 1 tablet by mouth 3 times daily as needed for Nausea or Vomiting  CHERYLE Camara   Insulin Pen Needle (TRUEPLUS PEN NEEDLES) 31G X 5 MM MISC Inject 1 each into the skin 4 times daily  Colonel Casa MD   Misc. Devices MISC Diabetic Shotes ICD 10: E11.9  Colonel Casa MD   triamcinolone (KENALOG) 0.1 % cream Apply topically 2 times daily. Colonel Casa MD   atenolol (TENORMIN) 50 MG tablet TAKE ONE-HALF TABLET BY MOUTH EVERY MORNING AND ONE-HALF TABLET EVERY EVENING. Colonel Casa MD   lisinopril (PRINIVIL;ZESTRIL) 5 MG tablet Take 1 tablet by mouth daily Indications: High Blood Pressure Disorder  Patient taking differently: Take 10 mg by mouth daily Indications: High Blood Pressure Disorder   Colonel Casa MD   CONTOUR NEXT TEST strip TEST FIVE TIMES DAILY  Colonel Casa MD   Multiple Vitamins-Minerals (MULTIPLE VITAMINS/WOMENS PO) Take 1 tablet by mouth daily   Historical Provider, MD   fexofenadine (ALLEGRA ALLERGY) 180 MG tablet Take 180 mg by mouth daily  Historical Provider, MD   Omega-3 Fatty Acids (FISH OIL) 1000 MG CAPS Take 1,000 mg by mouth 2 times daily   Historical Provider, MD   aspirin 81 MG tablet Take 81 mg by mouth daily 3 days a wk  Historical Provider, MD       Social History     Tobacco Use    Smoking status: Never Smoker    Smokeless tobacco: Never Used   Substance Use Topics    Alcohol use: No    Drug use:  No PHYSICAL EXAMINATION:  [ INSTRUCTIONS:  \"[x]\" Indicates a positive item  \"[]\" Indicates a negative item  -- DELETE ALL ITEMS NOT EXAMINED]  Vital Signs: (As obtained by patient/caregiver or practitioner observation)    Blood pressure-  Heart rate-    Respiratory rate-    Temperature-  Pulse oximetry-     Constitutional: [x] Appears well-developed and well-nourished [x] No apparent distress      [] Abnormal-   Mental status  [x] Alert and awake  [] Oriented to person/place/time [x]Able to follow commands      Eyes:  EOM    [x]  Normal  [] Abnormal-  Sclera  [x]  Normal  [] Abnormal -         Discharge []  None visible  [] Abnormal -    HENT:   [x] Normocephalic, atraumatic. [] Abnormal   [] Mouth/Throat: Mucous membranes are moist.     External Ears [x] Normal  [] Abnormal-     Neck: [x] No visualized mass     Pulmonary/Chest: [x] Respiratory effort normal.  [x] No visualized signs of difficulty breathing or respiratory distress        [] Abnormal-      Musculoskeletal:   [] Normal gait with no signs of ataxia         [] Normal range of motion of neck        [] Abnormal-       Neurological:        [] No Facial Asymmetry (Cranial nerve 7 motor function) (limited exam to video visit)          [] No gaze palsy        [] Abnormal-         Skin:        [x] No significant exanthematous lesions or discoloration noted on facial skin         [] Abnormal-            Psychiatric:       [x] Normal Affect [x] No Hallucinations        [] Abnormal-     Other pertinent observable physical exam findings-     No results found for this or any previous visit (from the past 672 hour(s)). ASSESSMENT/PLAN:  1. Type 2 diabetes mellitus with diabetic polyneuropathy, with long-term current use of insulin ([facility])  Glucose readings are stable suggested the following lab studies  - Hemoglobin A1C; Future  - CBC Auto Differential; Future  - Microalbumin / Creatinine Urine Ratio; Future    2.  Primary hypothyroidism  Symptoms are stable check the following labs  - T4, Free; Future  - TSH without Reflex; Future    3. Essential (primary) hypertension  Blood pressure has been stable  - Comprehensive Metabolic Panel; Future    4. Hypercholesterolemia  Continue with atorvastatin 10 mg and check lipid panel  - Lipid Panel; Future    5. ZOILA on CPAP  Compliant and satisfied with results stable    6. Gastroesophageal reflux disease without esophagitis  Stable    Return in about 3 months (around 10/24/2020) for Routine follow up - 15 minute visit. Kuldeep Burden is a 62 y.o. female being evaluated by a Virtual Visit (video visit) encounter to address concerns as mentioned above. A caregiver was present when appropriate. Due to this being a TeleHealth encounter (During JTVQF-87 public health emergency), evaluation of the following organ systems was limited: Vitals/Constitutional/EENT/Resp/CV/GI//MS/Neuro/Skin/Heme-Lymph-Imm. Pursuant to the emergency declaration under the [address] [place] [address] and the [employer] Act, this Virtual Visit was conducted with patient's (and/or legal guardian's) consent, to reduce the patient's risk of exposure to COVID-19 and provide necessary medical care. The patient (and/or legal guardian) has also been advised to contact this office for worsening conditions or problems, and seek emergency medical treatment and/or call 911 if deemed necessary. Patient identification was verified at the start of the visit: Yes    Total time spent on this encounter: Not billed by time    Services were provided through a video synchronous discussion virtually to substitute for in-person clinic visit. Patient and provider were located at their individual homes. --Oliva Alston MD on 7/24/2020 at 6:24 PM    An electronic signature was used to authenticate this note.

## 2020-07-31 RX ORDER — LEVOTHYROXINE SODIUM 137 UG/1
TABLET ORAL
Qty: 30 TABLET | Refills: 1 | Status: SHIPPED | OUTPATIENT
Start: 2020-07-31 | End: 2020-09-23

## 2020-08-14 ENCOUNTER — HOSPITAL ENCOUNTER (OUTPATIENT)
Dept: CT IMAGING | Age: 60
Discharge: HOME | End: 2020-08-14
Payer: COMMERCIAL

## 2020-08-14 ENCOUNTER — HOSPITAL ENCOUNTER (OUTPATIENT)
Dept: GENERAL RADIOLOGY | Age: 60
Discharge: HOME | End: 2020-08-14
Payer: COMMERCIAL

## 2020-08-14 DIAGNOSIS — E78.00 HYPERCHOLESTEROLEMIA: ICD-10-CM

## 2020-08-14 DIAGNOSIS — Z79.4 TYPE 2 DIABETES MELLITUS WITH DIABETIC POLYNEUROPATHY, WITH LONG-TERM CURRENT USE OF INSULIN (HCC): ICD-10-CM

## 2020-08-14 DIAGNOSIS — I10 ESSENTIAL (PRIMARY) HYPERTENSION: ICD-10-CM

## 2020-08-14 DIAGNOSIS — E03.9 PRIMARY HYPOTHYROIDISM: ICD-10-CM

## 2020-08-14 DIAGNOSIS — C64.2 RENAL CELL CARCINOMA OF LEFT KIDNEY (HCC): ICD-10-CM

## 2020-08-14 DIAGNOSIS — E11.42 TYPE 2 DIABETES MELLITUS WITH DIABETIC POLYNEUROPATHY, WITH LONG-TERM CURRENT USE OF INSULIN (HCC): ICD-10-CM

## 2020-08-14 LAB
ALBUMIN SERPL-MCNC: 4.1 G/DL (ref 3.5–5.2)
ALP BLD-CCNC: 70 U/L (ref 35–104)
ALT SERPL-CCNC: 19 U/L (ref 5–33)
ANION GAP SERPL CALCULATED.3IONS-SCNC: 15 MMOL/L (ref 7–19)
AST SERPL-CCNC: 21 U/L (ref 5–32)
BASOPHILS ABSOLUTE: 0.1 K/UL (ref 0–0.2)
BASOPHILS RELATIVE PERCENT: 1 % (ref 0–1)
BILIRUB SERPL-MCNC: 0.6 MG/DL (ref 0.2–1.2)
BUN BLDV-MCNC: 16 MG/DL (ref 6–20)
CALCIUM SERPL-MCNC: 9.5 MG/DL (ref 8.6–10)
CHLORIDE BLD-SCNC: 105 MMOL/L (ref 98–111)
CHOLESTEROL, TOTAL: 139 MG/DL (ref 160–199)
CO2: 24 MMOL/L (ref 22–29)
CREAT SERPL-MCNC: 1.1 MG/DL (ref 0.5–0.9)
CREATININE URINE: 56.1 MG/DL (ref 4.2–622)
EOSINOPHILS ABSOLUTE: 0.1 K/UL (ref 0–0.6)
EOSINOPHILS RELATIVE PERCENT: 2.5 % (ref 0–5)
GFR AFRICAN AMERICAN: 56
GFR AFRICAN AMERICAN: >59
GFR NON-AFRICAN AMERICAN: 46
GFR NON-AFRICAN AMERICAN: 51
GLUCOSE BLD-MCNC: 125 MG/DL (ref 74–109)
HBA1C MFR BLD: 7.2 % (ref 4–6)
HCT VFR BLD CALC: 40.2 % (ref 37–47)
HDLC SERPL-MCNC: 49 MG/DL (ref 65–121)
HEMOGLOBIN: 12.9 G/DL (ref 12–16)
IMMATURE GRANULOCYTES #: 0 K/UL
LDL CHOLESTEROL CALCULATED: 69 MG/DL
LYMPHOCYTES ABSOLUTE: 1.1 K/UL (ref 1.1–4.5)
LYMPHOCYTES RELATIVE PERCENT: 22.1 % (ref 20–40)
MCH RBC QN AUTO: 29.1 PG (ref 27–31)
MCHC RBC AUTO-ENTMCNC: 32.1 G/DL (ref 33–37)
MCV RBC AUTO: 90.5 FL (ref 81–99)
MICROALBUMIN UR-MCNC: 1.7 MG/DL (ref 0–19)
MICROALBUMIN/CREAT UR-RTO: 30.3 MG/G
MONOCYTES ABSOLUTE: 0.4 K/UL (ref 0–0.9)
MONOCYTES RELATIVE PERCENT: 6.8 % (ref 0–10)
NEUTROPHILS ABSOLUTE: 3.5 K/UL (ref 1.5–7.5)
NEUTROPHILS RELATIVE PERCENT: 67.4 % (ref 50–65)
PDW BLD-RTO: 14.5 % (ref 11.5–14.5)
PERFORMED ON: ABNORMAL
PLATELET # BLD: 200 K/UL (ref 130–400)
PMV BLD AUTO: 11.7 FL (ref 9.4–12.3)
POC CREATININE: 1.2 MG/DL (ref 0.3–1.3)
POC SAMPLE TYPE: ABNORMAL
POTASSIUM SERPL-SCNC: 4.5 MMOL/L (ref 3.5–5)
RBC # BLD: 4.44 M/UL (ref 4.2–5.4)
SODIUM BLD-SCNC: 144 MMOL/L (ref 136–145)
T4 FREE: 1.23 NG/DL (ref 0.93–1.7)
TOTAL PROTEIN: 7.1 G/DL (ref 6.6–8.7)
TRIGL SERPL-MCNC: 104 MG/DL (ref 0–149)
TSH SERPL DL<=0.05 MIU/L-ACNC: 1.39 UIU/ML (ref 0.27–4.2)
WBC # BLD: 5.1 K/UL (ref 4.8–10.8)

## 2020-08-14 PROCEDURE — 74178 CT ABD&PLV WO CNTR FLWD CNTR: CPT

## 2020-08-14 PROCEDURE — 82565 ASSAY OF CREATININE: CPT

## 2020-08-14 PROCEDURE — 6360000004 HC RX CONTRAST MEDICATION: Performed by: UROLOGY

## 2020-08-14 PROCEDURE — 71046 X-RAY EXAM CHEST 2 VIEWS: CPT

## 2020-08-14 RX ADMIN — IOPAMIDOL 75 ML: 755 INJECTION, SOLUTION INTRAVENOUS at 10:39

## 2020-08-19 ENCOUNTER — OFFICE VISIT (OUTPATIENT)
Dept: SURGERY | Age: 60
End: 2020-08-19
Payer: COMMERCIAL

## 2020-08-19 VITALS
HEIGHT: 68 IN | WEIGHT: 293 LBS | TEMPERATURE: 98.3 F | OXYGEN SATURATION: 99 % | BODY MASS INDEX: 44.41 KG/M2 | HEART RATE: 64 BPM

## 2020-08-19 PROCEDURE — 99212 OFFICE O/P EST SF 10 MIN: CPT | Performed by: PHYSICIAN ASSISTANT

## 2020-08-19 NOTE — LETTER
Preop Orders:     Patient: Rambo Gonzalez  : 1960    Hospital:  [address] [place] [address]      Admitting Physician:            Diagnosis:   Ventral Hernia  Morbid obesity    Procedure:  Ventral Hernia Repair With Mesh    Anesthesia: General    Admission:Outpatient    Date:   20    Labs:per anesthesia    Pre-Op Meds:  Kefzol 3grm IV    Latex Allergy: no    Beta Blocker: yes, patient advised to take    Medication Instructions: No plavix for 2 weeks prior to procedure; no asprin for 3 days prior to procedure    This has been electronically signed by Chad Hagen M.D.

## 2020-08-19 NOTE — PROGRESS NOTES
atenolol (TENORMIN) 50 MG tablet TAKE ONE-HALF TABLET BY MOUTH EVERY MORNING AND ONE-HALF TABLET EVERY EVENING. 90 tablet 3    lisinopril (PRINIVIL;ZESTRIL) 5 MG tablet Take 1 tablet by mouth daily Indications: High Blood Pressure Disorder (Patient taking differently: Take 10 mg by mouth daily Indications: High Blood Pressure Disorder ) 30 tablet 5    CONTOUR NEXT TEST strip TEST FIVE TIMES DAILY 500 strip 5    Multiple Vitamins-Minerals (MULTIPLE VITAMINS/WOMENS PO) Take 1 tablet by mouth daily       fexofenadine (ALLEGRA ALLERGY) 180 MG tablet Take 180 mg by mouth daily      Omega-3 Fatty Acids (FISH OIL) 1000 MG CAPS Take 1,000 mg by mouth 2 times daily       aspirin 81 MG tablet Take 81 mg by mouth daily 3 days a wk      ondansetron (ZOFRAN) 4 MG tablet Take 1 tablet by mouth 3 times daily as needed for Nausea or Vomiting (Patient not taking: Reported on 8/19/2020) 20 tablet 0    Insulin Pen Needle (TRUEPLUS PEN NEEDLES) 31G X 5 MM MISC Inject 1 each into the skin 4 times daily 400 each 3     No current facility-administered medications for this visit.       Allergies: Adhesive tape and Dermabond     Past Medical History:   Diagnosis Date    Allergic rhinitis     Ankle fracture     3 year ago; increasing difficulty walking; has bone fragments    Arthritis     sees dr. Geovanna Nelson as ambulation aid     right foot per dr. Freddie Denny CPAP (continuous positive airway pressure) dependence     12cm    Diabetes [facility] [address]     Hiatal hernia 9/27/2017    History of kidney cancer     Hyperlipidemia     Hypertension     Hypothyroidism     Murmur     Obesity     ZOILA (obstructive sleep apnea)     AHI:  28.5 per PSG, 9/2014    PLMD (periodic limb movement disorder)     Renal mass     cat scan done 9/13/18; to see dr. Karla Eng coming up    Thyroid disease     Type 2 diabetes mellitus without complication ([facility] [address]     Type II or unspecified type diabetes mellitus without mention of complication, not stated as uncontrolled      Past Surgical History:   Procedure Laterality Date    BREAST BIOPSY Right 2012    CARPAL TUNNEL RELEASE      bilateral    CHOLECYSTECTOMY      HYSTERECTOMY      [place] LAP, RADICAL NEPHRECTOMY Left 10/3/2018    NEPHRECTOMY LAPAROSCOPIC HAND ASSISTED performed by Manjeet Hensley MD at [facility] 43 LAP,CHOLECYSTECTOMY/GRAPH N/A 10/31/2017    CHOLECYSTECTOMY LAPAROSCOPIC performed by Marcela Goode MD at [facility] History   Problem Relation Age of Onset    Diabetes Mother     Cancer Mother         breast    Heart Disease Father     Cancer Father         colon, over 79    Cancer Sister 43        breast, negative genetic testing    Cancer Maternal Grandmother 76        breast    Diabetes Maternal Grandmother     Heart Attack Maternal Grandfather     Hypertension Other     Elevated Lipids Other     Coronary Art Dis Other     Heart Attack Paternal Grandmother      Social History     Tobacco Use    Smoking status: Never Smoker    Smokeless tobacco: Never Used   Substance Use Topics    Alcohol use: No     Review of Systems   Review of systems reviewed and positive for the above all other review of systems noted to be negative. Physical Exam  Pulse 64, temperature 98.3 °F (36.8 °C), temperature source Temporal, height 5' 8\" (1.727 m), weight (!) 396 lb (179.6 kg), last menstrual period 01/01/2002, SpO2 99 %, not currently breastfeeding. GENERAL:  Reveals a 62 y.o. female that  appears to be in no acute distress. HEENT:  Head is normocephalic and atraumatic. NECK:  Neck is supple without masses or carotid bruits. No obvious thyromegaly is grossly noted. CHEST:  Patient has normal respiratory effort. Chest is clear bilaterally with good thoracic expansion. HEART:  Heart demonstrated a regular rhythm and rate with no cardiac murmurs, gallops or rubs noted to auscultation.     ABDOMEN:  Inspection of the abdomen demonstrated the patient to have normal bowel sounds present. She has a large ventral hernia at the umbilicus. The hernia  is not fully reducible. There is no evidence of strangulation. Abdomen is protuberant. EXTREMITIES:  Extremities demonstrated no cyanosis or pitting edema bilaterally. PSYCHIATRIC:  Patient is oriented to time, place and person. The patient's mood and affect are normal.      IMPRESSION:  Ventral Hernia at the umbilicus without evidence of strangulation  Morbid Obesity BMI 60.42    PLAN:  The risks, benefits, and options were discussed with the patient. She  is willing to proceed with surgery.

## 2020-09-08 LAB
ALBUMIN SERPL-MCNC: 4 G/DL (ref 3.5–5.2)
ALP BLD-CCNC: 71 U/L (ref 35–104)
ALT SERPL-CCNC: 17 U/L (ref 5–33)
ANION GAP SERPL CALCULATED.3IONS-SCNC: 15 MMOL/L (ref 7–19)
AST SERPL-CCNC: 17 U/L (ref 5–32)
BASOPHILS ABSOLUTE: 0 K/UL (ref 0–0.2)
BASOPHILS RELATIVE PERCENT: 0.5 % (ref 0–1)
BILIRUB SERPL-MCNC: <0.2 MG/DL (ref 0.2–1.2)
BILIRUBIN URINE: NEGATIVE
BLOOD, URINE: NEGATIVE
BUN BLDV-MCNC: 18 MG/DL (ref 6–20)
CALCIUM SERPL-MCNC: 9.3 MG/DL (ref 8.6–10)
CHLORIDE BLD-SCNC: 103 MMOL/L (ref 98–111)
CLARITY: CLEAR
CO2: 23 MMOL/L (ref 22–29)
COLOR: YELLOW
CREAT SERPL-MCNC: 1.2 MG/DL (ref 0.5–0.9)
CREATININE URINE: 91.1 MG/DL (ref 4.2–622)
EOSINOPHILS ABSOLUTE: 0.1 K/UL (ref 0–0.6)
EOSINOPHILS RELATIVE PERCENT: 1.9 % (ref 0–5)
GFR AFRICAN AMERICAN: 56
GFR NON-AFRICAN AMERICAN: 46
GLUCOSE BLD-MCNC: 170 MG/DL (ref 74–109)
GLUCOSE URINE: NEGATIVE MG/DL
HCT VFR BLD CALC: 40.7 % (ref 37–47)
HEMOGLOBIN: 13.2 G/DL (ref 12–16)
IMMATURE GRANULOCYTES #: 0 K/UL
KETONES, URINE: NEGATIVE MG/DL
LEUKOCYTE ESTERASE, URINE: NEGATIVE
LYMPHOCYTES ABSOLUTE: 1.7 K/UL (ref 1.1–4.5)
LYMPHOCYTES RELATIVE PERCENT: 22.4 % (ref 20–40)
MAGNESIUM: 1.9 MG/DL (ref 1.6–2.6)
MCH RBC QN AUTO: 29.1 PG (ref 27–31)
MCHC RBC AUTO-ENTMCNC: 32.4 G/DL (ref 33–37)
MCV RBC AUTO: 89.8 FL (ref 81–99)
MONOCYTES ABSOLUTE: 0.5 K/UL (ref 0–0.9)
MONOCYTES RELATIVE PERCENT: 6.8 % (ref 0–10)
NEUTROPHILS ABSOLUTE: 5.1 K/UL (ref 1.5–7.5)
NEUTROPHILS RELATIVE PERCENT: 68.1 % (ref 50–65)
NITRITE, URINE: NEGATIVE
PARATHYROID HORMONE INTACT: 73.6 PG/ML (ref 15–65)
PDW BLD-RTO: 14 % (ref 11.5–14.5)
PH UA: 6 (ref 5–8)
PHOSPHORUS: 3.3 MG/DL (ref 2.5–4.5)
PLATELET # BLD: 195 K/UL (ref 130–400)
PMV BLD AUTO: 12.1 FL (ref 9.4–12.3)
POTASSIUM SERPL-SCNC: 4.3 MMOL/L (ref 3.5–5)
PROTEIN PROTEIN: 8 MG/DL (ref 15–45)
PROTEIN UA: NEGATIVE MG/DL
RBC # BLD: 4.53 M/UL (ref 4.2–5.4)
SODIUM BLD-SCNC: 141 MMOL/L (ref 136–145)
SPECIFIC GRAVITY UA: 1.01 (ref 1–1.03)
TOTAL PROTEIN: 7.3 G/DL (ref 6.6–8.7)
URIC ACID, SERUM: 8.3 MG/DL (ref 2.4–5.7)
UROBILINOGEN, URINE: 0.2 E.U./DL
VITAMIN D 25-HYDROXY: 31.7 NG/ML
WBC # BLD: 7.5 K/UL (ref 4.8–10.8)

## 2020-09-15 RX ORDER — PERPHENAZINE 16 MG/1
TABLET, FILM COATED ORAL
Qty: 500 STRIP | Refills: 5 | Status: SHIPPED | OUTPATIENT
Start: 2020-09-15

## 2020-09-15 RX ORDER — ATORVASTATIN CALCIUM 10 MG/1
TABLET, FILM COATED ORAL
Qty: 15 TABLET | Refills: 2 | Status: SHIPPED | OUTPATIENT
Start: 2020-09-15 | End: 2020-12-16 | Stop reason: SDUPTHER

## 2020-09-17 ENCOUNTER — HOSPITAL ENCOUNTER (OUTPATIENT)
Dept: PREADMISSION TESTING | Age: 60
Discharge: HOME | End: 2020-09-21
Payer: COMMERCIAL

## 2020-09-17 VITALS — WEIGHT: 293 LBS | HEIGHT: 68 IN | BODY MASS INDEX: 44.41 KG/M2

## 2020-09-17 PROCEDURE — 93005 ELECTROCARDIOGRAM TRACING: CPT | Performed by: SURGERY

## 2020-09-17 PROCEDURE — 87081 CULTURE SCREEN ONLY: CPT

## 2020-09-17 RX ORDER — GABAPENTIN 300 MG/1
300 CAPSULE ORAL ONCE
Status: CANCELLED | OUTPATIENT
Start: 2020-09-22

## 2020-09-17 RX ORDER — ACETAMINOPHEN 325 MG/1
975 TABLET ORAL ONCE
Status: CANCELLED | OUTPATIENT
Start: 2020-09-22

## 2020-09-17 RX ORDER — CELECOXIB 200 MG/1
200 CAPSULE ORAL ONCE
Status: CANCELLED | OUTPATIENT
Start: 2020-09-22

## 2020-09-18 LAB
EKG P AXIS: 6 DEGREES
EKG P-R INTERVAL: 154 MS
EKG Q-T INTERVAL: 422 MS
EKG QRS DURATION: 98 MS
EKG QTC CALCULATION (BAZETT): 430 MS
EKG T AXIS: 33 DEGREES

## 2020-09-18 PROCEDURE — 93010 ELECTROCARDIOGRAM REPORT: CPT | Performed by: INTERNAL MEDICINE

## 2020-09-19 LAB
MRSA CULTURE ONLY: NORMAL
SARS-COV-2, NAA: NOT DETECTED

## 2020-09-22 ENCOUNTER — ANESTHESIA EVENT (OUTPATIENT)
Dept: OPERATING ROOM | Age: 60
End: 2020-09-22
Payer: COMMERCIAL

## 2020-09-22 ENCOUNTER — HOSPITAL ENCOUNTER (OUTPATIENT)
Age: 60
Setting detail: OUTPATIENT SURGERY
Discharge: HOME | End: 2020-09-22
Attending: SURGERY | Admitting: SURGERY
Payer: COMMERCIAL

## 2020-09-22 ENCOUNTER — ANESTHESIA (OUTPATIENT)
Dept: OPERATING ROOM | Age: 60
End: 2020-09-22
Payer: COMMERCIAL

## 2020-09-22 VITALS
SYSTOLIC BLOOD PRESSURE: 128 MMHG | WEIGHT: 293 LBS | HEIGHT: 68 IN | OXYGEN SATURATION: 100 % | DIASTOLIC BLOOD PRESSURE: 64 MMHG | HEART RATE: 56 BPM | RESPIRATION RATE: 16 BRPM | TEMPERATURE: 97.1 F | BODY MASS INDEX: 44.41 KG/M2

## 2020-09-22 VITALS
OXYGEN SATURATION: 82 % | DIASTOLIC BLOOD PRESSURE: 69 MMHG | SYSTOLIC BLOOD PRESSURE: 112 MMHG | TEMPERATURE: 95.9 F | RESPIRATION RATE: 3 BRPM

## 2020-09-22 PROBLEM — K43.2 INCISIONAL HERNIA, WITHOUT OBSTRUCTION OR GANGRENE: Status: ACTIVE | Noted: 2020-09-22

## 2020-09-22 LAB
GLUCOSE BLD-MCNC: 141 MG/DL (ref 70–99)
PERFORMED ON: ABNORMAL

## 2020-09-22 PROCEDURE — 6370000000 HC RX 637 (ALT 250 FOR IP): Performed by: ANESTHESIOLOGY

## 2020-09-22 PROCEDURE — 7100000010 HC PHASE II RECOVERY - FIRST 15 MIN: Performed by: SURGERY

## 2020-09-22 PROCEDURE — 7100000000 HC PACU RECOVERY - FIRST 15 MIN: Performed by: SURGERY

## 2020-09-22 PROCEDURE — 82947 ASSAY GLUCOSE BLOOD QUANT: CPT

## 2020-09-22 PROCEDURE — 6360000002 HC RX W HCPCS: Performed by: SURGERY

## 2020-09-22 PROCEDURE — 7100000001 HC PACU RECOVERY - ADDTL 15 MIN: Performed by: SURGERY

## 2020-09-22 PROCEDURE — 6360000002 HC RX W HCPCS

## 2020-09-22 PROCEDURE — 6360000002 HC RX W HCPCS: Performed by: ANESTHESIOLOGY

## 2020-09-22 PROCEDURE — 2580000003 HC RX 258: Performed by: SURGERY

## 2020-09-22 PROCEDURE — 49568 PR IMPLANT MESH HERNIA REPAIR/DEBRIDEMENT CLOSURE: CPT | Performed by: PHYSICIAN ASSISTANT

## 2020-09-22 PROCEDURE — 2580000003 HC RX 258: Performed by: ANESTHESIOLOGY

## 2020-09-22 PROCEDURE — 3600000004 HC SURGERY LEVEL 4 BASE: Performed by: SURGERY

## 2020-09-22 PROCEDURE — 49560 PR REPAIR INCISIONAL HERNIA,REDUCIBLE: CPT | Performed by: SURGERY

## 2020-09-22 PROCEDURE — 3700000001 HC ADD 15 MINUTES (ANESTHESIA): Performed by: SURGERY

## 2020-09-22 PROCEDURE — 2500000003 HC RX 250 WO HCPCS

## 2020-09-22 PROCEDURE — 6370000000 HC RX 637 (ALT 250 FOR IP): Performed by: SURGERY

## 2020-09-22 PROCEDURE — 3700000000 HC ANESTHESIA ATTENDED CARE: Performed by: SURGERY

## 2020-09-22 PROCEDURE — C9290 INJ, BUPIVACAINE LIPOSOME: HCPCS | Performed by: SURGERY

## 2020-09-22 PROCEDURE — 3600000014 HC SURGERY LEVEL 4 ADDTL 15MIN: Performed by: SURGERY

## 2020-09-22 PROCEDURE — C1781 MESH (IMPLANTABLE): HCPCS | Performed by: SURGERY

## 2020-09-22 PROCEDURE — 2709999900 HC NON-CHARGEABLE SUPPLY: Performed by: SURGERY

## 2020-09-22 PROCEDURE — 49560 PR REPAIR INCISIONAL HERNIA,REDUCIBLE: CPT | Performed by: PHYSICIAN ASSISTANT

## 2020-09-22 PROCEDURE — 2500000003 HC RX 250 WO HCPCS: Performed by: ANESTHESIOLOGY

## 2020-09-22 PROCEDURE — 2500000003 HC RX 250 WO HCPCS: Performed by: SURGERY

## 2020-09-22 PROCEDURE — 49568 PR IMPLANT MESH HERNIA REPAIR/DEBRIDEMENT CLOSURE: CPT | Performed by: SURGERY

## 2020-09-22 DEVICE — MESH HERN L W5.4XL7IN POLYPR EPTFE OVL SELF EXP PTCH FOR: Type: IMPLANTABLE DEVICE | Site: ABDOMEN | Status: FUNCTIONAL

## 2020-09-22 RX ORDER — SODIUM CHLORIDE, SODIUM LACTATE, POTASSIUM CHLORIDE, CALCIUM CHLORIDE 600; 310; 30; 20 MG/100ML; MG/100ML; MG/100ML; MG/100ML
INJECTION, SOLUTION INTRAVENOUS CONTINUOUS
Status: DISCONTINUED | OUTPATIENT
Start: 2020-09-22 | End: 2020-09-22 | Stop reason: HOSPADM

## 2020-09-22 RX ORDER — LIDOCAINE HYDROCHLORIDE 10 MG/ML
1 INJECTION, SOLUTION EPIDURAL; INFILTRATION; INTRACAUDAL; PERINEURAL
Status: DISCONTINUED | OUTPATIENT
Start: 2020-09-22 | End: 2020-09-22 | Stop reason: HOSPADM

## 2020-09-22 RX ORDER — SODIUM CHLORIDE 9 MG/ML
INJECTION, SOLUTION INTRAVENOUS CONTINUOUS
Status: DISCONTINUED | OUTPATIENT
Start: 2020-09-22 | End: 2020-09-22 | Stop reason: HOSPADM

## 2020-09-22 RX ORDER — ROCURONIUM BROMIDE 10 MG/ML
INJECTION, SOLUTION INTRAVENOUS PRN
Status: DISCONTINUED | OUTPATIENT
Start: 2020-09-22 | End: 2020-09-22 | Stop reason: SDUPTHER

## 2020-09-22 RX ORDER — GABAPENTIN 300 MG/1
300 CAPSULE ORAL ONCE
Status: COMPLETED | OUTPATIENT
Start: 2020-09-22 | End: 2020-09-22

## 2020-09-22 RX ORDER — MORPHINE SULFATE 4 MG/ML
2 INJECTION, SOLUTION INTRAMUSCULAR; INTRAVENOUS EVERY 5 MIN PRN
Status: DISCONTINUED | OUTPATIENT
Start: 2020-09-22 | End: 2020-09-22 | Stop reason: HOSPADM

## 2020-09-22 RX ORDER — CELECOXIB 200 MG/1
200 CAPSULE ORAL ONCE
Status: COMPLETED | OUTPATIENT
Start: 2020-09-22 | End: 2020-09-22

## 2020-09-22 RX ORDER — METOCLOPRAMIDE HYDROCHLORIDE 5 MG/ML
10 INJECTION INTRAMUSCULAR; INTRAVENOUS ONCE
Status: COMPLETED | OUTPATIENT
Start: 2020-09-22 | End: 2020-09-22

## 2020-09-22 RX ORDER — SODIUM CHLORIDE 0.9 % (FLUSH) 0.9 %
10 SYRINGE (ML) INJECTION PRN
Status: DISCONTINUED | OUTPATIENT
Start: 2020-09-22 | End: 2020-09-22 | Stop reason: HOSPADM

## 2020-09-22 RX ORDER — APREPITANT 40 MG/1
40 CAPSULE ORAL ONCE
Status: COMPLETED | OUTPATIENT
Start: 2020-09-22 | End: 2020-09-22

## 2020-09-22 RX ORDER — LABETALOL HYDROCHLORIDE 5 MG/ML
5 INJECTION, SOLUTION INTRAVENOUS EVERY 10 MIN PRN
Status: DISCONTINUED | OUTPATIENT
Start: 2020-09-22 | End: 2020-09-22 | Stop reason: HOSPADM

## 2020-09-22 RX ORDER — FENTANYL CITRATE 50 UG/ML
25 INJECTION, SOLUTION INTRAMUSCULAR; INTRAVENOUS
Status: DISCONTINUED | OUTPATIENT
Start: 2020-09-22 | End: 2020-09-22 | Stop reason: HOSPADM

## 2020-09-22 RX ORDER — MORPHINE SULFATE 4 MG/ML
4 INJECTION, SOLUTION INTRAMUSCULAR; INTRAVENOUS EVERY 5 MIN PRN
Status: DISCONTINUED | OUTPATIENT
Start: 2020-09-22 | End: 2020-09-22 | Stop reason: HOSPADM

## 2020-09-22 RX ORDER — FENTANYL CITRATE 50 UG/ML
INJECTION, SOLUTION INTRAMUSCULAR; INTRAVENOUS PRN
Status: DISCONTINUED | OUTPATIENT
Start: 2020-09-22 | End: 2020-09-22 | Stop reason: SDUPTHER

## 2020-09-22 RX ORDER — HYDROMORPHONE HYDROCHLORIDE 1 MG/ML
0.5 INJECTION, SOLUTION INTRAMUSCULAR; INTRAVENOUS; SUBCUTANEOUS EVERY 5 MIN PRN
Status: DISCONTINUED | OUTPATIENT
Start: 2020-09-22 | End: 2020-09-22 | Stop reason: HOSPADM

## 2020-09-22 RX ORDER — MEPERIDINE HYDROCHLORIDE 50 MG/ML
12.5 INJECTION INTRAMUSCULAR; INTRAVENOUS; SUBCUTANEOUS EVERY 5 MIN PRN
Status: DISCONTINUED | OUTPATIENT
Start: 2020-09-22 | End: 2020-09-22 | Stop reason: HOSPADM

## 2020-09-22 RX ORDER — DEXAMETHASONE SODIUM PHOSPHATE 10 MG/ML
INJECTION, SOLUTION INTRAMUSCULAR; INTRAVENOUS PRN
Status: DISCONTINUED | OUTPATIENT
Start: 2020-09-22 | End: 2020-09-22 | Stop reason: SDUPTHER

## 2020-09-22 RX ORDER — SODIUM CHLORIDE 0.9 % (FLUSH) 0.9 %
10 SYRINGE (ML) INJECTION EVERY 12 HOURS SCHEDULED
Status: DISCONTINUED | OUTPATIENT
Start: 2020-09-22 | End: 2020-09-22 | Stop reason: HOSPADM

## 2020-09-22 RX ORDER — LIDOCAINE HYDROCHLORIDE 10 MG/ML
INJECTION, SOLUTION EPIDURAL; INFILTRATION; INTRACAUDAL; PERINEURAL PRN
Status: DISCONTINUED | OUTPATIENT
Start: 2020-09-22 | End: 2020-09-22 | Stop reason: SDUPTHER

## 2020-09-22 RX ORDER — HYDROCODONE BITARTRATE AND ACETAMINOPHEN 5; 325 MG/1; MG/1
1 TABLET ORAL EVERY 6 HOURS PRN
Qty: 12 TABLET | Refills: 0 | Status: SHIPPED | OUTPATIENT
Start: 2020-09-22 | End: 2020-10-16 | Stop reason: ALTCHOICE

## 2020-09-22 RX ORDER — DIPHENHYDRAMINE HYDROCHLORIDE 50 MG/ML
12.5 INJECTION INTRAMUSCULAR; INTRAVENOUS
Status: DISCONTINUED | OUTPATIENT
Start: 2020-09-22 | End: 2020-09-22 | Stop reason: HOSPADM

## 2020-09-22 RX ORDER — HYDROMORPHONE HYDROCHLORIDE 1 MG/ML
0.25 INJECTION, SOLUTION INTRAMUSCULAR; INTRAVENOUS; SUBCUTANEOUS EVERY 5 MIN PRN
Status: DISCONTINUED | OUTPATIENT
Start: 2020-09-22 | End: 2020-09-22 | Stop reason: HOSPADM

## 2020-09-22 RX ORDER — HYDRALAZINE HYDROCHLORIDE 20 MG/ML
5 INJECTION INTRAMUSCULAR; INTRAVENOUS EVERY 10 MIN PRN
Status: DISCONTINUED | OUTPATIENT
Start: 2020-09-22 | End: 2020-09-22 | Stop reason: HOSPADM

## 2020-09-22 RX ORDER — SCOLOPAMINE TRANSDERMAL SYSTEM 1 MG/1
1 PATCH, EXTENDED RELEASE TRANSDERMAL
Status: DISCONTINUED | OUTPATIENT
Start: 2020-09-22 | End: 2020-09-22 | Stop reason: HOSPADM

## 2020-09-22 RX ORDER — ACETAMINOPHEN 325 MG/1
975 TABLET ORAL ONCE
Status: COMPLETED | OUTPATIENT
Start: 2020-09-22 | End: 2020-09-22

## 2020-09-22 RX ORDER — METOCLOPRAMIDE HYDROCHLORIDE 5 MG/ML
10 INJECTION INTRAMUSCULAR; INTRAVENOUS
Status: DISCONTINUED | OUTPATIENT
Start: 2020-09-22 | End: 2020-09-22 | Stop reason: HOSPADM

## 2020-09-22 RX ORDER — PROMETHAZINE HYDROCHLORIDE 25 MG/ML
6.25 INJECTION, SOLUTION INTRAMUSCULAR; INTRAVENOUS
Status: DISCONTINUED | OUTPATIENT
Start: 2020-09-22 | End: 2020-09-22 | Stop reason: HOSPADM

## 2020-09-22 RX ORDER — MIDAZOLAM HYDROCHLORIDE 1 MG/ML
2 INJECTION INTRAMUSCULAR; INTRAVENOUS
Status: DISCONTINUED | OUTPATIENT
Start: 2020-09-22 | End: 2020-09-22 | Stop reason: HOSPADM

## 2020-09-22 RX ORDER — ONDANSETRON 2 MG/ML
INJECTION INTRAMUSCULAR; INTRAVENOUS PRN
Status: DISCONTINUED | OUTPATIENT
Start: 2020-09-22 | End: 2020-09-22 | Stop reason: SDUPTHER

## 2020-09-22 RX ORDER — FENTANYL CITRATE 50 UG/ML
50 INJECTION, SOLUTION INTRAMUSCULAR; INTRAVENOUS
Status: DISCONTINUED | OUTPATIENT
Start: 2020-09-22 | End: 2020-09-22 | Stop reason: HOSPADM

## 2020-09-22 RX ORDER — PROPOFOL 10 MG/ML
INJECTION, EMULSION INTRAVENOUS PRN
Status: DISCONTINUED | OUTPATIENT
Start: 2020-09-22 | End: 2020-09-22 | Stop reason: SDUPTHER

## 2020-09-22 RX ORDER — MIDAZOLAM HYDROCHLORIDE 1 MG/ML
INJECTION INTRAMUSCULAR; INTRAVENOUS PRN
Status: DISCONTINUED | OUTPATIENT
Start: 2020-09-22 | End: 2020-09-22 | Stop reason: SDUPTHER

## 2020-09-22 RX ORDER — ENALAPRILAT 2.5 MG/2ML
1.25 INJECTION INTRAVENOUS
Status: DISCONTINUED | OUTPATIENT
Start: 2020-09-22 | End: 2020-09-22 | Stop reason: HOSPADM

## 2020-09-22 RX ADMIN — FENTANYL CITRATE 100 MCG: 50 INJECTION INTRAMUSCULAR; INTRAVENOUS at 10:45

## 2020-09-22 RX ADMIN — ONDANSETRON HYDROCHLORIDE 4 MG: 2 INJECTION, SOLUTION INTRAMUSCULAR; INTRAVENOUS at 10:57

## 2020-09-22 RX ADMIN — METOCLOPRAMIDE 10 MG: 5 INJECTION, SOLUTION INTRAMUSCULAR; INTRAVENOUS at 10:20

## 2020-09-22 RX ADMIN — DEXAMETHASONE SODIUM PHOSPHATE 10 MG: 10 INJECTION, SOLUTION INTRAMUSCULAR; INTRAVENOUS at 10:57

## 2020-09-22 RX ADMIN — GABAPENTIN 300 MG: 300 CAPSULE ORAL at 10:24

## 2020-09-22 RX ADMIN — CELECOXIB 200 MG: 200 CAPSULE ORAL at 10:24

## 2020-09-22 RX ADMIN — ACETAMINOPHEN 975 MG: 325 TABLET, FILM COATED ORAL at 10:24

## 2020-09-22 RX ADMIN — SODIUM CHLORIDE, SODIUM LACTATE, POTASSIUM CHLORIDE, AND CALCIUM CHLORIDE: 600; 310; 30; 20 INJECTION, SOLUTION INTRAVENOUS at 10:26

## 2020-09-22 RX ADMIN — MEPERIDINE HYDROCHLORIDE 12.5 MG: 50 INJECTION, SOLUTION INTRAMUSCULAR; INTRAVENOUS; SUBCUTANEOUS at 13:46

## 2020-09-22 RX ADMIN — FENTANYL CITRATE 50 MCG: 50 INJECTION INTRAMUSCULAR; INTRAVENOUS at 11:04

## 2020-09-22 RX ADMIN — ROCURONIUM BROMIDE 10 MG: 10 INJECTION, SOLUTION INTRAVENOUS at 10:45

## 2020-09-22 RX ADMIN — APREPITANT 40 MG: 40 CAPSULE ORAL at 10:24

## 2020-09-22 RX ADMIN — LIDOCAINE HYDROCHLORIDE 50 MG: 10 INJECTION, SOLUTION EPIDURAL; INFILTRATION; INTRACAUDAL; PERINEURAL at 10:45

## 2020-09-22 RX ADMIN — SODIUM CHLORIDE, SODIUM LACTATE, POTASSIUM CHLORIDE, AND CALCIUM CHLORIDE: 600; 310; 30; 20 INJECTION, SOLUTION INTRAVENOUS at 13:05

## 2020-09-22 RX ADMIN — FAMOTIDINE 20 MG: 10 INJECTION, SOLUTION INTRAVENOUS at 10:20

## 2020-09-22 RX ADMIN — FENTANYL CITRATE 50 MCG: 50 INJECTION INTRAMUSCULAR; INTRAVENOUS at 11:46

## 2020-09-22 RX ADMIN — ROCURONIUM BROMIDE 20 MG: 10 INJECTION, SOLUTION INTRAVENOUS at 11:17

## 2020-09-22 RX ADMIN — PROPOFOL 200 MG: 10 INJECTION, EMULSION INTRAVENOUS at 10:45

## 2020-09-22 RX ADMIN — ROCURONIUM BROMIDE 40 MG: 10 INJECTION, SOLUTION INTRAVENOUS at 10:47

## 2020-09-22 RX ADMIN — SUGAMMADEX 400 MG: 100 INJECTION, SOLUTION INTRAVENOUS at 13:02

## 2020-09-22 RX ADMIN — CEFAZOLIN SODIUM 3 G: 10 INJECTION, POWDER, FOR SOLUTION INTRAVENOUS at 10:55

## 2020-09-22 RX ADMIN — MIDAZOLAM 2 MG: 1 INJECTION INTRAMUSCULAR; INTRAVENOUS at 10:38

## 2020-09-22 ASSESSMENT — LIFESTYLE VARIABLES: SMOKING_STATUS: 0

## 2020-09-22 ASSESSMENT — PAIN SCALES - GENERAL
PAINLEVEL_OUTOF10: 0

## 2020-09-22 ASSESSMENT — ENCOUNTER SYMPTOMS: SHORTNESS OF BREATH: 0

## 2020-09-22 NOTE — ANESTHESIA PRE PROCEDURE
Department of Anesthesiology  Preprocedure Note       Name:  Urban Vee   Age:  62 y.o.  :  1960                                          MRN:  [id number]         Date:  2020      Surgeon: Edwin Reilly):  Naseem Abebe MD    Procedure: Procedure(s):  VENTRAL HERNIA REPAIR WITH MESH    Medications prior to admission:   Prior to Admission medications    Medication Sig Start Date End Date Taking? Authorizing Provider   vitamin D 25 MCG (1000 UT) CAPS Take 1 capsule by mouth daily    Historical Provider, MD   atorvastatin (LIPITOR) 10 MG tablet TAKE ONE-HALF TABLET BY MOUTH DAILY 9/15/20   Neisha Gomez MD   CONTOUR NEXT TEST strip TEST FIVE TIMES DAILY 9/15/20   Neisha Gomez MD   levothyroxine (SYNTHROID) 137 MCG tablet TAKE 1 TABLET BY MOUTH DAILY 20   Neisha Gomez MD   LEVEMIR FLEXTOUCH 100 UNIT/ML injection pen ADMINISTER 46 UNITS UNDER THE SKIN EVERY NIGHT 20   Neisha Gomez MD   pantoprazole (PROTONIX) 40 MG tablet Take 1 tablet by mouth daily 20   Neisha Gomez MD   dilTIAZem (CARTIA XT) 180 MG extended release capsule TAKE 1 CAPSULE BY MOUTH DAILY 20   Neisha Gomez MD   insulin aspart (NOVOLOG FLEXPEN) 100 UNIT/ML injection pen INJECT 6 UNITS UNDER THE SKIN WITH BREAKFAST, 6 UNITS WITH LUNCH, AND 10 UNITS WITH DINNER  Patient taking differently: Inject into the skin 3 times daily (before meals) INJECT 6 UNITS WITH EACH [facility] AND 10 UNITS WITH EACH MEAL 20   Neisha Gomez MD   Insulin Pen Needle (TRUEPLUS PEN NEEDLES) 31G X 5 MM MISC Inject 1 each into the skin 4 times daily 20  Neisha Gomez MD   Misc. Devices MISC Diabetic Shotes ICD 10: E11.9 20   Neisha Gomez MD   triamcinolone (KENALOG) 0.1 % cream Apply topically 2 times daily.  20   Neisha Gomez MD   atenolol (TENORMIN) 50 MG tablet TAKE ONE-HALF TABLET BY MOUTH EVERY MORNING AND ONE-HALF TABLET EVERY EVENING. 10/25/19   Neisha Gomez MD lisinopril (PRINIVIL;ZESTRIL) 5 MG tablet Take 1 tablet by mouth daily Indications: High Blood Pressure Disorder  Patient taking differently: Take 20 mg by mouth daily Indications: High Blood Pressure Disorder  10/21/19   Duncan Sarmiento MD   Multiple Vitamins-Minerals (MULTIPLE VITAMINS/WOMENS PO) Take 1 tablet by mouth daily     Historical Provider, MD   fexofenadine (ALLEGRA ALLERGY) 180 MG tablet Take 180 mg by mouth daily    Historical Provider, MD   Omega-3 Fatty Acids (FISH OIL) 1000 MG CAPS Take 1,000 mg by mouth 2 times daily     Historical Provider, MD   aspirin 81 MG tablet Take 81 mg by mouth daily 3 days a wk    Historical Provider, MD       Current medications:    Current Facility-Administered Medications   Medication Dose Route Frequency Provider Last Rate Last Dose    ceFAZolin (ANCEF) 3 g in dextrose 5 % 100 mL IVPB  3 g Intravenous On Call to [address] [place] MD Arthur        acetaminophen (TYLENOL) tablet 975 mg  975 mg Oral Once Annmarie Cagle MD        ceFAZolin (ANCEF) 3 g in dextrose 5 % 100 mL IVPB  3 g Intravenous Once Annmarie Cagle MD        celecoxib (CELEBREX) capsule 200 mg  200 mg Oral Once Annmarie Cagle MD        gabapentin (NEURONTIN) capsule 300 mg  300 mg Oral Once Annmarie Cagle MD           Allergies:     Allergies   Allergen Reactions    Adhesive Tape Other (See Comments)     Post op incision infection    Dermabond Other (See Comments)     Post op incision infection       Problem List:    Patient Active Problem List   Diagnosis Code    ZOILA (obstructive sleep apnea) G47.33    PLMD (periodic limb movement disorder) G47.61    CPAP (continuous positive airway pressure) dependence Z99.89    Calculus of gallbladder without cholecystitis K80.20    Hiatal hernia K44.9    Type 2 diabetes mellitus with diabetic polyneuropathy, with long-term current use of insulin ([facility]) H95.06, Z18.2    Umbilical hernia without obstruction and without gangrene R57.4    Periumbilical abdominal pain R10.33    Renal cell carcinoma of left kidney (HCC) C64.2    Left renal mass N28.89    BRE (acute kidney injury) ([facility] [address] N17.9    Post-op pain G89.18    Hypertension I10    Postoperative anemia due to acute blood loss D62       Past Medical History:        Diagnosis Date    Allergic rhinitis     Ankle fracture     3 year ago; increasing difficulty walking; has bone fragments    Arthritis     sees dr. Shelley Brooks as ambulation aid     right foot per dr. Riley De Jesus    CPAP (continuous positive airway pressure) dependence     12cm    Diabetes ([facility] [address]     Hiatal hernia 9/27/2017    History of kidney cancer     Hyperlipidemia     Hypertension     Hypothyroidism     Murmur     Obesity     ZOILA (obstructive sleep apnea)     AHI:  28.5 per PSG, 9/2014    PLMD (periodic limb movement disorder)     Renal mass     cat scan done 9/13/18; to see dr. Marnie Wright coming up    Thyroid disease     Type 2 diabetes mellitus without complication ([facility] [address]     Type II or unspecified type diabetes mellitus without mention of complication, not stated as uncontrolled        Past Surgical History:        Procedure Laterality Date    BREAST BIOPSY Right 2012    CARPAL TUNNEL RELEASE      bilateral    CHOLECYSTECTOMY      HYSTERECTOMY      [place] LAP, RADICAL NEPHRECTOMY Left 10/3/2018    NEPHRECTOMY LAPAROSCOPIC HAND ASSISTED performed by Valerio Rivera MD at [facility] 43 LAP,CHOLECYSTECTOMY/GRAPH N/A 10/31/2017    CHOLECYSTECTOMY LAPAROSCOPIC performed by Pau Quiroga MD at [facility] [address] History:    Social History     Tobacco Use    Smoking status: Never Smoker    Smokeless tobacco: Never Used   Substance Use Topics    Alcohol use: No                                Counseling given: Not Answered      Vital Signs (Current): There were no vitals filed for this visit.                                            BP Readings from Last 3 Encounters:   06/26/20 (!) 157/85   06/26/20 137/72   03/11/20 120/68       NPO Status:                                                                                 BMI:   Wt Readings from Last 3 Encounters:   09/17/20 (!) 385 lb (174.6 kg)   08/19/20 (!) 396 lb (179.6 kg)   06/26/20 (!) 380 lb (172.4 kg)     There is no height or weight on file to calculate BMI.    CBC:   Lab Results   Component Value Date    WBC 7.5 09/08/2020    RBC 4.53 09/08/2020    HGB 13.2 09/08/2020    HCT 40.7 09/08/2020    MCV 89.8 09/08/2020    RDW 14.0 09/08/2020     09/08/2020       CMP:   Lab Results   Component Value Date     09/08/2020    K 4.3 09/08/2020    K 4.3 10/05/2018     09/08/2020    CO2 23 09/08/2020    BUN 18 09/08/2020    CREATININE 1.2 09/08/2020    GFRAA 56 09/08/2020    LABGLOM 46 09/08/2020    GLUCOSE 170 09/08/2020    PROT 7.3 09/08/2020    CALCIUM 9.3 09/08/2020    BILITOT <0.2 09/08/2020    ALKPHOS 71 09/08/2020    AST 17 09/08/2020    ALT 17 09/08/2020       POC Tests: No results for input(s): POCGLU, POCNA, POCK, POCCL, POCBUN, POCHEMO, POCHCT in the last 72 hours.     Coags:   Lab Results   Component Value Date    PROTIME 12.3 09/19/2018    INR 0.92 09/19/2018    APTT 28.9 09/19/2018       HCG (If Applicable): No results found for: PREGTESTUR, PREGSERUM, HCG, HCGQUANT     ABGs: No results found for: PHART, PO2ART, UWJ9HOF, OES5XAX, BEART, V0TDRVSA     Type & Screen (If Applicable):  No results found for: LABABO, LABRH    Drug/Infectious Status (If Applicable):  No results found for: HIV, HEPCAB    COVID-19 Screening (If Applicable):   Lab Results   Component Value Date    COVID19 NOT DETECTED 09/17/2020         Anesthesia Evaluation  Patient summary reviewed and Nursing notes reviewed no history of anesthetic complications:   Airway: Mallampati: I  TM distance: >3 FB   Neck ROM: full  Mouth opening: > = 3 FB Dental:          Pulmonary:   (+) sleep apnea: on CPAP,      (-) shortness of breath and not a current smoker Cardiovascular:  Exercise tolerance: good (>4 METS),   (+) hypertension:,     (-) pacemaker, past MI, CAD, CABG/stent, dysrhythmias and  angina            Stress test reviewed       Beta Blocker:  Dose within 24 Hrs      ROS comment: Stress test 2019;  Summary   Dobutamine stress echocardiogram without clinical, electrocardiographic,   or echocardiographic evidence of myocardial ischemia. Test was supervised by Dr. Jose Ridley. Neuro/Psych:      (-) seizures and CVA           GI/Hepatic/Renal:   (+) hiatal hernia, renal disease (s/p left nephrectomy 2018):, morbid obesity     (-) liver disease      ROS comment: CT abdomen/ pelvis:  Impression   A stable CT scan of the abdomen and pelvis. No evidence of   a neoplastic process or metastatic disease. Evidence of left nephrectomy. No regional complications. Show more     A large periumbilical fat-containing ventral hernia without   complication. A small sliding-type hiatal hernia with possible gastroesophageal   reflux and reflux esophagitis      . Endo/Other:    (+) DiabetesType II DM, well controlled, using insulin, hypothyroidism::., malignancy/cancer (renal cell carcinoma 2018, treated surgically). (-) blood dyscrasia               Abdominal:   (+) obese,         Vascular:     - DVT and PE. Anesthesia Plan      general     ASA 3     (Scop, pepcid, reglan and emend in preop.)  Induction: intravenous. BIS  MIPS: Postoperative opioids intended and Prophylactic antiemetics administered. Anesthetic plan and risks discussed with patient.                     Davy Burnham DO   9/22/2020

## 2020-09-22 NOTE — ANESTHESIA POSTPROCEDURE EVALUATION
Department of Anesthesiology  Postprocedure Note    Patient: Rad Dumas  MRN: [id number]  YOB: 1960  Date of evaluation: 9/22/2020  Time:  1:25 PM     Procedure Summary     Date:  09/22/20 Room / Location:  [address]    Anesthesia Start:  6185 Anesthesia Stop:  9228    Procedure:  VENTRAL HERNIA REPAIR WITH MESH (N/A Abdomen) Diagnosis:  (VENTRAL HERNIA, MORBID OBESITY)    Surgeon:  Paxton Jacob MD Responsible Provider:  CHERYLE Forman CRNA    Anesthesia Type:  general ASA Status:  3          Anesthesia Type: general    Mick Phase I: Mick Score: 10    Mick Phase II:      Last vitals: Reviewed and per EMR flowsheets.        Anesthesia Post Evaluation    Patient location during evaluation: PACU  Patient participation: complete - patient participated  Level of consciousness: awake and alert  Pain score: 0  Airway patency: patent  Nausea & Vomiting: no nausea and no vomiting  Complications: no  Cardiovascular status: hemodynamically stable and blood pressure returned to baseline  Respiratory status: acceptable and room air  Hydration status: stable

## 2020-09-22 NOTE — H&P
HISTORY OF PRESENT ILLNESS:  Han Momin a 46-year-old female well-known to our services who presents with a ventral hernia. Is been present for several years. We have tried to schedule schedule several times but it has had to be rescheduled. She is not having any pain. There is been no strangulation or incarceration. She has had no change in bowel habits.          Patient Active Problem List     Diagnosis Date Noted    Postoperative anemia due to acute blood loss 10/10/2018    Hypertension      Post-op pain 10/07/2018    BRE [facility]acute kidney injury) ([facility] [address]      Left renal mass 10/03/2018    Renal cell carcinoma of left kidney ([facility] [address] 25/38/6077    Umbilical hernia without obstruction and without gangrene 84/01/5741    Periumbilical abdominal pain 08/08/2018    Type 2 diabetes mellitus with diabetic polyneuropathy, with long-term current use of insulin ([facility] [address] 03/20/2018    Calculus of gallbladder without cholecystitis 09/27/2017    Hiatal hernia 09/27/2017    ZOILA (obstructive sleep apnea)      PLMD (periodic limb movement disorder)      CPAP (continuous positive airway pressure) dependence       Current Facility-Administered Medications   Current Outpatient Medications   Medication Sig Dispense Refill    levothyroxine (SYNTHROID) 137 MCG tablet TAKE 1 TABLET BY MOUTH DAILY 30 tablet 1    atorvastatin (LIPITOR) 10 MG tablet TAKE 1/2 TABLET BY MOUTH DAILY 15 tablet 2    LEVEMIR FLEXTOUCH 100 UNIT/ML injection pen ADMINISTER 46 UNITS UNDER THE SKIN EVERY NIGHT 15 mL 3    pantoprazole (PROTONIX) 40 MG tablet Take 1 tablet by mouth daily 90 tablet 1    dilTIAZem (CARTIA XT) 180 MG extended release capsule TAKE 1 CAPSULE BY MOUTH DAILY 90 capsule 3    insulin aspart (NOVOLOG FLEXPEN) 100 UNIT/ML injection pen INJECT 6 UNITS UNDER THE SKIN WITH BREAKFAST, 6 UNITS WITH LUNCH, AND 10 UNITS WITH DINNER 3 pen 5    Misc.  Devices MISC Diabetic Shotes ICD 10: E11.9 1 Device 0    triamcinolone (KENALOG) complication ([facility] [address]      Type II or unspecified type diabetes mellitus without mention of complication, not stated as uncontrolled          Past Surgical History         Past Surgical History:   Procedure Laterality Date    BREAST BIOPSY Right 2012    CARPAL TUNNEL RELEASE         bilateral    CHOLECYSTECTOMY        HYSTERECTOMY        FL LAP, RADICAL NEPHRECTOMY Left 10/3/2018     NEPHRECTOMY LAPAROSCOPIC HAND ASSISTED performed by Kenya Henao MD at [address] LAP,CHOLECYSTECTOMY/GRAPH N/A 10/31/2017     CHOLECYSTECTOMY LAPAROSCOPIC performed by Fredi Shafer MD at [address] History         Family History   Problem Relation Age of Onset    Diabetes Mother      Cancer Mother           breast    Heart Disease Father      Cancer Father           colon, over 79    Cancer Sister 43         breast, negative genetic testing    Cancer Maternal Grandmother 76         breast    Diabetes Maternal Grandmother      Heart Attack Maternal Grandfather      Hypertension Other      Elevated Lipids Other      Coronary Art Dis Other      Heart Attack Paternal Grandmother          Social History           Tobacco Use    Smoking status: Never Smoker    Smokeless tobacco: Never Used   Substance Use Topics    Alcohol use: No     Review of Systems   Review of systems reviewed and positive for the above all other review of systems noted to be negative.     Physical Exam  Pulse 64, temperature 98.3 °F (36.8 °C), temperature source Temporal, height 5' 8\" (1.727 m), weight (!) 396 lb (179.6 kg), last menstrual period 01/01/2002, SpO2 99 %, not currently breastfeeding.     GENERAL:  Reveals a 62 y.o. female that  appears to be in no acute distress.     HEENT:  Head is normocephalic and atraumatic.     NECK:  Neck is supple without masses or carotid bruits. No obvious thyromegaly is grossly noted.     CHEST:  Patient has normal respiratory effort.  Chest is clear bilaterally with good thoracic expansion.       HEART:  Heart demonstrated a regular rhythm and rate with no cardiac murmurs, gallops or rubs noted to auscultation.     ABDOMEN:  Inspection of the abdomen demonstrated the patient to have normal bowel sounds present. She has a large ventral hernia at the umbilicus. The hernia  is not fully reducible. There is no evidence of strangulation. Abdomen is protuberant.        EXTREMITIES:  Extremities demonstrated no cyanosis or pitting edema bilaterally.     PSYCHIATRIC:  Patient is oriented to time, place and person. The patient's mood and affect are normal.        IMPRESSION:  Ventral Hernia at the umbilicus without evidence of strangulation  Morbid Obesity BMI 60.42     PLAN:  The risks, benefits, and options were discussed with the patient. She  is willing to proceed with surgery.

## 2020-09-22 NOTE — PROGRESS NOTES
PATIENT DISCHARGED WITH SUPPLIES AND INSTRUCTIONS REGARDING C-DIFF STOOL COLLECTION AND COPY OF REQUISITION FOR LAB

## 2020-09-23 ENCOUNTER — TELEPHONE (OUTPATIENT)
Dept: SURGERY | Age: 60
End: 2020-09-23

## 2020-09-23 RX ORDER — LEVOTHYROXINE SODIUM 137 UG/1
137 TABLET ORAL DAILY
Qty: 90 TABLET | Refills: 1 | Status: SHIPPED | OUTPATIENT
Start: 2020-09-23 | End: 2021-03-18

## 2020-10-05 RX ORDER — INSULIN DETEMIR 100 [IU]/ML
INJECTION, SOLUTION SUBCUTANEOUS
Qty: 15 ML | Refills: 3 | Status: SHIPPED | OUTPATIENT
Start: 2020-10-05 | End: 2021-03-28

## 2020-10-06 RX ORDER — INSULIN ASPART 100 [IU]/ML
INJECTION, SOLUTION INTRAVENOUS; SUBCUTANEOUS
Qty: 9 ML | Refills: 1 | Status: SHIPPED | OUTPATIENT
Start: 2020-10-06 | End: 2020-12-03

## 2020-10-06 NOTE — TELEPHONE ENCOUNTER
[facility]. called to request a refill on her medication.       Last office visit : 7/24/2020   Next office visit : 10/29/2020     Requested Prescriptions     Signed Prescriptions Disp Refills    Insulin Aspart FlexPen 100 UNIT/ML SOPN 9 mL 1     Sig: INJECT 6 UNITS UNDER THE SKIN WITH BREAKFAST, 6 UNITS WITH LUNCH AND 10 UNITS WITH DINNER     Authorizing Provider: Domingo Patiño     Ordering User: Radha Bolton

## 2020-10-16 ENCOUNTER — OFFICE VISIT (OUTPATIENT)
Dept: UROLOGY | Age: 60
End: 2020-10-16
Payer: COMMERCIAL

## 2020-10-16 VITALS
SYSTOLIC BLOOD PRESSURE: 136 MMHG | WEIGHT: 293 LBS | HEART RATE: 62 BPM | HEIGHT: 67 IN | DIASTOLIC BLOOD PRESSURE: 77 MMHG | TEMPERATURE: 96.7 F | BODY MASS INDEX: 45.99 KG/M2

## 2020-10-16 LAB
BILIRUBIN, POC: NORMAL
BLOOD URINE, POC: NORMAL
CLARITY, POC: CLEAR
COLOR, POC: YELLOW
GLUCOSE URINE, POC: NORMAL
KETONES, POC: NORMAL
LEUKOCYTE EST, POC: NORMAL
NITRITE, POC: NORMAL
PH, POC: 6
PROTEIN, POC: NORMAL
SPECIFIC GRAVITY, POC: 1.02
UROBILINOGEN, POC: 0.2

## 2020-10-16 PROCEDURE — 81003 URINALYSIS AUTO W/O SCOPE: CPT | Performed by: UROLOGY

## 2020-10-16 PROCEDURE — 99213 OFFICE O/P EST LOW 20 MIN: CPT | Performed by: UROLOGY

## 2020-10-16 ASSESSMENT — ENCOUNTER SYMPTOMS
FACIAL SWELLING: 0
WHEEZING: 0
ABDOMINAL DISTENTION: 0
DIARRHEA: 0
EYE PAIN: 0
EYE DISCHARGE: 0
BLOOD IN STOOL: 0
CONSTIPATION: 0
VOMITING: 0
EYE REDNESS: 0
RHINORRHEA: 0
SHORTNESS OF BREATH: 0
SINUS PRESSURE: 0
COUGH: 0
ABDOMINAL PAIN: 0
COLOR CHANGE: 0
BACK PAIN: 0
SORE THROAT: 0
NAUSEA: 0

## 2020-10-16 NOTE — PROGRESS NOTES
Kelsey Brand is a 62 y.o. female who presents today   Chief Complaint   Patient presents with    Follow-up     patient here for a 6 month follow up on renal cell carcinoma with lab and diagnostic imaging done     Renal Cancer:  Patient is here today for a history of renal carcinoma which was diagnosed approximately 2 years(s) ago. The cancer was: left   The treatment received was left hand-assisted laparoscopic nephrectomy on 10/3/2018. Final pathology stage: T1 a N0 M0 patient has microscopic extension into the renal sinus fat but no gross extension therefore this was not designated as T3a. She did see medical oncology and it was felt she did not qualify for Sutent adjuvant treatment. Flank pain? no  Hematuria?   None  Patient underwent a ventral hernia repair        Past Medical History:   Diagnosis Date    Allergic rhinitis     Ankle fracture     3 year ago; increasing difficulty walking; has bone fragments    Arthritis     sees dr. Mamie Soto as ambulation aid     right foot per dr. Hollie Aguiar CPAP (continuous positive airway pressure) dependence     12cm    Diabetes ([facility] [address]     Hiatal hernia 9/27/2017    History of kidney cancer     Hyperlipidemia     Hypertension     Hypothyroidism     Murmur     Obesity     ZOILA (obstructive sleep apnea)     AHI:  28.5 per [facility], 9/2014    PLMD (periodic limb movement disorder)     Renal mass     cat scan done 9/13/18; to see dr. Kristin Chakraborty coming up    Thyroid disease     Type 2 diabetes mellitus without complication ([address]     Type II or unspecified type diabetes mellitus without mention of complication, not stated as uncontrolled        Past Surgical History:   Procedure Laterality Date    BREAST BIOPSY Right 2012    CARPAL TUNNEL RELEASE      bilateral    CHOLECYSTECTOMY      HYSTERECTOMY      [place] LAP, RADICAL NEPHRECTOMY Left 10/3/2018    NEPHRECTOMY LAPAROSCOPIC HAND ASSISTED performed by Joao Mead [facility] at [facility] Take 180 mg by mouth daily      Omega-3 Fatty Acids (FISH OIL) 1000 MG CAPS Take 1,000 mg by mouth 2 times daily       aspirin 81 MG tablet Take 81 mg by mouth daily 3 days a wk      Insulin Pen Needle (TRUEPLUS PEN NEEDLES) 31G X 5 MM MISC Inject 1 each into the skin 4 times daily 400 each 3     No current facility-administered medications for this visit.         Allergies   Allergen Reactions    Adhesive Tape Other (See Comments)     Post op incision infection    Dermabond Other (See Comments)     Post op incision infection       Social History     Socioeconomic History    Marital status:      Spouse name: None    Number of children: None    Years of education: None    Highest education level: None   Occupational History    None   Social Needs    Financial resource strain: None    Food insecurity     Worry: None     Inability: None    Transportation needs     Medical: None     Non-medical: None   Tobacco Use    Smoking status: Never Smoker    Smokeless tobacco: Never Used   Substance and Sexual Activity    Alcohol use: No    Drug use: No    Sexual activity: Yes   Lifestyle    Physical activity     Days per week: None     Minutes per session: None    Stress: None   Relationships    Social connections     Talks on phone: None     Gets together: None     Attends Jainism service: None     Active member of club or organization: None     Attends meetings of clubs or organizations: None     Relationship status: None    Intimate partner violence     Fear of current or ex partner: None     Emotionally abused: None     Physically abused: None     Forced sexual activity: None   Other Topics Concern    None   Social History Narrative    None       Family History   Problem Relation Age of Onset    Diabetes Mother     Cancer Mother         breast    Heart Disease Father     Cancer Father         colon, over 79    Cancer Sister 43        breast, negative genetic testing    Cancer Maternal Grandmother 76        breast    Diabetes Maternal Grandmother     Heart Attack Maternal Grandfather     Hypertension Other     Elevated Lipids Other     Coronary Art Dis Other     Heart Attack Paternal Grandmother        REVIEW OF SYSTEMS:  Review of Systems   Constitutional: Negative for activity change, chills, fatigue and fever. HENT: Negative for congestion, ear discharge, ear pain, facial swelling, mouth sores, rhinorrhea, sinus pressure and sore throat. Eyes: Negative for pain, discharge and redness. Respiratory: Negative for cough, shortness of breath and wheezing. Cardiovascular: Negative for chest pain, palpitations and leg swelling. Gastrointestinal: Negative for abdominal distention, abdominal pain, blood in stool, constipation, diarrhea, nausea and vomiting. Endocrine: Negative for polydipsia, polyphagia and polyuria. Genitourinary: Negative for decreased urine volume, difficulty urinating, dysuria, enuresis, flank pain, frequency, genital sores, hematuria and urgency. Musculoskeletal: Negative for back pain, gait problem, joint swelling, neck pain and neck stiffness. Skin: Negative for color change, rash and wound. Allergic/Immunologic: Negative for environmental allergies and immunocompromised state. Neurological: Negative for dizziness, syncope, weakness, light-headedness, numbness and headaches. Psychiatric/Behavioral: Negative for agitation, confusion, dysphoric mood, self-injury, sleep disturbance and suicidal ideas. The patient is not hyperactive. PHYSICAL EXAM:  /77   Pulse 62   Temp 96.7 °F (35.9 °C) (Temporal)   Ht 5' 7\" (1.702 m)   Wt (!) 390 lb (176.9 kg)   LMP 01/01/2002   BMI 61.08 kg/m²   Physical Exam  Vitals signs reviewed. Constitutional:       Appearance: She is well-developed. She is obese. HENT:      Head: Normocephalic and atraumatic. Eyes:      General: No scleral icterus.      Conjunctiva/sclera: Conjunctivae normal. Pupils: Pupils are equal, round, and reactive to light. Neck:      Musculoskeletal: Normal range of motion and neck supple. Cardiovascular:      Rate and Rhythm: Normal rate and regular rhythm. Pulmonary:      Effort: Pulmonary effort is normal. No respiratory distress. Breath sounds: Normal breath sounds. Chest:      Chest wall: No tenderness. Abdominal:      General: There is no distension. Palpations: Abdomen is soft. There is no mass. Tenderness: There is no abdominal tenderness. Musculoskeletal: Normal range of motion. General: No tenderness. Lymphadenopathy:      Cervical: No cervical adenopathy. Skin:     General: Skin is warm and dry. Neurological:      Mental Status: She is alert and oriented to person, place, and time.    Psychiatric:         Behavior: Behavior normal.             DATA:  CBC:   Lab Results   Component Value Date    WBC 7.5 09/08/2020    RBC 4.53 09/08/2020    HGB 13.2 09/08/2020    HCT 40.7 09/08/2020    MCV 89.8 09/08/2020    MCH 29.1 09/08/2020    MCHC 32.4 09/08/2020    RDW 14.0 09/08/2020     09/08/2020    MPV 12.1 09/08/2020     CMP:    Lab Results   Component Value Date     09/08/2020    K 4.3 09/08/2020    K 4.3 10/05/2018     09/08/2020    CO2 23 09/08/2020    BUN 18 09/08/2020    CREATININE 1.2 09/08/2020    GFRAA 56 09/08/2020    LABGLOM 46 09/08/2020    GLUCOSE 170 09/08/2020    PROT 7.3 09/08/2020    LABALBU 4.0 09/08/2020    CALCIUM 9.3 09/08/2020    BILITOT <0.2 09/08/2020    ALKPHOS 71 09/08/2020    AST 17 09/08/2020    ALT 17 09/08/2020     Results for orders placed or performed in visit on 10/16/20   POCT Urinalysis No Micro (Auto)   Result Value Ref Range    Color, UA yellow     Clarity, UA clear     Glucose, UA POC neg     Bilirubin, UA neg     Ketones, UA neg     Spec Grav, UA 1.025     Blood, UA POC neg     pH, UA 6.0     Protein, UA POC neg     Urobilinogen, UA 0.2     Leukocytes, UA trace     Nitrite, UA neg documentt is electronic  transcription/translation of spoken language to printed text. The  electronic translation of spoken language may be erroneous, or at times,  nonsensical words or phrases may be inadvertently transcribed.  Although I  have reviewed the document for such errors, some may still exist.

## 2020-10-19 ENCOUNTER — OFFICE VISIT (OUTPATIENT)
Dept: SURGERY | Age: 60
End: 2020-10-19

## 2020-10-19 VITALS
SYSTOLIC BLOOD PRESSURE: 132 MMHG | WEIGHT: 293 LBS | DIASTOLIC BLOOD PRESSURE: 80 MMHG | TEMPERATURE: 97.6 F | BODY MASS INDEX: 45.99 KG/M2 | HEIGHT: 67 IN

## 2020-10-19 PROCEDURE — 99024 POSTOP FOLLOW-UP VISIT: CPT | Performed by: PHYSICIAN ASSISTANT

## 2020-10-29 ENCOUNTER — OFFICE VISIT (OUTPATIENT)
Dept: FAMILY MEDICINE CLINIC | Age: 60
End: 2020-10-29
Payer: COMMERCIAL

## 2020-10-29 VITALS
TEMPERATURE: 97.1 F | HEART RATE: 56 BPM | BODY MASS INDEX: 61.08 KG/M2 | DIASTOLIC BLOOD PRESSURE: 78 MMHG | SYSTOLIC BLOOD PRESSURE: 106 MMHG | OXYGEN SATURATION: 98 % | WEIGHT: 293 LBS

## 2020-10-29 PROCEDURE — 90471 IMMUNIZATION ADMIN: CPT | Performed by: FAMILY MEDICINE

## 2020-10-29 PROCEDURE — 99214 OFFICE O/P EST MOD 30 MIN: CPT | Performed by: FAMILY MEDICINE

## 2020-10-29 PROCEDURE — 3051F HG A1C>EQUAL 7.0%<8.0%: CPT | Performed by: FAMILY MEDICINE

## 2020-10-29 PROCEDURE — 90686 IIV4 VACC NO PRSV 0.5 ML IM: CPT | Performed by: FAMILY MEDICINE

## 2020-10-29 RX ORDER — PANTOPRAZOLE SODIUM 40 MG/1
40 TABLET, DELAYED RELEASE ORAL DAILY
Qty: 90 TABLET | Refills: 3 | Status: SHIPPED | OUTPATIENT
Start: 2020-10-29 | End: 2021-12-12

## 2020-10-29 RX ORDER — ATENOLOL 50 MG/1
TABLET ORAL
Qty: 90 TABLET | Refills: 3 | Status: SHIPPED | OUTPATIENT
Start: 2020-10-29 | End: 2021-09-24 | Stop reason: SDUPTHER

## 2020-10-29 ASSESSMENT — PATIENT HEALTH QUESTIONNAIRE - PHQ9
SUM OF ALL RESPONSES TO PHQ QUESTIONS 1-9: 2
SUM OF ALL RESPONSES TO PHQ QUESTIONS 1-9: 2
2. FEELING DOWN, DEPRESSED OR HOPELESS: 1
SUM OF ALL RESPONSES TO PHQ QUESTIONS 1-9: 2
SUM OF ALL RESPONSES TO PHQ9 QUESTIONS 1 & 2: 2
1. LITTLE INTEREST OR PLEASURE IN DOING THINGS: 1

## 2020-10-29 NOTE — PROGRESS NOTES
Formerly [facility] PHYSICIAN SERVICES  [facility] FAMILY MEDICINE  [id number] [place] [address] Gabino Gibson [address]  Dept: [phone]  Dept Fax: [phone]  Loc: [phone]    Priyanka Whitfield is a 62 y.o. female who presents today for her medical conditions/complaints as noted below. Priyanka Whitfield is here for 3 Month Follow-Up        HPI:   CC: Here today to discuss the following:    Hypertension  Compliant with medications. No adverse effects from medication. No lightheadedness, palpitations, or chest pain. Diabetes Mellitus  Has been compliant with medications. No side effects of medications since last visit. No polyuria, polydipsia, or vision changes since last visit. No symptomatic episodes of hypoglycemia. Hyperlipidemia  Tolerating current cholesterol medication without side effects. No body aches. Attempting to reduce processed sugar and cholesterol from diet. She has a history of renal cell carcinoma of the left kidney. She followed up with her urologist on October 16. They plan on reimaging in the spring. She had undergone ventral hernia repair with mesh on September 22. Procedure performed by Dr. Laurence Castellon. She is recovered well. Bowel issues are controlled. She is having no considerable abdominal discomfort    Hypothyroidism  Symptoms are stable. No temperature intolerance, fatigue, or mood disturbance from baseline. Complaint with current medication. Gastroesophageal Reflux Disease  Symptoms currently under control. Medication adequately controls his symptoms. No hematochezia or melena.          HPI    Past Medical History:   Diagnosis Date    Allergic rhinitis     Ankle fracture     3 year ago; increasing difficulty walking; has bone fragments    Arthritis     sees dr. Bev Smith as ambulation aid     right foot per dr. Esteban Estrella    CPAP (continuous positive airway pressure) dependence     12cm    Diabetes ([address]     Hiatal hernia 9/27/2017    History of kidney cancer     Hyperlipidemia     Hypertension     Hypothyroidism     Murmur     Obesity     ZOILA (obstructive sleep apnea)     AHI:  28.5 per PSG, 9/2014    PLMD (periodic limb movement disorder)     Renal mass     cat scan done 9/13/18; to see dr. Jerald Otoole coming up    Thyroid disease     Type 2 diabetes mellitus without complication ([facility] [address]     Type II or unspecified type diabetes mellitus without mention of complication, not stated as uncontrolled       Past Surgical History:   Procedure Laterality Date    BREAST BIOPSY Right 2012    CARPAL TUNNEL RELEASE      bilateral    CHOLECYSTECTOMY      HYSTERECTOMY      [place] LAP, RADICAL NEPHRECTOMY Left 10/3/2018    NEPHRECTOMY LAPAROSCOPIC HAND ASSISTED performed by [facility] Gar[facility] MD at [facility] 43 LAP,CHOLECYSTECTOMY/GRAPH N/A 10/31/2017    CHOLECYSTECTOMY LAPAROSCOPIC performed by Nina Garcia MD at [facility] 41 N/A 9/22/2020    [facility] performed by Nina Garcia MD at [facility] History   Problem Relation Age of Onset    Diabetes Mother     Cancer Mother         breast    Heart Disease Father     Cancer Father         colon, over 79    Cancer Sister 43        breast, negative genetic testing    Cancer Maternal Grandmother 76        breast    Diabetes Maternal Grandmother     Heart Attack Maternal Grandfather     Hypertension Other     Elevated Lipids Other     Coronary Art Dis Other     Heart Attack Paternal Grandmother        Social History     Tobacco Use    Smoking status: Never Smoker    Smokeless tobacco: Never Used   Substance Use Topics    Alcohol use: No     Current Outpatient Medications   Medication Sig Dispense Refill    pantoprazole (PROTONIX) 40 MG tablet Take 1 tablet by mouth daily 90 tablet 3    atenolol (TENORMIN) 50 MG tablet TAKE ONE-HALF TABLET BY MOUTH EVERY MORNING AND ONE-HALF TABLET EVERY EVENING.  90 tablet 3    Insulin Aspart FlexPen 100 UNIT/ML SOPN INJECT 6 UNITS UNDER THE SKIN WITH BREAKFAST, 6 UNITS WITH LUNCH AND 10 UNITS WITH DINNER (Patient taking differently: INJECT 10 UNITS UNDER THE SKIN WITH BREAKFAST, 10 UNITS WITH LUNCH AND 10 UNITS WITH DINNER) 9 mL 1    LEVEMIR FLEXTOUCH 100 UNIT/ML injection pen INJECT 46 UNITS UNDER THE SKIN EVERY NIGHT 15 mL 3    levothyroxine (SYNTHROID) 137 MCG tablet Take 1 tablet by mouth Daily 90 tablet 1    vitamin D 25 MCG (1000 UT) CAPS Take 1 capsule by mouth daily      atorvastatin (LIPITOR) 10 MG tablet TAKE ONE-HALF TABLET BY MOUTH DAILY (Patient taking differently: Take 10 mg by mouth daily ) 15 tablet 2    CONTOUR NEXT TEST strip TEST FIVE TIMES DAILY 500 strip 5    dilTIAZem (CARTIA XT) 180 MG extended release capsule TAKE 1 CAPSULE BY MOUTH DAILY (Patient taking differently: Take 180 mg by mouth daily TAKE 1 CAPSULE BY MOUTH DAILY) 90 capsule 3    Insulin Pen Needle (TRUEPLUS PEN NEEDLES) 31G X 5 MM MISC Inject 1 each into the skin 4 times daily 400 each 3    Misc. Devices MISC Diabetic Shotes ICD 10: E11.9 1 Device 0    triamcinolone (KENALOG) 0.1 % cream Apply topically 2 times daily. 45 g 1    lisinopril (PRINIVIL;ZESTRIL) 5 MG tablet Take 1 tablet by mouth daily Indications: High Blood Pressure Disorder (Patient taking differently: Take 20 mg by mouth daily Indications: High Blood Pressure Disorder ) 30 tablet 5    Multiple Vitamins-Minerals (MULTIPLE VITAMINS/WOMENS PO) Take 1 tablet by mouth daily       fexofenadine (ALLEGRA ALLERGY) 180 MG tablet Take 180 mg by mouth daily      Omega-3 Fatty Acids (FISH OIL) 1000 MG CAPS Take 1,000 mg by mouth 2 times daily       aspirin 81 MG tablet Take 81 mg by mouth daily 3 days a wk       No current facility-administered medications for this visit.       Allergies   Allergen Reactions    Adhesive Tape Other (See Comments)     Post op incision infection    Dermabond Other (See Comments)     Post op incision infection       Health Maintenance   Topic Date Due    Hepatitis C screen  1960    Diabetic foot exam  02/28/1971    HIV screen  02/28/1976    Hepatitis B vaccine (1 of 3 - Risk 3-dose series) 02/28/1980    DTaP/Tdap/Td vaccine (1 - Tdap) 02/28/1980    Shingles Vaccine (1 of 2) 02/28/2011    Diabetic retinal exam  01/22/2020    Colon cancer screen colonoscopy  07/18/2021    A1C test (Diabetic or Prediabetic)  08/14/2021    Diabetic microalbuminuria test  08/14/2021    Lipid screen  08/14/2021    Potassium monitoring  09/08/2021    Creatinine monitoring  09/08/2021    Breast cancer screen  01/17/2022    Cervical cancer screen  04/08/2024    Flu vaccine  Completed    Pneumococcal 0-64 years Vaccine  Completed    Hepatitis A vaccine  Aged Out    Hib vaccine  Aged Out    Meningococcal (ACWY) vaccine  Aged Out       Subjective:      Review of Systems    Review of Systems   Constitutional: Negative for chills and fever. HENT: Negative for congestion. Respiratory: Negative for cough, chest tightness and shortness of breath. Cardiovascular: Negative for chest pain, palpitations and leg swelling. Gastrointestinal: Negative for abdominal pain, anal bleeding, constipation, diarrhea and nausea. Genitourinary: Negative for difficulty urinating. Psychiatric/Behavioral: Negative. SeeHPI for visit specific review of symptoms. All others negative      Objective:   /78   Pulse 56   Temp 97.1 °F (36.2 °C)   Wt (!) 390 lb (176.9 kg)   LMP 01/01/2002   SpO2 98%   BMI 61.08 kg/m²   Physical Exam    Physical Exam   Constitutional: She appears well-developed. Does not appear ill. Eyes: Pupils are equal, round, and reactive to light. Conjunctiva and Lids normal.  Neck: Normal range of motion. Neck supple. No masses. Neck Symmetric. Normal tracheal position. No thyroid enlargement  Cardiovascular: Normal rate and regular rhythm. Exam reveals no friction rub. Carotid arteries: no bruit observed.   No murmur heard. Respiratory:  Effort normal and breath sounds normal. No respiratory distress. No wheezes. No rales. No use of accessory muscles or intercostal retractions. Abdominal: Soft. Bowel sounds are normal. exhibits no distension. There is no tenderness. There is no rebound and no guarding. Musculoskeletal: exhibits no edema. Normal gait. Neurological: alert. Psychiatric: normal mood and affect. Her behavior is normal. Normal judgement and insight observed. Recent Results (from the past 672 hour(s))   POCT Urinalysis No Micro (Auto)    Collection Time: 10/16/20 10:56 AM   Result Value Ref Range    Color, UA yellow     Clarity, UA clear     Glucose, UA POC neg     Bilirubin, UA neg     Ketones, UA neg     Spec Grav, UA 1.025     Blood, UA POC neg     pH, UA 6.0     Protein, UA POC neg     Urobilinogen, UA 0.2     Leukocytes, UA trace     Nitrite, UA neg                Assessment & Plan: The following diagnoses and conditions are stable with no further orders unless indicated:  1. Essential (primary) hypertension  BP Readings from Last 3 Encounters:   10/29/20 106/78   10/19/20 132/80   10/16/20 136/77     Blood pressure stable  Refill the following medication  - atenolol (TENORMIN) 50 MG tablet; TAKE ONE-HALF TABLET BY MOUTH EVERY MORNING AND ONE-HALF TABLET EVERY EVENING. Dispense: 90 tablet; Refill: 3  - Comprehensive Metabolic Panel; Future  - CBC Auto Differential; Future    2. Immunization due    - INFLUENZA, QUADV, 3 YRS AND OLDER, IM PF, PREFILL SYR OR SDV, 0.5ML (AFLURIA QUADV, PF)    3. Hiatal hernia  Refill Protonix  - pantoprazole (PROTONIX) 40 MG tablet; Take 1 tablet by mouth daily  Dispense: 90 tablet; Refill: 3    4. Need for hepatitis C screening test    - Hepatitis C Antibody; Future    5.  Type 2 diabetes mellitus with diabetic polyneuropathy, with long-term current use of insulin (HCC)  Lab Results   Component Value Date    LABA1C 7.2 (H) 08/14/2020    LABA1C 7.5 (H) 01/17/2020    LABA1C 7.5 (H) 07/08/2019     Lab Results   Component Value Date    LABMICR 1.70 08/14/2020    LDLCALC 69 08/14/2020    CREATININE 1.2 (H) 09/08/2020   Average blood sugar readings are 135 on her meter  She is had no hypoglycemic episodes  Encourage proper diet and we will recheck A1c next visit    - Hemoglobin A1C; Future  - Microalbumin / Creatinine Urine Ratio; Future    6. Lipid screening  Lab Results   Component Value Date    CHOL 139 (L) 08/14/2020    CHOL 141 (L) 01/17/2020    CHOL 155 (L) 07/08/2019     Lab Results   Component Value Date    TRIG 104 08/14/2020    TRIG 96 01/17/2020    TRIG 112 07/08/2019     Lab Results   Component Value Date    HDL 49 (L) 08/14/2020    HDL 54 (L) 01/17/2020    HDL 48 (L) 07/08/2019     Lab Results   Component Value Date    LDLCALC 69 08/14/2020    LDLCALC 68 01/17/2020    [address] 07/08/2019     No results found for: LABVLDL, VLDL  No results found for: CHOLHDLRATIO  Continues atorvastatin 10 mg LDL goal of less than 70  - Lipid Panel; Future    7. Hypercholesterolemia  As above    8. Primary hypothyroidism  Lab Results   Component Value Date    TSH 1.390 08/14/2020    T4FREE 1.23 08/14/2020       9. Gastroesophageal reflux disease without esophagitis  Stable            Return in about 4 months (around 2/28/2021) for Routine follow up - 15 minute visit. Discussed use, benefit, and side effects of prescribed medications. All patient questions answered. Pt voiced understanding. Reviewed health maintenance. Instructedto continue current medications, diet and exercise. Patient agreed with treatmentplan. Follow up as directed.        Note dictated using [id number] [address]

## 2020-11-01 NOTE — PROGRESS NOTES
Subjective:  Patient presents today in follow-up from ventral hernia repair. She is doing well. Objective:  Patient Active Problem List    Diagnosis Date Noted    Incisional hernia, without obstruction or gangrene 09/22/2020    Postoperative anemia due to acute blood loss 10/10/2018    Hypertension     Post-op pain 10/07/2018    BRE ([facility] kidney [facility]) [facility] [address]     Left renal mass 10/03/2018    Renal cell carcinoma of left kidney ([facility] [address] 73/95/2275    Umbilical hernia without obstruction and without gangrene 68/11/9074    Periumbilical abdominal pain 08/08/2018    Type 2 diabetes mellitus with diabetic polyneuropathy, with long-term current use of insulin ([facility] [address] 03/20/2018    Calculus of gallbladder without cholecystitis 09/27/2017    Hiatal hernia 09/27/2017    ZOILA (obstructive sleep apnea)     PLMD (periodic limb movement disorder)     CPAP (continuous positive airway pressure) dependence      Current Outpatient Medications   Medication Sig Dispense Refill    pantoprazole (PROTONIX) 40 MG tablet Take 1 tablet by mouth daily 90 tablet 3    atenolol (TENORMIN) 50 MG tablet TAKE ONE-HALF TABLET BY MOUTH EVERY MORNING AND ONE-HALF TABLET EVERY EVENING.  90 tablet 3    Insulin Aspart FlexPen 100 UNIT/ML SOPN INJECT 6 UNITS UNDER THE SKIN WITH BREAKFAST, 6 UNITS WITH LUNCH AND 10 UNITS WITH DINNER (Patient taking differently: INJECT 10 UNITS UNDER THE SKIN WITH BREAKFAST, 10 UNITS WITH LUNCH AND 10 UNITS WITH DINNER) 9 mL 1    LEVEMIR FLEXTOUCH 100 UNIT/ML injection pen INJECT 46 UNITS UNDER THE SKIN EVERY NIGHT 15 mL 3    levothyroxine (SYNTHROID) 137 MCG tablet Take 1 tablet by mouth Daily 90 tablet 1    vitamin D 25 MCG (1000 UT) CAPS Take 1 capsule by mouth daily      atorvastatin (LIPITOR) 10 MG tablet TAKE ONE-HALF TABLET BY MOUTH DAILY (Patient taking differently: Take 10 mg by mouth daily ) 15 tablet 2    CONTOUR NEXT TEST strip TEST FIVE TIMES DAILY 500 strip 5    dilTIAZem (CARTIA XT) 180 MG extended release capsule TAKE 1 CAPSULE BY MOUTH DAILY (Patient taking differently: Take 180 mg by mouth daily TAKE 1 CAPSULE BY MOUTH DAILY) 90 capsule 3    Insulin Pen Needle (TRUEPLUS PEN NEEDLES) 31G X 5 MM MISC Inject 1 each into the skin 4 times daily 400 each 3    Misc. Devices MISC Diabetic Shotes ICD 10: E11.9 1 Device 0    triamcinolone (KENALOG) 0.1 % cream Apply topically 2 times daily. 45 g 1    lisinopril (PRINIVIL;ZESTRIL) 5 MG tablet Take 1 tablet by mouth daily Indications: High Blood Pressure Disorder (Patient taking differently: Take 20 mg by mouth daily Indications: High Blood Pressure Disorder ) 30 tablet 5    Multiple Vitamins-Minerals (MULTIPLE VITAMINS/WOMENS PO) Take 1 tablet by mouth daily       fexofenadine (ALLEGRA ALLERGY) 180 MG tablet Take 180 mg by mouth daily      Omega-3 Fatty Acids (FISH OIL) 1000 MG CAPS Take 1,000 mg by mouth 2 times daily       aspirin 81 MG tablet Take 81 mg by mouth daily 3 days a wk       No current facility-administered medications for this visit.       Allergies: Adhesive tape and Dermabond  Past Medical History:   Diagnosis Date    Allergic rhinitis     Ankle fracture     3 year ago; increasing difficulty walking; has bone fragments    Arthritis     sees dr. Randie Dubin as ambulation aid     right foot per dr. Kathy Gonzalez CPAP (continuous positive airway pressure) dependence     12cm    Diabetes [facility] [address]     Hiatal hernia 9/27/2017    History of kidney cancer     Hyperlipidemia     Hypertension     Hypothyroidism     Murmur     Obesity     ZOILA (obstructive sleep apnea)     AHI:  28.5 per PSG, 9/2014    PLMD (periodic limb movement disorder)     Renal mass     cat scan done 9/13/18; to see dr. Portia Wilde coming up    Thyroid disease     Type 2 diabetes mellitus without complication ([address]     Type II or unspecified type diabetes mellitus without mention of complication, not stated as uncontrolled      Past Surgical History: Procedure Laterality Date    BREAST BIOPSY Right 2012    CARPAL TUNNEL RELEASE      bilateral    CHOLECYSTECTOMY      HYSTERECTOMY      [place] LAP, RADICAL NEPHRECTOMY Left 10/3/2018    NEPHRECTOMY LAPAROSCOPIC HAND ASSISTED performed by [facility]na Prasad MD at [facility] 43 LAP,CHOLECYSTECTOMY/GRAPH N/A 10/31/2017    CHOLECYSTECTOMY LAPAROSCOPIC performed by Napoleon Martinez MD at [facility] 41 N/A 9/22/2020    VENTRAL HERNIA REPAIR WITH MESH performed by Napoleon Martinez MD at [facility] History   Problem Relation Age of Onset    Diabetes Mother     Cancer Mother         breast    Heart Disease Father     Cancer Father         colon, over 79    Cancer Sister 43        breast, negative genetic testing    Cancer Maternal Grandmother 76        breast    Diabetes Maternal Grandmother     Heart Attack Maternal Grandfather     Hypertension Other     Elevated Lipids Other     Coronary Art Dis Other     Heart Attack Paternal Grandmother      Social History     Tobacco Use    Smoking status: Never Smoker    Smokeless tobacco: Never Used   Substance Use Topics    Alcohol use: No     Exam:  Blood pressure 132/80, temperature 97.6 °F (36.4 °C), temperature source Temporal, height 5' 7\" (1.702 m), weight (!) 389 lb (176.4 kg), last menstrual period 01/01/2002, not currently breastfeeding. Examination of her abdomen her incision is healing well. There is no evidence of seroma. There is no evidence of infection. Assessment:  Status post ventral hernia repair    Plan: We will plan to see the patient back 2 to 3 weeks.

## 2020-11-02 ENCOUNTER — OFFICE VISIT (OUTPATIENT)
Dept: SURGERY | Age: 60
End: 2020-11-02

## 2020-11-02 VITALS
TEMPERATURE: 97.5 F | SYSTOLIC BLOOD PRESSURE: 132 MMHG | BODY MASS INDEX: 45.99 KG/M2 | DIASTOLIC BLOOD PRESSURE: 78 MMHG | WEIGHT: 293 LBS | HEIGHT: 67 IN

## 2020-11-02 PROCEDURE — 99024 POSTOP FOLLOW-UP VISIT: CPT | Performed by: PHYSICIAN ASSISTANT

## 2020-11-02 NOTE — LETTER
[facility]  [phone]  Phone: [phone]  Fax: [phone]          November 2, 2020     Patient: Mitchell Parks   YOB: 1960   Date of Visit: 11/2/2020       To Whom It May Concern: It is my medical opinion that [place] may return to work on 11/09/20 without restrictions. If you have any questions or concerns, please don't hesitate to call.     Sincerely,        Jacquie Reveles PA-C

## 2020-11-02 NOTE — PROGRESS NOTES
180 MG extended release capsule TAKE 1 CAPSULE BY MOUTH DAILY (Patient taking differently: Take 180 mg by mouth daily TAKE 1 CAPSULE BY MOUTH DAILY) 90 capsule 3    Insulin Pen Needle (TRUEPLUS PEN NEEDLES) 31G X 5 MM MISC Inject 1 each into the skin 4 times daily 400 each 3    Misc. Devices MISC Diabetic Shotes ICD 10: E11.9 1 Device 0    triamcinolone (KENALOG) 0.1 % cream Apply topically 2 times daily. 45 g 1    lisinopril (PRINIVIL;ZESTRIL) 5 MG tablet Take 1 tablet by mouth daily Indications: High Blood Pressure Disorder (Patient taking differently: Take 20 mg by mouth daily Indications: High Blood Pressure Disorder ) 30 tablet 5    Multiple Vitamins-Minerals (MULTIPLE VITAMINS/WOMENS PO) Take 1 tablet by mouth daily       fexofenadine (ALLEGRA ALLERGY) 180 MG tablet Take 180 mg by mouth daily      Omega-3 Fatty Acids (FISH OIL) 1000 MG CAPS Take 1,000 mg by mouth 2 times daily       aspirin 81 MG tablet Take 81 mg by mouth daily 3 days a wk       No current facility-administered medications for this visit.       Allergies: Adhesive tape and Dermabond  Past Medical History:   Diagnosis Date    Allergic rhinitis     Ankle fracture     3 year ago; increasing difficulty walking; has bone fragments    Arthritis     sees dr. Reginaldo Bartlett as ambulation aid     right foot per dr. Walt Simpson CPAP (continuous positive airway pressure) dependence     12cm    Diabetes [facility] [address]     Hiatal hernia 9/27/2017    History of kidney cancer     Hyperlipidemia     Hypertension     Hypothyroidism     Murmur     Obesity     ZOILA (obstructive sleep apnea)     AHI:  28.5 per PSG, 9/2014    PLMD (periodic limb movement disorder)     Renal mass     cat scan done 9/13/18; to see dr. Mamie Beasley coming up    Thyroid disease     Type 2 diabetes mellitus without complication ([facility] [address]     Type II or unspecified type diabetes mellitus without mention of complication, not stated as uncontrolled      Past Surgical History: Procedure Laterality Date    BREAST BIOPSY Right 2012    CARPAL TUNNEL RELEASE      bilateral    CHOLECYSTECTOMY      HYSTERECTOMY      [place] LAP, RADICAL NEPHRECTOMY Left 10/3/2018    NEPHRECTOMY LAPAROSCOPIC HAND ASSISTED performed by Joao Mead MD at [facility] 43 LAP,CHOLECYSTECTOMY/GRAPH N/A 10/31/2017    CHOLECYSTECTOMY LAPAROSCOPIC performed by Jose Enrique Hayes MD at [facility] 41 N/A 9/22/2020    VENTRAL HERNIA REPAIR WITH MESH performed by [facility] MD at [facility] History   Problem Relation Age of Onset    Diabetes Mother     Cancer Mother         breast    Heart Disease Father     Cancer Father         colon, over 79    Cancer Sister 43        breast, negative genetic testing    Cancer Maternal Grandmother 76        breast    Diabetes Maternal Grandmother     Heart Attack Maternal Grandfather     Hypertension Other     Elevated Lipids Other     Coronary Art Dis Other     Heart Attack Paternal Grandmother      Social History     Tobacco Use    Smoking status: Never Smoker    Smokeless tobacco: Never Used   Substance Use Topics    Alcohol use: No     Exam:  Blood pressure 132/78, temperature 97.5 °F (36.4 °C), temperature source Temporal, height 5' 7\" (1.702 m), weight (!) 389 lb (176.4 kg), last menstrual period 01/01/2002, not currently breastfeeding. Examination of her abdomen her incision is healing well. There is no evidence of seroma. There is no evidence of infection. Assessment:  Status post ventral hernia repair    Plan: We will plan to see her back in the office on prn basis. She may return to work on Monday.

## 2020-11-17 DIAGNOSIS — Z79.4 TYPE 2 DIABETES MELLITUS WITH DIABETIC POLYNEUROPATHY, WITH LONG-TERM CURRENT USE OF INSULIN (HCC): ICD-10-CM

## 2020-11-17 DIAGNOSIS — E11.42 TYPE 2 DIABETES MELLITUS WITH DIABETIC POLYNEUROPATHY, WITH LONG-TERM CURRENT USE OF INSULIN (HCC): ICD-10-CM

## 2020-11-17 DIAGNOSIS — E78.00 HYPERCHOLESTEROLEMIA: ICD-10-CM

## 2020-11-17 DIAGNOSIS — E03.9 PRIMARY HYPOTHYROIDISM: ICD-10-CM

## 2020-11-17 LAB
ALBUMIN SERPL-MCNC: 4.2 G/DL (ref 3.5–5.2)
ALP BLD-CCNC: 74 U/L (ref 35–104)
ALT SERPL-CCNC: 17 U/L (ref 5–33)
ANION GAP SERPL CALCULATED.3IONS-SCNC: 16 MMOL/L (ref 7–19)
AST SERPL-CCNC: 18 U/L (ref 5–32)
BILIRUB SERPL-MCNC: 0.4 MG/DL (ref 0.2–1.2)
BUN BLDV-MCNC: 21 MG/DL (ref 6–20)
CALCIUM SERPL-MCNC: 10 MG/DL (ref 8.6–10)
CHLORIDE BLD-SCNC: 105 MMOL/L (ref 98–111)
CHOLESTEROL, TOTAL: 155 MG/DL (ref 160–199)
CO2: 24 MMOL/L (ref 22–29)
CREAT SERPL-MCNC: 1.4 MG/DL (ref 0.5–0.9)
GFR AFRICAN AMERICAN: 46
GFR NON-AFRICAN AMERICAN: 38
GLUCOSE BLD-MCNC: 136 MG/DL (ref 74–109)
HBA1C MFR BLD: 7 % (ref 4–6)
HDLC SERPL-MCNC: 49 MG/DL (ref 65–121)
LDL CHOLESTEROL CALCULATED: 85 MG/DL
POTASSIUM SERPL-SCNC: 5.6 MMOL/L (ref 3.5–5)
SODIUM BLD-SCNC: 145 MMOL/L (ref 136–145)
T4 FREE: 1.42 NG/DL (ref 0.93–1.7)
TOTAL PROTEIN: 7.7 G/DL (ref 6.6–8.7)
TRIGL SERPL-MCNC: 107 MG/DL (ref 0–149)
TSH SERPL DL<=0.05 MIU/L-ACNC: 2.33 UIU/ML (ref 0.27–4.2)

## 2020-11-23 ENCOUNTER — OFFICE VISIT (OUTPATIENT)
Age: 60
End: 2020-11-23

## 2020-11-23 ENCOUNTER — TELEPHONE (OUTPATIENT)
Dept: FAMILY MEDICINE CLINIC | Age: 60
End: 2020-11-23

## 2020-11-23 VITALS — OXYGEN SATURATION: 96 % | TEMPERATURE: 97.1 F | HEART RATE: 71 BPM

## 2020-11-23 NOTE — TELEPHONE ENCOUNTER
Patient needs to be tested for Covid-19 due to symptoms of low grade fever, nausea, diarrhea, loss of appetite. Patient should also be tested for strep due to white patched on throat and sore throat.      [id number]

## 2020-11-25 ENCOUNTER — OFFICE VISIT (OUTPATIENT)
Dept: URGENT CARE | Age: 60
End: 2020-11-25
Payer: COMMERCIAL

## 2020-11-25 VITALS
TEMPERATURE: 98.2 F | RESPIRATION RATE: 18 BRPM | HEIGHT: 67 IN | HEART RATE: 81 BPM | BODY MASS INDEX: 45.99 KG/M2 | WEIGHT: 293 LBS | OXYGEN SATURATION: 97 %

## 2020-11-25 LAB
INFLUENZA A ANTIBODY: NORMAL
INFLUENZA B ANTIBODY: NORMAL
S PYO AG THROAT QL: NORMAL

## 2020-11-25 PROCEDURE — 87880 STREP A ASSAY W/OPTIC: CPT | Performed by: NURSE PRACTITIONER

## 2020-11-25 PROCEDURE — 87804 INFLUENZA ASSAY W/OPTIC: CPT | Performed by: NURSE PRACTITIONER

## 2020-11-25 PROCEDURE — 99213 OFFICE O/P EST LOW 20 MIN: CPT | Performed by: NURSE PRACTITIONER

## 2020-11-25 ASSESSMENT — ENCOUNTER SYMPTOMS
TROUBLE SWALLOWING: 1
SORE THROAT: 1
ABDOMINAL PAIN: 0
SWOLLEN GLANDS: 1
RHINORRHEA: 1
EYES NEGATIVE: 1
VOMITING: 0
COUGH: 0
NAUSEA: 1
DIARRHEA: 1
SINUS PRESSURE: 0

## 2020-11-25 ASSESSMENT — VISUAL ACUITY: OU: 1

## 2020-11-25 NOTE — PROGRESS NOTES
[address] [facility]  [address] [address] [place] [address]  Dept: [phone]  Dept Fax: [phone]  Loc: [phone]    Zach Blackwell is a 62 y.o. female who presents today for her medical conditions/complaintsas noted below. Zach Blackwell is c/o of Pharyngitis; Fever; Chills; Nausea; and Diarrhea        HPI:     Pharyngitis   This is a new problem. The current episode started in the past 7 days (Sunday). The problem occurs constantly. The problem has been gradually improving. Associated symptoms include anorexia, congestion, a fever, nausea, a sore throat and swollen glands. Pertinent negatives include no abdominal pain, arthralgias, chills, coughing, headaches, myalgias, rash or vomiting. Associated symptoms comments: Diarrhea. The symptoms are aggravated by swallowing and eating. She has tried rest and drinking for the symptoms. The treatment provided mild relief. Fever    This is a new problem. The current episode started in the past 7 days (Started Friday ). The problem occurs intermittently. The problem has been resolved. The maximum temperature noted was 100 to 100.9 F. The temperature was taken using an oral thermometer. Associated symptoms include congestion, diarrhea, nausea and a sore throat. Pertinent negatives include no abdominal pain, coughing, headaches, rash or vomiting. She has tried fluids for the symptoms. The treatment provided mild relief. Risk factors: no sick contacts      She began feeling ill on Friday and ran a fever all weekend around 100. Then Sunday she developed a swollen tonsil and sore throat on the right side. She had a COVID test done on Monday and it is pending. She is no longer running a fever and has poor appetite but is drinking well at home.   Past Medical History:   Diagnosis Date    Allergic rhinitis     Ankle fracture     3 year ago; increasing difficulty walking; has bone fragments    Arthritis     sees  shira    Braces as ambulation aid     right foot per dr. Adria Grant CPAP (continuous positive airway pressure) dependence     12cm    Diabetes ([facility] [address]     Hiatal hernia 9/27/2017    History of kidney cancer     Hyperlipidemia     Hypertension     Hypothyroidism     Murmur     Obesity     ZOILA (obstructive sleep apnea)     AHI:  28.5 per PSG, 9/2014    PLMD (periodic limb movement disorder)     Renal mass     cat scan done 9/13/18; to see dr. Luque coming up    Thyroid disease     Type 2 diabetes mellitus without complication ([facility] [address]     Type II or unspecified type diabetes mellitus without mention of complication, not stated as uncontrolled      Past Surgical History:   Procedure Laterality Date    BREAST BIOPSY Right 2012    CARPAL TUNNEL RELEASE      bilateral    CHOLECYSTECTOMY      HYSTERECTOMY      GA LAP, RADICAL NEPHRECTOMY Left 10/3/2018    NEPHRECTOMY LAPAROSCOPIC HAND ASSISTED performed by Ady Saldaña MD at [facility] 43 LAP,CHOLECYSTECTOMY/GRAPH N/A 10/31/2017    CHOLECYSTECTOMY LAPAROSCOPIC performed by Moi Power MD at [facility] 41 N/A 9/22/2020    [facility] performed by Moi Power MD at [facility] History   Problem Relation Age of Onset    Diabetes Mother     Cancer Mother         breast    Heart Disease Father     Cancer Father         colon, over 79    Cancer Sister 43        breast, negative genetic testing    Cancer Maternal Grandmother 76        breast    Diabetes Maternal Grandmother     Heart Attack Maternal Grandfather     Hypertension Other     Elevated Lipids Other     Coronary Art Dis Other     Heart Attack Paternal Grandmother        Social History     Tobacco Use    Smoking status: Never Smoker    Smokeless tobacco: Never Used   Substance Use Topics    Alcohol use: No      Current Outpatient Medications   Medication Sig Dispense Refill    pantoprazole (PROTONIX) 40 MG tablet Take 1 tablet by mouth daily 90 tablet 3    atenolol (TENORMIN) 50 MG tablet TAKE ONE-HALF TABLET BY MOUTH EVERY MORNING AND ONE-HALF TABLET EVERY EVENING. 90 tablet 3    Insulin Aspart FlexPen 100 UNIT/ML SOPN INJECT 6 UNITS UNDER THE SKIN WITH BREAKFAST, 6 UNITS WITH LUNCH AND 10 UNITS WITH DINNER (Patient taking differently: INJECT 10 UNITS UNDER THE SKIN WITH BREAKFAST, 10 UNITS WITH LUNCH AND 10 UNITS WITH DINNER) 9 mL 1    LEVEMIR FLEXTOUCH 100 UNIT/ML injection pen INJECT 46 UNITS UNDER THE SKIN EVERY NIGHT 15 mL 3    levothyroxine (SYNTHROID) 137 MCG tablet Take 1 tablet by mouth Daily 90 tablet 1    vitamin D 25 MCG (1000 UT) CAPS Take 1 capsule by mouth daily      atorvastatin (LIPITOR) 10 MG tablet TAKE ONE-HALF TABLET BY MOUTH DAILY (Patient taking differently: Take 10 mg by mouth daily ) 15 tablet 2    CONTOUR NEXT TEST strip TEST FIVE TIMES DAILY 500 strip 5    dilTIAZem (CARTIA XT) 180 MG extended release capsule TAKE 1 CAPSULE BY MOUTH DAILY (Patient taking differently: Take 180 mg by mouth daily TAKE 1 CAPSULE BY MOUTH DAILY) 90 capsule 3    Insulin Pen Needle (TRUEPLUS PEN NEEDLES) 31G X 5 MM MISC Inject 1 each into the skin 4 times daily 400 each 3    Misc. Devices MISC Diabetic Shotes ICD 10: E11.9 1 Device 0    triamcinolone (KENALOG) 0.1 % cream Apply topically 2 times daily. 45 g 1    lisinopril (PRINIVIL;ZESTRIL) 5 MG tablet Take 1 tablet by mouth daily Indications: High Blood Pressure Disorder (Patient taking differently: Take 20 mg by mouth daily Indications: High Blood Pressure Disorder ) 30 tablet 5    Multiple Vitamins-Minerals (MULTIPLE VITAMINS/WOMENS PO) Take 1 tablet by mouth daily       fexofenadine (ALLEGRA ALLERGY) 180 MG tablet Take 180 mg by mouth daily      Omega-3 Fatty Acids (FISH OIL) 1000 MG CAPS Take 1,000 mg by mouth 2 times daily       aspirin 81 MG tablet Take 81 mg by mouth daily 3 days a wk       No current facility-administered medications for this visit. tympanic membrane, ear canal and external ear normal.      Left Ear: Hearing, tympanic membrane, ear canal and external ear normal.      Nose: Rhinorrhea present. Rhinorrhea is clear. Right Sinus: No maxillary sinus tenderness or frontal sinus tenderness. Left Sinus: No maxillary sinus tenderness or frontal sinus tenderness. Mouth/Throat:      Lips: Pink. Mouth: Mucous membranes are moist.      Pharynx: Oropharynx is clear. Uvula midline. No oropharyngeal exudate or uvula swelling. Tonsils: No tonsillar exudate or tonsillar abscesses. 3+ on the right. 1+ on the left. Comments: Right tonsil swollen, no erythema or exudate noted on tonsil  Eyes:      General: Vision grossly intact. Conjunctiva/sclera: Conjunctivae normal.   Neck:      Musculoskeletal: Full passive range of motion without pain and neck supple. Trachea: Trachea and phonation normal.   Cardiovascular:      Rate and Rhythm: Normal rate and regular rhythm. Heart sounds: Normal heart sounds, S1 normal and S2 normal. No murmur. No friction rub. No gallop. Pulmonary:      Effort: Pulmonary effort is normal. No respiratory distress. Breath sounds: Normal breath sounds and air entry. No wheezing, rhonchi or rales. Abdominal:      General: Abdomen is protuberant. Palpations: Abdomen is soft. Musculoskeletal:         General: No tenderness or deformity. Lymphadenopathy:      Head:      Right side of head: No tonsillar, preauricular or posterior auricular adenopathy. Left side of head: No tonsillar, preauricular or posterior auricular adenopathy. Cervical: No cervical adenopathy. Skin:     General: Skin is warm and dry. Capillary Refill: Capillary refill takes less than 2 seconds. Neurological:      General: No focal deficit present. Mental Status: She is alert, oriented to person, place, and time and easily aroused.    Psychiatric:         Attention and Perception: Attention normal.         Mood and Affect: Mood normal.         Speech: Speech normal.         Behavior: Behavior normal. Behavior is cooperative. Pulse 81   Temp 98.2 °F (36.8 °C)   Resp 18   Ht 5' 7\" (1.702 m)   Wt (!) 378 lb (171.5 kg)   LMP 01/01/2002   SpO2 97%   BMI 59.20 kg/m²     :Assessment       Diagnosis Orders   1. Fever, unspecified fever cause  POCT rapid strep A    POCT Influenza A/B   2. Tonsillitis     3. Runny nose     4. Diarrhea, unspecified type     5. Nausea         :Plan      Orders Placed This Encounter   Procedures    POCT rapid strep A    POCT Influenza A/B     Results for orders placed or performed in visit on 11/25/20   POCT rapid strep A   Result Value Ref Range    Strep A Ag None Detected None Detected   POCT Influenza A/B   Result Value Ref Range    Influenza A Ab NEG     Influenza B Ab NEG      Return if symptoms worsen or fail to improve. No orders of the defined types were placed in this encounter. Patient Instructions     Plenty of fluids- push fluids  Rest  OTC Tylenol or Motrin as needed  Stay home and stay in since your COVID test is pending  Follow up with PCP or return to Urgent Care for worsening or unresolved symptoms. Patient Education        Tonsillitis: Care Instructions  Your Care Instructions     Tonsillitis is an infection of the tonsils that is caused by bacteria or a virus. The tonsils are in the back of the throat and are part of the immune system. Tonsillitis typically lasts from a few days up to a couple of weeks. Tonsillitis caused by a virus goes away on its own. Tonsillitis caused by the bacteria that causes strep throat is treated with antibiotics. You and your doctor may consider surgery to remove the tonsils (tonsillectomy) if you have serious complications or repeat infections. Follow-up care is a key part of your treatment and safety. Be sure to make and go to all appointments, and call your doctor if you are having problems.  It's also a good idea to know your test results and keep a list of the medicines you take. How can you care for yourself at home? · If your doctor prescribed antibiotics, take them as directed. Do not stop taking them just because you feel better. You need to take the full course of antibiotics. · Gargle with warm salt water. This helps reduce swelling and relieve discomfort. Gargle once an hour with 1 teaspoon of salt mixed in 8 fluid ounces of warm water. · Take an over-the-counter pain medicine, such as acetaminophen (Tylenol), ibuprofen (Advil, Motrin), or naproxen (Aleve). Be safe with medicines. Read and follow all instructions on the label. No one younger than 20 should take aspirin. It has been linked to Reye syndrome, a serious illness. · Be careful when taking over-the-counter cold or flu medicines and Tylenol at the same time. Many of these medicines have acetaminophen, which is Tylenol. Read the labels to make sure that you are not taking more than the recommended dose. Too much acetaminophen (Tylenol) can be harmful. · Try an over-the-counter throat spray to relieve throat pain. · Drink plenty of fluids. Fluids may help soothe an irritated throat. Drink warm or cool liquids (whichever feels better). These include tea, soup, and juice. · Do not smoke, and avoid secondhand smoke. Smoking can make tonsillitis worse. If you need help quitting, talk to your doctor about stop-smoking programs and medicines. These can increase your chances of quitting for good. · Use a vaporizer or humidifier to add moisture to your bedroom. Follow the directions for cleaning the machine. When should you call for help?    Call your doctor now or seek immediate medical care if:    · Your pain gets worse on one side of your throat.     · You have a new or higher fever.     · You notice changes in your voice.     · You have trouble opening your mouth.     · You have any trouble breathing.     · You have much more trouble swallowing.     · You have a fever with a stiff neck or a severe headache.     · You are sensitive to light or feel very sleepy or confused. Watch closely for changes in your health, and be sure to contact your doctor if:    · You do not get better after 2 days. Where can you learn more? Go to https://chpepiceweb.[email or web address] org and sign in to your [email or web address] account. Enter J268 in the [facility] box to learn more about \"Tonsillitis: Care Instructions. \"     If you do not have an account, please click on the \"Sign Up Now\" link. Current as of: April 15, 2020               Content Version: 12.6  © 2006-2020 [employer]. Care instructions adapted under license by [facility] ([facility]). If you have questions about a medical condition or this instruction, always ask your healthcare professional. [place] [address] any warranty or liability for your use of this information. Patient Education        Learning About Fever  What is a fever? A fever is a high body temperature. It's one way your body fights being sick. A fever shows that the body is responding to infection or other illnesses, both minor and severe. A fever is a symptom, not an illness by itself. A fever can be a sign that you are ill, but most fevers are not caused by a serious problem. You may have a fever with a minor illness, such as a cold. But sometimes a very serious infection may cause little or no fever. It is important to look at other symptoms, other conditions you have, and how you feel in general. In children, notice how they act and see what symptoms they complain of. What is a normal body temperature? A normal body temperature is about 98. 6ºF. Some people have a normal temperature that is a little higher or a little lower than this. Your temperature may be a little lower in the morning than it is later in the day.  It may go up during hot weather or when you exercise, wear heavy clothes, or take a hot bath. Your temperature may also be different depending on how you take it. A temperature taken in the mouth (oral) or under the arm may be a little lower than your core temperature (rectal). What is a fever temperature? A core temperature of 100.4°F or above is considered a fever. What can cause a fever? A fever may be caused by:  · Infections. This is the most common cause of a fever. Examples of infections that can cause a fever include the flu, a kidney infection, or pneumonia. · Some medicines. · Severe trauma or injury, such as a heart attack, stroke, heatstroke, or burns. · Other medical conditions, such as arthritis and some cancers. How can you treat a fever at home? · Ask your doctor if you can take an over-the-counter pain medicine, such as acetaminophen (Tylenol), ibuprofen (Advil, Motrin), or naproxen (Aleve). Be safe with medicines. Read and follow all instructions on the label. · To prevent dehydration, drink plenty of fluids. Choose water and other caffeine-free clear liquids until you feel better. If you have kidney, heart, or liver disease and have to limit fluids, talk with your doctor before you increase the amount of fluids you drink. Follow-up care is a key part of your treatment and safety. Be sure to make and go to all appointments, and call your doctor if you are having problems. It's also a good idea to know your test results and keep a list of the medicines you take. Where can you learn more? Go to https://Run3Dkari.NeoChord. org and sign in to your First To File account. Enter L562 in the [facility] box to learn more about \"Learning About Fever. \"     If you do not have an account, please click on the \"Sign Up Now\" link. Current as of: June 26, 2019               Content Version: 12.6  © 2305-7604 [employer]. Care instructions adapted under license by [facility] ([facility]).  If you have questions about a medical condition or this instruction, always ask your healthcare professional. [place] [address] any warranty or liability for your use of this information. Patient Education        Coronavirus (OXBDR-32): Care Instructions  Overview  The coronavirus disease (COVID-19) is caused by a virus. Symptoms may include a fever, a cough, and shortness of breath. It mainly spreads person-to-person through droplets from coughing and sneezing. The virus also can spread when people are in close contact with someone who is infected. Most people have mild symptoms and can take care of themselves at home. If their symptoms get worse, they may need care in a hospital. Treatment may include medicines to reduce symptoms, plus breathing support such as oxygen therapy or a ventilator. It's important to not spread the virus to others. If you have COVID-19, wear a face cover anytime you are around other people. You need to isolate yourself while you are sick. Leave your home only if you need to get medical care or testing. Follow-up care is a key part of your treatment and safety. Be sure to make and go to all appointments, and call your doctor if you are having problems. It's also a good idea to know your test results and keep a list of the medicines you take. How can you care for yourself at home? · Get extra rest. It can help you feel better. · Drink plenty of fluids. This helps replace fluids lost from fever. Fluids also help ease a scratchy throat. Water, soup, fruit juice, and hot tea with lemon are good choices. · Take acetaminophen (such as Tylenol) to reduce a fever. It may also help with muscle aches. Read and follow all instructions on the label. · Use petroleum jelly on sore skin. This can help if the skin around your nose and lips becomes sore from rubbing a lot with tissues. Tips for self-isolation  · Limit contact with people in your home. If possible, stay in a separate bedroom and use a separate bathroom.   · Wear a cloth face cover when you are around other people. It can help stop the spread of the virus when you cough or sneeze. · If you have to leave home, avoid crowds and try to stay at least 6 feet away from other people. · Avoid contact with pets and other animals. · Cover your mouth and nose with a tissue when you cough or sneeze. Then throw it in the trash right away. · Wash your hands often, especially after you cough or sneeze. Use soap and water, and scrub for at least 20 seconds. If soap and water aren't available, use an alcohol-based hand . · Don't share personal household items. These include bedding, towels, cups and glasses, and eating utensils. · [address] in the warmest water allowed for the fabric type, and dry it completely. It's okay to wash other people's laundry with yours. · Clean and disinfect your home every day. Use household  and disinfectant wipes or sprays. Take special care to clean things that you grab with your hands. These include doorknobs, remote controls, phones, and handles on your refrigerator and microwave. And don't forget countertops, tabletops, bathrooms, and computer keyboards. When you can end self-isolation  · If you know or suspect that you have COVID-19, stay in self-isolation until:  ? You haven't had a fever for 3 days, and  ? Your symptoms have improved, and  ? It's been at least 10 days since your symptoms started. · Talk to your doctor about whether you also need testing, especially if you have a weakened immune system. When should you call for help? Call 911 anytime you think you may need emergency care. For example, call if you have life-threatening symptoms, such as:    · You have severe trouble breathing.  (You can't talk at all.)     · You have constant chest pain or pressure.     · You are severely dizzy or lightheaded.     · You are confused or can't think clearly.     · Your face and lips have a blue color.     · You pass out (lose consciousness) or are very hard to wake up. Call your doctor now or seek immediate medical care if:    · You have moderate trouble breathing. (You can't speak a full sentence.)     · You are coughing up blood (more than about 1 teaspoon).     · You have signs of low blood pressure. These include feeling lightheaded; being too weak to stand; and having cold, pale, clammy skin. Watch closely for changes in your health, and be sure to contact your doctor if:    · Your symptoms get worse.     · You are not getting better as expected. Call before you go to the doctor's office. Follow their instructions. And wear a cloth face cover. Current as of: July 10, 2020               Content Version: 12.6  © 2006-2020 [employer]. Care instructions adapted under license by [facility] ([facility]). If you have questions about a medical condition or this instruction, always ask your healthcare professional. [place] [address] any warranty or liability for your use of this information. Patient Education        Learning About Coronavirus [phone])  Coronavirus [phone]): Overview  What is coronavirus (IHBYP-92)? The coronavirus disease (COVID-19) is caused by a virus. It is an illness that was first found in December 2019. It has since spread worldwide. The virus can cause fever, cough, and trouble breathing. In severe cases, it can cause pneumonia and make it hard to breathe without help. It can cause death. This virus spreads person-to-person through droplets from coughing and sneezing. It can also spread when you are close to someone who is infected. And it can spread when you touch something that has the virus on it, such as a doorknob or a tabletop. Coronaviruses are a large group of viruses. They cause the common cold. They also cause more serious illnesses like Middle East respiratory syndrome (MERS) and severe acute respiratory syndrome (SARS). COVID-19 is caused by a novel coronavirus. That means it's a new type that has not been seen in people before. How is COVID-19 treated? Mild illness can be treated at home, but more serious illness needs to be treated in the hospital. Treatment may include medicines to reduce symptoms, plus breathing support such as oxygen therapy or a ventilator. Other treatments, such as antiviral medicines, may help people who have COVID-19. What can you do to protect yourself from COVID-19? The best way to protect yourself from getting sick is to:  · Avoid areas where there is an outbreak. · Avoid contact with people who may be infected. · Avoid crowds and try to stay at least 6 feet away from other people. · Wash your hands often, especially after you cough or sneeze. Use soap and water, and scrub for at least 20 seconds. If soap and water aren't available, use an alcohol-based hand . · Avoid touching your mouth, nose, and eyes. What can you do to avoid spreading the virus to others? To help avoid spreading the virus to others:  · Wash your hands often with soap or alcohol-based hand sanitizers. · Cover your mouth with a tissue when you cough or sneeze. Then throw the tissue in the trash. · Use a disinfectant to clean things that you touch often. These include doorknobs, remote controls, phones, and handles on your refrigerator and microwave. And don't forget countertops, tabletops, bathrooms, and computer keyboards. · Wear a cloth face cover if you have to go to public areas. If you know or suspect that you have COVID-19:  · Stay home. Don't go to school, work, or public areas. And don't use public transportation, ride-shares, or taxis unless you have no choice. · Leave your home only if you need to get medical care or testing. But call the doctor's office first so they know you're coming. And wear a face cover. · Limit contact with people in your home. If possible, stay in a separate bedroom and use a separate bathroom.   · Wear a face cover whenever you're around other people. It can help stop the spread of the virus when you cough or sneeze. · Clean and disinfect your home every day. Use household  and disinfectant wipes or sprays. Take special care to clean things that you grab with your hands. · Self-isolate until it's safe to be around others again. ? If you have symptoms, it's safe when you haven't had a fever for 3 days and your symptoms have improved and it's been at least 10 days since your symptoms started. ? If you were exposed to the virus but don't have symptoms, it's safe to be around others 14 days after exposure. ? Talk to your doctor about whether you also need testing, especially if you have a weakened immune system. When to call for help  Call 911 anytime you think you may need emergency care. For example, call if:  · You have severe trouble breathing. (You can't talk at all.)  · You have constant chest pain or pressure. · You are severely dizzy or lightheaded. · You are confused or can't think clearly. · Your face and lips have a blue color. · You passed out (lost consciousness) or are very hard to wake up. Call your doctor now if you develop symptoms such as:  · Shortness of breath. · Fever. · Cough. If you need to get care, call ahead to the doctor's office for instructions before you go. Make sure you wear a face cover to prevent exposing other people to the virus. Where can you get the latest information? The following health organizations are tracking and studying this virus. Their websites contain the most up-to-date information. [place] also learn what to do if you think you may have been exposed to the virus. · U.S. Centers for Disease Control and Prevention (CDC): The CDC provides updated news about the disease and travel advice. The website also tells you how to prevent the spread of infection. www.cdc.gov  · World Health Organization [facility]): WHO offers information about the virus outbreaks.  WHO also has travel advice. www.who.int  Current as of: July 10, 2020               Content Version: 12.6  © 2006-2020 [employer]. Care instructions adapted under license by [facility] ([facility]). If you have questions about a medical condition or this instruction, always ask your healthcare professional. [place] [address] any warranty or liability for your use of this information. Patient given educational materials- see patient instructions. Discussed use, benefit, and side effects of prescribedmedications. All patient questions answered. Pt voiced understanding.        Electronically signed by Ashton Essex, APRN - CNP on 11/25/2020 at 8:11 AM

## 2020-11-25 NOTE — PATIENT INSTRUCTIONS
Plenty of fluids- push fluids  Rest  OTC Tylenol or Motrin as needed  Stay home and stay in since your COVID test is pending  Follow up with PCP or return to Urgent Care for worsening or unresolved symptoms. Patient Education        Tonsillitis: Care Instructions  Your Care Instructions     Tonsillitis is an infection of the tonsils that is caused by bacteria or a virus. The tonsils are in the back of the throat and are part of the immune system. Tonsillitis typically lasts from a few days up to a couple of weeks. Tonsillitis caused by a virus goes away on its own. Tonsillitis caused by the bacteria that causes strep throat is treated with antibiotics. You and your doctor may consider surgery to remove the tonsils (tonsillectomy) if you have serious complications or repeat infections. Follow-up care is a key part of your treatment and safety. Be sure to make and go to all appointments, and call your doctor if you are having problems. It's also a good idea to know your test results and keep a list of the medicines you take. How can you care for yourself at home? · If your doctor prescribed antibiotics, take them as directed. Do not stop taking them just because you feel better. You need to take the full course of antibiotics. · Gargle with warm salt water. This helps reduce swelling and relieve discomfort. Gargle once an hour with 1 teaspoon of salt mixed in 8 fluid ounces of warm water. · Take an over-the-counter pain medicine, such as acetaminophen (Tylenol), ibuprofen (Advil, Motrin), or naproxen (Aleve). Be safe with medicines. Read and follow all instructions on the label. No one younger than 20 should take aspirin. It has been linked to Reye syndrome, a serious illness. · Be careful when taking over-the-counter cold or flu medicines and Tylenol at the same time. Many of these medicines have acetaminophen, which is Tylenol.  Read the labels to make sure that you are not taking more than the recommended dose. Too much acetaminophen (Tylenol) can be harmful. · Try an over-the-counter throat spray to relieve throat pain. · Drink plenty of fluids. Fluids may help soothe an irritated throat. Drink warm or cool liquids (whichever feels better). These include tea, soup, and juice. · Do not smoke, and avoid secondhand smoke. Smoking can make tonsillitis worse. If you need help quitting, talk to your doctor about stop-smoking programs and medicines. These can increase your chances of quitting for good. · Use a vaporizer or humidifier to add moisture to your bedroom. Follow the directions for cleaning the machine. When should you call for help? Call your doctor now or seek immediate medical care if:    · Your pain gets worse on one side of your throat.     · You have a new or higher fever.     · You notice changes in your voice.     · You have trouble opening your mouth.     · You have any trouble breathing.     · You have much more trouble swallowing.     · You have a fever with a stiff neck or a severe headache.     · You are sensitive to light or feel very sleepy or confused. Watch closely for changes in your health, and be sure to contact your doctor if:    · You do not get better after 2 days. Where can you learn more? Go to https://Gennio.Helmi Technologies. org and sign in to your [employer] account. Enter O243 in the OrderingOnlineSystem.com box to learn more about \"Tonsillitis: Care Instructions. \"     If you do not have an account, please click on the \"Sign Up Now\" link. Current as of: April 15, 2020               Content Version: 12.6  © 4320-9959 [employer], Incorporated. Care instructions adapted under license by [facility] ([facility]). If you have questions about a medical condition or this instruction, always ask your healthcare professional. [place] [address] any warranty or liability for your use of this information.          Patient Education        Learning About Fever  What is a fever? A fever is a high body temperature. It's one way your body fights being sick. A fever shows that the body is responding to infection or other illnesses, both minor and severe. A fever is a symptom, not an illness by itself. A fever can be a sign that you are ill, but most fevers are not caused by a serious problem. You may have a fever with a minor illness, such as a cold. But sometimes a very serious infection may cause little or no fever. It is important to look at other symptoms, other conditions you have, and how you feel in general. In children, notice how they act and see what symptoms they complain of. What is a normal body temperature? A normal body temperature is about 98. 6ºF. Some people have a normal temperature that is a little higher or a little lower than this. Your temperature may be a little lower in the morning than it is later in the day. It may go up during hot weather or when you exercise, wear heavy clothes, or take a hot bath. Your temperature may also be different depending on how you take it. A temperature taken in the mouth (oral) or under the arm may be a little lower than your core temperature (rectal). What is a fever temperature? A core temperature of 100.4°F or above is considered a fever. What can cause a fever? A fever may be caused by:  · Infections. This is the most common cause of a fever. Examples of infections that can cause a fever include the flu, a kidney infection, or pneumonia. · Some medicines. · Severe trauma or injury, such as a heart attack, stroke, heatstroke, or burns. · Other medical conditions, such as arthritis and some cancers. How can you treat a fever at home? · Ask your doctor if you can take an over-the-counter pain medicine, such as acetaminophen (Tylenol), ibuprofen (Advil, Motrin), or naproxen (Aleve). Be safe with medicines. Read and follow all instructions on the label. · To prevent dehydration, drink plenty of fluids. Choose water and other caffeine-free clear liquids until you feel better. If you have kidney, heart, or liver disease and have to limit fluids, talk with your doctor before you increase the amount of fluids you drink. Follow-up care is a key part of your treatment and safety. Be sure to make and go to all appointments, and call your doctor if you are having problems. It's also a good idea to know your test results and keep a list of the medicines you take. Where can you learn more? Go to https://Compare And Share.Yappn. org and sign in to your Transfluent account. Enter P530 in the Cash Check Card box to learn more about \"Learning About Fever. \"     If you do not have an account, please click on the \"Sign Up Now\" link. Current as of: June 26, 2019               Content Version: 12.6  © 2772-5202 [employer], DoesThatMakeSense.com. Care instructions adapted under license by [facility] ([facility]). If you have questions about a medical condition or this instruction, always ask your healthcare professional. [place] [address] any warranty or liability for your use of this information. Patient Education        Coronavirus ([id number]): Care Instructions  Overview  The coronavirus disease (COVID-19) is caused by a virus. Symptoms may include a fever, a cough, and shortness of breath. It mainly spreads person-to-person through droplets from coughing and sneezing. The virus also can spread when people are in close contact with someone who is infected. Most people have mild symptoms and can take care of themselves at home. If their symptoms get worse, they may need care in a hospital. Treatment may include medicines to reduce symptoms, plus breathing support such as oxygen therapy or a ventilator. It's important to not spread the virus to others. If you have COVID-19, wear a face cover anytime you are around other people. You need to isolate yourself while you are sick.  Leave your home only if you need to get medical care or testing. Follow-up care is a key part of your treatment and safety. Be sure to make and go to all appointments, and call your doctor if you are having problems. It's also a good idea to know your test results and keep a list of the medicines you take. How can you care for yourself at home? · Get extra rest. It can help you feel better. · Drink plenty of fluids. This helps replace fluids lost from fever. Fluids also help ease a scratchy throat. Water, soup, fruit juice, and hot tea with lemon are good choices. · Take acetaminophen (such as Tylenol) to reduce a fever. It may also help with muscle aches. Read and follow all instructions on the label. · Use petroleum jelly on sore skin. This can help if the skin around your nose and lips becomes sore from rubbing a lot with tissues. Tips for self-isolation  · Limit contact with people in your home. If possible, stay in a separate bedroom and use a separate bathroom. · Wear a cloth face cover when you are around other people. It can help stop the spread of the virus when you cough or sneeze. · If you have to leave home, avoid crowds and try to stay at least 6 feet away from other people. · Avoid contact with pets and other animals. · Cover your mouth and nose with a tissue when you cough or sneeze. Then throw it in the trash right away. · Wash your hands often, especially after you cough or sneeze. Use soap and water, and scrub for at least 20 seconds. If soap and water aren't available, use an alcohol-based hand . · Don't share personal household items. These include bedding, towels, cups and glasses, and eating utensils. · [address] in the warmest water allowed for the fabric type, and dry it completely. It's okay to wash other people's laundry with yours. · Clean and disinfect your home every day. Use household  and disinfectant wipes or sprays. Take special care to clean things that you grab with your hands. These include doorknobs, remote controls, phones, and handles on your refrigerator and microwave. And don't forget countertops, tabletops, bathrooms, and computer keyboards. When you can end self-isolation  · If you know or suspect that you have COVID-19, stay in self-isolation until:  ? You haven't had a fever for 3 days, and  ? Your symptoms have improved, and  ? It's been at least 10 days since your symptoms started. · Talk to your doctor about whether you also need testing, especially if you have a weakened immune system. When should you call for help? Call 911 anytime you think you may need emergency care. For example, call if you have life-threatening symptoms, such as:    · You have severe trouble breathing. (You can't talk at all.)     · You have constant chest pain or pressure.     · You are severely dizzy or lightheaded.     · You are confused or can't think clearly.     · Your face and lips have a blue color.     · You pass out (lose consciousness) or are very hard to wake up. Call your doctor now or seek immediate medical care if:    · You have moderate trouble breathing. (You can't speak a full sentence.)     · You are coughing up blood (more than about 1 teaspoon).     · You have signs of low blood pressure. These include feeling lightheaded; being too weak to stand; and having cold, pale, clammy skin. Watch closely for changes in your health, and be sure to contact your doctor if:    · Your symptoms get worse.     · You are not getting better as expected. Call before you go to the doctor's office. Follow their instructions. And wear a cloth face cover. Current as of: July 10, 2020               Content Version: 12.6  © 6402-3790 [employer], Incorporated. Care instructions adapted under license by [facility] ([facility]).  If you have questions about a medical condition or this instruction, always ask your healthcare professional. [facility] any warranty or liability for your the virus to others:  · Wash your hands often with soap or alcohol-based hand sanitizers. · Cover your mouth with a tissue when you cough or sneeze. Then throw the tissue in the trash. · Use a disinfectant to clean things that you touch often. These include doorknobs, remote controls, phones, and handles on your refrigerator and microwave. And don't forget countertops, tabletops, bathrooms, and computer keyboards. · Wear a cloth face cover if you have to go to public areas. If you know or suspect that you have COVID-19:  · Stay home. Don't go to school, work, or public areas. And don't use public transportation, ride-shares, or taxis unless you have no choice. · Leave your home only if you need to get medical care or testing. But call the doctor's office first so they know you're coming. And wear a face cover. · Limit contact with people in your home. If possible, stay in a separate bedroom and use a separate bathroom. · Wear a face cover whenever you're around other people. It can help stop the spread of the virus when you cough or sneeze. · Clean and disinfect your home every day. Use household  and disinfectant wipes or sprays. Take special care to clean things that you grab with your hands. · Self-isolate until it's safe to be around others again. ? If you have symptoms, it's safe when you haven't had a fever for 3 days and your symptoms have improved and it's been at least 10 days since your symptoms started. ? If you were exposed to the virus but don't have symptoms, it's safe to be around others 14 days after exposure. ? Talk to your doctor about whether you also need testing, especially if you have a weakened immune system. When to call for help  Call 911 anytime you think you may need emergency care. For example, call if:  · You have severe trouble breathing. (You can't talk at all.)  · You have constant chest pain or pressure. · You are severely dizzy or lightheaded.   · You are confused or can't think clearly. · Your face and lips have a blue color. · You passed out (lost consciousness) or are very hard to wake up. Call your doctor now if you develop symptoms such as:  · Shortness of breath. · Fever. · Cough. If you need to get care, call ahead to the doctor's office for instructions before you go. Make sure you wear a face cover to prevent exposing other people to the virus. Where can you get the latest information? The following health organizations are tracking and studying this virus. Their websites contain the most up-to-date information. Kimmielandry Chahal[place]li also learn what to do if you think you may have been exposed to the virus. · U.S. Centers for Disease Control and Prevention (CDC): The CDC provides updated news about the disease and travel advice. The website also tells you how to prevent the spread of infection. www.cdc.gov  · World Health Organization [facility]): WHO offers information about the virus outbreaks. WHO also has travel advice. www.who.int  Current as of: July 10, 2020               Content Version: 12.6  © 1590-2487 [employer], Incorporated. Care instructions adapted under license by [facility] ([facility]). If you have questions about a medical condition or this instruction, always ask your healthcare professional. [address] any warranty or liability for your use of this information.

## 2020-11-27 LAB — SARS-COV-2, NAA: NOT DETECTED

## 2020-11-28 ENCOUNTER — TELEPHONE (OUTPATIENT)
Dept: URGENT CARE | Age: 60
End: 2020-11-28

## 2020-11-28 RX ORDER — AMOXICILLIN 500 MG/1
500 CAPSULE ORAL 2 TIMES DAILY
Qty: 20 CAPSULE | Refills: 0 | Status: SHIPPED | OUTPATIENT
Start: 2020-11-28 | End: 2020-12-04

## 2020-11-28 NOTE — PROGRESS NOTES
Please let patient know that I am sending her in Amoxicillin and will treat her for bacterial pharyngitis. It will be twice a day for 10 days. It is sent to [facility] in [place].

## 2020-12-03 RX ORDER — INSULIN ASPART 100 [IU]/ML
INJECTION, SOLUTION INTRAVENOUS; SUBCUTANEOUS
Qty: 9 ML | Refills: 1 | Status: SHIPPED | OUTPATIENT
Start: 2020-12-03 | End: 2021-03-28

## 2020-12-04 ENCOUNTER — TELEMEDICINE (OUTPATIENT)
Dept: FAMILY MEDICINE CLINIC | Age: 60
End: 2020-12-04
Payer: COMMERCIAL

## 2020-12-04 PROCEDURE — 99213 OFFICE O/P EST LOW 20 MIN: CPT | Performed by: CLINICAL NURSE SPECIALIST

## 2020-12-04 RX ORDER — AMOXICILLIN AND CLAVULANATE POTASSIUM 875; 125 MG/1; MG/1
1 TABLET, FILM COATED ORAL 2 TIMES DAILY
Qty: 20 TABLET | Refills: 0 | Status: SHIPPED | OUTPATIENT
Start: 2020-12-04 | End: 2020-12-14

## 2020-12-04 ASSESSMENT — ENCOUNTER SYMPTOMS
CHEST TIGHTNESS: 0
NAUSEA: 1
SINUS PRESSURE: 0
WHEEZING: 0
FACIAL SWELLING: 0
TROUBLE SWALLOWING: 1
EYE PAIN: 0
COUGH: 0
RHINORRHEA: 0
SHORTNESS OF BREATH: 0
VOMITING: 0
EYE DISCHARGE: 0
SORE THROAT: 1
DIARRHEA: 1

## 2020-12-04 NOTE — PROGRESS NOTES
SUBJECTIVE:  Martin Bustos is a 62 y.o. who presents today for Pharyngitis; Fever; Nausea; and Diarrhea      HPI    VIRTUAL VISIT VIA TELEPHONE    _______________________________________________________________    Due to COVID 23 outbreak, patient's office visit was converted to telephone virtual visit. Patient was contacted and agreed to proceed with a telephone virtual visit. The risks and benefits of converting to a telephone virtual visit were discussed in light of the current infectious disease epidemic. Patient also understood that insurance coverage and co-pays are up to their individual insurance plans. Mrs Mehdi Cronin was evaluated today via [facility]. She has been dealing with a sore throat for 2 weeks. Her symptoms first started with fatigue, sore throat, low grade temp, chills, nausea and diarrhea. She went to [facility] for COVID testing, she had negative results. She returned to  2 days later with negative strep and flu. Her symptoms continued and was later started on amoxicillin one week ago today. She has been taking amoxicillin but she continues with sore throat, especially on the right side with tonsillar swelling. She has pain and swelling to her right tonsillar node region and into her right ear as well.        Past Medical History:   Diagnosis Date    Allergic rhinitis     Ankle fracture     3 year ago; increasing difficulty walking; has bone fragments    Arthritis     sees dr. Ela Temple as ambulation aid     right foot per dr. Aranza Gaffney CPAP (continuous positive airway pressure) dependence     12cm    Diabetes ([address]     Hiatal hernia 9/27/2017    History of kidney cancer     Hyperlipidemia     Hypertension     Hypothyroidism     Murmur     Obesity     ZOILA (obstructive sleep apnea)     AHI:  28.5 per PSG, 9/2014    PLMD (periodic limb movement disorder)     Renal mass     cat scan done 9/13/18; to see dr. Colin Vargas coming up    Thyroid disease     Type 2 diabetes mellitus without complication [address] [address]     Type II or unspecified type diabetes mellitus without mention of complication, not stated as uncontrolled      Past Surgical History:   Procedure Laterality Date    BREAST BIOPSY Right 2012    CARPAL TUNNEL RELEASE      bilateral    CHOLECYSTECTOMY      HYSTERECTOMY      AR LAP, RADICAL NEPHRECTOMY Left 10/3/2018    NEPHRECTOMY LAPAROSCOPIC HAND ASSISTED performed by Kaur Howell MD at [facility] LAP,CHOLECYSTECTOMY/GRAPH N/A 10/31/2017    CHOLECYSTECTOMY LAPAROSCOPIC performed by Amarilys Cummings MD at [place] [address] N/A 9/22/2020    VENTRAL HERNIA REPAIR WITH MESH performed by [facility]ys Cummings [facility] at [facility] History   Problem Relation Age of Onset    Diabetes Mother     Cancer Mother         breast    Heart Disease Father     Cancer Father         colon, over 79    Cancer Sister 43        breast, negative genetic testing    Cancer Maternal Grandmother 76        breast    Diabetes Maternal Grandmother     Heart Attack Maternal Grandfather     Hypertension Other     Elevated Lipids Other     Coronary Art Dis Other     Heart Attack Paternal Grandmother      Social History     Tobacco Use    Smoking status: Never Smoker    Smokeless tobacco: Never Used   Substance Use Topics    Alcohol use: No     Current Outpatient Medications   Medication Sig Dispense Refill    amoxicillin-clavulanate (AUGMENTIN) 875-125 MG per tablet Take 1 tablet by mouth 2 times daily for 10 days 20 tablet 0    Insulin Aspart FlexPen 100 UNIT/ML SOPN INJECT 6 UNITS UNDER THE SKIN WITH BREAKFAST, 6 UNITS WITH LUNCH AND 10 UNITS WITH DINNER 9 mL 1    pantoprazole (PROTONIX) 40 MG tablet Take 1 tablet by mouth daily 90 tablet 3    atenolol (TENORMIN) 50 MG tablet TAKE ONE-HALF TABLET BY MOUTH EVERY MORNING AND ONE-HALF TABLET EVERY EVENING.  90 tablet 3    LEVEMIR FLEXTOUCH 100 UNIT/ML injection pen INJECT 46 UNITS UNDER THE SKIN EVERY NIGHT 15 mL 3    levothyroxine (SYNTHROID) 137 MCG tablet Take 1 tablet by mouth Daily 90 tablet 1    vitamin D 25 MCG (1000 UT) CAPS Take 1 capsule by mouth daily      atorvastatin (LIPITOR) 10 MG tablet TAKE ONE-HALF TABLET BY MOUTH DAILY (Patient taking differently: Take 10 mg by mouth daily ) 15 tablet 2    CONTOUR NEXT TEST strip TEST FIVE TIMES DAILY 500 strip 5    dilTIAZem (CARTIA XT) 180 MG extended release capsule TAKE 1 CAPSULE BY MOUTH DAILY (Patient taking differently: Take 180 mg by mouth daily TAKE 1 CAPSULE BY MOUTH DAILY) 90 capsule 3    Insulin Pen Needle (TRUEPLUS PEN NEEDLES) 31G X 5 MM MISC Inject 1 each into the skin 4 times daily 400 each 3    Misc. Devices MISC Diabetic Shotes ICD 10: E11.9 1 Device 0    triamcinolone (KENALOG) 0.1 % cream Apply topically 2 times daily. 45 g 1    lisinopril (PRINIVIL;ZESTRIL) 5 MG tablet Take 1 tablet by mouth daily Indications: High Blood Pressure Disorder (Patient taking differently: Take 20 mg by mouth daily Indications: High Blood Pressure Disorder ) 30 tablet 5    Multiple Vitamins-Minerals (MULTIPLE VITAMINS/WOMENS PO) Take 1 tablet by mouth daily       fexofenadine (ALLEGRA ALLERGY) 180 MG tablet Take 180 mg by mouth daily      Omega-3 Fatty Acids (FISH OIL) 1000 MG CAPS Take 1,000 mg by mouth 2 times daily       aspirin 81 MG tablet Take 81 mg by mouth daily 3 days a wk       No current facility-administered medications for this visit. Allergies   Allergen Reactions    Adhesive Tape Other (See Comments)     Post op incision infection    Dermabond Other (See Comments)     Post op incision infection       Review of Systems   Constitutional: Positive for activity change, appetite change, chills, fatigue and fever. HENT: Positive for ear pain, sore throat and trouble swallowing. Negative for congestion, ear discharge, facial swelling, hearing loss, postnasal drip, rhinorrhea and sinus pressure.     Eyes: Negative for pain, discharge and scheduled.

## 2020-12-16 RX ORDER — ATORVASTATIN CALCIUM 10 MG/1
TABLET, FILM COATED ORAL
Qty: 15 TABLET | Refills: 2 | Status: SHIPPED | OUTPATIENT
Start: 2020-12-16 | End: 2021-03-12

## 2020-12-21 DIAGNOSIS — E87.5 HYPERKALEMIA: ICD-10-CM

## 2020-12-21 LAB
ANION GAP SERPL CALCULATED.3IONS-SCNC: 8 MMOL/L (ref 7–19)
BUN BLDV-MCNC: 15 MG/DL (ref 6–20)
CALCIUM SERPL-MCNC: 9.5 MG/DL (ref 8.6–10)
CHLORIDE BLD-SCNC: 104 MMOL/L (ref 98–111)
CO2: 28 MMOL/L (ref 22–29)
CREAT SERPL-MCNC: 1.2 MG/DL (ref 0.5–0.9)
GFR AFRICAN AMERICAN: 55
GFR NON-AFRICAN AMERICAN: 46
GLUCOSE BLD-MCNC: 171 MG/DL (ref 74–109)
POTASSIUM SERPL-SCNC: 4.5 MMOL/L (ref 3.5–5)
SODIUM BLD-SCNC: 140 MMOL/L (ref 136–145)

## 2021-01-18 ENCOUNTER — OFFICE VISIT (OUTPATIENT)
Dept: FAMILY MEDICINE CLINIC | Age: 61
End: 2021-01-18
Payer: COMMERCIAL

## 2021-01-18 ENCOUNTER — HOSPITAL ENCOUNTER (OUTPATIENT)
Dept: GENERAL RADIOLOGY | Age: 61
Discharge: HOME | End: 2021-01-18
Payer: COMMERCIAL

## 2021-01-18 VITALS
BODY MASS INDEX: 45.99 KG/M2 | OXYGEN SATURATION: 98 % | SYSTOLIC BLOOD PRESSURE: 138 MMHG | WEIGHT: 293 LBS | TEMPERATURE: 97.3 F | HEART RATE: 62 BPM | HEIGHT: 67 IN | DIASTOLIC BLOOD PRESSURE: 86 MMHG

## 2021-01-18 DIAGNOSIS — M25.572 CHRONIC PAIN OF LEFT ANKLE: Primary | ICD-10-CM

## 2021-01-18 DIAGNOSIS — G89.29 CHRONIC PAIN OF LEFT ANKLE: Primary | ICD-10-CM

## 2021-01-18 DIAGNOSIS — G89.29 CHRONIC PAIN OF LEFT ANKLE: ICD-10-CM

## 2021-01-18 DIAGNOSIS — M25.572 CHRONIC PAIN OF LEFT ANKLE: ICD-10-CM

## 2021-01-18 PROCEDURE — 99213 OFFICE O/P EST LOW 20 MIN: CPT | Performed by: FAMILY MEDICINE

## 2021-01-18 PROCEDURE — 73620 X-RAY EXAM OF FOOT: CPT

## 2021-01-18 PROCEDURE — 73600 X-RAY EXAM OF ANKLE: CPT

## 2021-01-18 ASSESSMENT — PATIENT HEALTH QUESTIONNAIRE - PHQ9
SUM OF ALL RESPONSES TO PHQ9 QUESTIONS 1 & 2: 0
SUM OF ALL RESPONSES TO PHQ QUESTIONS 1-9: 0
SUM OF ALL RESPONSES TO PHQ QUESTIONS 1-9: 0

## 2021-01-18 NOTE — PROGRESS NOTES
[facility] PHYSICIAN SERVICES  [facility] FAMILY MEDICINE  [id number] [place] [address] [address] [address]  Dept: [phone]  Dept Fax: [phone]  Loc: [phone]    Raj Mccann is a 62 y.o. female who presents today for her medical conditions/complaints as noted below. Raj Mccann is here for Foot Pain (left foot and ankle pain, maybe heel, pain at night off and on, no injury )        HPI:   CC: Here today to discuss the following:    Left foot and ankle pain for the past few weeks. The pain is off an on. She did not notice any specific swelling or redness. No recent injury. The pain generally describes a dull ache. She is able to weight-bear without issues.       HPI    Past Medical History:   Diagnosis Date    Allergic rhinitis     Ankle fracture     3 year ago; increasing difficulty walking; has bone fragments    Arthritis     sees dr. Muriel Arriaza as ambulation aid     right foot per dr. Purvis Catching CPAP (continuous positive airway pressure) dependence     12cm    Diabetes ([facility] [address]     Hiatal hernia 9/27/2017    History of kidney cancer     Hyperlipidemia     Hypertension     Hypothyroidism     Murmur     Obesity     ZOILA (obstructive sleep apnea)     AHI:  28.5 per PSG, 9/2014    PLMD (periodic limb movement disorder)     Renal mass     cat scan done 9/13/18; to see dr. Vero Gallegos coming up    Thyroid disease     Type 2 diabetes mellitus without complication ([facility] [address]     Type II or unspecified type diabetes mellitus without mention of complication, not stated as uncontrolled       Past Surgical History:   Procedure Laterality Date    BREAST BIOPSY Right 2012    CARPAL TUNNEL RELEASE      bilateral    CHOLECYSTECTOMY      HYSTERECTOMY      [place] LAP, RADICAL NEPHRECTOMY Left 10/3/2018    NEPHRECTOMY LAPAROSCOPIC HAND ASSISTED performed by Paulette Oro MD at [facility] 43 LAP,CHOLECYSTECTOMY/GRAPH N/A 10/31/2017 CHOLECYSTECTOMY LAPAROSCOPIC performed by Dyasi Verduzco MD at [facility] N/A 9/22/2020    VENTRAL HERNIA REPAIR WITH MESH performed by Daysi Verdu[facility]co [facility] History   Problem Relation Age of Onset    Diabetes Mother     Cancer Mother         breast    Heart Disease Father     Cancer Father         colon, over 79    Cancer Sister 43        breast, negative genetic testing    Cancer Maternal Grandmother 76        breast    Diabetes Maternal Grandmother     Heart Attack Maternal Grandfather     Hypertension Other     Elevated Lipids Other     Coronary Art Dis Other     Heart Attack Paternal Grandmother        Social History     Tobacco Use    Smoking status: Never Smoker    Smokeless tobacco: Never Used   Substance Use Topics    Alcohol use: No     Current Outpatient Medications   Medication Sig Dispense Refill    atorvastatin (LIPITOR) 10 MG tablet TAKE ONE-HALF TABLET BY MOUTH DAILY 15 tablet 2    Insulin Aspart FlexPen 100 UNIT/ML SOPN INJECT 6 UNITS UNDER THE SKIN WITH BREAKFAST, 6 UNITS WITH LUNCH AND 10 UNITS WITH DINNER 9 mL 1    pantoprazole (PROTONIX) 40 MG tablet Take 1 tablet by mouth daily 90 tablet 3    atenolol (TENORMIN) 50 MG tablet TAKE ONE-HALF TABLET BY MOUTH EVERY MORNING AND ONE-HALF TABLET EVERY EVENING. 90 tablet 3    LEVEMIR FLEXTOUCH 100 UNIT/ML injection pen INJECT 46 UNITS UNDER THE SKIN EVERY NIGHT 15 mL 3    levothyroxine (SYNTHROID) 137 MCG tablet Take 1 tablet by mouth Daily 90 tablet 1    vitamin D 25 MCG (1000 UT) CAPS Take 1 capsule by mouth daily      CONTOUR NEXT TEST strip TEST FIVE TIMES DAILY 500 strip 5    dilTIAZem (CARTIA XT) 180 MG extended release capsule TAKE 1 CAPSULE BY MOUTH DAILY (Patient taking differently: Take 180 mg by mouth daily TAKE 1 CAPSULE BY MOUTH DAILY) 90 capsule 3    Misc.  Devices MISC Diabetic Shotes ICD 10: E11.9 1 Device 0  triamcinolone (KENALOG) 0.1 % cream Apply topically 2 times daily. 45 g 1    lisinopril (PRINIVIL;ZESTRIL) 5 MG tablet Take 1 tablet by mouth daily Indications: High Blood Pressure Disorder (Patient taking differently: Take 20 mg by mouth daily Indications: High Blood Pressure Disorder ) 30 tablet 5    Multiple Vitamins-Minerals (MULTIPLE VITAMINS/WOMENS PO) Take 1 tablet by mouth daily       fexofenadine (ALLEGRA ALLERGY) 180 MG tablet Take 180 mg by mouth daily      Omega-3 Fatty Acids (FISH OIL) 1000 MG CAPS Take 1,000 mg by mouth 2 times daily       aspirin 81 MG tablet Take 81 mg by mouth daily 3 days a wk      Insulin Pen Needle (TRUEPLUS PEN NEEDLES) 31G X 5 MM MISC Inject 1 each into the skin 4 times daily 400 each 3     No current facility-administered medications for this visit. Allergies   Allergen Reactions    Adhesive Tape Other (See Comments)     Post op incision infection    Dermabond Other (See Comments)     Post op incision infection       Health Maintenance   Topic Date Due    Hepatitis C screen  1960    Diabetic foot exam  02/28/1971    HIV screen  02/28/1976    Hepatitis B vaccine (1 of 3 - Risk 3-dose series) 02/28/1980    DTaP/Tdap/Td vaccine (1 - Tdap) 02/28/1980    Shingles Vaccine (1 of 2) 02/28/2011    Diabetic retinal exam  01/22/2020    Colon cancer screen colonoscopy  07/18/2021    Diabetic microalbuminuria test  08/14/2021    A1C test (Diabetic or Prediabetic)  11/17/2021    Lipid screen  11/17/2021    Potassium monitoring  12/21/2021    Creatinine monitoring  12/21/2021    Breast cancer screen  01/17/2022    Cervical cancer screen  04/08/2024    Flu vaccine  Completed    Pneumococcal 0-64 years Vaccine  Completed    Hepatitis A vaccine  Aged Out    Hib vaccine  Aged Out    Meningococcal (ACWY) vaccine  Aged Out       Subjective:      Review of Systems    Review of Systems   Constitutional: Negative for chills and fever. - XR ANKLE LEFT (2 VIEWS); Future    Discussed low purine diet. Return if symptoms worsen or fail to improve. Discussed use, benefit, and side effects of prescribed medications. All patient questions answered. Pt voiced understanding. Reviewed health maintenance. Instructedto continue current medications, diet and exercise. Patient agreed with treatmentplan. Follow up as directed. Note dictated using Dragon Dictation software  Sometimes this dictation software makes erroneous transcriptions.

## 2021-01-18 NOTE — PATIENT INSTRUCTIONS
We are committed to providing you with the best care possible. In order to help us achieve these goals please remember to bring all medications, herbal products, and over the counter supplements with you to each visit. If your provider has ordered testing for you, please be sure to follow up with our office if you have not received results within 7 days after the testing took place. *If you receive a survey after visiting one of our offices, please take time to share your experience concerning your physician office visit. These surveys are confidential and no health information about you is shared. We are eager to improve for you and we are counting on your feedback to help make that happen.    _______________________________________________________________    COVID 19 Information:  [email or web address]  · Click \"learn more\" on the red banner at the top for Coronavirus Updates  · Click AQLBO-43 Vaccine Resources  · Click [place] DXKAITP-40 Vaccination Information  · If your demographic is eligible (refer to the tier), call the number listed.   · [phone]

## 2021-02-23 DIAGNOSIS — E03.9 PRIMARY HYPOTHYROIDISM: ICD-10-CM

## 2021-02-23 DIAGNOSIS — Z79.4 TYPE 2 DIABETES MELLITUS WITH DIABETIC POLYNEUROPATHY, WITH LONG-TERM CURRENT USE OF INSULIN (HCC): ICD-10-CM

## 2021-02-23 DIAGNOSIS — E11.42 TYPE 2 DIABETES MELLITUS WITH DIABETIC POLYNEUROPATHY, WITH LONG-TERM CURRENT USE OF INSULIN (HCC): ICD-10-CM

## 2021-02-23 DIAGNOSIS — I10 ESSENTIAL HYPERTENSION: ICD-10-CM

## 2021-02-23 LAB
ALBUMIN SERPL-MCNC: 4 G/DL (ref 3.5–5.2)
ALP BLD-CCNC: 80 U/L (ref 35–104)
ALT SERPL-CCNC: 14 U/L (ref 5–33)
ANION GAP SERPL CALCULATED.3IONS-SCNC: 13 MMOL/L (ref 7–19)
AST SERPL-CCNC: 16 U/L (ref 5–32)
BASOPHILS ABSOLUTE: 0.1 K/UL (ref 0–0.2)
BASOPHILS RELATIVE PERCENT: 0.7 % (ref 0–1)
BILIRUB SERPL-MCNC: 0.4 MG/DL (ref 0.2–1.2)
BUN BLDV-MCNC: 19 MG/DL (ref 6–20)
CALCIUM SERPL-MCNC: 9.5 MG/DL (ref 8.6–10)
CHLORIDE BLD-SCNC: 106 MMOL/L (ref 98–111)
CHOLESTEROL, TOTAL: 149 MG/DL (ref 160–199)
CO2: 24 MMOL/L (ref 22–29)
CREAT SERPL-MCNC: 1.5 MG/DL (ref 0.5–0.9)
CREATININE URINE: 109.6 MG/DL (ref 4.2–622)
EOSINOPHILS ABSOLUTE: 0.1 K/UL (ref 0–0.6)
EOSINOPHILS RELATIVE PERCENT: 1.5 % (ref 0–5)
GFR AFRICAN AMERICAN: 43
GFR NON-AFRICAN AMERICAN: 35
GLUCOSE BLD-MCNC: 129 MG/DL (ref 74–109)
HBA1C MFR BLD: 7 % (ref 4–6)
HCT VFR BLD CALC: 40 % (ref 37–47)
HDLC SERPL-MCNC: 52 MG/DL (ref 65–121)
HEMOGLOBIN: 12.7 G/DL (ref 12–16)
IMMATURE GRANULOCYTES #: 0 K/UL
LDL CHOLESTEROL CALCULATED: 77 MG/DL
LYMPHOCYTES ABSOLUTE: 1.9 K/UL (ref 1.1–4.5)
LYMPHOCYTES RELATIVE PERCENT: 23.4 % (ref 20–40)
MCH RBC QN AUTO: 29.2 PG (ref 27–31)
MCHC RBC AUTO-ENTMCNC: 31.8 G/DL (ref 33–37)
MCV RBC AUTO: 92 FL (ref 81–99)
MICROALBUMIN UR-MCNC: 3.5 MG/DL (ref 0–19)
MICROALBUMIN/CREAT UR-RTO: 31.9 MG/G
MONOCYTES ABSOLUTE: 0.4 K/UL (ref 0–0.9)
MONOCYTES RELATIVE PERCENT: 5.3 % (ref 0–10)
NEUTROPHILS ABSOLUTE: 5.6 K/UL (ref 1.5–7.5)
NEUTROPHILS RELATIVE PERCENT: 68.9 % (ref 50–65)
PDW BLD-RTO: 14.5 % (ref 11.5–14.5)
PLATELET # BLD: 228 K/UL (ref 130–400)
PMV BLD AUTO: 11.2 FL (ref 9.4–12.3)
POTASSIUM SERPL-SCNC: 4.8 MMOL/L (ref 3.5–5)
RBC # BLD: 4.35 M/UL (ref 4.2–5.4)
SODIUM BLD-SCNC: 143 MMOL/L (ref 136–145)
T4 FREE: 1.44 NG/DL (ref 0.93–1.7)
TOTAL PROTEIN: 7.7 G/DL (ref 6.6–8.7)
TRIGL SERPL-MCNC: 101 MG/DL (ref 0–149)
TSH SERPL DL<=0.05 MIU/L-ACNC: 1.45 UIU/ML (ref 0.27–4.2)
WBC # BLD: 8.1 K/UL (ref 4.8–10.8)

## 2021-03-02 ENCOUNTER — OFFICE VISIT (OUTPATIENT)
Dept: FAMILY MEDICINE CLINIC | Age: 61
End: 2021-03-02
Payer: COMMERCIAL

## 2021-03-02 DIAGNOSIS — L65.9 ALOPECIA: ICD-10-CM

## 2021-03-02 DIAGNOSIS — E78.00 HYPERCHOLESTEROLEMIA: ICD-10-CM

## 2021-03-02 DIAGNOSIS — K21.9 GASTROESOPHAGEAL REFLUX DISEASE WITHOUT ESOPHAGITIS: ICD-10-CM

## 2021-03-02 DIAGNOSIS — E11.42 TYPE 2 DIABETES MELLITUS WITH DIABETIC POLYNEUROPATHY, WITH LONG-TERM CURRENT USE OF INSULIN (HCC): Primary | ICD-10-CM

## 2021-03-02 DIAGNOSIS — Z79.4 TYPE 2 DIABETES MELLITUS WITH DIABETIC POLYNEUROPATHY, WITH LONG-TERM CURRENT USE OF INSULIN (HCC): Primary | ICD-10-CM

## 2021-03-02 DIAGNOSIS — E03.9 PRIMARY HYPOTHYROIDISM: ICD-10-CM

## 2021-03-02 LAB
FERRITIN: 110.2 NG/ML (ref 13–150)
IRON: 67 UG/DL (ref 37–145)
SEDIMENTATION RATE, ERYTHROCYTE: 41 MM/HR (ref 0–25)
VITAMIN D 25-HYDROXY: 32.5 NG/ML

## 2021-03-02 PROCEDURE — 3051F HG A1C>EQUAL 7.0%<8.0%: CPT | Performed by: FAMILY MEDICINE

## 2021-03-02 PROCEDURE — 99214 OFFICE O/P EST MOD 30 MIN: CPT | Performed by: FAMILY MEDICINE

## 2021-03-02 NOTE — PROGRESS NOTES
[facility] PHYSICIAN SERVICES  [facility] FAMILY MEDICINE  [address] [facility] [address] [address]  Dept: [phone]  Dept Fax: [phone]  Loc: [phone]    Shira Garcia is a 62 y.o. female who presents today for her medical conditions/complaints as noted below. Shira Garcia is here for Follow-up        HPI:   CC: Here today to discuss the following:    Diabetes Mellitus  Has been compliant with medications. No side effects of medications since last visit. No polyuria, polydipsia, or vision changes since last visit. No symptomatic episodes of hypoglycemia.     levemir 46 nits  Mealtime insulin is typically 6 to 10 units of insulin aspart 8    She is also concerned about recent hair loss. She notices it it is uniform hair loss and not patchy. She is had no rashes. No other clear indications. . Hyperlipidemia  Tolerating current cholesterol medication without side effects. No body aches. Attempting to reduce processed sugar and cholesterol from diet. Hypothyroidism  Symptoms are stable. No temperature intolerance, fatigue, or mood disturbance from baseline. Complaint with current medication. Gastroesophageal Reflux Disease  Symptoms currently under control. Medication adequately controls his symptoms. No hematochezia or melena.          HPI    Past Medical History:   Diagnosis Date    Allergic rhinitis     Ankle fracture     3 year ago; increasing difficulty walking; has bone fragments    Arthritis     sees dr. Nichelle Foote as ambulation aid     right foot per dr. Don Easley CPAP (continuous positive airway pressure) dependence     12cm    Diabetes ([address]     Hiatal hernia 9/27/2017    History of kidney cancer     Hyperlipidemia     Hypertension     Hypothyroidism     Murmur     Obesity     ZOILA (obstructive sleep apnea)     AHI:  28.5 per PSG, 9/2014    PLMD (periodic limb movement disorder)     Renal mass     cat scan done 9/13/18; to see  Idalia Tinoco coming up    Thyroid disease     Type 2 diabetes mellitus without complication ([facility] [address]     Type II or unspecified type diabetes mellitus without mention of complication, not stated as uncontrolled       Past Surgical History:   Procedure Laterality Date    BREAST BIOPSY Right 2012    CARPAL TUNNEL RELEASE      bilateral    CHOLECYSTECTOMY      HYSTERECTOMY      [place] LAP, RADICAL NEPHRECTOMY Left 10/3/2018    NEPHRECTOMY LAPAROSCOPIC HAND ASSISTED performed by Kendell Malik MD at [facility] 43 LAP,CHOLECYSTECTOMY/GRAPH N/A 10/31/2017    CHOLECYSTECTOMY LAPAROSCOPIC performed by Yareli Joseph MD at [facility] 41 N/A 9/22/2020    [facility] performed by Yareli Joseph MD at [facility] History   Problem Relation Age of Onset    Diabetes Mother     Cancer Mother         breast    Heart Disease Father     Cancer Father         colon, over 79    Cancer Sister 43        breast, negative genetic testing    Cancer Maternal Grandmother 76        breast    Diabetes Maternal Grandmother     Heart Attack Maternal Grandfather     Hypertension Other     Elevated Lipids Other     Coronary Art Dis Other     Heart Attack Paternal Grandmother        Social History     Tobacco Use    Smoking status: Never Smoker    Smokeless tobacco: Never Used   Substance Use Topics    Alcohol use: No     Current Outpatient Medications   Medication Sig Dispense Refill    atorvastatin (LIPITOR) 10 MG tablet TAKE ONE-HALF TABLET BY MOUTH DAILY 15 tablet 2    Insulin Aspart FlexPen 100 UNIT/ML SOPN INJECT 6 UNITS UNDER THE SKIN WITH BREAKFAST, 6 UNITS WITH LUNCH AND 10 UNITS WITH DINNER 9 mL 1    pantoprazole (PROTONIX) 40 MG tablet Take 1 tablet by mouth daily 90 tablet 3    atenolol (TENORMIN) 50 MG tablet TAKE ONE-HALF TABLET BY MOUTH EVERY MORNING AND ONE-HALF TABLET EVERY EVENING.  90 tablet 3    LEVEMIR FLEXTOUCH 100 UNIT/ML injection pen INJECT 46 UNITS UNDER THE SKIN EVERY NIGHT 15 mL 3    levothyroxine (SYNTHROID) 137 MCG tablet Take 1 tablet by mouth Daily 90 tablet 1    vitamin D 25 MCG (1000 UT) CAPS Take 1 capsule by mouth daily      CONTOUR NEXT TEST strip TEST FIVE TIMES DAILY 500 strip 5    dilTIAZem (CARTIA XT) 180 MG extended release capsule TAKE 1 CAPSULE BY MOUTH DAILY (Patient taking differently: Take 180 mg by mouth daily TAKE 1 CAPSULE BY MOUTH DAILY) 90 capsule 3    Misc. Devices MISC Diabetic Shotes ICD 10: E11.9 1 Device 0    triamcinolone (KENALOG) 0.1 % cream Apply topically 2 times daily. 45 g 1    lisinopril (PRINIVIL;ZESTRIL) 5 MG tablet Take 1 tablet by mouth daily Indications: High Blood Pressure Disorder (Patient taking differently: Take 20 mg by mouth daily Indications: High Blood Pressure Disorder ) 30 tablet 5    Multiple Vitamins-Minerals (MULTIPLE VITAMINS/WOMENS PO) Take 1 tablet by mouth daily       fexofenadine (ALLEGRA ALLERGY) 180 MG tablet Take 180 mg by mouth daily      Omega-3 Fatty Acids (FISH OIL) 1000 MG CAPS Take 1,000 mg by mouth 2 times daily       aspirin 81 MG tablet Take 81 mg by mouth daily 3 days a wk      Insulin Pen Needle (TRUEPLUS PEN NEEDLES) 31G X 5 MM MISC Inject 1 each into the skin 4 times daily 400 each 3     No current facility-administered medications for this visit.       Allergies   Allergen Reactions    Adhesive Tape Other (See Comments)     Post op incision infection    Dermabond Other (See Comments)     Post op incision infection       Health Maintenance   Topic Date Due    Hepatitis C screen  Never done    Diabetic foot exam  Never done    HIV screen  Never done    DTaP/Tdap/Td vaccine (1 - Tdap) Never done    Shingles Vaccine (1 of 2) Never done    Diabetic retinal exam  01/22/2020    Colon cancer screen colonoscopy  07/18/2021    Breast cancer screen  01/17/2022    A1C test (Diabetic or Prediabetic)  02/23/2022    Diabetic microalbuminuria test  02/23/2022    Lipid screen 02/23/2022    Potassium monitoring  02/23/2022    Creatinine monitoring  02/23/2022    Cervical cancer screen  04/08/2024    Flu vaccine  Completed    Pneumococcal 0-64 years Vaccine  Completed    Hepatitis A vaccine  Aged Out    Hib vaccine  Aged Out    Meningococcal (ACWY) vaccine  Aged Out       Subjective:      Review of Systems  Review of Systems   Constitutional: Negative for chills and fever. HENT: Negative for congestion. Respiratory: Negative for cough, chest tightness and shortness of breath. Cardiovascular: Negative for chest pain, palpitations and leg swelling. Gastrointestinal: Negative for abdominal pain, anal bleeding, constipation, diarrhea and nausea. Genitourinary: Negative for difficulty urinating. Psychiatric/Behavioral: Negative. SeeHPI for visit specific review of symptoms. All others negative      Objective:   /82   Pulse 87   Temp 97.3 °F (36.3 °C)   Wt (!) 385 lb (174.6 kg)   LMP 01/01/2002   SpO2 98%   BMI 60.30 kg/m²   Physical Exam  Physical Exam   Constitutional: She appears well-developed. Does not appear ill. Eyes: Pupils are equal, round, and reactive to light. Conjunctiva and Lids normal.  Neck: Normal range of motion. Neck supple. No masses. Neck Symmetric. Normal tracheal position. No thyroid enlargement  Cardiovascular: Normal rate and regular rhythm. Exam reveals no friction rub. Carotid arteries: no bruit observed. No murmur heard. Respiratory:  Effort normal and breath sounds normal. No respiratory distress. No wheezes. No rales. No use of accessory muscles or intercostal retractions. Abdominal: Soft. Bowel sounds are normal. exhibits no distension. There is no tenderness. There is no rebound and no guarding. Musculoskeletal: exhibits no edema. Normal gait. Neurological: alert. Psychiatric: normal mood and affect. Her behavior is normal. Normal judgement and insight observed.       Recent Results (from the past 672 hour(s)) Microalbumin / Creatinine Urine Ratio    Collection Time: 02/23/21  9:21 AM   Result Value Ref Range    Microalbumin, Random Urine 3.50 0.00 - 19.00 mg/dL    Creatinine, Ur 109.6 4.2 - 622.0 mg/dL    Microalbumin Creatinine Ratio 31.9 mg/g   T4, Free    Collection Time: 02/23/21  9:23 AM   Result Value Ref Range    T4 Free 1.44 0.93 - 1.70 ng/dL   TSH without Reflex    Collection Time: 02/23/21  9:23 AM   Result Value Ref Range    TSH 1.450 0.270 - 4.200 uIU/mL   CBC Auto Differential    Collection Time: 02/23/21  9:23 AM   Result Value Ref Range    WBC 8.1 4.8 - 10.8 K/uL    RBC 4.35 4.20 - 5.40 M/uL    Hemoglobin 12.7 12.0 - 16.0 g/dL    Hematocrit 40.0 37.0 - 47.0 %    MCV 92.0 81.0 - 99.0 fL    MCH 29.2 27.0 - 31.0 pg    MCHC 31.8 (L) 33.0 - 37.0 g/dL    RDW 14.5 11.5 - 14.5 %    Platelets 144 506 - 024 K/uL    MPV 11.2 9.4 - 12.3 fL    Neutrophils % 68.9 (H) 50.0 - 65.0 %    Lymphocytes % 23.4 20.0 - 40.0 %    Monocytes % 5.3 0.0 - 10.0 %    Eosinophils % 1.5 0.0 - 5.0 %    Basophils % 0.7 0.0 - 1.0 %    Neutrophils Absolute 5.6 1.5 - 7.5 K/uL    Immature Granulocytes # 0.0 K/uL    Lymphocytes Absolute 1.9 1.1 - 4.5 K/uL    Monocytes Absolute 0.40 0.00 - 0.90 K/uL    Eosinophils Absolute 0.10 0.00 - 0.60 K/uL    Basophils Absolute 0.10 0.00 - 0.20 K/uL   Comprehensive Metabolic Panel    Collection Time: 02/23/21  9:23 AM   Result Value Ref Range    Sodium 143 136 - 145 mmol/L    Potassium 4.8 3.5 - 5.0 mmol/L    Chloride 106 98 - 111 mmol/L    CO2 24 22 - 29 mmol/L    Anion Gap 13 7 - 19 mmol/L    Glucose 129 (H) 74 - 109 mg/dL    BUN 19 6 - 20 mg/dL    CREATININE 1.5 (H) 0.5 - 0.9 mg/dL    GFR Non-African American 35 (A) >60    GFR  43 (L) >59    Calcium 9.5 8.6 - 10.0 mg/dL    Total Protein 7.7 6.6 - 8.7 g/dL    Albumin 4.0 3.5 - 5.2 g/dL    Total Bilirubin 0.4 0.2 - 1.2 mg/dL    Alkaline Phosphatase 80 35 - 104 U/L    ALT 14 5 - 33 U/L    AST 16 5 - 32 U/L   Lipid Panel    Collection Time: 02/23/21  9:23 AM   Result Value Ref Range    Cholesterol, Total 149 (L) 160 - 199 mg/dL    Triglycerides 101 0 - 149 mg/dL    HDL 52 (L) 65 - 121 mg/dL    LDL Calculated 77 <100 mg/dL   Hemoglobin A1C    Collection Time: 02/23/21  9:23 AM   Result Value Ref Range    Hemoglobin A1C 7.0 (H) 4.0 - 6.0 %   Vitamin D 25 Hydroxy    Collection Time: 03/02/21  4:03 PM   Result Value Ref Range    Vit D, 25-Hydroxy 32.5 >=30 ng/mL   Iron    Collection Time: 03/02/21  4:03 PM   Result Value Ref Range    Iron 67 37 - 145 ug/dL   Ferritin    Collection Time: 03/02/21  4:03 PM   Result Value Ref Range    Ferritin 110.2 13.0 - 150.0 ng/mL   MALIA Screen with Reflex    Collection Time: 03/02/21  4:03 PM   Result Value Ref Range    MALIA Ab, IgG BARBIE Detected (A) None Detected   Sedimentation Rate    Collection Time: 03/02/21  4:03 PM   Result Value Ref Range    Sed Rate 41 (H) 0 - 25 mm/Hr   DHEA-Sulfate    Collection Time: 03/02/21  4:03 PM   Result Value Ref Range    DHEAS (DHEA Sulfate) 60 13 - 130 ug/dL       Lab Results   Component Value Date    LABA1C 7.0 (H) 02/23/2021    LABA1C 7.0 (H) 11/17/2020    LABA1C 7.2 (H) 08/14/2020     Lab Results   Component Value Date    LABMICR 3.50 02/23/2021    LDLCALC 77 02/23/2021    CREATININE 1.5 (H) 02/23/2021             Assessment & Plan: The following diagnoses and conditions are stable with no further orders unless indicated:  1. Type 2 diabetes mellitus with diabetic polyneuropathy, with long-term current use of insulin ([facility])  Lab Results   Component Value Date    LABA1C 7.0 (H) 02/23/2021    LABA1C 7.0 (H) 11/17/2020    LABA1C 7.2 (H) 08/14/2020     Lab Results   Component Value Date    LABMICR 3.50 02/23/2021    LDLCALC 77 02/23/2021    CREATININE 1.5 (H) 02/23/2021     Encouraged ADA diet. Discussed continuous glucose monitoring today. Continue with current medication  - Comprehensive Metabolic Panel; Future  - Hemoglobin A1C; Future  - CBC Auto Differential; Future    2. Alopecia  Check following lab studies  - Testosterone Free and Total, Non-Male; Future  - DHEA-Sulfate; Future  - Sedimentation Rate; Future  - MALIA Screen with Reflex; Future  - Ferritin; Future  - Iron; Future  - Vitamin D 25 Hydroxy; Future    3. Hypercholesterolemia  Lab Results   Component Value Date    CHOL 149 (L) 02/23/2021    CHOL 155 (L) 11/17/2020    CHOL 139 (L) 08/14/2020     Lab Results   Component Value Date    TRIG 101 02/23/2021    TRIG 107 11/17/2020    TRIG 104 08/14/2020     Lab Results   Component Value Date    HDL 52 (L) 02/23/2021    HDL 49 (L) 11/17/2020    HDL 49 (L) 08/14/2020     Lab Results   Component Value Date    LDLCALC 77 02/23/2021    LDLCALC 85 11/17/2020    LDLCALC 69 08/14/2020     No results found for: LABVLDL, VLDL  No results found for: CHOLHDLRATIO  Continue with atorvastatin 10 mg    4. Primary hypothyroidism  Lab Results   Component Value Date    TSH 1.450 02/23/2021    T4FREE 1.44 02/23/2021     Her alopecia does not appear to be related to her thyroid    5. Gastroesophageal reflux disease without esophagitis  Backspace stable            Return in about 3 months (around 6/2/2021) for Routine follow up - 15 minute visit. Discussed use, benefit, and side effects of prescribed medications. All patient questions answered. Pt voiced understanding. Reviewed health maintenance. Instructedto continue current medications, diet and exercise. Patient agreed with treatmentplan. Follow up as directed. Note dictated using Dragon Dictation software  Sometimes this dictation software makes erroneous transcriptions.

## 2021-03-02 NOTE — PATIENT INSTRUCTIONS
We are committed to providing you with the best care possible. In order to help us achieve these goals please remember to bring all medications, herbal products, and over the counter supplements with you to each visit. If your provider has ordered testing for you, please be sure to follow up with our office if you have not received results within 7 days after the testing took place. *If you receive a survey after visiting one of our offices, please take time to share your experience concerning your physician office visit. These surveys are confidential and no health information about you is shared. We are eager to improve for you and we are counting on your feedback to help make that happen.      _______________________________________________________________    [facility] Continuous Glucose Monitoring  [email or web address]  [facility] contact information is; Toll-free [phone] · Fax [phone].

## 2021-03-04 LAB — DHEAS (DHEA SULFATE): 60 UG/DL (ref 13–130)

## 2021-03-05 LAB — ANA IGG, ELISA: DETECTED

## 2021-03-06 VITALS
BODY MASS INDEX: 60.3 KG/M2 | OXYGEN SATURATION: 98 % | SYSTOLIC BLOOD PRESSURE: 137 MMHG | TEMPERATURE: 97.3 F | DIASTOLIC BLOOD PRESSURE: 82 MMHG | HEART RATE: 87 BPM | WEIGHT: 293 LBS

## 2021-03-06 LAB
ANA PATTERN: ABNORMAL
ANA TITER: ABNORMAL
ANTINUCLEAR AB INTERPRETIVE COMMENT: ABNORMAL
ANTINUCLEAR ANTIBODY, HEP-2, IGG: DETECTED

## 2021-03-07 LAB — DOUBLE STRANDED DNA AB, IGG: NORMAL

## 2021-03-08 DIAGNOSIS — M34.9 SCLERODERMA (HCC): Primary | ICD-10-CM

## 2021-03-08 LAB
ANTI JO-1 IGG: 0 AU/ML (ref 0–40)
ENA TO RNP ANTIBODY: NORMAL
ENA TO SMITH (SM) ANTIBODY: 1 AU/ML (ref 0–40)
ENA TO SSB (LA) ANTIBODY: 0 AU/ML (ref 0–40)
SCLERODERMA (SCL-70) AB: 157 AU/ML (ref 0–40)
SSA 52 (RO) (ENA) AB, IGG: 2 AU/ML (ref 0–40)
SSA 60 (RO) (ENA) AB, IGG: 2 AU/ML (ref 0–40)

## 2021-03-09 DIAGNOSIS — M34.9 SCLERODERMA (HCC): Primary | ICD-10-CM

## 2021-03-09 LAB
SEX HORMONE BINDING GLOBULIN: 27 NMOL/L (ref 30–135)
TESTOSTERONE FREE: 5.3 PG/ML (ref 0.6–3.8)
TESTOSTERONE, FEMALES/CHILDREN: 29 NG/DL (ref 5–32)

## 2021-03-12 RX ORDER — ATORVASTATIN CALCIUM 10 MG/1
TABLET, FILM COATED ORAL
Qty: 45 TABLET | Refills: 3 | Status: SHIPPED | OUTPATIENT
Start: 2021-03-12 | End: 2021-09-24 | Stop reason: SDUPTHER

## 2021-03-17 DIAGNOSIS — E03.9 PRIMARY HYPOTHYROIDISM: ICD-10-CM

## 2021-03-18 RX ORDER — LEVOTHYROXINE SODIUM 137 UG/1
137 TABLET ORAL DAILY
Qty: 90 TABLET | Refills: 1 | Status: SHIPPED | OUTPATIENT
Start: 2021-03-18 | End: 2021-09-15

## 2021-03-18 NOTE — TELEPHONE ENCOUNTER
B[facility]csi [facility]. called to request a refill on her medication.       Last office visit : 3/2/2021   Next office visit : 6/7/2021     Requested Prescriptions     Signed Prescriptions Disp Refills    levothyroxine (SYNTHROID) 137 MCG tablet 90 tablet 1     Sig: TAKE 1 TABLET BY MOUTH DAILY     Authorizing Provider: James Hillman     Ordering User: Mckinley Willoughby

## 2021-03-27 DIAGNOSIS — Z79.4 TYPE 2 DIABETES MELLITUS WITH DIABETIC POLYNEUROPATHY, WITH LONG-TERM CURRENT USE OF INSULIN (HCC): ICD-10-CM

## 2021-03-27 DIAGNOSIS — E11.42 TYPE 2 DIABETES MELLITUS WITH DIABETIC POLYNEUROPATHY, WITH LONG-TERM CURRENT USE OF INSULIN (HCC): ICD-10-CM

## 2021-03-28 RX ORDER — INSULIN ASPART 100 [IU]/ML
INJECTION, SOLUTION INTRAVENOUS; SUBCUTANEOUS
Qty: 9 ML | Refills: 1 | Status: SHIPPED | OUTPATIENT
Start: 2021-03-28 | End: 2021-08-01

## 2021-03-28 RX ORDER — INSULIN DETEMIR 100 [IU]/ML
INJECTION, SOLUTION SUBCUTANEOUS
Qty: 15 ML | Refills: 3 | Status: SHIPPED | OUTPATIENT
Start: 2021-03-28 | End: 2021-08-01

## 2021-05-06 DIAGNOSIS — I10 ESSENTIAL (PRIMARY) HYPERTENSION: ICD-10-CM

## 2021-05-07 RX ORDER — DILTIAZEM HYDROCHLORIDE 180 MG/1
CAPSULE, COATED, EXTENDED RELEASE ORAL
Qty: 90 CAPSULE | Refills: 3 | Status: SHIPPED | OUTPATIENT
Start: 2021-05-07 | End: 2022-05-02

## 2021-05-07 NOTE — TELEPHONE ENCOUNTER
Bécsi [facility] 77. called to request a refill on her medication.       Last office visit : 3/2/2021   Next office visit : 6/7/2021     Requested Prescriptions     Pending Prescriptions Disp Refills    dilTIAZem (CARDIZEM CD) 180 MG extended release capsule [Pharmacy Med Name: DILTIAZEM CD 180MG CAPSULES (24 HR)] 90 capsule 3     Sig: TAKE 1 CAPSULE BY MOUTH DAILY            Terrie Nichols MA

## 2021-05-13 ENCOUNTER — OFFICE VISIT (OUTPATIENT)
Dept: FAMILY MEDICINE CLINIC | Age: 61
End: 2021-05-13
Payer: COMMERCIAL

## 2021-05-13 VITALS
BODY MASS INDEX: 61.52 KG/M2 | WEIGHT: 293 LBS | TEMPERATURE: 97.8 F | OXYGEN SATURATION: 98 % | SYSTOLIC BLOOD PRESSURE: 142 MMHG | HEART RATE: 60 BPM | DIASTOLIC BLOOD PRESSURE: 92 MMHG

## 2021-05-13 DIAGNOSIS — R31.9 HEMATURIA, UNSPECIFIED TYPE: ICD-10-CM

## 2021-05-13 DIAGNOSIS — K13.0 ANGULAR CHEILITIS: Primary | ICD-10-CM

## 2021-05-13 DIAGNOSIS — K13.0 ANGULAR CHEILITIS: ICD-10-CM

## 2021-05-13 LAB
ALBUMIN SERPL-MCNC: 4 G/DL (ref 3.5–5.2)
ALP BLD-CCNC: 81 U/L (ref 35–104)
ALT SERPL-CCNC: 16 U/L (ref 5–33)
ANION GAP SERPL CALCULATED.3IONS-SCNC: 8 MMOL/L (ref 7–19)
APPEARANCE FLUID: CLEAR
AST SERPL-CCNC: 17 U/L (ref 5–32)
BASOPHILS ABSOLUTE: 0.1 K/UL (ref 0–0.2)
BASOPHILS RELATIVE PERCENT: 0.6 % (ref 0–1)
BILIRUB SERPL-MCNC: 0.4 MG/DL (ref 0.2–1.2)
BILIRUBIN, POC: NEGATIVE
BLOOD URINE, POC: NEGATIVE
BUN BLDV-MCNC: 17 MG/DL (ref 8–23)
CALCIUM SERPL-MCNC: 9.4 MG/DL (ref 8.8–10.2)
CHLORIDE BLD-SCNC: 105 MMOL/L (ref 98–111)
CLARITY, POC: CLEAR
CO2: 28 MMOL/L (ref 22–29)
COLOR, POC: YELLOW
CREAT SERPL-MCNC: 1.2 MG/DL (ref 0.5–0.9)
EOSINOPHILS ABSOLUTE: 0.2 K/UL (ref 0–0.6)
EOSINOPHILS RELATIVE PERCENT: 2 % (ref 0–5)
FOLATE: 16.4 NG/ML (ref 4.8–37.3)
GFR AFRICAN AMERICAN: 55
GFR NON-AFRICAN AMERICAN: 46
GLUCOSE BLD-MCNC: 118 MG/DL (ref 74–109)
GLUCOSE URINE, POC: NEGATIVE
HCT VFR BLD CALC: 41.5 % (ref 37–47)
HEMOGLOBIN: 12.9 G/DL (ref 12–16)
IMMATURE GRANULOCYTES #: 0 K/UL
KETONES, POC: NEGATIVE
LEUKOCYTE EST, POC: NEGATIVE
LYMPHOCYTES ABSOLUTE: 2.1 K/UL (ref 1.1–4.5)
LYMPHOCYTES RELATIVE PERCENT: 26 % (ref 20–40)
MCH RBC QN AUTO: 28.5 PG (ref 27–31)
MCHC RBC AUTO-ENTMCNC: 31.1 G/DL (ref 33–37)
MCV RBC AUTO: 91.8 FL (ref 81–99)
MONOCYTES ABSOLUTE: 0.4 K/UL (ref 0–0.9)
MONOCYTES RELATIVE PERCENT: 5.6 % (ref 0–10)
NEUTROPHILS ABSOLUTE: 5.2 K/UL (ref 1.5–7.5)
NEUTROPHILS RELATIVE PERCENT: 65.5 % (ref 50–65)
NITRITE, POC: NEGATIVE
PDW BLD-RTO: 14.2 % (ref 11.5–14.5)
PH, POC: 6
PLATELET # BLD: 240 K/UL (ref 130–400)
PMV BLD AUTO: 11.3 FL (ref 9.4–12.3)
POTASSIUM SERPL-SCNC: 4.4 MMOL/L (ref 3.5–5)
PROTEIN, POC: NEGATIVE
RBC # BLD: 4.52 M/UL (ref 4.2–5.4)
SODIUM BLD-SCNC: 141 MMOL/L (ref 136–145)
SPECIFIC GRAVITY, POC: 1.01
TOTAL PROTEIN: 7.7 G/DL (ref 6.6–8.7)
UROBILINOGEN, POC: NORMAL
VITAMIN B-12: 531 PG/ML (ref 211–946)
VITAMIN D 25-HYDROXY: 33.7 NG/ML
WBC # BLD: 7.9 K/UL (ref 4.8–10.8)

## 2021-05-13 PROCEDURE — 81002 URINALYSIS NONAUTO W/O SCOPE: CPT | Performed by: CLINICAL NURSE SPECIALIST

## 2021-05-13 PROCEDURE — 99214 OFFICE O/P EST MOD 30 MIN: CPT | Performed by: CLINICAL NURSE SPECIALIST

## 2021-05-13 RX ORDER — CLOTRIMAZOLE AND BETAMETHASONE DIPROPIONATE 10; .64 MG/G; MG/G
CREAM TOPICAL
Qty: 15 G | Refills: 0 | Status: SHIPPED | OUTPATIENT
Start: 2021-05-13

## 2021-05-13 RX ORDER — LISINOPRIL 20 MG/1
40 TABLET ORAL DAILY
COMMUNITY
End: 2021-12-21 | Stop reason: DRUGHIGH

## 2021-05-13 RX ORDER — CHOLECALCIFEROL (VITAMIN D3) 1250 MCG
1 CAPSULE ORAL WEEKLY
COMMUNITY

## 2021-05-13 ASSESSMENT — ENCOUNTER SYMPTOMS
SINUS PRESSURE: 0
FACIAL SWELLING: 0
SORE THROAT: 0
TROUBLE SWALLOWING: 0
WHEEZING: 0
COLOR CHANGE: 0
BACK PAIN: 0
CHEST TIGHTNESS: 0
DIARRHEA: 0
COUGH: 0
ABDOMINAL PAIN: 0
SHORTNESS OF BREATH: 0
CONSTIPATION: 0

## 2021-05-13 NOTE — PROGRESS NOTES
INJECT 46 UNITS UNDER THE SKIN EVERY NIGHT 15 mL 3    levothyroxine (SYNTHROID) 137 MCG tablet TAKE 1 TABLET BY MOUTH DAILY 90 tablet 1    atorvastatin (LIPITOR) 10 MG tablet TAKE 1/2 TABLET BY MOUTH DAILY 45 tablet 3    pantoprazole (PROTONIX) 40 MG tablet Take 1 tablet by mouth daily 90 tablet 3    atenolol (TENORMIN) 50 MG tablet TAKE ONE-HALF TABLET BY MOUTH EVERY MORNING AND ONE-HALF TABLET EVERY EVENING. 90 tablet 3    CONTOUR NEXT TEST strip TEST FIVE TIMES DAILY 500 strip 5    Misc. Devices MISC Diabetic Shotes ICD 10: E11.9 1 Device 0    triamcinolone (KENALOG) 0.1 % cream Apply topically 2 times daily. 45 g 1    Multiple Vitamins-Minerals (MULTIPLE VITAMINS/WOMENS PO) Take 1 tablet by mouth daily       fexofenadine (ALLEGRA ALLERGY) 180 MG tablet Take 180 mg by mouth daily      Omega-3 Fatty Acids (FISH OIL) 1000 MG CAPS Take 1,000 mg by mouth 2 times daily       aspirin 81 MG tablet Take 81 mg by mouth daily 3 days a wk      Insulin Pen Needle (TRUEPLUS PEN NEEDLES) 31G X 5 MM MISC Inject 1 each into the skin 4 times daily 400 each 3     No current facility-administered medications for this visit. Allergies   Allergen Reactions    Adhesive Tape Other (See Comments)     Post op incision infection    Dermabond Other (See Comments)     Post op incision infection       Review of Systems   Constitutional: Negative for appetite change, chills, diaphoresis, fatigue and fever. HENT: Positive for mouth sores (lip pain and swelling, dryness and cracking at angles). Negative for congestion, ear pain, facial swelling, hearing loss, postnasal drip, sinus pressure, sore throat and trouble swallowing. Respiratory: Negative for cough, chest tightness, shortness of breath and wheezing. Cardiovascular: Negative for chest pain and palpitations. Gastrointestinal: Negative for abdominal pain, constipation and diarrhea. Genitourinary: Positive for hematuria.  Negative for difficulty urinating, dysuria, flank pain, frequency and urgency. Musculoskeletal: Negative for arthralgias, back pain, joint swelling and neck pain. Skin: Negative for color change and rash. Neurological: Negative for dizziness, syncope, weakness, light-headedness and headaches. Hematological: Negative for adenopathy. Psychiatric/Behavioral: Negative for confusion and dysphoric mood. The patient is not nervous/anxious. OBJECTIVE:  BP (!) 142/92   Pulse 60   Temp 97.8 °F (36.6 °C) (Temporal)   Wt (!) 392 lb 12.8 oz (178.2 kg)   LMP 01/01/2002   SpO2 98%   BMI 61.52 kg/m²    Physical Exam  Vitals signs reviewed. Constitutional:       General: She is not in acute distress. Appearance: She is well-developed. She is not ill-appearing or toxic-appearing. HENT:      Head: Normocephalic and atraumatic. Comments: Lip pigment is brighter pink with localized inflammation, cracking at angles  Eyes:      General:         Right eye: No discharge. Left eye: No discharge. Conjunctiva/sclera: Conjunctivae normal.      Pupils: Pupils are equal, round, and reactive to light. Neck:      Musculoskeletal: Normal range of motion and neck supple. Thyroid: No thyromegaly. Trachea: No tracheal deviation. Cardiovascular:      Rate and Rhythm: Normal rate and regular rhythm. Heart sounds: No murmur. Pulmonary:      Effort: Pulmonary effort is normal. No respiratory distress. Breath sounds: Normal breath sounds. No wheezing or rales. Genitourinary:     Vagina: Normal.   Musculoskeletal: Normal range of motion. General: No deformity. Right lower leg: No edema. Left lower leg: No edema. Lymphadenopathy:      Cervical: No cervical adenopathy. Skin:     General: Skin is warm and dry. Findings: No erythema or rash. Neurological:      Mental Status: She is alert and oriented to person, place, and time. Mental status is at baseline. Motor: No weakness. Gait: Gait normal.   Psychiatric:         Mood and Affect: Mood normal.         Behavior: Behavior normal.         Thought Content: Thought content normal.         Judgment: Judgment normal.       Lab Results   Component Value Date    WBC 7.9 05/13/2021    HGB 12.9 05/13/2021    HCT 41.5 05/13/2021    MCV 91.8 05/13/2021     05/13/2021     Lab Results   Component Value Date     05/13/2021    K 4.4 05/13/2021     05/13/2021    CO2 28 05/13/2021    BUN 17 05/13/2021    CREATININE 1.2 (H) 05/13/2021    GLUCOSE 118 (H) 05/13/2021    CALCIUM 9.4 05/13/2021    PROT 7.7 05/13/2021    LABALBU 4.0 05/13/2021    BILITOT 0.4 05/13/2021    ALKPHOS 81 05/13/2021    AST 17 05/13/2021    ALT 16 05/13/2021    LABGLOM 46 (A) 05/13/2021    GFRAA 55 (L) 05/13/2021       Lab Results   Component Value Date    CNEFXPCM64 338 05/13/2021     Lab Results   Component Value Date    VITD25 33.7 05/13/2021     Lab Results   Component Value Date    FOLATE 16.4 05/13/2021         ASSESSMENT/PLAN:  1. Angular cheilitis  Unimproved with emollients and time  Labs ok  rx lotrisone and bactroban to alternate  - Vitamin B12; Future  - Vitamin D 25 Hydroxy; Future  - Vitamin B6; Future  - CBC Auto Differential; Future  - Comprehensive Metabolic Panel; Future  - Zinc; Future  - Vitamin B1; Future  - Folate; Future  - clotrimazole-betamethasone (LOTRISONE) 1-0.05 % cream; Apply topically 2 times daily. Dispense: 15 g; Refill: 0  - mupirocin (BACTROBAN) 2 % ointment; Apply 2 times daily. Dispense: 30 g; Refill: 0    2. Hematuria, unspecified type  UA negative  If return of symptoms will need work up  Due for colonoscopy   - POCT Urinalysis no Micro          Return for already scheduled.

## 2021-05-16 LAB — ZINC: 56.2 UG/DL (ref 60–120)

## 2021-05-17 LAB
VITAMIN B1, PLASMA: 17 NMOL/L (ref 4–15)
VITAMIN B6: 58.1 NMOL/L (ref 20–125)

## 2021-05-23 ENCOUNTER — PATIENT MESSAGE (OUTPATIENT)
Dept: FAMILY MEDICINE CLINIC | Age: 61
End: 2021-05-23

## 2021-05-24 DIAGNOSIS — E11.42 TYPE 2 DIABETES MELLITUS WITH DIABETIC POLYNEUROPATHY, WITH LONG-TERM CURRENT USE OF INSULIN (HCC): ICD-10-CM

## 2021-05-24 DIAGNOSIS — Z79.4 TYPE 2 DIABETES MELLITUS WITH DIABETIC POLYNEUROPATHY, WITH LONG-TERM CURRENT USE OF INSULIN (HCC): ICD-10-CM

## 2021-05-24 LAB — HBA1C MFR BLD: 7.1 % (ref 4–6)

## 2021-06-07 ENCOUNTER — OFFICE VISIT (OUTPATIENT)
Dept: FAMILY MEDICINE CLINIC | Age: 61
End: 2021-06-07
Payer: COMMERCIAL

## 2021-06-07 ENCOUNTER — OFFICE VISIT (OUTPATIENT)
Dept: GASTROENTEROLOGY | Facility: CLINIC | Age: 61
End: 2021-06-07

## 2021-06-07 VITALS
HEIGHT: 68 IN | HEART RATE: 62 BPM | TEMPERATURE: 97.3 F | WEIGHT: 293 LBS | SYSTOLIC BLOOD PRESSURE: 128 MMHG | DIASTOLIC BLOOD PRESSURE: 78 MMHG | OXYGEN SATURATION: 98 % | BODY MASS INDEX: 44.41 KG/M2

## 2021-06-07 DIAGNOSIS — E60 ZINC DEFICIENCY: ICD-10-CM

## 2021-06-07 DIAGNOSIS — E11.42 TYPE 2 DIABETES MELLITUS WITH DIABETIC POLYNEUROPATHY, WITH LONG-TERM CURRENT USE OF INSULIN (HCC): Primary | ICD-10-CM

## 2021-06-07 DIAGNOSIS — K62.5 RECTAL BLEEDING: Primary | ICD-10-CM

## 2021-06-07 DIAGNOSIS — Z79.4 TYPE 2 DIABETES MELLITUS WITH DIABETIC POLYNEUROPATHY, WITH LONG-TERM CURRENT USE OF INSULIN (HCC): Primary | ICD-10-CM

## 2021-06-07 DIAGNOSIS — I10 ESSENTIAL (PRIMARY) HYPERTENSION: ICD-10-CM

## 2021-06-07 DIAGNOSIS — E03.9 PRIMARY HYPOTHYROIDISM: ICD-10-CM

## 2021-06-07 DIAGNOSIS — Z80.0 FAMILY HISTORY OF COLON CANCER: ICD-10-CM

## 2021-06-07 DIAGNOSIS — E78.00 HYPERCHOLESTEROLEMIA: ICD-10-CM

## 2021-06-07 DIAGNOSIS — Z86.010 HX OF COLONIC POLYPS: ICD-10-CM

## 2021-06-07 DIAGNOSIS — Z23 ENCOUNTER FOR IMMUNIZATION: ICD-10-CM

## 2021-06-07 DIAGNOSIS — K21.9 GASTROESOPHAGEAL REFLUX DISEASE WITHOUT ESOPHAGITIS: ICD-10-CM

## 2021-06-07 PROCEDURE — 90471 IMMUNIZATION ADMIN: CPT | Performed by: FAMILY MEDICINE

## 2021-06-07 PROCEDURE — 99203 OFFICE O/P NEW LOW 30 MIN: CPT | Performed by: NURSE PRACTITIONER

## 2021-06-07 PROCEDURE — 99214 OFFICE O/P EST MOD 30 MIN: CPT | Performed by: FAMILY MEDICINE

## 2021-06-07 PROCEDURE — 3051F HG A1C>EQUAL 7.0%<8.0%: CPT | Performed by: FAMILY MEDICINE

## 2021-06-07 PROCEDURE — 90715 TDAP VACCINE 7 YRS/> IM: CPT | Performed by: FAMILY MEDICINE

## 2021-06-07 RX ORDER — DILTIAZEM HYDROCHLORIDE 180 MG/1
1 CAPSULE, EXTENDED RELEASE ORAL DAILY
COMMUNITY
Start: 2021-05-07

## 2021-06-07 RX ORDER — CHLORAL HYDRATE 500 MG
2000 CAPSULE ORAL DAILY
COMMUNITY

## 2021-06-07 RX ORDER — ERGOCALCIFEROL 1.25 MG/1
50000 CAPSULE ORAL
COMMUNITY
Start: 2021-06-03

## 2021-06-07 RX ORDER — ATENOLOL 50 MG/1
1 TABLET ORAL DAILY
COMMUNITY
Start: 2021-04-23

## 2021-06-07 RX ORDER — ATORVASTATIN CALCIUM 10 MG/1
0.5 TABLET, FILM COATED ORAL DAILY
COMMUNITY
Start: 2021-03-12

## 2021-06-07 RX ORDER — MULTIPLE VITAMINS W/ MINERALS TAB 9MG-400MCG
1 TAB ORAL DAILY
COMMUNITY

## 2021-06-07 RX ORDER — LISINOPRIL 20 MG/1
20 TABLET ORAL DAILY
COMMUNITY
Start: 2021-06-04

## 2021-06-07 RX ORDER — SODIUM, POTASSIUM,MAG SULFATES 17.5-3.13G
1 SOLUTION, RECONSTITUTED, ORAL ORAL EVERY 12 HOURS
Qty: 177 ML | Refills: 0 | Status: SHIPPED | OUTPATIENT
Start: 2021-06-07 | End: 2021-06-28 | Stop reason: HOSPADM

## 2021-06-07 RX ORDER — PANTOPRAZOLE SODIUM 40 MG/1
40 TABLET, DELAYED RELEASE ORAL DAILY
COMMUNITY
Start: 2021-03-14

## 2021-06-07 RX ORDER — FEXOFENADINE HCL 180 MG/1
180 TABLET ORAL DAILY
COMMUNITY

## 2021-06-07 RX ORDER — INSULIN DETEMIR 100 [IU]/ML
46 INJECTION, SOLUTION SUBCUTANEOUS DAILY
COMMUNITY
Start: 2021-06-03

## 2021-06-07 RX ORDER — LEVOTHYROXINE SODIUM 137 UG/1
137 TABLET ORAL DAILY
COMMUNITY
Start: 2021-03-18

## 2021-06-07 NOTE — PATIENT INSTRUCTIONS
Zinc 50 mg once a day    _______________________________________________________________    Go to room 201 for labs prior to next visit. Be sure to fast for at least 12 hours prior to laboratory appointment. Would recommend getting labs about 1 week prior to the appointment time to ensure they are available at the time of the appointment. No appointment is necessary for laboratory work as the orders have already been placed.     Lab opens at 6:30 am and closes at 5:00 pm.

## 2021-06-07 NOTE — PROGRESS NOTES
"Chief Complaint:   Chief Complaint   Patient presents with   • Rectal Bleeding     Pt has had 2 episodes of seeing [facility] since [id number] once time she saw a little blood in her underwear and another time she saw some in the toilet water-Last colon was 7/18/2016; Personal history of hyperplastic polyps and f amily history of colon cancer         Patient ID: Norma Hayes is a 61 y.o. female     History of Present Illness: This is a very pleasant 61-year-old female who is here today with complaints of rectal bleeding.    Patient states that she had 2 episodes of bright red blood per rectum with a bowel movement that occurred in April.  She states she also noted some \"leakage of blood\" in her undergarment.  She states otherwise she has had no other symptoms to accompany this.The patient denies any nausea, vomiting, epigastric pain, dysphagia, pyrosis or hematemesis.  The patient denies any fever or chills.  Denies any melena.  Denies any unintentional weight loss or loss of appetite.    The patient's last colonoscopy revealed diverticulosis in the sigmoid colon with findings of a polyp as well.  Performed on 7/18/2016.    Past Medical History:   Diagnosis Date   • Diverticulosis    • Family history of colon cancer    • History of colon polyps    • Hypertension    • Sleep apnea    • Type 2 diabetes mellitus (CMS/HCC)        Past Surgical History:   Procedure Laterality Date   • CARPAL TUNNEL RELEASE     • CHOLECYSTECTOMY  10/31/2017   • COLONOSCOPY  07/18/2016    Diverticulosis in the sigmoid colon; One 7mm polyp in the descending colon-path shows benign granulation tissue polyp demonstating ulceration and moderate acute inflammation, nonspecific; The examination was otherwise normal on direct and retroflexion views; Repeat 5 years   • COLONOSCOPY  06/28/2011    One 2mm hyperplastic polyp in the ascending colon; One 5mm hyperplastic polyp in the rectum; Diverticulosis sigmoid colon and descending colon; " Repeat 5 years   • HERNIA REPAIR  09/2020   • HYSTERECTOMY     • NEPHRECTOMY Left 10/03/2018         Current Outpatient Medications:   •  Aspirin Buf,CaCarb-MgCarb-MgO, 81 MG tablet, Take 81 mg by mouth 3 (Three) Times a Week., Disp: , Rfl:   •  atenolol (TENORMIN) 50 MG tablet, Take 1 tablet by mouth Daily., Disp: , Rfl:   •  atorvastatin (LIPITOR) 10 MG tablet, Take 0.5 tablets by mouth Daily., Disp: , Rfl:   •  dilTIAZem (TIAZAC) 180 MG 24 hr capsule, Take 1 capsule by mouth Daily., Disp: , Rfl:   •  fexofenadine (ALLEGRA) 180 MG tablet, Take 180 mg by mouth Daily., Disp: , Rfl:   •  insulin aspart (novoLOG FLEXPEN) 100 UNIT/ML solution pen-injector sc pen, Inject 6-10 Units under the skin into the appropriate area as directed 3 (Three) Times a Day., Disp: , Rfl:   •  Levemir FlexTouch 100 UNIT/ML injection, Inject 46 Units under the skin into the appropriate area as directed Daily., Disp: , Rfl:   •  levothyroxine (SYNTHROID, LEVOTHROID) 137 MCG tablet, Take 137 mcg by mouth Daily., Disp: , Rfl:   •  lisinopril (PRINIVIL,ZESTRIL) 20 MG tablet, Take 20 mg by mouth Daily., Disp: , Rfl:   •  multivitamin with minerals (MULTIVITAMIN ADULT PO), Take 1 tablet by mouth Daily., Disp: , Rfl:   •  Omega-3 Fatty Acids (fish oil) 1000 MG capsule capsule, Take 2,000 mg by mouth Daily., Disp: , Rfl:   •  pantoprazole (PROTONIX) 40 MG EC tablet, Take 40 mg by mouth Daily., Disp: , Rfl:   •  vitamin D (ERGOCALCIFEROL) 1.25 MG (75996 UT) capsule capsule, Take 50,000 Units by mouth Every 7 (Seven) Days., Disp: , Rfl:   •  sodium-potassium-magnesium sulfates (Suprep Bowel Prep Kit) 17.5-3.13-1.6 GM/177ML solution oral solution, Take 1 bottle by mouth Every 12 (Twelve) Hours. Split dose prep as directed by office instructions provided.  2 bottles = one kit., Disp: 177 mL, Rfl: 0    Allergies   Allergen Reactions   • Adhesive Tape Other (See Comments)     And Dermabond  Caused incisions to get infected       Social History  "    Socioeconomic History   • Marital status:      Spouse name: Not on file   • Number of children: Not on file   • Years of education: Not on file   • Highest education level: Not on file   Tobacco Use   • Smoking status: Never Smoker   • Smokeless tobacco: Never Used   Vaping Use   • Vaping Use: Never used   Substance and Sexual Activity   • Alcohol use: Not Currently   • Drug use: Defer   • Sexual activity: Defer       Family History   Problem Relation Age of Onset   • Colon cancer Father 71   • Colon polyps Neg Hx    • Esophageal cancer Neg Hx    • Liver cancer Neg Hx    • Liver disease Neg Hx    • Rectal cancer Neg Hx    • Stomach cancer Neg Hx        Vitals:    06/07/21 1307   BP: 128/78   BP Location: Left arm   Patient Position: Sitting   Cuff Size: Large Adult   Pulse: 62   Temp: 97.3 °F (36.3 °C)   TempSrc: Infrared   SpO2: 98%   Weight: (!) 176 kg (389 lb)   Height: 172.7 cm (68\")       Review of Systems:    General:    Present -feeling well   Skin:    Not Present-Rash   HEENT:     Not Present-Acute visual changes or Acute hearing changes   Neck :    Not Present- swollen glands   Genitourinary:      Not Present- burning, frequency, urgency hematuria, dysuria,   Cardiovascular:   Not Present-chest pain, palpitations, or pressure   Respiratory:   Not Present- shortness of breath or cough   Gastrointestinal:  Musculoskeletal:  Neurological:  Psychiatric:   Present as mentioned in the HP    Not Present. Recent gait disturbances.    Not Present-Seizures and weakness in extremities.    Not Present- Anxiety or Depression.       Physical Exam:    General Appearance:    Alert, cooperative, in no acute distress   Psych:    Mood appropriate    Eyes:          conjunctivae and sclerae normal, no   icterus, no pallor   ENMT:    Ears appear intact with no abnormalities noted oral mucosa moist   Neck:   No adenopathy, supple, trachea midline, no thyromegaly, no   carotid bruit, no JVD    Cardiovascular:    Regular " rhythm and normal rate, normal S1 and S2, no            murmur, no gallop, no rub, no click   Gastrointestinal:     Inspection normal.  Normal bowel sounds, no masses, no organomegaly, soft round non-tender, non-distended, no guarding, no rebound or tenderness. No hepatosplenomegaly.   Skin:   No bleeding, bruising or rash   Neurologic:   nonfocal       Lab Results - Last 18 Months   Lab Units 05/13/21  1216 03/02/21  1603 02/23/21  0923 12/21/20  1054 11/17/20  1046 09/08/20  1531 08/14/20  1055 03/06/20  1338 03/06/20  1338 02/18/20  1420 01/17/20  0925   GLUCOSE mg/dL 118*  --  129* 171* 136* 170* 125*   < > 161* 110*   < >   BUN mg/dL 17  --  19 15 21* 18 16   < > 16 13   < >   CREATININE mg/dL 1.2*  --  1.5* 1.2* 1.4* 1.2* 1.1*   < > 1.2* 1.2*   < >   SODIUM mmol/L 141  --  143 140 145 141 144   < > 144 141   < >   POTASSIUM mmol/L 4.4  --  4.8 4.5 5.6* 4.3 4.5   < > 4.7 4.2   < >   CHLORIDE mmol/L 105  --  106 104 105 103 105   < > 104 104   < >   TOTAL CO2 mmol/L 28  --  24 28 24 23 24   < > 24 25   < >   TOTAL PROTEIN g/dL 7.7  --  7.7  --  7.7 7.3 7.1  --  7.5  --    < >   ALBUMIN g/dL 4  --  4  --  4.2 4 4.1  --  4.1  --    < >   ALT (SGPT) U/L 16  --  14  --  17 17 19  --  19  --    < >   AST (SGOT) U/L 17  --  16  --  18 17 21  --  18  --    < >   ALK PHOS U/L 81  --  80  --  74 71 70  --  80  --    < >   BILIRUBIN mg/dL 0.4  --  0.4  --  0.4 <0.2 0.6  --  0.4  --    < >   SED RATE mm/Hr  --  41*  --   --   --   --   --   --   --  32*  --     < > = values in this interval not displayed.       Lab Results - Last 18 Months   Lab Units 05/13/21  1216 02/23/21  0923 09/08/20  1531 08/14/20  1055 03/06/20  1338 02/18/20  1420   HEMOGLOBIN g/dL 12.9 12.7 13.2 12.9 13.0 14.2   HEMATOCRIT % 41.5 40.0 40.7 40.2 40.5 44.8   MCV fL 91.8 92.0 89.8 90.5 90.4 91.8   WBC K/uL 7.9 8.1 7.5 5.1 6.7 8.5   RDW % 14.2 14.5 14.0 14.5 14.0 14.1   MPV fL 11.3 11.2 12.1 11.7 11.8 11.6   PLATELETS K/uL 240 228 195 200 188 238        Lab Results - Last 18 Months   Lab Units 05/13/21  1216 03/02/21  1603 02/23/21  0923 11/17/20  1046 08/14/20  1055 01/17/20  0925   IRON ug/dL  --  67  --   --   --   --    FERRITIN ng/mL  --  110.2  --   --   --   --    TSH uIU/mL  --   --  1.450 2.330 1.390 0.698   FOLATE ng/mL 16.4  --   --   --   --   --         Lab Results - Last 18 Months   Lab Units 03/02/21  1603   FERRITIN ng/mL 110.2   MILO  Detected*           Assessment and Plan:  Assessment/Plan   Diagnoses and all orders for this visit:    1. Rectal bleeding (Primary)  -     Case Request; Standing  -     Case Request    2. Hx of colonic polyps  -     Case Request; Standing  -     Case Request    3. Family history of colon cancer  -     Case Request; Standing  -     Case Request    Other orders  -     Follow Anesthesia Guidelines / Protocol; Future  -     Obtain Informed Consent; Future  -     Implement Anesthesia Orders Day of Procedure; Standing  -     Obtain Informed Consent; Standing  -     Verify bowel prep was successful; Standing  -     sodium-potassium-magnesium sulfates (Suprep Bowel Prep Kit) 17.5-3.13-1.6 GM/177ML solution oral solution; Take 1 bottle by mouth Every 12 (Twelve) Hours. Split dose prep as directed by office instructions provided.  2 bottles = one kit.  Dispense: 177 mL; Refill: 0    Schedule patient for colonoscopy.     There are no Patient Instructions on file for this visit.    Next follow-up appointment      The risks, benefits, and alternatives of colonoscopy were reviewed with the patient today.  Risks including perforation of the colon possibly requiring surgery or colostomy.  Additional risks include risk of bleeding from biopsies or removal of colon tissue.  There is also the risk of a drug reaction or problems with anesthesia.  This will be discussed with the further by the anesthesia team on the day of the procedure.  Lastly there is a possibility of missing a colon polyp or cancer.  The benefits include the  diagnosis and management of disease of the colon and rectum.  Alternatives to colonoscopy include barium enema, laboratory testing, radiographic evaluation, or no intervention.  The patient verbalizes understanding and agrees.      EMR Dragon/Transcription disclaimer:  Much of this encounter note is an electronic transcription/translation of spoken language to printed text. The electronic translation of spoken language may permit erroneous, or at times, nonsensical words or phrases to be inadvertently transcribed; although I have reviewed the note for such errors, some may still exist.

## 2021-06-07 NOTE — H&P (VIEW-ONLY)
"Chief Complaint:   Chief Complaint   Patient presents with   • Rectal Bleeding     Pt has had 2 episodes of seeing [facility] since nqs-[id number]-[id number] once time she saw a little blood in her underwear and another time she saw some in the toilet water-Last colon was 7/18/2016; Personal history of hyperplastic polyps and f amily history of colon cancer         Patient ID: Norma Hayes is a 61 y.o. female     History of Present Illness: This is a very pleasant 61-year-old female who is here today with complaints of rectal bleeding.    Patient states that she had 2 episodes of bright red blood per rectum with a bowel movement that occurred in April.  She states she also noted some \"leakage of blood\" in her undergarment.  She states otherwise she has had no other symptoms to accompany this.The patient denies any nausea, vomiting, epigastric pain, dysphagia, pyrosis or hematemesis.  The patient denies any fever or chills.  Denies any melena.  Denies any unintentional weight loss or loss of appetite.    The patient's last colonoscopy revealed diverticulosis in the sigmoid colon with findings of a polyp as well.  Performed on 7/18/2016.    Past Medical History:   Diagnosis Date   • Diverticulosis    • Family history of colon cancer    • History of colon polyps    • Hypertension    • Sleep apnea    • Type 2 diabetes mellitus (CMS/HCC)        Past Surgical History:   Procedure Laterality Date   • CARPAL TUNNEL RELEASE     • CHOLECYSTECTOMY  10/31/2017   • COLONOSCOPY  07/18/2016    Diverticulosis in the sigmoid colon; One 7mm polyp in the descending colon-path shows benign granulation tissue polyp demonstating ulceration and moderate acute inflammation, nonspecific; The examination was otherwise normal on direct and retroflexion views; Repeat 5 years   • COLONOSCOPY  06/28/2011    One 2mm hyperplastic polyp in the ascending colon; One 5mm hyperplastic polyp in the rectum; Diverticulosis sigmoid colon and descending colon; " Repeat 5 years   • HERNIA REPAIR  09/2020   • HYSTERECTOMY     • NEPHRECTOMY Left 10/03/2018         Current Outpatient Medications:   •  Aspirin Buf,CaCarb-MgCarb-MgO, 81 MG tablet, Take 81 mg by mouth 3 (Three) Times a Week., Disp: , Rfl:   •  atenolol (TENORMIN) 50 MG tablet, Take 1 tablet by mouth Daily., Disp: , Rfl:   •  atorvastatin (LIPITOR) 10 MG tablet, Take 0.5 tablets by mouth Daily., Disp: , Rfl:   •  dilTIAZem (TIAZAC) 180 MG 24 hr capsule, Take 1 capsule by mouth Daily., Disp: , Rfl:   •  fexofenadine (ALLEGRA) 180 MG tablet, Take 180 mg by mouth Daily., Disp: , Rfl:   •  insulin aspart (novoLOG FLEXPEN) 100 UNIT/ML solution pen-injector sc pen, Inject 6-10 Units under the skin into the appropriate area as directed 3 (Three) Times a Day., Disp: , Rfl:   •  Levemir FlexTouch 100 UNIT/ML injection, Inject 46 Units under the skin into the appropriate area as directed Daily., Disp: , Rfl:   •  levothyroxine (SYNTHROID, LEVOTHROID) 137 MCG tablet, Take 137 mcg by mouth Daily., Disp: , Rfl:   •  lisinopril (PRINIVIL,ZESTRIL) 20 MG tablet, Take 20 mg by mouth Daily., Disp: , Rfl:   •  multivitamin with minerals (MULTIVITAMIN ADULT PO), Take 1 tablet by mouth Daily., Disp: , Rfl:   •  Omega-3 Fatty Acids (fish oil) 1000 MG capsule capsule, Take 2,000 mg by mouth Daily., Disp: , Rfl:   •  pantoprazole (PROTONIX) 40 MG EC tablet, Take 40 mg by mouth Daily., Disp: , Rfl:   •  vitamin D (ERGOCALCIFEROL) 1.25 MG (94229 UT) capsule capsule, Take 50,000 Units by mouth Every 7 (Seven) Days., Disp: , Rfl:   •  sodium-potassium-magnesium sulfates (Suprep Bowel Prep Kit) 17.5-3.13-1.6 GM/177ML solution oral solution, Take 1 bottle by mouth Every 12 (Twelve) Hours. Split dose prep as directed by office instructions provided.  2 bottles = one kit., Disp: 177 mL, Rfl: 0    Allergies   Allergen Reactions   • Adhesive Tape Other (See Comments)     And Dermabond  Caused incisions to get infected       Social History  "    Socioeconomic History   • Marital status:      Spouse name: Not on file   • Number of children: Not on file   • Years of education: Not on file   • Highest education level: Not on file   Tobacco Use   • Smoking status: Never Smoker   • Smokeless tobacco: Never Used   Vaping Use   • Vaping Use: Never used   Substance and Sexual Activity   • Alcohol use: Not Currently   • Drug use: Defer   • Sexual activity: Defer       Family History   Problem Relation Age of Onset   • Colon cancer Father 71   • Colon polyps Neg Hx    • Esophageal cancer Neg Hx    • Liver cancer Neg Hx    • Liver disease Neg Hx    • Rectal cancer Neg Hx    • Stomach cancer Neg Hx        Vitals:    06/07/21 1307   BP: 128/78   BP Location: Left arm   Patient Position: Sitting   Cuff Size: Large Adult   Pulse: 62   Temp: 97.3 °F (36.3 °C)   TempSrc: Infrared   SpO2: 98%   Weight: (!) 176 kg (389 lb)   Height: 172.7 cm (68\")       Review of Systems:    General:    Present -feeling well   Skin:    Not Present-Rash   HEENT:     Not Present-Acute visual changes or Acute hearing changes   Neck :    Not Present- swollen glands   Genitourinary:      Not Present- burning, frequency, urgency hematuria, dysuria,   Cardiovascular:   Not Present-chest pain, palpitations, or pressure   Respiratory:   Not Present- shortness of breath or cough   Gastrointestinal:  Musculoskeletal:  Neurological:  Psychiatric:   Present as mentioned in the HP    Not Present. Recent gait disturbances.    Not Present-Seizures and weakness in extremities.    Not Present- Anxiety or Depression.       Physical Exam:    General Appearance:    Alert, cooperative, in no acute distress   Psych:    Mood appropriate    Eyes:          conjunctivae and sclerae normal, no   icterus, no pallor   ENMT:    Ears appear intact with no abnormalities noted oral mucosa moist   Neck:   No adenopathy, supple, trachea midline, no thyromegaly, no   carotid bruit, no JVD    Cardiovascular:    Regular " rhythm and normal rate, normal S1 and S2, no            murmur, no gallop, no rub, no click   Gastrointestinal:     Inspection normal.  Normal bowel sounds, no masses, no organomegaly, soft round non-tender, non-distended, no guarding, no rebound or tenderness. No hepatosplenomegaly.   Skin:   No bleeding, bruising or rash   Neurologic:   nonfocal       Lab Results - Last 18 Months   Lab Units 05/13/21  1216 03/02/21  1603 02/23/21  0923 12/21/20  1054 11/17/20  1046 09/08/20  1531 08/14/20  1055 03/06/20  1338 03/06/20  1338 02/18/20  1420 01/17/20  0925   GLUCOSE mg/dL 118*  --  129* 171* 136* 170* 125*   < > 161* 110*   < >   BUN mg/dL 17  --  19 15 21* 18 16   < > 16 13   < >   CREATININE mg/dL 1.2*  --  1.5* 1.2* 1.4* 1.2* 1.1*   < > 1.2* 1.2*   < >   SODIUM mmol/L 141  --  143 140 145 141 144   < > 144 141   < >   POTASSIUM mmol/L 4.4  --  4.8 4.5 5.6* 4.3 4.5   < > 4.7 4.2   < >   CHLORIDE mmol/L 105  --  106 104 105 103 105   < > 104 104   < >   TOTAL CO2 mmol/L 28  --  24 28 24 23 24   < > 24 25   < >   TOTAL PROTEIN g/dL 7.7  --  7.7  --  7.7 7.3 7.1  --  7.5  --    < >   ALBUMIN g/dL 4  --  4  --  4.2 4 4.1  --  4.1  --    < >   ALT (SGPT) U/L 16  --  14  --  17 17 19  --  19  --    < >   AST (SGOT) U/L 17  --  16  --  18 17 21  --  18  --    < >   ALK PHOS U/L 81  --  80  --  74 71 70  --  80  --    < >   BILIRUBIN mg/dL 0.4  --  0.4  --  0.4 <0.2 0.6  --  0.4  --    < >   SED RATE mm/Hr  --  41*  --   --   --   --   --   --   --  32*  --     < > = values in this interval not displayed.       Lab Results - Last 18 Months   Lab Units 05/13/21  1216 02/23/21  0923 09/08/20  1531 08/14/20  1055 03/06/20  1338 02/18/20  1420   HEMOGLOBIN g/dL 12.9 12.7 13.2 12.9 13.0 14.2   HEMATOCRIT % 41.5 40.0 40.7 40.2 40.5 44.8   MCV fL 91.8 92.0 89.8 90.5 90.4 91.8   WBC K/uL 7.9 8.1 7.5 5.1 6.7 8.5   RDW % 14.2 14.5 14.0 14.5 14.0 14.1   MPV fL 11.3 11.2 12.1 11.7 11.8 11.6   PLATELETS K/uL 240 228 195 200 188 238        Lab Results - Last 18 Months   Lab Units 05/13/21  1216 03/02/21  1603 02/23/21  0923 11/17/20  1046 08/14/20  1055 01/17/20  0925   IRON ug/dL  --  67  --   --   --   --    FERRITIN ng/mL  --  110.2  --   --   --   --    TSH uIU/mL  --   --  1.450 2.330 1.390 0.698   FOLATE ng/mL 16.4  --   --   --   --   --         Lab Results - Last 18 Months   Lab Units 03/02/21  1603   FERRITIN ng/mL 110.2   MILO  Detected*           Assessment and Plan:  Assessment/Plan   Diagnoses and all orders for this visit:    1. Rectal bleeding (Primary)  -     Case Request; Standing  -     Case Request    2. Hx of colonic polyps  -     Case Request; Standing  -     Case Request    3. Family history of colon cancer  -     Case Request; Standing  -     Case Request    Other orders  -     Follow Anesthesia Guidelines / Protocol; Future  -     Obtain Informed Consent; Future  -     Implement Anesthesia Orders Day of Procedure; Standing  -     Obtain Informed Consent; Standing  -     Verify bowel prep was successful; Standing  -     sodium-potassium-magnesium sulfates (Suprep Bowel Prep Kit) 17.5-3.13-1.6 GM/177ML solution oral solution; Take 1 bottle by mouth Every 12 (Twelve) Hours. Split dose prep as directed by office instructions provided.  2 bottles = one kit.  Dispense: 177 mL; Refill: 0    Schedule patient for colonoscopy.     There are no Patient Instructions on file for this visit.    Next follow-up appointment      The risks, benefits, and alternatives of colonoscopy were reviewed with the patient today.  Risks including perforation of the colon possibly requiring surgery or colostomy.  Additional risks include risk of bleeding from biopsies or removal of colon tissue.  There is also the risk of a drug reaction or problems with anesthesia.  This will be discussed with the further by the anesthesia team on the day of the procedure.  Lastly there is a possibility of missing a colon polyp or cancer.  The benefits include the  diagnosis and management of disease of the colon and rectum.  Alternatives to colonoscopy include barium enema, laboratory testing, radiographic evaluation, or no intervention.  The patient verbalizes understanding and agrees.      EMR Dragon/Transcription disclaimer:  Much of this encounter note is an electronic transcription/translation of spoken language to printed text. The electronic translation of spoken language may permit erroneous, or at times, nonsensical words or phrases to be inadvertently transcribed; although I have reviewed the note for such errors, some may still exist.

## 2021-06-07 NOTE — PROGRESS NOTES
[facility] PHYSICIAN SERVICES  [facility] FAMILY MEDICINE  [id number] [place] [id number]  [facility] [place] [address]  Dept: [phone]  Dept Fax: [phone]  Loc: [phone]    Alejandro Mason is a 62 y.o. female who presents today for her medical conditions/complaints as noted below. Alejandro Mason is here for No chief complaint on file. HPI:   CC: Here today to discuss the following:    Diabetes Mellitus  Has been compliant with medications. No side effects of medications since last visit. No polyuria, polydipsia, or vision changes since last visit. No symptomatic episodes of hypoglycemia. Levemir 46 units   Insulin aspartate: 6/6/10     Hypertension  Compliant with medications. No adverse effects from medication. No lightheadedness, palpitations, or chest pain. Hyperlipidemia  Tolerating current cholesterol medication without side effects. No body aches. Attempting to reduce processed sugar and cholesterol from diet. Hypothyroidism  Symptoms are stable. No temperature intolerance, fatigue, or mood disturbance from baseline. Complaint with current medication. Gastroesophageal Reflux Disease  Symptoms currently under control. Medication adequately controls his symptoms. No hematochezia or melena.        HPI    Past Medical History:   Diagnosis Date    Allergic rhinitis     Ankle fracture     3 year ago; increasing difficulty walking; has bone fragments    Arthritis     sees dr. Leny Martinez as ambulation aid     right foot per dr. Kimberly Turk CPAP (continuous positive airway pressure) dependence     12cm    Diabetes ([address]     Hiatal hernia 9/27/2017    History of kidney cancer     Hyperlipidemia     Hypertension     Hypothyroidism     Murmur     Obesity     ZOILA (obstructive sleep apnea)     AHI:  28.5 per PSG, 9/2014    PLMD (periodic limb movement disorder)     Renal mass     cat scan done 9/13/18; to see dr. Deyanira Catalan coming up    Thyroid disease     Type 2 diabetes mellitus without complication [facility] [address]     Type II or unspecified type diabetes mellitus without mention of complication, not stated as uncontrolled       Past Surgical History:   Procedure Laterality Date    BREAST BIOPSY Right 2012    CARPAL TUNNEL RELEASE      bilateral    CHOLECYSTECTOMY      HYSTERECTOMY      IA LAP, RADICAL NEPHRECTOMY Left 10/3/2018    NEPHRECTOMY LAPAROSCOPIC HAND ASSISTED performed by Joaquin Marrero MD at [facility] 43 LAP,CHOLECYSTECTOMY/GRAPH N/A 10/31/2017    CHOLECYSTECTOMY LAPAROSCOPIC performed by Priyanka Joseph MD at [facility] 41 N/A 9/22/2020    [facility] performed by Priyanka Joseph MD at [facility] History   Problem Relation Age of Onset    Diabetes Mother     Cancer Mother         breast    Heart Disease Father     Cancer Father         colon, over 79    Cancer Sister 43        breast, negative genetic testing    Cancer Maternal Grandmother 76        breast    Diabetes Maternal Grandmother     Heart Attack Maternal Grandfather     Hypertension Other     Elevated Lipids Other     Coronary Art Dis Other     Heart Attack Paternal Grandmother        Social History     Tobacco Use    Smoking status: Never Smoker    Smokeless tobacco: Never Used   Substance Use Topics    Alcohol use: No     Current Outpatient Medications   Medication Sig Dispense Refill    lisinopril (PRINIVIL;ZESTRIL) 20 MG tablet Take 20 mg by mouth daily      Cholecalciferol (VITAMIN D3) 1.25 MG (70824 UT) CAPS Take 1 capsule by mouth once a week      clotrimazole-betamethasone (LOTRISONE) 1-0.05 % cream Apply topically 2 times daily.  15 g 0    dilTIAZem (CARDIZEM CD) 180 MG extended release capsule TAKE 1 CAPSULE BY MOUTH DAILY 90 capsule 3    Insulin Aspart FlexPen 100 UNIT/ML SOPN INJECT 6 UNITS UNDER THE SKIN WITH BREAKFAST, 6 UNITS WITH LUNCH AND 10 UNITS WITH DINNER 9 mL 1    LEVEMIR FLEXTOUCH 100 UNIT/ML injection pen Tdap) 06/07/2031    Flu vaccine  Completed    COVID-19 Vaccine  Completed    Hepatitis A vaccine  Aged Out    Hib vaccine  Aged Out    Meningococcal (ACWY) vaccine  Aged Out       Subjective:      Review of Systems  Review of Systems   Constitutional: Negative for chills and fever. HENT: Negative for congestion. Respiratory: Negative for cough, chest tightness and shortness of breath. Cardiovascular: Negative for chest pain, palpitations and leg swelling. Gastrointestinal: Negative for abdominal pain, anal bleeding, constipation, diarrhea and nausea. Genitourinary: Negative for difficulty urinating. Psychiatric/Behavioral: Negative. SeeHPI for visit specific review of symptoms. All others negative      Objective:   BP (!) 172/108   Pulse 69   Temp 97.5 °F (36.4 °C)   Ht 5' 7\" (1.702 m)   Wt (!) 389 lb 1.6 oz (176.5 kg)   LMP 01/01/2002   SpO2 95%   BMI 60.94 kg/m²   Physical Exam  Physical Exam   Constitutional: She appears well-developed. Does not appear ill. Neck: Normal range of motion. Neck supple. No masses. Neck Symmetric. Normal tracheal position. No thyroid enlargement  Cardiovascular: Normal rate and regular rhythm. Exam reveals no friction rub. Carotid arteries: no bruit observed. No murmur heard. Respiratory:  Effort normal and breath sounds normal. No respiratory distress. No wheezes. No rales. No use of accessory muscles or intercostal retractions. Neurological: alert. Psychiatric: normal mood and affect. Her behavior is normal. Normal judgement and insight observed.       Recent Results (from the past 672 hour(s))   Vitamin B12    Collection Time: 05/13/21 12:16 PM   Result Value Ref Range    Vitamin B-12 531 211 - 946 pg/mL   Vitamin D 25 Hydroxy    Collection Time: 05/13/21 12:16 PM   Result Value Ref Range    Vit D, 25-Hydroxy 33.7 >=30 ng/mL   Vitamin B6    Collection Time: 05/13/21 12:16 PM   Result Value Ref Range    Vitamin B6, Plasma 58.1 20.0 - 125.0 nmol/L   CBC Auto Differential    Collection Time: 05/13/21 12:16 PM   Result Value Ref Range    WBC 7.9 4.8 - 10.8 K/uL    RBC 4.52 4.20 - 5.40 M/uL    Hemoglobin 12.9 12.0 - 16.0 g/dL    Hematocrit 41.5 37.0 - 47.0 %    MCV 91.8 81.0 - 99.0 fL    MCH 28.5 27.0 - 31.0 pg    MCHC 31.1 (L) 33.0 - 37.0 g/dL    RDW 14.2 11.5 - 14.5 %    Platelets 317 026 - 553 K/uL    MPV 11.3 9.4 - 12.3 fL    Neutrophils % 65.5 (H) 50.0 - 65.0 %    Lymphocytes % 26.0 20.0 - 40.0 %    Monocytes % 5.6 0.0 - 10.0 %    Eosinophils % 2.0 0.0 - 5.0 %    Basophils % 0.6 0.0 - 1.0 %    Neutrophils Absolute 5.2 1.5 - 7.5 K/uL    Immature Granulocytes # 0.0 K/uL    Lymphocytes Absolute 2.1 1.1 - 4.5 K/uL    Monocytes Absolute 0.40 0.00 - 0.90 K/uL    Eosinophils Absolute 0.20 0.00 - 0.60 K/uL    Basophils Absolute 0.10 0.00 - 0.20 K/uL   Comprehensive Metabolic Panel    Collection Time: 05/13/21 12:16 PM   Result Value Ref Range    Sodium 141 136 - 145 mmol/L    Potassium 4.4 3.5 - 5.0 mmol/L    Chloride 105 98 - 111 mmol/L    CO2 28 22 - 29 mmol/L    Anion Gap 8 7 - 19 mmol/L    Glucose 118 (H) 74 - 109 mg/dL    BUN 17 8 - 23 mg/dL    CREATININE 1.2 (H) 0.5 - 0.9 mg/dL    GFR Non- 46 (A) >60    GFR  55 (L) >59    Calcium 9.4 8.8 - 10.2 mg/dL    Total Protein 7.7 6.6 - 8.7 g/dL    Albumin 4.0 3.5 - 5.2 g/dL    Total Bilirubin 0.4 0.2 - 1.2 mg/dL    Alkaline Phosphatase 81 35 - 104 U/L    ALT 16 5 - 33 U/L    AST 17 5 - 32 U/L   Zinc    Collection Time: 05/13/21 12:16 PM   Result Value Ref Range    Zinc 56.2 (L) 60.0 - 120.0 ug/dL   Vitamin B1    Collection Time: 05/13/21 12:16 PM   Result Value Ref Range    Vitamin B1, Plasma 17 (H) 4 - 15 nmol/L   Folate    Collection Time: 05/13/21 12:16 PM   Result Value Ref Range    Folate 16.4 4.8 - 37.3 ng/mL   Hemoglobin A1C    Collection Time: 05/24/21 10:16 AM   Result Value Ref Range    Hemoglobin A1C 7.1 (H) 4.0 - 6.0 %         Lab Results   Component Value Date LABA1C 7.1 (H) 05/24/2021    LABA1C 7.0 (H) 02/23/2021    LABA1C 7.0 (H) 11/17/2020     Lab Results   Component Value Date    LABMICR 3.50 02/23/2021    LDLCALC 77 02/23/2021    CREATININE 1.2 (H) 05/13/2021         Assessment & Plan: The following diagnoses and conditions are stable with no further orders unless indicated:  1. Type 2 diabetes mellitus with diabetic polyneuropathy, with long-term current use of insulin (HCC)  Lab Results   Component Value Date    LABA1C 7.1 (H) 05/24/2021    LABA1C 7.0 (H) 02/23/2021    LABA1C 7.0 (H) 11/17/2020     Lab Results   Component Value Date    LABMICR 3.50 02/23/2021    LDLCALC 77 02/23/2021    CREATININE 1.2 (H) 05/13/2021     Blood sugar stable. Encouraged ADA diet. Continue checking glucoses before every meal and nightly. We will see back in 3 months  - Hemoglobin A1C; Future  - Comprehensive Metabolic Panel; Future    2. Zinc deficiency  She had her zinc level checked as she was experiencing some alopecia and angular cheilitis   Suggested she take 50 mg of zinc supplement. Recommend over-the-counter multivitamin as well we will recheck next visit  - Zinc; Future    3. Essential (primary) hypertension  BP Readings from Last 3 Encounters:   06/07/21 (!) 172/108   05/13/21 (!) 142/92   03/02/21 137/82     She checks her blood pressure at home and has been consistently less than 140/90. Typical blood pressures average less than 130/80. Continue to monitor. Uncertain why her blood pressure is elevated today but continue checking ambulatory blood pressures  - Comprehensive Metabolic Panel; Future  - CBC Auto Differential; Future    4.  Hypercholesterolemia  Lab Results   Component Value Date    CHOL 149 (L) 02/23/2021    CHOL 155 (L) 11/17/2020    CHOL 139 (L) 08/14/2020     Lab Results   Component Value Date    TRIG 101 02/23/2021    TRIG 107 11/17/2020    TRIG 104 08/14/2020     Lab Results   Component Value Date    HDL 52 (L) 02/23/2021    HDL 49 (L) 11/17/2020    HDL 49 (L) 08/14/2020     Lab Results   Component Value Date    LDLCALC 77 02/23/2021    LDLCALC 85 11/17/2020    LDLCALC 69 08/14/2020     No results found for: LABVLDL, VLDL  No results found for: CHOLHDLRATIO  LDL has improved since last visit  Continue with atorvastatin 10 mg daily  - Lipid Panel; Future    5. Encounter for immunization  - Tdap (age 6y and older) IM ([address])    6. Primary hypothyroidism  Lab Results   Component Value Date    TSH 1.450 02/23/2021    T4FREE 1.44 02/23/2021     Stable    7. Gastroesophageal reflux disease without esophagitis  Stable        Return in about 3 months (around 9/7/2021) for Routine follow up - 15 minute visit. Discussed use, benefit, and side effects of prescribed medications. All patient questions answered. Pt voiced understanding. Reviewed health maintenance. Instructedto continue current medications, diet and exercise. Patient agreed with treatmentplan. Follow up as directed. Note dictated using Dragon Dictation software  Sometimes this dictation software makes erroneous transcriptions.

## 2021-06-07 NOTE — PROGRESS NOTES
No chief complaint on file.    Subjective   HPI  Norma Hayes is a 61 y.o. female who presents as a referral for preventative maintenance. She has no complaints of nausea or vomiting. No change in bowels. No wt loss. No BRBPR. No melena. There is *** family hx for colon cancer. No abdominal pain. There was *** Cologuard screening this year.  The patient's last colonoscopy revealed diverticulosis in the sigmoid colon with findings of a polyp as well.  Performed on 7/18/2016.  Past Medical History:   Diagnosis Date   • Diverticulosis    • Family history of colon cancer    • History of colon polyps    • Hypertension    • Sleep apnea    • Type 2 diabetes mellitus (CMS/HCC)      Past Surgical History:   Procedure Laterality Date   • CARPAL TUNNEL RELEASE     • COLONOSCOPY  07/18/2016    Diverticulosis in the sigmoid colon; One 7mm polyp in the descending colon-path shows benign granulation tissue polyp demonstating ulceration and moderate acute inflammation, nonspecific; The examination was otherwise normal on direct and retroflexion views; Repeat 5 years   • COLONOSCOPY  06/28/2011    One 2mm hyperplastic polyp in the ascending colon; One 5mm hyperplastic polyp in the rectum; Diverticulosis sigmoid colon and descending colon; Repeat 5 years   • HYSTERECTOMY       No current outpatient medications on file.  No Known Allergies  Social History     Socioeconomic History   • Marital status:      Spouse name: Not on file   • Number of children: Not on file   • Years of education: Not on file   • Highest education level: Not on file     Family History   Problem Relation Age of Onset   • Colon cancer Father 71       REVIEW OF SYSTEMS  General: well appearing, no fever chills or sweats, no unexplained wt loss  HEENT: no acute visual or hearing disturbances  Cardiovascular: No chest pain or palpitations  Pulmonary: No shortness of breath, coughing, wheezing or hemoptysis  : No burning, urgency, hematuria, or  dysuria  Musculoskeletal: No joint pain or stiffness  Peripheral: no edema  Skin: No lesions or rashes  Neuro: No dizziness, headaches, stroke, syncope  Endocrine: No hot or cold intolerances  Hematological: No blood dyscrasias    Objective   There were no vitals filed for this visit.      PHYSICAL EXAM  General: age appropriate well nourished well appearing, no acute distress  Head: normocephalic and atraumatic  Global assessment-supple  Neck-No JVD noted, no lymphadenopathy  Pulmonary-clear to auscultation bilaterally, normal respiratory effort  Cardiovascular-normal rate and rhythm, normal heart sounds, S1 and S2 noted  Abdomen-soft, non tender, non distended, normal bowel sounds all 4 quadrants, no hepatosplenomegaly noted  Extremities-No clubbing cyanosis or edema  Neuro-Non focal, converses appropriately, awake, alert, oriented      Imaging Results (Most Recent)     None        Assessment/Plan   There are no diagnoses linked to this encounter.  * Surgery not found *     Anticoagulant/antiplatelet***  Phentermine***         All risks, benefits, alternatives, and indications of colonoscopy procedure have been discussed with the patient. Risks to include perforation of the colon requiring possible surgery or colostomy, risk of bleeding from biopsies or removal of colon tissue, possibility of missing a colon polyp or cancer, or adverse drug reaction.  Benefits to include the diagnosis and management of disease of the colon and rectum. Alternatives to include barium enema, radiographic evaluation, lab testing or no intervention. Pt verbalizes understanding and agrees.

## 2021-06-10 VITALS
SYSTOLIC BLOOD PRESSURE: 128 MMHG | WEIGHT: 293 LBS | HEART RATE: 69 BPM | DIASTOLIC BLOOD PRESSURE: 82 MMHG | BODY MASS INDEX: 45.99 KG/M2 | HEIGHT: 67 IN | OXYGEN SATURATION: 95 % | TEMPERATURE: 97.5 F

## 2021-06-28 ENCOUNTER — HOSPITAL ENCOUNTER (OUTPATIENT)
Facility: HOSPITAL | Age: 61
Setting detail: HOSPITAL OUTPATIENT SURGERY
Discharge: HOME | End: 2021-06-28
Attending: INTERNAL MEDICINE | Admitting: INTERNAL MEDICINE

## 2021-06-28 ENCOUNTER — ANESTHESIA (OUTPATIENT)
Dept: GASTROENTEROLOGY | Facility: HOSPITAL | Age: 61
End: 2021-06-28

## 2021-06-28 ENCOUNTER — ANESTHESIA EVENT (OUTPATIENT)
Dept: GASTROENTEROLOGY | Facility: HOSPITAL | Age: 61
End: 2021-06-28

## 2021-06-28 VITALS
HEART RATE: 56 BPM | WEIGHT: 293 LBS | HEIGHT: 67 IN | RESPIRATION RATE: 17 BRPM | TEMPERATURE: 97.1 F | OXYGEN SATURATION: 98 % | BODY MASS INDEX: 45.99 KG/M2 | DIASTOLIC BLOOD PRESSURE: 65 MMHG | SYSTOLIC BLOOD PRESSURE: 117 MMHG

## 2021-06-28 DIAGNOSIS — Z80.0 FAMILY HISTORY OF COLON CANCER: ICD-10-CM

## 2021-06-28 DIAGNOSIS — Z86.010 HX OF COLONIC POLYPS: ICD-10-CM

## 2021-06-28 DIAGNOSIS — K62.5 RECTAL BLEEDING: ICD-10-CM

## 2021-06-28 PROBLEM — K62.1 HYPERPLASTIC RECTAL POLYP: Status: ACTIVE | Noted: 2021-06-07

## 2021-06-28 LAB — GLUCOSE BLDC GLUCOMTR-MCNC: 128 MG/DL (ref 70–130)

## 2021-06-28 PROCEDURE — 88305 TISSUE EXAM BY PATHOLOGIST: CPT | Performed by: INTERNAL MEDICINE

## 2021-06-28 PROCEDURE — 25010000002 PROPOFOL 10 MG/ML EMULSION: Performed by: NURSE ANESTHETIST, CERTIFIED REGISTERED

## 2021-06-28 PROCEDURE — 82962 GLUCOSE BLOOD TEST: CPT

## 2021-06-28 PROCEDURE — 45385 COLONOSCOPY W/LESION REMOVAL: CPT | Performed by: INTERNAL MEDICINE

## 2021-06-28 RX ORDER — LIDOCAINE HYDROCHLORIDE 10 MG/ML
0.5 INJECTION, SOLUTION EPIDURAL; INFILTRATION; INTRACAUDAL; PERINEURAL ONCE AS NEEDED
Status: CANCELLED | OUTPATIENT
Start: 2021-06-28

## 2021-06-28 RX ORDER — SODIUM CHLORIDE 9 MG/ML
500 INJECTION, SOLUTION INTRAVENOUS CONTINUOUS PRN
Status: DISCONTINUED | OUTPATIENT
Start: 2021-06-28 | End: 2021-06-28 | Stop reason: HOSPADM

## 2021-06-28 RX ORDER — SODIUM CHLORIDE 0.9 % (FLUSH) 0.9 %
10 SYRINGE (ML) INJECTION AS NEEDED
Status: DISCONTINUED | OUTPATIENT
Start: 2021-06-28 | End: 2021-06-28 | Stop reason: HOSPADM

## 2021-06-28 RX ORDER — LIDOCAINE HYDROCHLORIDE 20 MG/ML
INJECTION, SOLUTION EPIDURAL; INFILTRATION; INTRACAUDAL; PERINEURAL AS NEEDED
Status: DISCONTINUED | OUTPATIENT
Start: 2021-06-28 | End: 2021-06-28 | Stop reason: SURG

## 2021-06-28 RX ORDER — PROPOFOL 10 MG/ML
VIAL (ML) INTRAVENOUS AS NEEDED
Status: DISCONTINUED | OUTPATIENT
Start: 2021-06-28 | End: 2021-06-28 | Stop reason: SURG

## 2021-06-28 RX ADMIN — LIDOCAINE HYDROCHLORIDE 100 MG: 20 INJECTION, SOLUTION EPIDURAL; INFILTRATION; INTRACAUDAL; PERINEURAL at 09:19

## 2021-06-28 RX ADMIN — PROPOFOL 400 MG: 10 INJECTION, EMULSION INTRAVENOUS at 09:19

## 2021-06-28 RX ADMIN — SODIUM CHLORIDE 500 ML: 9 INJECTION, SOLUTION INTRAVENOUS at 08:59

## 2021-06-28 NOTE — ANESTHESIA POSTPROCEDURE EVALUATION
"Patient: Norma Hayes    Procedure Summary     Date: 06/28/21 Room / Location:  [facility] /  [facility]    Anesthesia Start: 0917 Anesthesia Stop: 0944    Procedure: COLONOSCOPY WITH ANESTHESIA (N/A ) Diagnosis:       Rectal bleeding      Hx of colonic polyps      Family history of colon cancer      (Rectal bleeding [K62.5])      (Hx of colonic polyps [Z86.010])      (Family history of colon cancer [Z80.0])    Surgeons: Sagrario Rdz MD Provider: Drea Golden CRNA    Anesthesia Type: MAC ASA Status: 3          Anesthesia Type: MAC    Vitals  Vitals Value Taken Time   /65 06/28/21 1005   Temp     Pulse 55 06/28/21 1007   Resp 17 06/28/21 1005   SpO2 98 % 06/28/21 1007   Vitals shown include unvalidated device data.        Post Anesthesia Care and Evaluation    Patient location during evaluation: bedside  Patient participation: complete - patient participated  Level of consciousness: awake and awake and alert  Pain score: 0  Pain management: adequate  Airway patency: patent  Anesthetic complications: No anesthetic complications  PONV Status: none  Cardiovascular status: acceptable  Respiratory status: acceptable  Hydration status: acceptable    Comments: Patient discharged according to acceptable Hoang score per RN assessment. See nursing records for further information.     Blood pressure 117/65, pulse 56, temperature 97.1 °F (36.2 °C), temperature source Temporal, resp. rate 17, height 170.2 cm (67\"), weight (!) 174 kg (383 lb), SpO2 98 %, not currently breastfeeding.        "

## 2021-06-28 NOTE — ANESTHESIA PREPROCEDURE EVALUATION
Anesthesia Evaluation     Patient summary reviewed and Nursing notes reviewed   no history of anesthetic complications:  NPO Solid Status: > 8 hours  NPO Liquid Status: > 2 hours           Airway   Mallampati: I  TM distance: >3 FB  Neck ROM: full  No difficulty expected  Dental      Pulmonary    (+) sleep apnea on CPAP,   (-) not a smoker  Cardiovascular   Exercise tolerance: good (4-7 METS)    (+) hypertension,   (-) CAD, cardiac stents      Neuro/Psych  (-) seizures, TIA, CVA  GI/Hepatic/Renal/Endo    (+) morbid obesity, GERD,  renal disease (kidney cancer s/p nephrectomy), diabetes mellitus type 2 using insulin,     Musculoskeletal     Abdominal    Substance History      OB/GYN          Other                        Anesthesia Plan    ASA 3     MAC     intravenous induction     Anesthetic plan, all risks, benefits, and alternatives have been provided, discussed and informed consent has been obtained with: patient.

## 2021-06-29 LAB
CYTO UR: NORMAL
LAB AP CASE REPORT: NORMAL
PATH REPORT.FINAL DX SPEC: NORMAL
PATH REPORT.GROSS SPEC: NORMAL

## 2021-07-02 ENCOUNTER — OFFICE VISIT (OUTPATIENT)
Dept: NEUROLOGY | Age: 61
End: 2021-07-02
Payer: COMMERCIAL

## 2021-07-02 VITALS
TEMPERATURE: 96.8 F | SYSTOLIC BLOOD PRESSURE: 128 MMHG | DIASTOLIC BLOOD PRESSURE: 78 MMHG | HEART RATE: 57 BPM | WEIGHT: 293 LBS | OXYGEN SATURATION: 96 % | HEIGHT: 67 IN | BODY MASS INDEX: 45.99 KG/M2

## 2021-07-02 DIAGNOSIS — G47.61 PLMD (PERIODIC LIMB MOVEMENT DISORDER): ICD-10-CM

## 2021-07-02 DIAGNOSIS — G47.33 OSA (OBSTRUCTIVE SLEEP APNEA): Primary | ICD-10-CM

## 2021-07-02 DIAGNOSIS — Z99.89 CPAP (CONTINUOUS POSITIVE AIRWAY PRESSURE) DEPENDENCE: ICD-10-CM

## 2021-07-02 PROCEDURE — 99214 OFFICE O/P EST MOD 30 MIN: CPT | Performed by: PHYSICIAN ASSISTANT

## 2021-07-02 NOTE — PROGRESS NOTES
[id number] [facility]  [address] [place] [address] [address], [address]  [facility], [place] [address]  Phone [phone]  Fax [phone]       [facility] Sleep Follow Up Encounter      Information:   Patient Name: Nithya Doe  :   1960  Age:   62 y.o. MRN:   [id number]  Account #:  [de-identified]  Today:                21    Provider:  Yogesh Luke PA-C    Chief Complaint   Patient presents with    Sleep Apnea     follow up         Subjective:   Nithya Doe is a 62 y.o. female  with a history of ZOILA and PLMD who comes in for a sleep clinic follow up. The PSG, 2014 revealed an AHI of 28.5. She is prescribed CPAP at 12cm of pressure. The compliance report indicates that she is averaging 10  hours of CPAP use per day. She reports that consistent PAP use has alleviated the previous ZOILA symptoms. The PLMD is stable.  The CPAP is out of date.     Location or symptom:  ZOILA  Onset:  PS  Timing:  q hs  Severity:  Moderate  Associated:  Snoring, witnessed apneas, and excessive daytime somnolence  Alleviated:  CPAP      Objective:     Past Medical History:   Diagnosis Date    Allergic rhinitis     Ankle fracture     3 year ago; increasing difficulty walking; has bone fragments    Arthritis     sees dr. Randie Dubin as ambulation aid     right foot per dr. Malini Powell    CPAP (continuous positive airway pressure) dependence     12cm    Diabetes ([address]     Hiatal hernia 2017    History of kidney cancer     Hyperlipidemia     Hypertension     Hypothyroidism     Murmur     Obesity     ZOILA (obstructive sleep apnea)     AHI:  28.5 per PSG, 2014    PLMD (periodic limb movement disorder)     Renal mass     cat scan done 18; to see dr. Portia Wilde coming up    Thyroid disease     Type 2 diabetes mellitus without complication ([address]     Type II or unspecified type diabetes mellitus without mention of complication, not stated as uncontrolled        Past Surgical History:   Procedure Laterality Date  BREAST BIOPSY Right 2012    CARPAL TUNNEL RELEASE      bilateral    CHOLECYSTECTOMY      HYSTERECTOMY      [place] LAP, RADICAL NEPHRECTOMY Left 10/3/2018    NEPHRECTOMY LAPAROSCOPIC HAND ASSISTED performed by Corby Kahn [facility] LAP,CHOLECYSTECTOMY/GRAPH N/A 10/31/2017    CHOLECYSTECTOMY LAPAROSCOPIC performed by Noam Ruiz MD at [facility] N/A 9/22/2020    VENTRAL HERNIA REPAIR WITH MESH performed by Noam Ruiz MD at [facility]  ·     Significant Injuries  ·     Family History   Problem Relation Age of Onset    Diabetes Mother     Cancer Mother         breast    Heart Disease Father     Cancer Father         colon, over 79    Cancer Sister 43        breast, negative genetic testing    Cancer Maternal Grandmother 76        breast    Diabetes Maternal Grandmother     Heart Attack Maternal Grandfather     Hypertension Other     Elevated Lipids Other     Coronary Art Dis Other     Heart Attack Paternal Grandmother        Social History  Social History     Tobacco Use   Smoking Status Never Smoker   Smokeless Tobacco Never Used     Social History     Substance and Sexual Activity   Alcohol Use No     Social History     Substance and Sexual Activity   Drug Use No         Current Outpatient Medications   Medication Sig Dispense Refill    lisinopril (PRINIVIL;ZESTRIL) 20 MG tablet Take 20 mg by mouth daily      Cholecalciferol (VITAMIN D3) 1.25 MG (59219 UT) CAPS Take 1 capsule by mouth once a week      dilTIAZem (CARDIZEM CD) 180 MG extended release capsule TAKE 1 CAPSULE BY MOUTH DAILY 90 capsule 3    Insulin Aspart FlexPen 100 UNIT/ML SOPN INJECT 6 UNITS UNDER THE SKIN WITH BREAKFAST, 6 UNITS WITH LUNCH AND 10 UNITS WITH DINNER 9 mL 1    LEVEMIR FLEXTOUCH 100 UNIT/ML injection pen INJECT 46 UNITS UNDER THE SKIN EVERY NIGHT 15 mL 3    levothyroxine (SYNTHROID) 137 MCG tablet TAKE 1 TABLET BY MOUTH DAILY 90 tablet 1    atorvastatin (LIPITOR) 10 MG tablet TAKE 1/2 TABLET BY MOUTH DAILY 45 tablet 3    pantoprazole (PROTONIX) 40 MG tablet Take 1 tablet by mouth daily 90 tablet 3    atenolol (TENORMIN) 50 MG tablet TAKE ONE-HALF TABLET BY MOUTH EVERY MORNING AND ONE-HALF TABLET EVERY EVENING. 90 tablet 3    CONTOUR NEXT TEST strip TEST FIVE TIMES DAILY 500 strip 5    Misc. Devices MISC Diabetic Shotes ICD 10: E11.9 1 Device 0    Multiple Vitamins-Minerals (MULTIPLE VITAMINS/WOMENS PO) Take 1 tablet by mouth daily       fexofenadine (ALLEGRA ALLERGY) 180 MG tablet Take 180 mg by mouth daily      Omega-3 Fatty Acids (FISH OIL) 1000 MG CAPS Take 1,000 mg by mouth 2 times daily       aspirin 81 MG tablet Take 81 mg by mouth daily 3 days a wk      clotrimazole-betamethasone (LOTRISONE) 1-0.05 % cream Apply topically 2 times daily. (Patient not taking: Reported on 7/2/2021) 15 g 0    Insulin Pen Needle (TRUEPLUS PEN NEEDLES) 31G X 5 MM MISC Inject 1 each into the skin 4 times daily 400 each 3    triamcinolone (KENALOG) 0.1 % cream Apply topically 2 times daily. (Patient not taking: Reported on 7/2/2021) 45 g 1     No current facility-administered medications for this visit. Allergies:  Adhesive tape and Dermabond    REVIEW OF SYSTEMS     Constitutional: []? Fever []? Sweats []? Chills []? Recent Injury   [x]? Denies all unless marked  HENT:[]? Headache  []? Head Injury  []? Sore Throat  []? Ear Pain  []? Dizziness []? Hearing Loss   [x]? Denies all unless marked  Musculoskeletal: []? Arthralgia  []? Myalgias []? Muscle cramps  []? Muscle twitches   [x]? Denies all unless marked   Spine:  []? Neck pain  []? Back pain  []? Sciaticia  [x]? Denies all unless marked  Neurological:[]? Visual Disturbance []? Double Vision []? Slurred Speech []? Trouble swallowing  []? Vertigo []? Tingling []? Numbness []? Weakness []? Loss of Balance   []? Loss of Consciousness []? Memory Loss []? Seizures  [x]?  Denies all unless marked  Psychiatric/Behavioral:[]? Depression []? Anxiety  [x]? Denies all unless marked  Sleep: []? Insomnia []? Sleep Disturbance []? Snoring []? Restless Legs []? Daytime Sleepiness [x]? Sleep Apnea  []? Denies all unless marked    The MA has completed the ROS with the patient. I have reviewed it in its' entirety with the patient and agree with the documentation. PHYSICAL EXAM  /78 (Site: Left Upper Arm, Position: Sitting, Cuff Size: Medium Adult)   Pulse 57   Temp 96.8 °F (36 °C)   Ht 5' 7\" (1.702 m)   Wt (!) 389 lb (176.4 kg)   LMP 01/01/2002   SpO2 96%   BMI 60.93 kg/m²      Constitutional   Alert in NAD, well developed, pleasant and cooperative with exam; body habitus obes as indicated by BMI  HEENT- Conjunctiva normal.  No scars, masses, or lesions over external nose or ears, hearing intact, no neck masses noted, no jugular vein distension, no bruit  Cardiac- Regular rate and rhythm; Grade I/VI murmur  Pulmonary- Clear to auscultation, good expansion, normal effort without use of accessory muscles  Musculoskeletal  No significant wasting of muscles noted, no bony deformities  Extremities - No clubbing, cyanosis or edema  Skin  Warm, dry, and intact. No rash, erythema, or pallor  Psychiatric  Mood, affect, and behavior appear normal      Neurologic:  Extraocular movements are intact without nystagmus. PERRL. Visual fields are full to confrontation. Facial movements are symmetrical and normal.  Speech is precise. Extremity strength is normal in both uppers and lowers. Deep tendon reflexes are intact and symmetrical.  Rapid alternating movements are unimpaired. Finger-to-nose testing is performed well, without dysmetria.   Gait is normal.    I reviewed the following studies:       []  :  Clinical laboratory test results     []  :  Radiology reports                    [x]  :  Review and summarization of medical records and/or obtain medical records        []  :  Previous/recent polysomnogram report(s)     []  :  [place] Sleepiness Scale       [x]  :  Compliance download: The CPAP is set at a pressure of 12cm. Compliance download shows that she uses device: 100% of the time;  percentage of days with usage >=4 hours: 100%. AHI: 0.1    Assessment:       ICD-10-CM    1. ZOILA (obstructive sleep apnea)  G47.33    2. PLMD (periodic limb movement disorder)  G47.61    3. CPAP (continuous positive airway pressure) dependence  Z99.89           [x]  :  Stable-PLMD     []  :  Improved                       [x]  :  Well controlled-ZOILA              []  :  Resolving     []  :  Resolved     []  :  Inadequately controlled     []  :  Worsening     []  :  Additional workup planned    Patient is compliant and benefiting from therapy as indicated by compliance evaluation and patient report. Plan:     No orders of the defined types were placed in this encounter. 1.   Previously or presently advised of the etiology,  pathophysiology, diagnosis, treatment options, and risks of untreated ZOILA. Risks may include, but are not limited to  hypertension, coronary artery disease, atrial fibrillation, CHF, diabetes, stroke, weight gain, impaired cognition, daytime somnolence, and motor vehicle accidents. Advised to abstain from driving or operating heavy machinery when drowsy and the use of respiratory suppressants. Discussed diagnostic studies and potential treatment plan. 2.  Will evaluate for PAP clinical benefit and and compliance during a 30 day period within the preceding 90 days PRN. 3.  The following educational material has been included in this visit after visit summary for your review: ZOILA/PAP guidelines-Discussed with the patient and all questions fully answered. 4.  Continue CPAP/Resmed-order supplies  5.   Follow up in 1 year

## 2021-07-02 NOTE — LETTER
[address], [address]  [place] [address]  Phone [phone]           Re:  [place]    21  :  1960  Address: [address] [place] [address]       [facility]\""le PAP Supplies, 1 year supply  Item HPCPS Code Frequency   Mask of choice  or  1 per 3 months   Nasal Mask cushion/pillows  or  2 per 30 days   Full Face Mask Interface  1 per 30 days   Headgear  1 per 6 months   Tubing, length of choice  or  1 per 3 months   Water Chamber  1 per 6 months   Chinstrap  1 per 6 months   Disposable Filters  2 per 30 days   Reusable Filters  1 per 6 months     Diagnoses:  Obstructive sleep apnea (G47.33)  Length of Need: Lifetime, 99    Ordering Provider: Yogesh Luke PA-C  NPI:  [id number]        Signature:         Date: 2021      Electronically Signed by Yogesh Luke PA-C  on 2021 at 9:48 AM

## 2021-07-02 NOTE — PATIENT INSTRUCTIONS
Patient education: Sleep apnea in adults       INTRODUCTION  Normally during sleep, air moves through the throat and in and out of the lungs at a regular rhythm. In a person with sleep apnea, air movement is periodically diminished or stopped. There are two types of sleep apnea: obstructive sleep apnea and central sleep apnea. In obstructive sleep apnea, breathing is abnormal because of narrowing or closure of the throat. In central sleep apnea, breathing is abnormal because of a change in the breathing control and rhythm. Sleep apnea is a serious condition that can affect a person's ability to safely perform normal daily activities and can affect long term health. Approximately 25 percent of adults are at risk for sleep apnea of some degree. Men are more commonly affected than women. Other risk factors include middle and older age, being overweight or obese, and having a small mouth and throat. This topic review focuses on the most common type of sleep apnea in adults, obstructive sleep apnea (ZOILA). HOW SLEEP APNEA OCCURS  The throat is surrounded by muscles that control the airway for speaking, swallowing, and breathing. During sleep, these muscles are less active, and this causes the throat to narrow. In most people, this narrowing does not affect breathing. In others, it can cause snoring, sometimes with reduced or completely blocked airflow. A completely blocked airway without airflow is called an obstructive apnea. Partial obstruction with diminished airflow is called a hypopnea. A person may have apnea and hypopnea during sleep. Insufficient breathing due to apnea or hypopnea causes oxygen levels to fall and carbon dioxide to rise. Because the airway is blocked, breathing faster or harder does not help to improve oxygen levels until the airway is reopened. Typically, the obstruction requires the person to awaken to activate the upper airway muscles.  Once the airway is opened, the person then takes several deep breaths to catch up on breathing. As the person awakens, he or she may move briefly, snort or snore, and take a deep breath. Less frequently, a person may awaken completely with a sensation of gasping, smothering, or choking. If the person falls back to sleep quickly, he or she will not remember the event. Many people with sleep apnea are unaware of their abnormal breathing in sleep, and all patients underestimate how often their sleep is interrupted. Awakening from sleep causes sleep to be unrefreshing and causes fatigue and daytime sleepiness. Anatomic causes of obstructive sleep apnea   Most patients have ZOILA because of a small upper airway. As the bones of the face and skull develop, some people develop a small lower face, a small mouth, and a tongue that seems too large for the mouth. These features are genetically determined, which explains why ZOILA tends to cluster in families. Obesity is another major factor. Tonsil enlargement can be an important cause, especially in children. SLEEP APNEA SYMPTOMS  The main symptoms of ZOILA are loud snoring, fatigue, and daytime sleepiness. However, some people have no symptoms. For example, if the person does not have a bed partner, he or she may not be aware of the snoring. Fatigue and sleepiness have many causes and are often attributed to overwork and increasing age. As a result, a person may be slow to recognize that they have a problem. A bed partner or spouse often prompts the patient to seek medical care. Other symptoms may include one or more of the following:  ?Restless sleep  ? Awakening with choking, gasping, or smothering  ? Morning headaches, dry mouth, or sore throat  ? Waking frequently to urinate  ? Awakening unrested, groggy  ? Low energy, difficulty concentrating, memory impairment    Risk factors  Certain factors increase the risk of sleep apnea.   ?Increasing age [de-identified] ZOILA occurs at all ages, but it is more common in middle and older age adults. ?Male sex  ZOILA is two times more common in men, especially in middle age. ?Obesity  The more obese a person is, the more likely he or she is to have ZOILA. ? Sedation from medication or alcohol  This interferes with the ability to awaken from sleep and can lengthen periods of apnea (no breathing), with potentially dangerous consequences. ? Abnormality of the airway. SLEEP APNEA CONSEQUENCES  Complications of sleep apnea can include daytime sleepiness and difficulty concentrating. The consequence of this is an increased risk of accidents and errors in daily activities. Studies have shown that people with severe ZOILA are more than twice as likely to be involved in a motor vehicle accident as people without these conditions. People with ZOILA are encouraged to discuss options for driving, working, and performing other high-risk tasks with a healthcare provider. In addition, people with untreated ZOILA may have an increased risk of cardiovascular problems such as high blood pressure, heart attack, abnormal heart rhythms, or stroke. This risk may be due to changes in the heart rate and blood pressure that occur during sleep. SLEEP APNEA DIAGNOSIS  The diagnosis of ZOILA is best made by a knowledgeable sleep medicine specialist who has an understanding of the individual's health issues. The diagnosis is usually based upon the person's medical history, physical examination, and testing, including:  ? A complaint of snoring and ineffective sleep  ? Neck size (greater than 16 inches in men or 14 inches in women) is associated with an increased risk of sleep apnea  ? A small upper airway: difficulty seeing the throat because of a tongue that is large for the mouth  ? High blood pressure, especially if it is resistant to treatment  ? If a bed partner has observed the patient during episodes of stopped breathing (apnea), choking, or gasping during sleep, there is a strong possibility of sleep apnea.     Testing is usually performed in a sleep laboratory. A full sleep study is called a polysomnogram. The polysomnogram measures the breathing effort and airflow, blood oxygen level, heart rate and rhythm, duration of the various stages of sleep, body position, and movement of the arms/legs. Home monitoring devices are available that can perform a sleep study. This is a reasonable alternative to conventional testing in a sleep laboratory if the clinician strongly suspects moderate or severe sleep apnea and the patient does not have other illnesses or sleep disorders that may interfere with the results. SLEEP APNEA TREATMENT  Sleep apnea is best treated by a knowledgeable sleep medicine specialist. The goal of treatment is to maintain an open airway during sleep. Effective treatment will eliminate the symptoms of sleep disturbance; long-term health consequences are also reduced. Most treatments require nightly use. The challenge for the clinician and the patient is to select an effective therapy that is appropriate for the patient's problem and that is acceptable for long term use. Auto-titrating CPAP delivers an amount of PAP that varies during the night. The variation is dependent on event detection software algorithms, which will increase the pressure gradually in response to flow changes until adequate patency is detected. After a period of sustained upper airway patency, the delivered level of pressure gradually decreases until the algorithm identifies recurrent upper airway obstruction, at which point the delivered pressure again increases. The result is that the delivered pressure varies throughout the night, in an effort to provide the lowest pressure that is necessary to maintain upper airway patency. Continuous positive airway pressure (CPAP)  The most effective treatment for sleep apnea uses air pressure from a mechanical device to keep the upper airway open during sleep.  A CPAP (continuous positive airway pressure)  device uses an air-tight attachment to the nose, typically a mask, connected to a tube and a blower which generates the pressure. Devices that fit comfortably into the nasal opening, rather than over the nose, are also available. CPAP should be used any time the person sleeps (day or night). The CPAP device is usually used for the first time in the sleep lab, where a technician can adjust the pressure and select the best equipment to keep the airway open. Alternatively, an auto device with a self-adjusting pressure feature, provided with proper education and training, can get treatment started without another sleep test. While the treatment may seem uncomfortable, noisy, or bulky at first, most people accept the treatment after experiencing better sleep. However, difficulty with mask comfort and nasal congestion prevent up to 50 percent of people from using the treatment on a regular basis. Continued follow up with a healthcare provider helps to ensure that the treatment is effective and comfortable. Information from the CPAP machine is often used by physicians, therapists, and insurers to track the success of treatment. CPAP can be delivered with different features to improve comfort and solve problems that may come up during treatment. Changes in treatment may be needed if symptoms do not improve or if the persons condition changes, such as a gain or loss of weight. Adjust sleep position  Adjusting sleep position (to stay off the back) may help improve sleep quality in people who have ZOILA when sleeping on the back. However, this is difficult to maintain throughout the night and is rarely an adequate solution. Weight loss  Weight loss may be helpful for obese or overweight patients. Weight loss may be accomplished with dietary changes, exercise, and/or surgical treatment.  However, it can be difficult to maintain weight loss; the five-year success of non-surgical weight loss is only 5 percent, meaning that 95 percent of people regain lost weight. Avoid alcohol and other sedatives  Alcohol can worsen sleepiness, potentially increasing the risk of accidents or injury. People with ZOILA are often counseled to drink little to no alcohol, even during the daytime. Similarly, people who take anti-anxiety medications or sedatives to sleep should speak with their healthcare provider about the safety of these medications. People with ZOILA must notify all healthcare providers, including surgeons, about their condition and the potential risks of being sedated. People with ZOILA who are given anesthesia and/or pain medications require special management and close monitoring to reduce the risk of a blocked airway. Dental devices  A dental device, called an oral appliance or mandibular advancement device, can reposition the jaw (mandible), bringing the tongue and soft palate forward as well. This may relieve obstruction in some people. This treatment is excellent for reducing snoring, although the effect on ZOILA is sometimes more limited. As a result, dental devices are best used for mild cases of ZOILA when relief of snoring is the main goal. Failure to tolerate and accept CPAP is another indication for dental devices. While dental devices are not as effective as CPAP for ZOILA, some patients prefer a dental device to CPAP. Side effects of dental devices are generally minor but may include changes to the bite with prolonged use. Surgical treatment  Surgery is an alternative therapy for patients who cannot tolerate or do not improve with nonsurgical treatments such as CPAP or oral devices. Surgery can also be used in combination with other nonsurgical treatments. Surgical procedures reshape structures in the upper airways or surgically reposition bone or soft tissue. Uvulopalatopharyngoplasty (UPPP) removes the uvula and excessive tissue in the throat, including the tonsils, if present.  Other procedures, such as maxillomandibular advancement (MMA), address both the upper and lower pharyngeal airway more globally. UPPP alone has limited success rates (less than 50 percent) and people can relapse (when ZOILA symptoms return after surgery). As a result, this surgery is only recommended in a minority of people and should be considered with caution. MMA may have a higher success rate, particularly in people with abnormal jaw (maxilla and mandible) anatomy, but it is the most complicated procedure. A newer surgical approach, nerve stimulation to protrude the tongue, has promising success rates in very selected people. Tracheostomy creates a permanent opening in the neck. It is reserved for people with severe disease in whom less drastic measures have failed or are inappropriate. Although it is always successful in eliminating obstructive sleep apnea, tracheostomy requires significant lifestyle changes and carries some serious risks (eg, infection, bleeding, blockage). All surgical treatments require discussions about the goals of treatment, the expected outcomes, and potential complications. Hypoglossal nerve stimulator- \"Inspire\" device    PAP treatment failure:  Possible causes of treatment failure include nonadherence or suboptimal adherence, weight gain, an inappropriate level of prescribed positive pressure, or an additional disorder causing sleepiness (eg, narcolepsy) that may require alterations in the therapeutic regimen. A review of medications should also be undertaken since many drugs may lead to sleepiness. Inadequate sleep time may also negate the expected effects from treatment of ZOILA. Also, pt's can have persistent hypersomnolence associated with sleep apnea even in the presence of adequate therapy and at those times Provigil or Nuvigil or other stimulants may be indicated.     Once the patient's positive airway pressure therapy has been optimized and symptoms resolved, a regimen of long-term follow-up should be established. Annual visits are reasonable, with more frequent visits in between if new issues arise. The purpose of long-term follow-up is to assess usage and monitor for recurrent ZOILA, new side effects, air leakage, and fluctuations in body weight. WHERE TO GET MORE INFORMATION  Your healthcare provider is the best source of information for questions and concerns related to your medical problem. Organizations  [employer]  Provides information about sleep apnea to the public, publishes a newsletter, and serves as an advocate for people with the disorder. Giovana, [address]   Summer@[employer]. org   [employer].. org   Tel: [phone]   Fax: [facility] organization that works to [employer] and safety by promoting public understanding of sleep and sleep disorders. Supports sleep-related education, research, and advocacy; produces and distributes educational materials to the public and healthcare professionals; and offers postdoctoral fellowships and grants for sleep researchers. [address]   [email or web address]   [email or web address]   Tel: [phone]   Fax: [phone]    Important information:  Medicare/private insurance CPAP/BiPAP/APAP requirements:  Medicare/private insurance has specific requirements for PAP compliance that must be met during the first 90 days of use to continue coverage for CPAP/BiPAP/APAP  from day 91 and beyond. The policy requires that patients use a PAP device 4 hours per 24 hour period, at least 70% of the time over a 30 day period. This data must be downloaded as a report direct from the PAP devices. This is called a compliance download. Your PAP supplier will assist you in this matter.      Reminder:  Please bring a copy of the compliance download to your next office visit or have your supplier fax it to our office prior to your office visit. Note:  Where applicable, we will utilize PAP device efficiency reports, additional testing, and face-to-face  clinical evaluation subsequent to any treatment, changes in treatment, and continued treatment. Caution:  Please abstain from driving or engaging in other activities which may be hazardous in the presence of diminished alertness or daytime drowsiness. And avoid the use of sedatives or alcohol, which can worsen sleep apnea and daytime drowsiness. Mask suggestions:  -     Resmed Airfit N20 (Nasal) or F20 (Full face mask). They conform to your face, thus decreasing the potential for mask leakage. You might like the AirTouch F20(full face mask). It has a \"memory foam\" like cushion. The AirFit F30 is a smaller style full face mask designed to sit low on and cover less of your face for fewer facial marks. AirFit N30i has a top of the head tube with a nasal mask. AirFit P10 reported to be the most comfortable nasal pillow mask. Resmed Mirage FX reported to be the most comfortable nasal mask. Resmed Mirage [place] reported to be the most comfortable hybrid mask. AirTouch N20-memory foam nasal mask. Respironics: You might also like to try a nasal mask called a Dreamwear nasal mask or the Dreamwear nasal pillow. Another suggestion is the [facility], it is a minimal contact full face mask. The Kristian Jeans incredible under the nose design makes it the only full face mask that won't cause red marks on the bridge of your nose when compared to other full face masks. The Dreamwear full face mask has a  soft feel, unique in-frame air-flow, and innovative air tube connection at the top of the head for the ultimate in sleep comfort. Comfort Gel Blue. Dreamwear gel pillows. Thien & Liam: Brevida nasal pillow mask and Simplus FFM    The use of a memory foam CPAP pillow supports the head and neck throughout the night.

## 2021-08-01 DIAGNOSIS — E11.42 TYPE 2 DIABETES MELLITUS WITH DIABETIC POLYNEUROPATHY, WITH LONG-TERM CURRENT USE OF INSULIN (HCC): ICD-10-CM

## 2021-08-01 DIAGNOSIS — Z79.4 TYPE 2 DIABETES MELLITUS WITH DIABETIC POLYNEUROPATHY, WITH LONG-TERM CURRENT USE OF INSULIN (HCC): ICD-10-CM

## 2021-08-01 RX ORDER — INSULIN DETEMIR 100 [IU]/ML
INJECTION, SOLUTION SUBCUTANEOUS
Qty: 15 ML | Refills: 3 | Status: SHIPPED | OUTPATIENT
Start: 2021-08-01 | End: 2021-11-18

## 2021-08-01 RX ORDER — INSULIN ASPART 100 [IU]/ML
INJECTION, SOLUTION INTRAVENOUS; SUBCUTANEOUS
Qty: 9 ML | Refills: 1 | Status: SHIPPED | OUTPATIENT
Start: 2021-08-01 | End: 2021-09-24 | Stop reason: SDUPTHER

## 2021-08-12 ENCOUNTER — OFFICE VISIT (OUTPATIENT)
Dept: FAMILY MEDICINE CLINIC | Age: 61
End: 2021-08-12
Payer: COMMERCIAL

## 2021-08-12 VITALS
DIASTOLIC BLOOD PRESSURE: 84 MMHG | HEIGHT: 67 IN | BODY MASS INDEX: 45.99 KG/M2 | SYSTOLIC BLOOD PRESSURE: 126 MMHG | HEART RATE: 66 BPM | TEMPERATURE: 97.4 F | RESPIRATION RATE: 18 BRPM | OXYGEN SATURATION: 98 % | WEIGHT: 293 LBS

## 2021-08-12 DIAGNOSIS — R10.2 SUPRAPUBIC ABDOMINAL PAIN: ICD-10-CM

## 2021-08-12 DIAGNOSIS — K57.92 DIVERTICULITIS: Primary | ICD-10-CM

## 2021-08-12 DIAGNOSIS — R50.9 FEVER WITH CHILLS: ICD-10-CM

## 2021-08-12 LAB
APPEARANCE FLUID: CLEAR
BILIRUBIN, POC: NORMAL
BLOOD URINE, POC: NORMAL
CLARITY, POC: CLEAR
COLOR, POC: YELLOW
GLUCOSE URINE, POC: NORMAL
KETONES, POC: NORMAL
LEUKOCYTE EST, POC: NORMAL
NITRITE, POC: NORMAL
PH, POC: 5.5
PROTEIN, POC: NORMAL
SPECIFIC GRAVITY, POC: 1.01
UROBILINOGEN, POC: 0.2

## 2021-08-12 PROCEDURE — 99214 OFFICE O/P EST MOD 30 MIN: CPT | Performed by: CLINICAL NURSE SPECIALIST

## 2021-08-12 PROCEDURE — 81002 URINALYSIS NONAUTO W/O SCOPE: CPT | Performed by: CLINICAL NURSE SPECIALIST

## 2021-08-12 RX ORDER — PEN NEEDLE, DIABETIC 31 GX5/16"
NEEDLE, DISPOSABLE MISCELLANEOUS
Qty: 400 EACH | Refills: 3 | Status: SHIPPED | OUTPATIENT
Start: 2021-08-12

## 2021-08-12 RX ORDER — AMOXICILLIN AND CLAVULANATE POTASSIUM 875; 125 MG/1; MG/1
1 TABLET, FILM COATED ORAL 2 TIMES DAILY
Qty: 20 TABLET | Refills: 0 | Status: SHIPPED | OUTPATIENT
Start: 2021-08-12 | End: 2021-08-22

## 2021-08-12 ASSESSMENT — ENCOUNTER SYMPTOMS
EYE DISCHARGE: 0
COLOR CHANGE: 0
TROUBLE SWALLOWING: 0
ABDOMINAL DISTENTION: 0
SORE THROAT: 0
COUGH: 0
ABDOMINAL PAIN: 1
CHEST TIGHTNESS: 0
BACK PAIN: 0
EYE PAIN: 0
WHEEZING: 0
NAUSEA: 0
VOMITING: 0
FACIAL SWELLING: 0
SHORTNESS OF BREATH: 0
SINUS PRESSURE: 0
CONSTIPATION: 1
DIARRHEA: 1
EYE REDNESS: 0

## 2021-08-12 NOTE — PROGRESS NOTES
SUBJECTIVE:  [place] is a 62 y.o. who presents today for Fatigue (for about 2 weeks now; thinks it is a diverticulitis flare up), Diarrhea, and Abdominal Pain      HPI    Ms Pina Long presents today with fatigue, fever and chills, irregular bowel pattern and suprapubic abdominal pain for 2 weeks. Her bowels alternate from loose to constipation, increased gas and bloating. Had negative COVID test.   She decreased her diet to full liquids over the weekend and started amoxil script she had at home. No fever since the weekend. No bleeding. She is tolerating po well. Has had diverticulitis twice in the past.  Has been eating more tomatoes and cucumbers, higher fiber.      Past Medical History:   Diagnosis Date    Allergic rhinitis     Ankle fracture     3 year ago; increasing difficulty walking; has bone fragments    Arthritis     sees dr. Kostas Araya as ambulation aid     right foot per dr. Chris Romo CPAP (continuous positive airway pressure) dependence     12cm    Diabetes ([facility] [address]     Hiatal hernia 9/27/2017    History of kidney cancer     Hyperlipidemia     Hypertension     Hypothyroidism     Murmur     Obesity     ZOILA (obstructive sleep apnea)     AHI:  28.5 per PSG, 9/2014    PLMD (periodic limb movement disorder)     Renal mass     cat scan done 9/13/18; to see dr. Negrete Most coming up    Thyroid disease     Type 2 diabetes mellitus without complication ([address]     Type II or unspecified type diabetes mellitus without mention of complication, not stated as uncontrolled      Past Surgical History:   Procedure Laterality Date    BREAST BIOPSY Right 2012    CARPAL TUNNEL RELEASE      bilateral    CHOLECYSTECTOMY      HYSTERECTOMY      [place] LAP, RADICAL NEPHRECTOMY Left 10/3/2018    NEPHRECTOMY LAPAROSCOPIC HAND ASSISTED performed by Kaur Howell MD at [address] LAP,CHOLECYSTECTOMY/GRAPH N/A 10/31/2017    CHOLECYSTECTOMY LAPAROSCOPIC performed by Amarilys Cummings MD at [address] [place] [address]  VENTRAL HERNIA REPAIR N/A 9/22/2020    VENTRAL HERNIA REPAIR WITH MESH performed by [facility]ilys Cummings MD at [facility] History   Problem Relation Age of Onset    Diabetes Mother     Cancer Mother         breast    Heart Disease Father     Cancer Father         colon, over 79    Cancer Sister 43        breast, negative genetic testing    Cancer Maternal Grandmother 76        breast    Diabetes Maternal Grandmother     Heart Attack Maternal Grandfather     Hypertension Other     Elevated Lipids Other     Coronary Art Dis Other     Heart Attack Paternal Grandmother      Social History     Tobacco Use    Smoking status: Never Smoker    Smokeless tobacco: Never Used   Substance Use Topics    Alcohol use: No     Current Outpatient Medications   Medication Sig Dispense Refill    amoxicillin-clavulanate (AUGMENTIN) 875-125 MG per tablet Take 1 tablet by mouth 2 times daily for 10 days 20 tablet 0    LEVEMIR FLEXTOUCH 100 UNIT/ML injection pen INJECT 46 UNITS UNDER THE SKIN EVERY NIGHT 15 mL 3    Insulin Aspart FlexPen 100 UNIT/ML SOPN INJECT 6 UNITS UNDER THE SKIN WITH BREAKFAST, 6 UNITS WITH LUNCH AND 10 UNITS WITH DINNER 9 mL 1    lisinopril (PRINIVIL;ZESTRIL) 20 MG tablet Take 20 mg by mouth daily      Cholecalciferol (VITAMIN D3) 1.25 MG (86072 UT) CAPS Take 1 capsule by mouth once a week      dilTIAZem (CARDIZEM CD) 180 MG extended release capsule TAKE 1 CAPSULE BY MOUTH DAILY 90 capsule 3    levothyroxine (SYNTHROID) 137 MCG tablet TAKE 1 TABLET BY MOUTH DAILY 90 tablet 1    atorvastatin (LIPITOR) 10 MG tablet TAKE 1/2 TABLET BY MOUTH DAILY 45 tablet 3    pantoprazole (PROTONIX) 40 MG tablet Take 1 tablet by mouth daily 90 tablet 3    atenolol (TENORMIN) 50 MG tablet TAKE ONE-HALF TABLET BY MOUTH EVERY MORNING AND ONE-HALF TABLET EVERY EVENING. 90 tablet 3    CONTOUR NEXT TEST strip TEST FIVE TIMES DAILY 500 strip 5    Misc.  Devices MISC Diabetic Shotes ICD 10: E11.9 1 Device 0    Multiple Vitamins-Minerals (MULTIPLE VITAMINS/WOMENS PO) Take 1 tablet by mouth daily       fexofenadine (ALLEGRA ALLERGY) 180 MG tablet Take 180 mg by mouth daily      Omega-3 Fatty Acids (FISH OIL) 1000 MG CAPS Take 1,000 mg by mouth 2 times daily       aspirin 81 MG tablet Take 81 mg by mouth daily 3 days a wk      clotrimazole-betamethasone (LOTRISONE) 1-0.05 % cream Apply topically 2 times daily. (Patient not taking: Reported on 7/2/2021) 15 g 0    Insulin Pen Needle (TRUEPLUS PEN NEEDLES) 31G X 5 MM MISC Inject 1 each into the skin 4 times daily 400 each 3    triamcinolone (KENALOG) 0.1 % cream Apply topically 2 times daily. (Patient not taking: Reported on 7/2/2021) 45 g 1     No current facility-administered medications for this visit. Allergies   Allergen Reactions    Adhesive Tape Other (See Comments)     Post op incision infection    Dermabond Other (See Comments)     Post op incision infection       Review of Systems   Constitutional: Positive for chills, fatigue and fever. Negative for appetite change and diaphoresis. HENT: Negative for congestion, ear pain, facial swelling, hearing loss, postnasal drip, sinus pressure, sore throat and trouble swallowing. Eyes: Negative for pain, discharge, redness and visual disturbance. Respiratory: Negative for cough, chest tightness, shortness of breath and wheezing. Cardiovascular: Negative for chest pain and palpitations. Gastrointestinal: Positive for abdominal pain, constipation and diarrhea. Negative for abdominal distention, nausea and vomiting. Endocrine: Negative for cold intolerance and heat intolerance. Genitourinary: Negative for difficulty urinating, dysuria, flank pain, frequency, hematuria and urgency. Musculoskeletal: Negative for arthralgias, back pain, joint swelling and neck pain. Skin: Negative for color change and rash. Neurological: Negative for dizziness, syncope, weakness, light-headedness and headaches. Hematological: Negative for adenopathy. Does not bruise/bleed easily. Psychiatric/Behavioral: Negative for confusion and dysphoric mood. The patient is not nervous/anxious. OBJECTIVE:  /84   Pulse 66   Temp 97.4 °F (36.3 °C) (Temporal)   Resp 18   Ht 5' 7\" (1.702 m)   Wt (!) 388 lb 12.8 oz (176.4 kg)   LMP 01/01/2002   SpO2 98%   BMI 60.89 kg/m²    Physical Exam  Vitals reviewed. Constitutional:       General: She is not in acute distress. Appearance: She is well-developed. She is obese. She is not ill-appearing or toxic-appearing. HENT:      Head: Normocephalic and atraumatic. Eyes:      General:         Right eye: No discharge. Left eye: No discharge. Conjunctiva/sclera: Conjunctivae normal.      Pupils: Pupils are equal, round, and reactive to light. Neck:      Thyroid: No thyromegaly. Trachea: No tracheal deviation. Cardiovascular:      Rate and Rhythm: Normal rate and regular rhythm. Heart sounds: No murmur heard. Pulmonary:      Effort: Pulmonary effort is normal. No respiratory distress. Breath sounds: Normal breath sounds. No wheezing or rales. Abdominal:      General: Bowel sounds are normal. There is no distension. Palpations: Abdomen is soft. Tenderness: There is no abdominal tenderness. There is no rebound. Comments: Difficult exam due to body habitus   Genitourinary:     Vagina: Normal.   Musculoskeletal:         General: No deformity. Normal range of motion. Cervical back: Normal range of motion and neck supple. Right lower leg: No edema. Left lower leg: No edema. Skin:     General: Skin is warm and dry. Findings: No erythema or rash. Neurological:      Mental Status: She is alert and oriented to person, place, and time. Mental status is at baseline. Motor: No weakness.       Gait: Gait normal.   Psychiatric:         Mood and Affect: Mood normal.         Behavior: Behavior normal. Thought Content: Thought content normal.         Judgment: Judgment normal.         ASSESSMENT/PLAN:  1. Diverticulitis  Suspected, acute  UA negative  Augmentin  Stay on clear-full liquids until pain resolves  May use tylenol prn pain  Call if any worsening symptoms, will need CT  - amoxicillin-clavulanate (AUGMENTIN) 875-125 MG per tablet; Take 1 tablet by mouth 2 times daily for 10 days  Dispense: 20 tablet; Refill: 0    2. Fever with chills  - POCT Urinalysis no Micro    3. Suprapubic abdominal pain  - POCT Urinalysis no Micro          Return for already scheduled.

## 2021-09-15 DIAGNOSIS — E03.9 PRIMARY HYPOTHYROIDISM: ICD-10-CM

## 2021-09-15 RX ORDER — LEVOTHYROXINE SODIUM 137 UG/1
137 TABLET ORAL DAILY
Qty: 90 TABLET | Refills: 1 | Status: SHIPPED | OUTPATIENT
Start: 2021-09-15 | End: 2022-03-08

## 2021-09-17 ENCOUNTER — TELEPHONE (OUTPATIENT)
Dept: NEUROLOGY | Age: 61
End: 2021-09-17

## 2021-09-17 NOTE — TELEPHONE ENCOUNTER
Called and left patient a VM to let her know that I have her scheduled an appointment with Minus Letters for a follow up after being setup on her [employer] sleep machine. Left a VM regarding the appointment time/date.

## 2021-09-24 ENCOUNTER — OFFICE VISIT (OUTPATIENT)
Dept: FAMILY MEDICINE CLINIC | Age: 61
End: 2021-09-24
Payer: COMMERCIAL

## 2021-09-24 VITALS
TEMPERATURE: 97.3 F | DIASTOLIC BLOOD PRESSURE: 76 MMHG | SYSTOLIC BLOOD PRESSURE: 136 MMHG | OXYGEN SATURATION: 97 % | BODY MASS INDEX: 61.4 KG/M2 | HEART RATE: 65 BPM | WEIGHT: 293 LBS

## 2021-09-24 DIAGNOSIS — I10 ESSENTIAL (PRIMARY) HYPERTENSION: ICD-10-CM

## 2021-09-24 DIAGNOSIS — E78.00 HYPERCHOLESTEROLEMIA: ICD-10-CM

## 2021-09-24 DIAGNOSIS — R53.83 OTHER FATIGUE: ICD-10-CM

## 2021-09-24 DIAGNOSIS — E11.42 TYPE 2 DIABETES MELLITUS WITH DIABETIC POLYNEUROPATHY, WITH LONG-TERM CURRENT USE OF INSULIN (HCC): ICD-10-CM

## 2021-09-24 DIAGNOSIS — Z79.4 TYPE 2 DIABETES MELLITUS WITH DIABETIC POLYNEUROPATHY, WITH LONG-TERM CURRENT USE OF INSULIN (HCC): ICD-10-CM

## 2021-09-24 DIAGNOSIS — E03.9 PRIMARY HYPOTHYROIDISM: Primary | ICD-10-CM

## 2021-09-24 PROCEDURE — 90674 CCIIV4 VAC NO PRSV 0.5 ML IM: CPT | Performed by: FAMILY MEDICINE

## 2021-09-24 PROCEDURE — 3051F HG A1C>EQUAL 7.0%<8.0%: CPT | Performed by: FAMILY MEDICINE

## 2021-09-24 PROCEDURE — 90472 IMMUNIZATION ADMIN EACH ADD: CPT | Performed by: FAMILY MEDICINE

## 2021-09-24 PROCEDURE — 90750 HZV VACC RECOMBINANT IM: CPT | Performed by: FAMILY MEDICINE

## 2021-09-24 PROCEDURE — 90471 IMMUNIZATION ADMIN: CPT | Performed by: FAMILY MEDICINE

## 2021-09-24 PROCEDURE — 99214 OFFICE O/P EST MOD 30 MIN: CPT | Performed by: FAMILY MEDICINE

## 2021-09-24 RX ORDER — ATENOLOL 50 MG/1
TABLET ORAL
Qty: 90 TABLET | Refills: 3 | Status: SHIPPED | OUTPATIENT
Start: 2021-09-24 | End: 2022-09-26 | Stop reason: SDUPTHER

## 2021-09-24 RX ORDER — ATORVASTATIN CALCIUM 10 MG/1
10 TABLET, FILM COATED ORAL DAILY
Qty: 90 TABLET | Refills: 3 | Status: SHIPPED | OUTPATIENT
Start: 2021-09-24 | End: 2021-11-01

## 2021-09-24 RX ORDER — INSULIN ASPART 100 [IU]/ML
INJECTION, SOLUTION INTRAVENOUS; SUBCUTANEOUS
Qty: 9 ML | Refills: 3 | Status: SHIPPED | OUTPATIENT
Start: 2021-09-24 | End: 2022-01-21

## 2021-09-24 NOTE — PROGRESS NOTES
[facility] PHYSICIAN SERVICES  [facility] FAMILY MEDICINE  [address] [facility] [address]  [address] [address]  Dept: [phone]  Dept Fax: [phone]  Loc: [phone]    Ross Zambrano is a 62 y.o. female who presents today for her medical conditions/complaints as noted below. Ross Zambrano is here for 3 Month Follow-Up        HPI:   CC: Here today to discuss the following:    Diabetes Mellitus  Has been compliant with medications. No side effects of medications since last visit. No polyuria, polydipsia, or vision changes since last visit. No symptomatic episodes of hypoglycemia. Levemir 48 units qhs  Aspart 6/6/10    Gastroesophageal Reflux Disease  Symptoms currently under control. Medication adequately controls his symptoms. No hematochezia or melena. Hypothyroidism  Symptoms are stable. No temperature intolerance, fatigue, or mood disturbance from baseline. Complaint with current medication. Hypertension  Compliant with medications. No adverse effects from medication. No lightheadedness, palpitations, or chest pain. Hyperlipidemia  Tolerating current cholesterol medication without side effects. No body aches. Attempting to reduce processed sugar and cholesterol from diet. ZOILA on CPAP  Compliant with CPAP. No daytime somnolence. Feels rested for the most part when wearing CPAP. She is requesting diabetic shoes. She is suffering from chronic foot pain and she has a history of posttraumatic arthritis of the right ankle resulting in issues with chronic pain. She is also at risk for callus formation. She has prolonged history of diabetes.           HPI    Past Medical History:   Diagnosis Date    Allergic rhinitis     Ankle fracture     3 year ago; increasing difficulty walking; has bone fragments    Arthritis     sees dr. Gilma Gordon as ambulation aid     right foot per dr. Lisa Ruiz    CPAP (continuous positive airway pressure) dependence     12cm    Diabetes ([facility] [address]     Hiatal hernia 9/27/2017    History of kidney cancer     Hyperlipidemia     Hypertension     Hypothyroidism     Murmur     Obesity     ZOILA (obstructive sleep apnea)     AHI:  28.5 per PSG, 9/2014    PLMD (periodic limb movement disorder)     Renal mass     cat scan done 9/13/18; to see dr. Felice Basurto coming up    Thyroid disease     Type 2 diabetes mellitus without complication ([facility] [address]     Type II or unspecified type diabetes mellitus without mention of complication, not stated as uncontrolled       Past Surgical History:   Procedure Laterality Date    BREAST BIOPSY Right 2012    CARPAL TUNNEL RELEASE      bilateral    CHOLECYSTECTOMY      HYSTERECTOMY      CT LAP, RADICAL NEPHRECTOMY Left 10/3/2018    NEPHRECTOMY LAPAROSCOPIC HAND ASSISTED performed by Jaylyn Shelton MD at [facility] [address] LAP,CHOLECYSTECTOMY/GRAPH N/A 10/31/2017    CHOLECYSTECTOMY LAPAROSCOPIC performed by Rakan Barrios MD at [facility] N/A 9/22/2020    [address] performed by Rakan Barrios MD at [address] [facility] History   Problem Relation Age of Onset    Diabetes Mother     Cancer Mother         breast    Heart Disease Father     Cancer Father         colon, over 79    Cancer Sister 43        breast, negative genetic testing    Cancer Maternal Grandmother 76        breast    Diabetes Maternal Grandmother     Heart Attack Maternal Grandfather     Hypertension Other     Elevated Lipids Other     Coronary Art Dis Other     Heart Attack Paternal Grandmother        Social History     Tobacco Use    Smoking status: Never Smoker    Smokeless tobacco: Never Used   Substance Use Topics    Alcohol use: No     Current Outpatient Medications   Medication Sig Dispense Refill    atenolol (TENORMIN) 50 MG tablet TAKE ONE-HALF TABLET BY MOUTH EVERY MORNING AND ONE-HALF TABLET EVERY EVENING.  90 tablet 3    Insulin Aspart FlexPen 100 UNIT/ML SOPN INJECT 6 UNITS UNDER THE SKIN WITH BREAKFAST, 6 UNITS WITH LUNCH AND 10 UNITS WITH DINNER 9 mL 3    atorvastatin (LIPITOR) 10 MG tablet Take 1 tablet by mouth daily 90 tablet 3    Misc. Devices MISC Diabetic shoes 1 each 0    levothyroxine (SYNTHROID) 137 MCG tablet TAKE 1 TABLET BY MOUTH DAILY 90 tablet 1    B-D UF III MINI PEN NEEDLES 31G X 5 MM MISC USE FOUR TIMES DAILY AS DIRECTED 400 each 3    LEVEMIR FLEXTOUCH 100 UNIT/ML injection pen INJECT 46 UNITS UNDER THE SKIN EVERY NIGHT (Patient taking differently: Inject 48 Units into the skin nightly ) 15 mL 3    lisinopril (PRINIVIL;ZESTRIL) 20 MG tablet Take 40 mg by mouth daily       Cholecalciferol (VITAMIN D3) 1.25 MG (29965 UT) CAPS Take 1 capsule by mouth once a week      dilTIAZem (CARDIZEM CD) 180 MG extended release capsule TAKE 1 CAPSULE BY MOUTH DAILY 90 capsule 3    pantoprazole (PROTONIX) 40 MG tablet Take 1 tablet by mouth daily 90 tablet 3    CONTOUR NEXT TEST strip TEST FIVE TIMES DAILY 500 strip 5    Misc. Devices MISC Diabetic Shotes ICD 10: E11.9 1 Device 0    Multiple Vitamins-Minerals (MULTIPLE VITAMINS/WOMENS PO) Take 1 tablet by mouth daily       fexofenadine (ALLEGRA ALLERGY) 180 MG tablet Take 180 mg by mouth daily      Omega-3 Fatty Acids (FISH OIL) 1000 MG CAPS Take 1,000 mg by mouth 2 times daily       aspirin 81 MG tablet Take 81 mg by mouth daily 3 days a wk      clotrimazole-betamethasone (LOTRISONE) 1-0.05 % cream Apply topically 2 times daily. (Patient not taking: Reported on 7/2/2021) 15 g 0     No current facility-administered medications for this visit.      Allergies   Allergen Reactions    Adhesive Tape Other (See Comments)     Post op incision infection    Dermabond Other (See Comments)     Post op incision infection       Health Maintenance   Topic Date Due    Hepatitis C screen  Never done    Diabetic foot exam  Never done    HIV screen  Never done    Diabetic retinal exam  01/22/2020    Shingles Vaccine (2 of 2) 11/19/2021    Breast cancer screen  01/17/2022    Diabetic microalbuminuria test  02/23/2022    A1C test (Diabetic or Prediabetic)  09/15/2022    Lipid screen  09/15/2022    Potassium monitoring  09/15/2022    Creatinine monitoring  09/15/2022    Pneumococcal 0-64 years Vaccine (2 of 2 - PPSV23) 02/28/2026    Colon cancer screen colonoscopy  06/28/2026    DTaP/Tdap/Td vaccine (2 - Td or Tdap) 06/07/2031    Flu vaccine  Completed    COVID-19 Vaccine  Completed    Hepatitis A vaccine  Aged Out    Hib vaccine  Aged Out    Meningococcal (ACWY) vaccine  Aged Out       Subjective:      Review of Systems  Review of Systems   Constitutional: Negative for chills and fever. HENT: Negative for congestion. Respiratory: Negative for cough, chest tightness and shortness of breath. Cardiovascular: Negative for chest pain, palpitations and leg swelling. Gastrointestinal: Negative for abdominal pain, anal bleeding, constipation, diarrhea and nausea. Genitourinary: Negative for difficulty urinating. Psychiatric/Behavioral: Negative. SeeHPI for visit specific review of symptoms. All others negative      Objective:   /76   Pulse 65   Temp 97.3 °F (36.3 °C)   Wt (!) 392 lb (177.8 kg)   LMP 01/01/2002   SpO2 97%   BMI 61.40 kg/m²   Physical Exam    Physical Exam   Constitutional: She appears well-developed. Does not appear ill. Neck: Normal range of motion. Neck supple. No masses. Neck Symmetric. Normal tracheal position. No thyroid enlargement  Cardiovascular: Normal rate and regular rhythm. Exam reveals no friction rub. Carotid arteries: no bruit observed. No murmur heard. Respiratory:  Effort normal and breath sounds normal. No respiratory distress. No wheezes. No rales. No use of accessory muscles or intercostal retractions. Neurological: alert. Psychiatric: normal mood and affect. Her behavior is normal. Normal judgement and insight observed.     Recent Results (from the past 672 hour(s))   Comprehensive Metabolic Panel    Collection Time: 09/15/21  9:54 AM   Result Value Ref Range    Sodium 142 136 - 145 mmol/L    Potassium 4.8 3.5 - 5.0 mmol/L    Chloride 105 98 - 111 mmol/L    CO2 26 22 - 29 mmol/L    Anion Gap 11 7 - 19 mmol/L    Glucose 143 (H) 74 - 109 mg/dL    BUN 21 8 - 23 mg/dL    CREATININE 1.2 (H) 0.5 - 0.9 mg/dL    GFR Non- 46 (A) >60    GFR  55 (L) >59    Calcium 9.6 8.8 - 10.2 mg/dL    Total Protein 7.8 6.6 - 8.7 g/dL    Albumin 4.1 3.5 - 5.2 g/dL    Total Bilirubin 0.4 0.2 - 1.2 mg/dL    Alkaline Phosphatase 89 35 - 104 U/L    ALT 15 5 - 33 U/L    AST 16 5 - 32 U/L   Zinc    Collection Time: 09/15/21  9:54 AM   Result Value Ref Range    Zinc 111.3 60.0 - 120.0 ug/dL   Hemoglobin A1C    Collection Time: 09/15/21  9:54 AM   Result Value Ref Range    Hemoglobin A1C 7.2 (H) 4.0 - 6.0 %   Lipid Panel    Collection Time: 09/15/21  9:54 AM   Result Value Ref Range    Cholesterol, Total 158 (L) 160 - 199 mg/dL    Triglycerides 91 0 - 149 mg/dL    HDL 51 (L) 65 - 121 mg/dL    LDL Calculated 89 <100 mg/dL   CBC Auto Differential    Collection Time: 09/15/21  9:54 AM   Result Value Ref Range    WBC 7.3 4.8 - 10.8 K/uL    RBC 4.40 4.20 - 5.40 M/uL    Hemoglobin 12.8 12.0 - 16.0 g/dL    Hematocrit 41.3 37.0 - 47.0 %    MCV 93.9 81.0 - 99.0 fL    MCH 29.1 27.0 - 31.0 pg    MCHC 31.0 (L) 33.0 - 37.0 g/dL    RDW 14.6 (H) 11.5 - 14.5 %    Platelets 309 007 - 090 K/uL    MPV 11.4 9.4 - 12.3 fL    Neutrophils % 71.4 (H) 50.0 - 65.0 %    Lymphocytes % 20.9 20.0 - 40.0 %    Monocytes % 5.0 0.0 - 10.0 %    Eosinophils % 1.9 0.0 - 5.0 %    Basophils % 0.4 0.0 - 1.0 %    Neutrophils Absolute 5.2 1.5 - 7.5 K/uL    Immature Granulocytes # 0.0 K/uL    Lymphocytes Absolute 1.5 1.1 - 4.5 K/uL    Monocytes Absolute 0.40 0.00 - 0.90 K/uL    Eosinophils Absolute 0.10 0.00 - 0.60 K/uL    Basophils Absolute 0.00 0.00 - 0.20 K/uL       Lab Results   Component Value Date LABA1C 7.2 (H) 09/15/2021    LABA1C 7.1 (H) 05/24/2021    LABA1C 7.0 (H) 02/23/2021     Lab Results   Component Value Date    LABMICR 3.50 02/23/2021    LDLCALC 89 09/15/2021    CREATININE 1.2 (H) 09/15/2021     Lab Results   Component Value Date    CHOL 158 (L) 09/15/2021    CHOL 149 (L) 02/23/2021    CHOL 155 (L) 11/17/2020     Lab Results   Component Value Date    TRIG 91 09/15/2021    TRIG 101 02/23/2021    TRIG 107 11/17/2020     Lab Results   Component Value Date    HDL 51 (L) 09/15/2021    HDL 52 (L) 02/23/2021    HDL 49 (L) 11/17/2020     Lab Results   Component Value Date    LDLCALC 89 09/15/2021    LDLCALC 77 02/23/2021    LDLCALC 85 11/17/2020     No results found for: LABVLDL, VLDL  No results found for: CHOLHDLRATIO          Assessment & Plan: The following diagnoses and conditions are stable with no further orders unless indicated:  1. Essential (primary) hypertension  BP Readings from Last 3 Encounters:   09/24/21 136/76   08/12/21 126/84   07/02/21 128/78   Nephrology just increased her lisinopril to 20 mg  Discussed lifestyle changes such as diet and exercise. Recommended eliminate processed food from diet such as sugar and fried foods. Recommended exercising at least 150 minutes/week. Try to do full body resistance training twice a week as well. Offered suggestions for calorie counting such as phone apps and online resources such as [employer] fitness pal and Lose it. Discussed healthy weight.      - atenolol (TENORMIN) 50 MG tablet; TAKE ONE-HALF TABLET BY MOUTH EVERY MORNING AND ONE-HALF TABLET EVERY EVENING. Dispense: 90 tablet; Refill: 3  - CBC Auto Differential; Future    2.  Type 2 diabetes mellitus with diabetic polyneuropathy, with long-term current use of insulin ([facility])  Lab Results   Component Value Date    LABA1C 7.2 (H) 09/15/2021    LABA1C 7.1 (H) 05/24/2021    LABA1C 7.0 (H) 02/23/2021     Lab Results   Component Value Date    LABMICR 3.50 02/23/2021    [facility] 89 09/15/2021 CREATININE 1.2 (H) 09/15/2021     Increase Levemir to 50 units at night  - Insulin Aspart FlexPen 100 UNIT/ML SOPN; INJECT 6 UNITS UNDER THE SKIN WITH BREAKFAST, 6 UNITS WITH LUNCH AND 10 UNITS WITH DINNER  Dispense: 9 mL; Refill: 3  - Microalbumin / Creatinine Urine Ratio; Future  - Comprehensive Metabolic Panel; Future    3. Primary hypothyroidism  Lab Results   Component Value Date    TSH 1.450 02/23/2021    T4FREE 1.44 02/23/2021     Stable    4. Hypercholesterolemia  Lab Results   Component Value Date    CHOL 158 (L) 09/15/2021    CHOL 149 (L) 02/23/2021    CHOL 155 (L) 11/17/2020     Lab Results   Component Value Date    TRIG 91 09/15/2021    TRIG 101 02/23/2021    TRIG 107 11/17/2020     Lab Results   Component Value Date    HDL 51 (L) 09/15/2021    HDL 52 (L) 02/23/2021    HDL 49 (L) 11/17/2020     Lab Results   Component Value Date    LDLCALC 89 09/15/2021    LDLCALC 77 02/23/2021    LDLCALC 85 11/17/2020     No results found for: LABVLDL, VLDL  No results found for: CHOLHDLRATIO  LDL goal less than 70. Will increase atorvastatin to 10 mg daily. She was on 5 mg  - Lipid Panel; Future  - atorvastatin (LIPITOR) 10 MG tablet; Take 1 tablet by mouth daily  Dispense: 90 tablet; Refill: 3    5. Other fatigue  Lab Results   Component Value Date    TSH 1.450 02/23/2021    T4FREE 1.44 02/23/2021     Stable  - TSH without Reflex; Future  - T4, Free; Future          Return in about 3 months (around 12/24/2021) for Routine follow up - 15 minute visit. Discussed use, benefit, and side effects of prescribed medications. All patient questions answered. Pt voiced understanding. Reviewed health maintenance. Instructedto continue current medications, diet and exercise. Patient agreed with treatmentplan. Follow up as directed. Note dictated using Dragon Dictation software  Sometimes this dictation software makes erroneous transcriptions.

## 2021-10-20 ENCOUNTER — HOSPITAL ENCOUNTER (OUTPATIENT)
Dept: CT IMAGING | Age: 61
Discharge: HOME | End: 2021-10-20
Payer: COMMERCIAL

## 2021-10-20 ENCOUNTER — HOSPITAL ENCOUNTER (OUTPATIENT)
Dept: GENERAL RADIOLOGY | Age: 61
Discharge: HOME | End: 2021-10-20
Payer: COMMERCIAL

## 2021-10-20 DIAGNOSIS — C64.2 RENAL CELL CARCINOMA OF LEFT KIDNEY (HCC): ICD-10-CM

## 2021-10-20 LAB
ALBUMIN SERPL-MCNC: 4.1 G/DL (ref 3.5–5.2)
ALP BLD-CCNC: 84 U/L (ref 35–104)
ALT SERPL-CCNC: 16 U/L (ref 5–33)
ANION GAP SERPL CALCULATED.3IONS-SCNC: 10 MMOL/L (ref 7–19)
AST SERPL-CCNC: 18 U/L (ref 5–32)
BILIRUB SERPL-MCNC: 0.4 MG/DL (ref 0.2–1.2)
BUN BLDV-MCNC: 19 MG/DL (ref 8–23)
CALCIUM SERPL-MCNC: 9.7 MG/DL (ref 8.8–10.2)
CHLORIDE BLD-SCNC: 105 MMOL/L (ref 98–111)
CO2: 27 MMOL/L (ref 22–29)
CREAT SERPL-MCNC: 1.2 MG/DL (ref 0.5–0.9)
GFR AFRICAN AMERICAN: 55
GFR NON-AFRICAN AMERICAN: 46
GLUCOSE BLD-MCNC: 133 MG/DL (ref 74–109)
POTASSIUM SERPL-SCNC: 4.4 MMOL/L (ref 3.5–5)
SODIUM BLD-SCNC: 142 MMOL/L (ref 136–145)
TOTAL PROTEIN: 7.7 G/DL (ref 6.6–8.7)

## 2021-10-20 PROCEDURE — 71046 X-RAY EXAM CHEST 2 VIEWS: CPT

## 2021-10-20 PROCEDURE — 6360000004 HC RX CONTRAST MEDICATION: Performed by: UROLOGY

## 2021-10-20 PROCEDURE — 74178 CT ABD&PLV WO CNTR FLWD CNTR: CPT

## 2021-10-20 RX ADMIN — IOPAMIDOL 100 ML: 755 INJECTION, SOLUTION INTRAVENOUS at 10:34

## 2021-10-31 DIAGNOSIS — E78.00 HYPERCHOLESTEROLEMIA: ICD-10-CM

## 2021-11-01 RX ORDER — ATORVASTATIN CALCIUM 10 MG/1
10 TABLET, FILM COATED ORAL DAILY
Qty: 90 TABLET | Refills: 3 | Status: SHIPPED | OUTPATIENT
Start: 2021-11-01 | End: 2022-06-24 | Stop reason: SDUPTHER

## 2021-11-02 DIAGNOSIS — E11.42 DIABETIC PERIPHERAL NEUROPATHY (HCC): ICD-10-CM

## 2021-11-18 DIAGNOSIS — Z79.4 TYPE 2 DIABETES MELLITUS WITH DIABETIC POLYNEUROPATHY, WITH LONG-TERM CURRENT USE OF INSULIN (HCC): ICD-10-CM

## 2021-11-18 DIAGNOSIS — E11.42 TYPE 2 DIABETES MELLITUS WITH DIABETIC POLYNEUROPATHY, WITH LONG-TERM CURRENT USE OF INSULIN (HCC): ICD-10-CM

## 2021-11-18 RX ORDER — INSULIN DETEMIR 100 [IU]/ML
INJECTION, SOLUTION SUBCUTANEOUS
Qty: 15 ML | Refills: 3 | Status: SHIPPED | OUTPATIENT
Start: 2021-11-18 | End: 2022-03-21

## 2021-11-18 NOTE — TELEPHONE ENCOUNTER
[address] called to request a refill on her medication.       Last office visit : 9/24/2021   Next office visit : 12/21/2021     Requested Prescriptions     Signed Prescriptions Disp Refills    LEVEMIR FLEXTOUCH 100 UNIT/ML injection pen 15 mL 3     Sig: INJECT [address] SKIN EVERY NIGHT     Authorizing Provider: Carla Brewer     Ordering User: Eric Garcia

## 2021-11-24 ENCOUNTER — OFFICE VISIT (OUTPATIENT)
Dept: NEUROLOGY | Age: 61
End: 2021-11-24
Payer: COMMERCIAL

## 2021-11-24 VITALS
DIASTOLIC BLOOD PRESSURE: 78 MMHG | WEIGHT: 293 LBS | OXYGEN SATURATION: 98 % | HEART RATE: 60 BPM | SYSTOLIC BLOOD PRESSURE: 134 MMHG | BODY MASS INDEX: 45.99 KG/M2 | HEIGHT: 67 IN

## 2021-11-24 DIAGNOSIS — G47.61 PLMD (PERIODIC LIMB MOVEMENT DISORDER): ICD-10-CM

## 2021-11-24 DIAGNOSIS — G47.33 OSA (OBSTRUCTIVE SLEEP APNEA): Primary | ICD-10-CM

## 2021-11-24 DIAGNOSIS — Z99.89 CPAP (CONTINUOUS POSITIVE AIRWAY PRESSURE) DEPENDENCE: ICD-10-CM

## 2021-11-24 PROCEDURE — 99214 OFFICE O/P EST MOD 30 MIN: CPT | Performed by: PHYSICIAN ASSISTANT

## 2021-11-24 NOTE — PATIENT INSTRUCTIONS
Patient education: Sleep apnea in adults       INTRODUCTION -- Normally during sleep, air moves through the throat and in and out of the lungs at a regular rhythm. In a person with sleep apnea, air movement is periodically diminished or stopped. There are two types of sleep apnea: obstructive sleep apnea and central sleep apnea. In obstructive sleep apnea, breathing is abnormal because of narrowing or closure of the throat. In central sleep apnea, breathing is abnormal because of a change in the breathing control and rhythm. Sleep apnea is a serious condition that can affect a person's ability to safely perform normal daily activities and can affect long term health. Approximately 25 percent of adults are at risk for sleep apnea of some degree. Men are more commonly affected than women. Other risk factors include middle and older age, being overweight or obese, and having a small mouth and throat. This topic review focuses on the most common type of sleep apnea in adults, obstructive sleep apnea (ZOILA). HOW SLEEP APNEA OCCURS -- The throat is surrounded by muscles that control the airway for speaking, swallowing, and breathing. During sleep, these muscles are less active, and this causes the throat to narrow. In most people, this narrowing does not affect breathing. In others, it can cause snoring, sometimes with reduced or completely blocked airflow. A completely blocked airway without airflow is called an obstructive apnea. Partial obstruction with diminished airflow is called a hypopnea. A person may have apnea and hypopnea during sleep. Insufficient breathing due to apnea or hypopnea causes oxygen levels to fall and carbon dioxide to rise. Because the airway is blocked, breathing faster or harder does not help to improve oxygen levels until the airway is reopened. Typically, the obstruction requires the person to awaken to activate the upper airway muscles.  Once the airway is opened, the person then takes several deep breaths to catch up on breathing. As the person awakens, he or she may move briefly, snort or snore, and take a deep breath. Less frequently, a person may awaken completely with a sensation of gasping, smothering, or choking. If the person falls back to sleep quickly, he or she will not remember the event. Many people with sleep apnea are unaware of their abnormal breathing in sleep, and all patients underestimate how often their sleep is interrupted. Awakening from sleep causes sleep to be unrefreshing and causes fatigue and daytime sleepiness. Anatomic causes of obstructive sleep apnea --  Most patients have ZOILA because of a small upper airway. As the bones of the face and skull develop, some people develop a small lower face, a small mouth, and a tongue that seems too large for the mouth. These features are genetically determined, which explains why ZOILA tends to cluster in families. Obesity is another major factor. Tonsil enlargement can be an important cause, especially in children. SLEEP APNEA SYMPTOMS -- The main symptoms of ZOILA are loud snoring, fatigue, and daytime sleepiness. However, some people have no symptoms. For example, if the person does not have a bed partner, he or she may not be aware of the snoring. Fatigue and sleepiness have many causes and are often attributed to overwork and increasing age. As a result, a person may be slow to recognize that they have a problem. A bed partner or spouse often prompts the patient to seek medical care. Other symptoms may include one or more of the following:  ?Restless sleep  ? Awakening with choking, gasping, or smothering  ? Morning headaches, dry mouth, or sore throat  ? Waking frequently to urinate  ? Awakening unrested, groggy  ? Low energy, difficulty concentrating, memory impairment    Risk factors -- Certain factors increase the risk of sleep apnea.   ?Increasing age - ZOILA occurs at all ages, but it is more common in middle and older age adults. ?Male sex - ZOILA is two times more common in men, especially in middle age. ?Obesity - The more obese a person is, the more likely he or she is to have ZOILA. ? Sedation from medication or alcohol - This interferes with the ability to awaken from sleep and can lengthen periods of apnea (no breathing), with potentially dangerous consequences. ? Abnormality of the airway. SLEEP APNEA CONSEQUENCES -- Complications of sleep apnea can include daytime sleepiness and difficulty concentrating. The consequence of this is an increased risk of accidents and errors in daily activities. Studies have shown that people with severe ZOILA are more than twice as likely to be involved in a motor vehicle accident as people without these conditions. People with ZOILA are encouraged to discuss options for driving, working, and performing other high-risk tasks with a healthcare provider. In addition, people with untreated ZOILA may have an increased risk of cardiovascular problems such as high blood pressure, heart attack, abnormal heart rhythms, or stroke. This risk may be due to changes in the heart rate and blood pressure that occur during sleep. SLEEP APNEA DIAGNOSIS -- The diagnosis of ZOILA is best made by a knowledgeable sleep medicine specialist who has an understanding of the individual's health issues. The diagnosis is usually based upon the person's medical history, physical examination, and testing, including:  ? A complaint of snoring and ineffective sleep  ? Neck size (greater than 16 inches in men or 14 inches in women) is associated with an increased risk of sleep apnea  ? A small upper airway: difficulty seeing the throat because of a tongue that is large for the mouth  ? High blood pressure, especially if it is resistant to treatment  ? If a bed partner has observed the patient during episodes of stopped breathing (apnea), choking, or gasping during sleep, there is a strong possibility of sleep (continuous positive airway pressure)  device uses an air-tight attachment to the nose, typically a mask, connected to a tube and a blower which generates the pressure. Devices that fit comfortably into the nasal opening, rather than over the nose, are also available. CPAP should be used any time the person sleeps (day or night). The CPAP device is usually used for the first time in the sleep lab, where a technician can adjust the pressure and select the best equipment to keep the airway open. Alternatively, an auto device with a self-adjusting pressure feature, provided with proper education and training, can get treatment started without another sleep test. While the treatment may seem uncomfortable, noisy, or bulky at first, most people accept the treatment after experiencing better sleep. However, difficulty with mask comfort and nasal congestion prevent up to 50 percent of people from using the treatment on a regular basis. Continued follow up with a healthcare provider helps to ensure that the treatment is effective and comfortable. Information from the CPAP machine is often used by physicians, therapists, and insurers to track the success of treatment. CPAP can be delivered with different features to improve comfort and solve problems that may come up during treatment. Changes in treatment may be needed if symptoms do not improve or if the persons condition changes, such as a gain or loss of weight. Adjust sleep position -- Adjusting sleep position (to stay off the back) may help improve sleep quality in people who have ZOILA when sleeping on the back. However, this is difficult to maintain throughout the night and is rarely an adequate solution. Weight loss -- Weight loss may be helpful for obese or overweight patients. Weight loss may be accomplished with dietary changes, exercise, and/or surgical treatment.  However, it can be difficult to maintain weight loss; the five-year success of non-surgical weight loss is only 5 percent, meaning that 95 percent of people regain lost weight. Avoid alcohol and other sedatives -- Alcohol can worsen sleepiness, potentially increasing the risk of accidents or injury. People with ZOILA are often counseled to drink little to no alcohol, even during the daytime. Similarly, people who take anti-anxiety medications or sedatives to sleep should speak with their healthcare provider about the safety of these medications. People with ZOILA must notify all healthcare providers, including surgeons, about their condition and the potential risks of being sedated. People with ZOILA who are given anesthesia and/or pain medications require special management and close monitoring to reduce the risk of a blocked airway. Dental devices -- A dental device, called an oral appliance or mandibular advancement device, can reposition the jaw (mandible), bringing the tongue and soft palate forward as well. This may relieve obstruction in some people. This treatment is excellent for reducing snoring, although the effect on ZOILA is sometimes more limited. As a result, dental devices are best used for mild cases of ZOILA when relief of snoring is the main goal. Failure to tolerate and accept CPAP is another indication for dental devices. While dental devices are not as effective as CPAP for ZOILA, some patients prefer a dental device to CPAP. Side effects of dental devices are generally minor but may include changes to the bite with prolonged use. Surgical treatment -- Surgery is an alternative therapy for patients who cannot tolerate or do not improve with nonsurgical treatments such as CPAP or oral devices. Surgery can also be used in combination with other nonsurgical treatments. Surgical procedures reshape structures in the upper airways or surgically reposition bone or soft tissue. Uvulopalatopharyngoplasty (UPPP) removes the uvula and excessive tissue in the throat, including the tonsils, if present. Other procedures, such as maxillomandibular advancement (MMA), address both the upper and lower pharyngeal airway more globally. UPPP alone has limited success rates (less than 50 percent) and people can relapse (when ZOILA symptoms return after surgery). As a result, this surgery is only recommended in a minority of people and should be considered with caution. MMA may have a higher success rate, particularly in people with abnormal jaw (maxilla and mandible) anatomy, but it is the most complicated procedure. A newer surgical approach, nerve stimulation to protrude the tongue, has promising success rates in very selected people. Tracheostomy creates a permanent opening in the neck. It is reserved for people with severe disease in whom less drastic measures have failed or are inappropriate. Although it is always successful in eliminating obstructive sleep apnea, tracheostomy requires significant lifestyle changes and carries some serious risks (eg, infection, bleeding, blockage). All surgical treatments require discussions about the goals of treatment, the expected outcomes, and potential complications. Hypoglossal nerve stimulator- \"Inspire\" device    PAP treatment failure:  Possible causes of treatment failure include nonadherence or suboptimal adherence, weight gain, an inappropriate level of prescribed positive pressure, or an additional disorder causing sleepiness (eg, narcolepsy) that may require alterations in the therapeutic regimen. A review of medications should also be undertaken since many drugs may lead to sleepiness. Inadequate sleep time may also negate the expected effects from treatment of ZOILA. Also, pt's can have persistent hypersomnolence associated with sleep apnea even in the presence of adequate therapy and at those times Provigil or Nuvigil or other stimulants may be indicated.     Once the patient's positive airway pressure therapy has been optimized and symptoms resolved, a regimen of long-term follow-up should be established. Annual visits are reasonable, with more frequent visits in between if new issues arise. The purpose of long-term follow-up is to assess usage and monitor for recurrent ZOILA, new side effects, air leakage, and fluctuations in body weight. WHERE TO GET MORE INFORMATION -- Your healthcare provider is the best source of information for questions and concerns related to your medical problem. Organizations  American Sleep Apnea Association  Provides information about sleep apnea to the public, publishes a newsletter, and serves as an advocate for people with the disorder. Giovana, [address] [facility], [address] [facility]   [email or web address]@[email or web address]. org   [employer].. org   Tel: [phone]   Fax: [facility], [place] that works to [employer] and safety by promoting public understanding of sleep and sleep disorders. Supports sleep-related education, research, and advocacy; produces and distributes educational materials to the public and healthcare professionals; and offers postdoctoral fellowships and grants for sleep researchers. [address]oshana [address]   [email or web address]   [email or web address] org   Tel: [phone]   Fax: [phone]    Important information:  Medicare/private insurance CPAP/BiPAP/APAP requirements:  Medicare/private insurance has specific requirements for PAP compliance that must be met during the first 90 days of use to continue coverage for CPAP/BiPAP/APAP  from day 91 and beyond. The policy requires that patients use a PAP device 4 hours per 24 hour period, at least 70% of the time over a 30 day period. This data must be downloaded as a report direct from the PAP devices. This is called a compliance download. Your PAP supplier will assist you in this matter.      Note:  Where applicable, we will utilize PAP device efficiency reports, additional testing, and face-to-face  clinical evaluation subsequent to any treatment, changes in treatment, and continued treatment. Caution:  Please abstain from driving or engaging in other activities which may be hazardous in the presence of diminished alertness or daytime drowsiness. And avoid the use of sedatives or alcohol, which can worsen sleep apnea and daytime drowsiness. Mask suggestions:  -     Resmed Airfit N20 (Nasal) or F20 (Full face mask). They conform to your face, thus decreasing the potential for mask leakage. You might like the AirTouch F20(full face mask). It has a \"memory foam\" like cushion. The AirFit F30 is a smaller style full face mask designed to sit low on and cover less of your face for fewer facial marks. AirFit N30i has a top of the head tube with a nasal mask. AirFit P10 reported to be the most comfortable nasal pillow mask. Resmed Mirage FX reported to be the most comfortable nasal mask. Resmed Mirage Langston reported to be the most comfortable hybrid mask. AirTouch N20-memory foam nasal mask. Respironics: You might also like to try a nasal mask called a Dreamwear nasal mask or the Dreamwear nasal pillow. Another suggestion is the [facility], it is a minimal contact full face mask. The Cassie Waite incredible under the nose design makes it the only full face mask that won't cause red marks on the bridge of your nose when compared to other full face masks. The Dreamwear full face mask has a  soft feel, unique in-frame air-flow, and innovative air tube connection at the top of the head for the ultimate in sleep comfort. Comfort Gel Blue. Dreamwear gel pillows. Thien & Liam: Brevida nasal pillow mask and Simplus FFM    The use of a memory foam CPAP pillow supports the head and neck throughout the night.

## 2021-11-24 NOTE — PROGRESS NOTES
[address] [facility]  [address] [place] [address] Drive, [address]  [place] crum  Phone [phone]  Fax [phone]       [facility] Sleep Follow Up Encounter      Information:   Patient Name: Rama Carranza  :   1960  Age:   62 y.o. MRN:   [id number]  Account #:  [de-identified]  Today:                21    Provider:  Robert Miller PA-C    Chief Complaint   Patient presents with    Sleep Apnea     follow up new machine         Subjective:   Rama Carranza is a 62 y.o. female  with a history of ZOILA and PLMD who comes in for a sleep clinic follow up. The PSG, 2014 revealed an AHI of 28.5. Since her last office visit, she received orders for an auto CPAP with a pressure range of 8cm to 16cm. She is doing well with the new device. The compliance report indicates that she is averaging 10 hours of CPAP use per day. She uses nasal pillows and a chin strap. She reports that consistent PAP use has alleviated the previous ZOILA symptoms.  The PLMD is stable.      Location or symptom:  ZOILA  Onset:  PS  Timing:  q hs  Severity:  Moderate  Associated:  Snoring, witnessed apneas, and excessive daytime somnolence  Alleviated:  CPAP (2nd CPAP, 2021)      Objective:     Past Medical History:   Diagnosis Date    Allergic rhinitis     Ankle fracture     3 year ago; increasing difficulty walking; has bone fragments    Arthritis     sees dr. Naye Smith as ambulation aid     right foot per dr. Brandy Khalil    CPAP (continuous positive airway pressure) dependence     12cm    Diabetes [facility] [address]     Hiatal hernia 2017    History of kidney cancer     Hyperlipidemia     Hypertension     Hypothyroidism     Murmur     Obesity     ZOILA (obstructive sleep apnea)     AHI:  28.5 per PSG, 2014    PLMD (periodic limb movement disorder)     Renal mass     cat scan done 18; to see dr. Heydi Phillips coming up    Thyroid disease     Type 2 diabetes mellitus without complication ([facility] [address]     Type II or unspecified type diabetes mellitus without mention of complication, not stated as uncontrolled        Past Surgical History:   Procedure Laterality Date    BREAST BIOPSY Right 2012    CARPAL TUNNEL RELEASE      bilateral    CHOLECYSTECTOMY      HYSTERECTOMY      AR LAP, RADICAL NEPHRECTOMY Left 10/3/2018    NEPHRECTOMY LAPAROSCOPIC HAND ASSISTED performed by Josh Rosa MD at \[facility]"" 43 LAP,CHOLECYSTECTOMY/GRAPH N/A 10/31/2017    CHOLECYSTECTOMY LAPAROSCOPIC performed by Baljit Doan MD at [place] [address] N/A 9/22/2020    VENTRAL HERNIA REPAIR WITH MESH performed by [facility]jit Doan [facility] at [facility]  ·     Significant Injuries  ·     Family History   Problem Relation Age of Onset    Diabetes Mother     Cancer Mother         breast    Heart Disease Father     Cancer Father         colon, over 79    Cancer Sister 43        breast, negative genetic testing    Cancer Maternal Grandmother 76        breast    Diabetes Maternal Grandmother     Heart Attack Maternal Grandfather     Hypertension Other     Elevated Lipids Other     Coronary Art Dis Other     Heart Attack Paternal Grandmother        Social History  Social History     Tobacco Use   Smoking Status Never Smoker   Smokeless Tobacco Never Used     Social History     Substance and Sexual Activity   Alcohol Use No     Social History     Substance and Sexual Activity   Drug Use No         Current Outpatient Medications   Medication Sig Dispense Refill    LEVEMIR FLEXTOUCH 100 UNIT/ML injection pen INJECT 46 UNITS UNDER THE SKIN EVERY NIGHT 15 mL 3    Misc. Devices MISC 1 pair of Diabetic Shoes w/ 3 sets of heat moldable inserts ICD 10: E11.9 1 each 0    atorvastatin (LIPITOR) 10 MG tablet TAKE 1 TABLET BY MOUTH DAILY 90 tablet 3    atenolol (TENORMIN) 50 MG tablet TAKE ONE-HALF TABLET BY MOUTH EVERY MORNING AND ONE-HALF TABLET EVERY EVENING.  90 tablet 3    Insulin Aspart FlexPen 100 UNIT/ML Seizures  [x]? Denies all unless marked  Psychiatric/Behavioral:[]? Depression []? Anxiety  [x]? Denies all unless marked  Sleep: []? Insomnia []? Sleep Disturbance []? Snoring [x]? Restless Legs []? Daytime Sleepiness [x]? Sleep Apnea  []? Denies all unless marked    The MA has completed the ROS with the patient. I have reviewed it in its' entirety with the patient and agree with the documentation. PHYSICAL EXAM  Ht 5' 7\" (1.702 m)   Wt (!) 392 lb (177.8 kg)   LMP 01/01/2002   Breastfeeding No   BMI 61.40 kg/m²      Constitutional -  Alert in NAD, well developed, pleasant and cooperative with exam; body habitus obes as indicated by BMI  HEENT- Conjunctiva normal.  No scars, masses, or lesions over external nose or ears, hearing intact, no neck masses noted, no jugular vein distension, no bruit  Cardiac- Regular rate and rhythm; Grade I/VI murmur  Pulmonary- Clear to auscultation, good expansion, normal effort without use of accessory muscles  Musculoskeletal - No significant wasting of muscles noted, no bony deformities  Extremities - No clubbing, cyanosis or edema  Skin - Warm, dry, and intact. No rash, erythema, or pallor  Psychiatric - Mood, affect, and behavior appear normal      Neurologic:  Extraocular movements are intact without nystagmus. PERRL. Visual fields are full to confrontation. Facial movements are symmetrical and normal.  Speech is precise. Extremity strength is normal in both uppers and lowers. Deep tendon reflexes are intact and symmetrical.  Rapid alternating movements are unimpaired. Finger-to-nose testing is performed well, without dysmetria.   Gait is normal.    I reviewed the following studies:       []  :  Clinical laboratory test results     []  :  Radiology reports                    [x]  :  Review and summarization of medical records and/or obtain medical records        []  :  Previous/recent polysomnogram report(s)     []  :  Ruth Sleepiness Scale       [x]  : Compliance download: The auto CPAP is set at a pressure range of 8cm to 16cm. Compliance download shows that she uses device: 100% of the time;  percentage of days with usage >=4 hours: 100%. AHI: 0.4 (Also reviewed, current download from pt)    Assessment:       ICD-10-CM    1. ZOILA (obstructive sleep apnea)  G47.33    2. PLMD (periodic limb movement disorder)  G47.61    3. CPAP (continuous positive airway pressure) dependence  Z99.89           [x]  :  Stable-PLMD     []  :  Improved                       [x]  :  Well controlled-ZOILA              []  :  Resolving     []  :  Resolved     []  :  Inadequately controlled     []  :  Worsening     []  :  Additional workup planned    Patient is compliant and benefiting from therapy as indicated by compliance evaluation and patient report. Plan:     No orders of the defined types were placed in this encounter. 1.   Previously or presently advised of the etiology,  pathophysiology, diagnosis, treatment options, and risks of untreated ZOILA. Risks may include, but are not limited to  hypertension, coronary artery disease, atrial fibrillation, CHF, diabetes, stroke, weight gain, impaired cognition, daytime somnolence, and motor vehicle accidents. Advised to abstain from driving or operating heavy machinery when drowsy and the use of respiratory suppressants. Discussed diagnostic studies and potential treatment plan. 2.  Will evaluate for PAP clinical benefit and and compliance during a 30 day period within the preceding 90 days PRN. 3.  The following educational material has been included in this visit after visit summary for your review: ZOILA/PAP guidelines-Discussed with the patient and all questions fully answered. 4.  Continue CPAP/Resmed-order supplies-[employer]  5.   Follow up in 1 year

## 2021-11-24 NOTE — LETTER
[facility] Neurology and Sleep Medicine  [address] [place] [address] Drive, [address]  Brennan Gerard  Phone [phone]  Fax [phone]             Re:  [place]    21  :  1960  Address: [address] [place] [address]       Replinishible PAP Supplies, 1 year supply  Item HPCPS Code Frequency   Mask of choice  or  1 per 3 months   Nasal Mask cushion/pillows  or  2 per 30 days   Full Face Mask Interface  1 per 30 days   Headgear  1 per 6 months   Tubing, length of choice  or  1 per 3 months   Water Chamber  1 per 6 months   Chinstrap  1 per 6 months   Disposable Filters  2 per 30 days   Reusable Filters  1 per 6 months     Diagnoses:  Obstructive sleep apnea (G47.33)  Length of Need: Lifetime, 99    Ordering Provider: Charla Monsivais PA-C  NPI:  [id number]        Signature: [unfilled]        Date: 2021      Electronically Signed by Charla Monsivais PA-C  on 2021 at 8:52 AM

## 2021-12-06 ENCOUNTER — OFFICE VISIT (OUTPATIENT)
Dept: UROLOGY | Age: 61
End: 2021-12-06
Payer: COMMERCIAL

## 2021-12-06 VITALS
WEIGHT: 290 LBS | BODY MASS INDEX: 43.95 KG/M2 | DIASTOLIC BLOOD PRESSURE: 63 MMHG | HEIGHT: 68 IN | SYSTOLIC BLOOD PRESSURE: 125 MMHG

## 2021-12-06 DIAGNOSIS — Z79.4 TYPE 2 DIABETES MELLITUS WITH DIABETIC POLYNEUROPATHY, WITH LONG-TERM CURRENT USE OF INSULIN (HCC): ICD-10-CM

## 2021-12-06 DIAGNOSIS — Z79.4 TYPE 2 DIABETES MELLITUS WITH DIABETIC POLYNEUROPATHY, WITH LONG-TERM CURRENT USE OF INSULIN (HCC): Primary | ICD-10-CM

## 2021-12-06 DIAGNOSIS — E11.42 TYPE 2 DIABETES MELLITUS WITH DIABETIC POLYNEUROPATHY, WITH LONG-TERM CURRENT USE OF INSULIN (HCC): ICD-10-CM

## 2021-12-06 DIAGNOSIS — E78.00 HYPERCHOLESTEROLEMIA: ICD-10-CM

## 2021-12-06 DIAGNOSIS — I10 ESSENTIAL (PRIMARY) HYPERTENSION: ICD-10-CM

## 2021-12-06 DIAGNOSIS — C64.2 RENAL CELL CARCINOMA OF LEFT KIDNEY (HCC): Primary | ICD-10-CM

## 2021-12-06 DIAGNOSIS — E11.42 TYPE 2 DIABETES MELLITUS WITH DIABETIC POLYNEUROPATHY, WITH LONG-TERM CURRENT USE OF INSULIN (HCC): Primary | ICD-10-CM

## 2021-12-06 DIAGNOSIS — R53.83 OTHER FATIGUE: ICD-10-CM

## 2021-12-06 LAB
ALBUMIN SERPL-MCNC: 4 G/DL (ref 3.5–5.2)
ALP BLD-CCNC: 85 U/L (ref 35–104)
ALT SERPL-CCNC: 18 U/L (ref 5–33)
ANION GAP SERPL CALCULATED.3IONS-SCNC: 13 MMOL/L (ref 7–19)
AST SERPL-CCNC: 18 U/L (ref 5–32)
BASOPHILS ABSOLUTE: 0 K/UL (ref 0–0.2)
BASOPHILS RELATIVE PERCENT: 0.5 % (ref 0–1)
BILIRUB SERPL-MCNC: 0.5 MG/DL (ref 0.2–1.2)
BUN BLDV-MCNC: 14 MG/DL (ref 8–23)
CALCIUM SERPL-MCNC: 9.8 MG/DL (ref 8.8–10.2)
CHLORIDE BLD-SCNC: 103 MMOL/L (ref 98–111)
CHOLESTEROL, TOTAL: 144 MG/DL (ref 160–199)
CO2: 25 MMOL/L (ref 22–29)
CREAT SERPL-MCNC: 1.3 MG/DL (ref 0.5–0.9)
CREATININE URINE: 115.2 MG/DL (ref 4.2–622)
EOSINOPHILS ABSOLUTE: 0.2 K/UL (ref 0–0.6)
EOSINOPHILS RELATIVE PERCENT: 3.2 % (ref 0–5)
GFR AFRICAN AMERICAN: 50
GFR NON-AFRICAN AMERICAN: 42
GLUCOSE BLD-MCNC: 145 MG/DL (ref 74–109)
HBA1C MFR BLD: 7.1 % (ref 4–6)
HCT VFR BLD CALC: 44.1 % (ref 37–47)
HDLC SERPL-MCNC: 50 MG/DL (ref 65–121)
HEMOGLOBIN: 13.7 G/DL (ref 12–16)
IMMATURE GRANULOCYTES #: 0 K/UL
LDL CHOLESTEROL CALCULATED: 71 MG/DL
LYMPHOCYTES ABSOLUTE: 1.9 K/UL (ref 1.1–4.5)
LYMPHOCYTES RELATIVE PERCENT: 25.5 % (ref 20–40)
MCH RBC QN AUTO: 28.5 PG (ref 27–31)
MCHC RBC AUTO-ENTMCNC: 31.1 G/DL (ref 33–37)
MCV RBC AUTO: 91.9 FL (ref 81–99)
MICROALBUMIN UR-MCNC: 5.2 MG/DL (ref 0–19)
MICROALBUMIN/CREAT UR-RTO: 45.1 MG/G
MONOCYTES ABSOLUTE: 0.5 K/UL (ref 0–0.9)
MONOCYTES RELATIVE PERCENT: 6.6 % (ref 0–10)
NEUTROPHILS ABSOLUTE: 4.8 K/UL (ref 1.5–7.5)
NEUTROPHILS RELATIVE PERCENT: 64.1 % (ref 50–65)
PDW BLD-RTO: 13.8 % (ref 11.5–14.5)
PLATELET # BLD: 225 K/UL (ref 130–400)
PMV BLD AUTO: 10.7 FL (ref 9.4–12.3)
POTASSIUM SERPL-SCNC: 4.6 MMOL/L (ref 3.5–5)
RBC # BLD: 4.8 M/UL (ref 4.2–5.4)
SODIUM BLD-SCNC: 141 MMOL/L (ref 136–145)
T4 FREE: 1.25 NG/DL (ref 0.93–1.7)
TOTAL PROTEIN: 7.6 G/DL (ref 6.6–8.7)
TRIGL SERPL-MCNC: 114 MG/DL (ref 0–149)
TSH SERPL DL<=0.05 MIU/L-ACNC: 2.9 UIU/ML (ref 0.27–4.2)
WBC # BLD: 7.5 K/UL (ref 4.8–10.8)

## 2021-12-06 PROCEDURE — 99214 OFFICE O/P EST MOD 30 MIN: CPT | Performed by: UROLOGY

## 2021-12-06 ASSESSMENT — ENCOUNTER SYMPTOMS
EYE REDNESS: 0
SORE THROAT: 0
BACK PAIN: 0
CHEST TIGHTNESS: 0
FACIAL SWELLING: 0
VOMITING: 0
NAUSEA: 0
EYE DISCHARGE: 0
WHEEZING: 0

## 2021-12-06 NOTE — PROGRESS NOTES
Kimberly Mitchell is a 62 y.o. female who presents today   Chief Complaint   Patient presents with    Follow-up     I am here today for a yearly FU. I had my imaging and labs done prior. Unable to leave urine sample     Renal Cancer:  Patient is here today for a history of renal carcinoma which was diagnosed approximately 3 years(s) ago. The cancer was: left   The treatment received was left hand-assisted laparoscopic nephrectomy on 10/3/2018. Final pathology stage: T1 a N0 M0 patient has microscopic extension into the renal sinus fat but no gross extension therefore this was not designated as T3a. She did see medical oncology and it was felt she did not qualify for Sutent adjuvant treatment. Flank pain? no  Hematuria? None  Patient underwent a ventral hernia repair  History is unchanged as above she is here for her annual follow-up.   She denies any new symptoms no flank pain no hematuria        Past Medical History:   Diagnosis Date    Allergic rhinitis     Ankle fracture     3 year ago; increasing difficulty walking; has bone fragments    Arthritis     sees dr. Carlos Manuel Long as ambulation aid     right foot per dr. Alberto Ramsey CPAP (continuous positive airway pressure) dependence     12cm    Diabetes [facility]     Hiatal hernia 9/27/2017    History of kidney cancer     Hyperlipidemia     Hypertension     Hypothyroidism     Murmur     Obesity     ZOILA (obstructive sleep apnea)     AHI:  28.5 per PSG, 9/2014    PLMD (periodic limb movement disorder)     Renal mass     cat scan done 9/13/18; to see dr. Javy Rivas coming up    Thyroid disease     Type 2 diabetes mellitus without complication ([facility]     Type II or unspecified type diabetes mellitus without mention of complication, not stated as uncontrolled        Past Surgical History:   Procedure Laterality Date    BREAST BIOPSY Right 2012    CARPAL TUNNEL RELEASE      bilateral    CHOLECYSTECTOMY      HYSTERECTOMY      [place] LAP, RADICAL NEPHRECTOMY Left 10/3/2018    NEPHRECTOMY LAPAROSCOPIC HAND ASSISTED performed by Breanna Prescott MD at [facility] 43 LAP,CHOLECYSTECTOMY/GRAPH N/A 10/31/2017    CHOLECYSTECTOMY LAPAROSCOPIC performed by Rozina Abarca MD at [facility] 41 N/A 9/22/2020    VENTRAL HERNIA REPAIR WITH MESH performed by Rozina Abarca MD at [address] [place] [address] OR       Current Outpatient Medications   Medication Sig Dispense Refill    LEVEMIR FLEXTOUCH 100 UNIT/ML injection pen INJECT 46 UNITS UNDER THE SKIN EVERY NIGHT 15 mL 3    Misc. Devices MISC 1 pair of Diabetic Shoes w/ 3 sets of heat moldable inserts ICD 10: E11.9 1 each 0    atorvastatin (LIPITOR) 10 MG tablet TAKE 1 TABLET BY MOUTH DAILY 90 tablet 3    atenolol (TENORMIN) 50 MG tablet TAKE ONE-HALF TABLET BY MOUTH EVERY MORNING AND ONE-HALF TABLET EVERY EVENING. 90 tablet 3    Insulin Aspart FlexPen 100 UNIT/ML SOPN INJECT 6 UNITS UNDER THE SKIN WITH BREAKFAST, 6 UNITS WITH LUNCH AND 10 UNITS WITH DINNER 9 mL 3    levothyroxine (SYNTHROID) 137 MCG tablet TAKE 1 TABLET BY MOUTH DAILY 90 tablet 1    B-D UF III MINI PEN NEEDLES 31G X 5 MM MISC USE FOUR TIMES DAILY AS DIRECTED 400 each 3    lisinopril (PRINIVIL;ZESTRIL) 20 MG tablet Take 40 mg by mouth daily       Cholecalciferol (VITAMIN D3) 1.25 MG (40284 UT) CAPS Take 1 capsule by mouth once a week      clotrimazole-betamethasone (LOTRISONE) 1-0.05 % cream Apply topically 2 times daily.  15 g 0    dilTIAZem (CARDIZEM CD) 180 MG extended release capsule TAKE 1 CAPSULE BY MOUTH DAILY 90 capsule 3    pantoprazole (PROTONIX) 40 MG tablet Take 1 tablet by mouth daily 90 tablet 3    CONTOUR NEXT TEST strip TEST FIVE TIMES DAILY 500 strip 5    Multiple Vitamins-Minerals (MULTIPLE VITAMINS/WOMENS PO) Take 1 tablet by mouth daily       fexofenadine (ALLEGRA ALLERGY) 180 MG tablet Take 180 mg by mouth daily      Omega-3 Fatty Acids (FISH OIL) 1000 MG CAPS Take 1,000 mg by mouth 2 times daily       aspirin 81 MG tablet Take 81 mg by mouth daily 3 days a wk       No current facility-administered medications for this visit. Allergies   Allergen Reactions    Adhesive Tape Other (See Comments)     Post op incision infection    Dermabond Other (See Comments)     Post op incision infection       Social History     Socioeconomic History    Marital status:      Spouse name: None    Number of children: None    Years of education: None    Highest education level: None   Occupational History    None   Tobacco Use    Smoking status: Never Smoker    Smokeless tobacco: Never Used   Vaping Use    Vaping Use: Never used   Substance and Sexual Activity    Alcohol use: No    Drug use: No    Sexual activity: Yes   Other Topics Concern    None   Social History Narrative    None     Social Determinants of Health     Financial Resource Strain:     Difficulty of Paying Living Expenses: Not on file   Food Insecurity:     Worried About Running Out of Food in the Last Year: Not on file    Elissa of Food in the Last Year: Not on file   Transportation Needs:     Lack of Transportation (Medical): Not on file    Lack of Transportation (Non-Medical):  Not on file   Physical Activity:     Days of Exercise per Week: Not on file    Minutes of Exercise per Session: Not on file   Stress:     Feeling of Stress : Not on file   Social Connections:     Frequency of Communication with Friends and Family: Not on file    Frequency of Social Gatherings with Friends and Family: Not on file    Attends Yazidi Services: Not on file    Active Member of Clubs or Organizations: Not on file    Attends Club or Organization Meetings: Not on file    Marital Status: Not on file   Intimate Partner Violence:     Fear of Current or Ex-Partner: Not on file    Emotionally Abused: Not on file    Physically Abused: Not on file    Sexually Abused: Not on file   Housing Stability:     Unable to Pay for Housing in the Last Year: Not on file    Number of Places Lived in the Last Year: Not on file    Unstable Housing in the Last Year: Not on file       Family History   Problem Relation Age of Onset    Diabetes Mother     Cancer Mother         breast    Heart Disease Father     Cancer Father         colon, over 79    Cancer Sister 43        breast, negative genetic testing    Cancer Maternal Grandmother 76        breast    Diabetes Maternal Grandmother     Heart Attack Maternal Grandfather     Hypertension Other     Elevated Lipids Other     Coronary Art Dis Other     Heart Attack Paternal Grandmother        REVIEW OF SYSTEMS:  Review of Systems   Constitutional: Negative for chills and fever. HENT: Negative for facial swelling and sore throat. Eyes: Negative for discharge and redness. Respiratory: Negative for chest tightness and wheezing. Cardiovascular: Negative for chest pain and palpitations. Gastrointestinal: Negative for nausea and vomiting. Endocrine: Negative for polyphagia and polyuria. Genitourinary: Negative for decreased urine volume, difficulty urinating, dyspareunia, dysuria, enuresis, flank pain, frequency, genital sores, hematuria, menstrual problem, pelvic pain, urgency, vaginal bleeding, vaginal discharge and vaginal pain. Musculoskeletal: Negative for back pain and neck stiffness. Skin: Negative for rash and wound. Neurological: Negative for dizziness and headaches. Hematological: Negative for adenopathy. Does not bruise/bleed easily. Psychiatric/Behavioral: Negative for confusion and hallucinations. PHYSICAL EXAM:  /63   Ht 5' 8\" (1.727 m)   Wt 290 lb (131.5 kg)   LMP 01/01/2002   BMI 44.09 kg/m²   Physical Exam  Vitals reviewed. Constitutional:       Appearance: She is well-developed. She is obese. HENT:      Head: Normocephalic and atraumatic. Eyes:      General: No scleral icterus.      Conjunctiva/sclera: Conjunctivae normal.      Pupils: Pupils are equal, round, and reactive to light. Cardiovascular:      Rate and Rhythm: Normal rate and regular rhythm. Pulmonary:      Effort: Pulmonary effort is normal. No respiratory distress. Breath sounds: Normal breath sounds. Chest:      Chest wall: No tenderness. Abdominal:      General: There is no distension. Palpations: Abdomen is soft. There is no mass. Tenderness: There is no abdominal tenderness. Musculoskeletal:         General: No tenderness. Normal range of motion. Cervical back: Normal range of motion and neck supple. Lymphadenopathy:      Cervical: No cervical adenopathy. Skin:     General: Skin is warm and dry. Neurological:      Mental Status: She is alert and oriented to person, place, and time. Psychiatric:         Behavior: Behavior normal.             DATA:  CBC:   Lab Results   Component Value Date    WBC 7.5 12/06/2021    RBC 4.80 12/06/2021    HGB 13.7 12/06/2021    HCT 44.1 12/06/2021    MCV 91.9 12/06/2021    MCH 28.5 12/06/2021    MCHC 31.1 12/06/2021    RDW 13.8 12/06/2021     12/06/2021    MPV 10.7 12/06/2021     CMP:    Lab Results   Component Value Date     12/06/2021    K 4.6 12/06/2021    K 4.3 10/05/2018     12/06/2021    CO2 25 12/06/2021    BUN 14 12/06/2021    CREATININE 1.3 12/06/2021    GFRAA 50 12/06/2021    LABGLOM 42 12/06/2021    GLUCOSE 145 12/06/2021    PROT 7.6 12/06/2021    LABALBU 4.0 12/06/2021    CALCIUM 9.8 12/06/2021    BILITOT 0.5 12/06/2021    ALKPHOS 85 12/06/2021    AST 18 12/06/2021    ALT 18 12/06/2021     IMAGING:  CT scan of abdomen pelvis done on 10/20/2021 compared to previous study is stable no evidence of neoplastic process or metastatic disease. A tiny low-density cortical nodule in the right kidney is seen is too small to further characterize probably represents a cortical cyst.  This can be followed on her follow-up imaging.   There is also mention of a tiny low-density nodule in the dome of the liver that was unchanged this was not mentioned on the prior CT done August 2020. There is also mention of a tiny subpleural nodule in the left lower lobe laterally it was similar in appearance on previous study otherwise no evidence of pulmonary metastasis for the visualized portion of the lung seen on CT. She did have a chest x-ray done on 10/20/2021 shows showed no active cardiopulmonary disease. In reviewing disease x-rays with myself I agree that I see these incidental things mentioned but there is nothing of concern no evidence of metastatic or neoplastic process patient was reassured    1. Renal cell carcinoma of left kidney (HCC)  Return in 1 year with CT chest abdomen pelvis and CMP  - CT ABDOMEN PELVIS W WO CONTRAST Additional Contrast? Radiologist Recommendation (renal mass protocol); Future  - CT CHEST W CONTRAST; Future  - Comprehensive Metabolic Panel; Future      Orders Placed This Encounter   Procedures    CT ABDOMEN PELVIS W WO CONTRAST Additional Contrast? Radiologist Recommendation (renal mass protocol)     Renal mass protocol     Standing Status:   Future     Standing Expiration Date:   6/6/2023     Order Specific Question:   Additional Contrast?     Answer:   Radiologist Recommendation     Comments:   renal mass protocol     Order Specific Question:   Reason for exam:     Answer:   History of left renal cell carcinoma status post nephrectomy    CT CHEST W CONTRAST     Standing Status:   Future     Standing Expiration Date:   6/6/2023     Order Specific Question:   Reason for exam:     Answer:   Status post left nephrectomy rule out pulmonary metastasis    Comprehensive Metabolic Panel     In 1 year     Standing Status:   Future     Standing Expiration Date:   6/6/2023        Return in about 1 year (around 12/6/2022) for office visit after xray study.     All information inputted into the note by the MA to include chief complaint, past medical history, past surgical history, medications, allergies, social and family history and review of systems has been reviewed and updated as needed by me. EMR Dragon/transcription disclaimer: Much of this documentt is electronic  transcription/translation of spoken language to printed text. The  electronic translation of spoken language may be erroneous, or at times,  nonsensical words or phrases may be inadvertently transcribed.  Although I  have reviewed the document for such errors, some may still exist.

## 2021-12-11 DIAGNOSIS — K44.9 HIATAL HERNIA: ICD-10-CM

## 2021-12-12 RX ORDER — PANTOPRAZOLE SODIUM 40 MG/1
40 TABLET, DELAYED RELEASE ORAL DAILY
Qty: 90 TABLET | Refills: 3 | Status: SHIPPED | OUTPATIENT
Start: 2021-12-12

## 2021-12-21 ENCOUNTER — OFFICE VISIT (OUTPATIENT)
Dept: FAMILY MEDICINE CLINIC | Age: 61
End: 2021-12-21
Payer: COMMERCIAL

## 2021-12-21 VITALS
SYSTOLIC BLOOD PRESSURE: 138 MMHG | HEART RATE: 61 BPM | BODY MASS INDEX: 60.67 KG/M2 | OXYGEN SATURATION: 97 % | WEIGHT: 293 LBS | DIASTOLIC BLOOD PRESSURE: 88 MMHG

## 2021-12-21 DIAGNOSIS — Z79.4 TYPE 2 DIABETES MELLITUS WITH DIABETIC POLYNEUROPATHY, WITH LONG-TERM CURRENT USE OF INSULIN (HCC): Primary | ICD-10-CM

## 2021-12-21 DIAGNOSIS — E11.42 TYPE 2 DIABETES MELLITUS WITH DIABETIC POLYNEUROPATHY, WITH LONG-TERM CURRENT USE OF INSULIN (HCC): Primary | ICD-10-CM

## 2021-12-21 DIAGNOSIS — I10 ESSENTIAL (PRIMARY) HYPERTENSION: ICD-10-CM

## 2021-12-21 DIAGNOSIS — E78.00 HYPERCHOLESTEROLEMIA: ICD-10-CM

## 2021-12-21 DIAGNOSIS — E03.9 PRIMARY HYPOTHYROIDISM: ICD-10-CM

## 2021-12-21 PROCEDURE — 90471 IMMUNIZATION ADMIN: CPT | Performed by: FAMILY MEDICINE

## 2021-12-21 PROCEDURE — 99214 OFFICE O/P EST MOD 30 MIN: CPT | Performed by: FAMILY MEDICINE

## 2021-12-21 PROCEDURE — 3051F HG A1C>EQUAL 7.0%<8.0%: CPT | Performed by: FAMILY MEDICINE

## 2021-12-21 PROCEDURE — 90750 HZV VACC RECOMBINANT IM: CPT | Performed by: FAMILY MEDICINE

## 2021-12-21 RX ORDER — LISINOPRIL 40 MG/1
40 TABLET ORAL DAILY
Refills: 5 | Status: SHIPPED | COMMUNITY
Start: 2021-12-21

## 2021-12-21 NOTE — PROGRESS NOTES
[facility] PHYSICIAN SERVICES  [facility]  [id number] [place] [address] Gabino Gibson [address]  Dept: [phone]  Dept Fax: [phone]  Loc: [phone]    Kong Baltazar is a 62 y.o. female who presents today for her medical conditions/complaints as noted below. Kong Baltazar is here for 3 Month Follow-Up        HPI:   CC: Here today to discuss the following:    Diabetes Mellitus  Has been compliant with medications. No side effects of medications since last visit. No polyuria, polydipsia, or vision changes since last visit. No symptomatic episodes of hypoglycemia. Hypertension  Compliant with medications. No adverse effects from medication. No lightheadedness, palpitations, or chest pain. Hyperlipidemia  Tolerating current cholesterol medication without side effects. No body aches. Attempting to reduce processed sugar and cholesterol from diet. Hypothyroidism  Symptoms are stable. No temperature intolerance, fatigue, or mood disturbance from baseline. Complaint with current medication.             HPI    Past Medical History:   Diagnosis Date    Allergic rhinitis     Ankle fracture     3 year ago; increasing difficulty walking; has bone fragments    Arthritis     sees dr. Brittney Schafer as ambulation aid     right foot per dr. Darylene Gills CPAP (continuous positive airway pressure) dependence     12cm    Diabetes ([address]     Hiatal hernia 9/27/2017    History of kidney cancer     Hyperlipidemia     Hypertension     Hypothyroidism     Murmur     Obesity     ZOILA (obstructive sleep apnea)     AHI:  28.5 per PSG, 9/2014    PLMD (periodic limb movement disorder)     Renal mass     cat scan done 9/13/18; to see dr. Cedillo Risk coming up    Thyroid disease     Type 2 diabetes mellitus without complication ([address]     Type II or unspecified type diabetes mellitus without mention of complication, not stated as uncontrolled       Past Surgical History:   Procedure Laterality Date    BREAST BIOPSY Right 2012    CARPAL TUNNEL RELEASE      bilateral    CHOLECYSTECTOMY      HYSTERECTOMY      [place] LAP, RADICAL NEPHRECTOMY Left 10/3/2018    NEPHRECTOMY LAPAROSCOPIC HAND ASSISTED performed by Josh Rosa MD at [facility] LAP,CHOLECYSTECTOMY/GRAPH N/A 10/31/2017    CHOLECYSTECTOMY LAPAROSCOPIC performed by Baljit Doan MD at [facility] N/A 9/22/2020    VENTRAL HERNIA REPAIR WITH MESH performed by Baljit Doan MD at [facility] History   Problem Relation Age of Onset    Diabetes Mother     Cancer Mother         breast    Heart Disease Father     Cancer Father         colon, over 79    Cancer Sister 43        breast, negative genetic testing    Cancer Maternal Grandmother 76        breast    Diabetes Maternal Grandmother     Heart Attack Maternal Grandfather     Hypertension Other     Elevated Lipids Other     Coronary Art Dis Other     Heart Attack Paternal Grandmother        Social History     Tobacco Use    Smoking status: Never Smoker    Smokeless tobacco: Never Used   Substance Use Topics    Alcohol use: No     Current Outpatient Medications   Medication Sig Dispense Refill    lisinopril (PRINIVIL;ZESTRIL) 40 MG tablet Take 1 tablet by mouth daily  5    pantoprazole (PROTONIX) 40 MG tablet TAKE 1 TABLET BY MOUTH DAILY 90 tablet 3    LEVEMIR FLEXTOUCH 100 UNIT/ML injection pen INJECT 46 UNITS UNDER THE SKIN EVERY NIGHT (Patient taking differently: 54 Units ) 15 mL 3    Misc. Devices MISC 1 pair of Diabetic Shoes w/ 3 sets of heat moldable inserts ICD 10: E11.9 1 each 0    atorvastatin (LIPITOR) 10 MG tablet TAKE 1 TABLET BY MOUTH DAILY 90 tablet 3    atenolol (TENORMIN) 50 MG tablet TAKE ONE-HALF TABLET BY MOUTH EVERY MORNING AND ONE-HALF TABLET EVERY EVENING.  90 tablet 3    Insulin Aspart FlexPen 100 UNIT/ML SOPN INJECT 6 UNITS UNDER THE SKIN WITH BREAKFAST, 6 UNITS WITH LUNCH AND 10 UNITS WITH DINNER 9 mL 3 / Creatinine Urine Ratio    Collection Time: 12/06/21  8:32 AM   Result Value Ref Range    Microalbumin, Random Urine 5.20 0.00 - 19.00 mg/dL    Creatinine, Ur 115.2 4.2 - 622.0 mg/dL    Microalbumin Creatinine Ratio 45.1 mg/g   Comprehensive Metabolic Panel    Collection Time: 12/06/21  8:32 AM   Result Value Ref Range    Sodium 141 136 - 145 mmol/L    Potassium 4.6 3.5 - 5.0 mmol/L    Chloride 103 98 - 111 mmol/L    CO2 25 22 - 29 mmol/L    Anion Gap 13 7 - 19 mmol/L    Glucose 145 (H) 74 - 109 mg/dL    BUN 14 8 - 23 mg/dL    CREATININE 1.3 (H) 0.5 - 0.9 mg/dL    GFR Non-African American 42 (A) >60    GFR  50 (L) >59    Calcium 9.8 8.8 - 10.2 mg/dL    Total Protein 7.6 6.6 - 8.7 g/dL    Albumin 4.0 3.5 - 5.2 g/dL    Total Bilirubin 0.5 0.2 - 1.2 mg/dL    Alkaline Phosphatase 85 35 - 104 U/L    ALT 18 5 - 33 U/L    AST 18 5 - 32 U/L   CBC Auto Differential    Collection Time: 12/06/21  8:32 AM   Result Value Ref Range    WBC 7.5 4.8 - 10.8 K/uL    RBC 4.80 4.20 - 5.40 M/uL    Hemoglobin 13.7 12.0 - 16.0 g/dL    Hematocrit 44.1 37.0 - 47.0 %    MCV 91.9 81.0 - 99.0 fL    MCH 28.5 27.0 - 31.0 pg    MCHC 31.1 (L) 33.0 - 37.0 g/dL    RDW 13.8 11.5 - 14.5 %    Platelets 881 078 - 333 K/uL    MPV 10.7 9.4 - 12.3 fL    Neutrophils % 64.1 50.0 - 65.0 %    Lymphocytes % 25.5 20.0 - 40.0 %    Monocytes % 6.6 0.0 - 10.0 %    Eosinophils % 3.2 0.0 - 5.0 %    Basophils % 0.5 0.0 - 1.0 %    Neutrophils Absolute 4.8 1.5 - 7.5 K/uL    Immature Granulocytes # 0.0 K/uL    Lymphocytes Absolute 1.9 1.1 - 4.5 K/uL    Monocytes Absolute 0.50 0.00 - 0.90 K/uL    Eosinophils Absolute 0.20 0.00 - 0.60 K/uL    Basophils Absolute 0.00 0.00 - 0.20 K/uL       Lab Results   Component Value Date    LABA1C 7.1 (H) 12/03/2021    LABA1C 7.2 (H) 09/15/2021    LABA1C 7.1 (H) 05/24/2021     Lab Results   Component Value Date    LABMICR 5.20 12/06/2021    LDLCALC 71 12/06/2021    CREATININE 1.3 (H) 12/06/2021     Lab Results   Component found for: CHOLHDLRATIO  LDL improved considerably. Discussed lifestyle changes such as diet and exercise. Recommended eliminate processed food from diet such as sugar and fried foods. Recommended exercising at least 150 minutes/week. Try to do full body resistance training twice a week as well. Offered suggestions for calorie counting such as phone apps and online resources such as My fitness pal and Lose it. Discussed healthy weight. 4. Primary hypothyroidism  Lab Results   Component Value Date    TSH 2.900 12/06/2021    T4FREE 1.25 12/06/2021     Stable            Return in about 3 months (around 3/21/2022) for Routine follow up - 15 minute visit. Discussed use, benefit, and side effects of prescribed medications. All patient questions answered. Pt voiced understanding. Reviewed health maintenance. Instructedto continue current medications, diet and exercise. Patient agreed with treatmentplan. Follow up as directed. Note dictated using Dragon Dictation software  Sometimes this dictation software makes erroneous transcriptions.

## 2022-01-20 DIAGNOSIS — E11.42 TYPE 2 DIABETES MELLITUS WITH DIABETIC POLYNEUROPATHY, WITH LONG-TERM CURRENT USE OF INSULIN (HCC): ICD-10-CM

## 2022-01-20 DIAGNOSIS — Z79.4 TYPE 2 DIABETES MELLITUS WITH DIABETIC POLYNEUROPATHY, WITH LONG-TERM CURRENT USE OF INSULIN (HCC): ICD-10-CM

## 2022-01-21 RX ORDER — INSULIN ASPART 100 [IU]/ML
INJECTION, SOLUTION INTRAVENOUS; SUBCUTANEOUS
Qty: 9 ML | Refills: 3 | Status: SHIPPED | OUTPATIENT
Start: 2022-01-21 | End: 2022-05-12

## 2022-03-08 DIAGNOSIS — Z79.4 TYPE 2 DIABETES MELLITUS WITH DIABETIC POLYNEUROPATHY, WITH LONG-TERM CURRENT USE OF INSULIN (HCC): ICD-10-CM

## 2022-03-08 DIAGNOSIS — E03.9 PRIMARY HYPOTHYROIDISM: ICD-10-CM

## 2022-03-08 DIAGNOSIS — E11.42 TYPE 2 DIABETES MELLITUS WITH DIABETIC POLYNEUROPATHY, WITH LONG-TERM CURRENT USE OF INSULIN (HCC): ICD-10-CM

## 2022-03-08 LAB
ALBUMIN SERPL-MCNC: 4.1 G/DL (ref 3.5–5.2)
ALP BLD-CCNC: 84 U/L (ref 35–104)
ALT SERPL-CCNC: 17 U/L (ref 5–33)
ANION GAP SERPL CALCULATED.3IONS-SCNC: 14 MMOL/L (ref 7–19)
AST SERPL-CCNC: 18 U/L (ref 5–32)
BACTERIA: NEGATIVE /HPF
BASOPHILS ABSOLUTE: 0.1 K/UL (ref 0–0.2)
BASOPHILS RELATIVE PERCENT: 0.7 % (ref 0–1)
BILIRUB SERPL-MCNC: 0.5 MG/DL (ref 0.2–1.2)
BILIRUBIN URINE: NEGATIVE
BLOOD, URINE: NEGATIVE
BUN BLDV-MCNC: 16 MG/DL (ref 8–23)
CALCIUM SERPL-MCNC: 9.6 MG/DL (ref 8.8–10.2)
CHLORIDE BLD-SCNC: 102 MMOL/L (ref 98–111)
CLARITY: ABNORMAL
CO2: 24 MMOL/L (ref 22–29)
COLOR: YELLOW
CREAT SERPL-MCNC: 1.1 MG/DL (ref 0.5–0.9)
CREATININE URINE: 127.3 MG/DL (ref 4.2–622)
CRYSTALS, UA: ABNORMAL /HPF
EOSINOPHILS ABSOLUTE: 0.1 K/UL (ref 0–0.6)
EOSINOPHILS RELATIVE PERCENT: 1.8 % (ref 0–5)
EPITHELIAL CELLS, UA: 11 /HPF (ref 0–5)
GFR AFRICAN AMERICAN: >59
GFR NON-AFRICAN AMERICAN: 50
GLUCOSE BLD-MCNC: 143 MG/DL (ref 74–109)
GLUCOSE URINE: NEGATIVE MG/DL
HBA1C MFR BLD: 7.1 % (ref 4–6)
HCT VFR BLD CALC: 43.6 % (ref 37–47)
HEMOGLOBIN: 13.6 G/DL (ref 12–16)
HYALINE CASTS: 5 /HPF (ref 0–8)
IMMATURE GRANULOCYTES #: 0 K/UL
KETONES, URINE: NEGATIVE MG/DL
LEUKOCYTE ESTERASE, URINE: ABNORMAL
LYMPHOCYTES ABSOLUTE: 1.6 K/UL (ref 1.1–4.5)
LYMPHOCYTES RELATIVE PERCENT: 20.9 % (ref 20–40)
MAGNESIUM: 1.9 MG/DL (ref 1.6–2.4)
MCH RBC QN AUTO: 28.4 PG (ref 27–31)
MCHC RBC AUTO-ENTMCNC: 31.2 G/DL (ref 33–37)
MCV RBC AUTO: 91 FL (ref 81–99)
MONOCYTES ABSOLUTE: 0.5 K/UL (ref 0–0.9)
MONOCYTES RELATIVE PERCENT: 6.3 % (ref 0–10)
NEUTROPHILS ABSOLUTE: 5.4 K/UL (ref 1.5–7.5)
NEUTROPHILS RELATIVE PERCENT: 69.9 % (ref 50–65)
NITRITE, URINE: NEGATIVE
PARATHYROID HORMONE INTACT: 74.2 PG/ML (ref 15–65)
PDW BLD-RTO: 14.5 % (ref 11.5–14.5)
PH UA: 6.5 (ref 5–8)
PHOSPHORUS: 3.4 MG/DL (ref 2.5–4.5)
PLATELET # BLD: 212 K/UL (ref 130–400)
PMV BLD AUTO: 11.6 FL (ref 9.4–12.3)
POTASSIUM SERPL-SCNC: 4 MMOL/L (ref 3.5–5)
PROTEIN PROTEIN: 15 MG/DL (ref 15–45)
PROTEIN UA: NEGATIVE MG/DL
RBC # BLD: 4.79 M/UL (ref 4.2–5.4)
RBC UA: 1 /HPF (ref 0–4)
SODIUM BLD-SCNC: 140 MMOL/L (ref 136–145)
SPECIFIC GRAVITY UA: 1.01 (ref 1–1.03)
TOTAL PROTEIN: 7.6 G/DL (ref 6.6–8.7)
URIC ACID, SERUM: 8.2 MG/DL (ref 2.4–5.7)
UROBILINOGEN, URINE: 0.2 E.U./DL
VITAMIN D 25-HYDROXY: 43.7 NG/ML
WBC # BLD: 7.7 K/UL (ref 4.8–10.8)
WBC UA: 23 /HPF (ref 0–5)

## 2022-03-08 RX ORDER — LEVOTHYROXINE SODIUM 137 UG/1
137 TABLET ORAL DAILY
Qty: 90 TABLET | Refills: 1 | Status: SHIPPED | OUTPATIENT
Start: 2022-03-08 | End: 2022-08-29

## 2022-03-18 DIAGNOSIS — E11.42 TYPE 2 DIABETES MELLITUS WITH DIABETIC POLYNEUROPATHY, WITH LONG-TERM CURRENT USE OF INSULIN (HCC): ICD-10-CM

## 2022-03-18 DIAGNOSIS — Z79.4 TYPE 2 DIABETES MELLITUS WITH DIABETIC POLYNEUROPATHY, WITH LONG-TERM CURRENT USE OF INSULIN (HCC): ICD-10-CM

## 2022-03-18 PROCEDURE — 3051F HG A1C>EQUAL 7.0%<8.0%: CPT | Performed by: FAMILY MEDICINE

## 2022-03-21 RX ORDER — INSULIN DETEMIR 100 [IU]/ML
54 INJECTION, SOLUTION SUBCUTANEOUS NIGHTLY
Qty: 6 PEN | Refills: 1 | Status: SHIPPED | OUTPATIENT
Start: 2022-03-21 | End: 2022-05-18

## 2022-03-22 ENCOUNTER — OFFICE VISIT (OUTPATIENT)
Dept: FAMILY MEDICINE CLINIC | Age: 62
End: 2022-03-22
Payer: COMMERCIAL

## 2022-03-22 VITALS
OXYGEN SATURATION: 97 % | SYSTOLIC BLOOD PRESSURE: 132 MMHG | BODY MASS INDEX: 60.21 KG/M2 | WEIGHT: 293 LBS | HEART RATE: 57 BPM | TEMPERATURE: 97.9 F | DIASTOLIC BLOOD PRESSURE: 86 MMHG

## 2022-03-22 DIAGNOSIS — E11.42 TYPE 2 DIABETES MELLITUS WITH DIABETIC POLYNEUROPATHY, WITH LONG-TERM CURRENT USE OF INSULIN (HCC): Primary | ICD-10-CM

## 2022-03-22 DIAGNOSIS — E03.9 PRIMARY HYPOTHYROIDISM: ICD-10-CM

## 2022-03-22 DIAGNOSIS — Z99.89 OSA ON CPAP: ICD-10-CM

## 2022-03-22 DIAGNOSIS — Z79.4 TYPE 2 DIABETES MELLITUS WITH DIABETIC POLYNEUROPATHY, WITH LONG-TERM CURRENT USE OF INSULIN (HCC): Primary | ICD-10-CM

## 2022-03-22 DIAGNOSIS — E78.00 HYPERCHOLESTEROLEMIA: ICD-10-CM

## 2022-03-22 DIAGNOSIS — G47.33 OSA ON CPAP: ICD-10-CM

## 2022-03-22 DIAGNOSIS — I10 ESSENTIAL (PRIMARY) HYPERTENSION: ICD-10-CM

## 2022-03-22 DIAGNOSIS — K21.9 GASTROESOPHAGEAL REFLUX DISEASE WITHOUT ESOPHAGITIS: ICD-10-CM

## 2022-03-22 PROCEDURE — 3051F HG A1C>EQUAL 7.0%<8.0%: CPT | Performed by: FAMILY MEDICINE

## 2022-03-22 PROCEDURE — 99214 OFFICE O/P EST MOD 30 MIN: CPT | Performed by: FAMILY MEDICINE

## 2022-03-22 RX ORDER — HYDROCHLOROTHIAZIDE 12.5 MG/1
1 CAPSULE, GELATIN COATED ORAL DAILY
COMMUNITY
Start: 2022-03-15

## 2022-03-22 ASSESSMENT — PATIENT HEALTH QUESTIONNAIRE - PHQ9
SUM OF ALL RESPONSES TO PHQ QUESTIONS 1-9: 0
SUM OF ALL RESPONSES TO PHQ QUESTIONS 1-9: 0
2. FEELING DOWN, DEPRESSED OR HOPELESS: 0
SUM OF ALL RESPONSES TO PHQ QUESTIONS 1-9: 0
1. LITTLE INTEREST OR PLEASURE IN DOING THINGS: 0
SUM OF ALL RESPONSES TO PHQ QUESTIONS 1-9: 0
SUM OF ALL RESPONSES TO PHQ9 QUESTIONS 1 & 2: 0

## 2022-03-22 NOTE — PROGRESS NOTES
[facility] PHYSICIAN SERVICES  [facility] FAMILY MEDICINE  [address] [facility] [address]  [address] [address]  Dept: [phone]  Dept Fax: [phone]  Loc: [phone]    Johnathan Black is a 65 y.o. female who presents today for her medical conditions/complaints as noted below. Johnathan Black is here for 3 Month Follow-Up, Altered Mental Status, and Fatigue        HPI:   CC: Here today to discuss the following:    Diabetes Mellitus  She is been having some increased symptoms of \"fatigue\" during the day. She had an episode of hypoglycemia in February. She sent a patient message dated February 9. She states she woke up around 4 AM \"shaking\" and \"crying\". She checked her blood sugar and it was 71. She typically feels bad when her blood sugar is below 100. Currently she is taking the following insulin:  Levemir 50 units nightly  Insulin aspart: 6 units before breakfast lunch and 10 units with dinner      Gastroesophageal Reflux Disease  Symptoms currently under control. Medication adequately controls his symptoms. No hematochezia or melena. Hypertension  Compliant with medications. No adverse effects from medication. No lightheadedness, palpitations, or chest pain. Hyperlipidemia  Tolerating current cholesterol medication without side effects. No body aches. Attempting to reduce processed sugar and cholesterol from diet. HPI    Subjective:      Review of Systems   Constitutional: Positive for fatigue. Negative for chills and fever. HENT: Negative for congestion. Respiratory: Negative for cough, chest tightness and shortness of breath. Cardiovascular: Negative for chest pain, palpitations and leg swelling. Gastrointestinal: Negative for abdominal pain, anal bleeding, constipation, diarrhea and nausea. Genitourinary: Negative for difficulty urinating. Psychiatric/Behavioral: Negative. SeeHPI for visit specific review of symptoms.   All others negative      Objective:   /86   Pulse 57   Temp 97.9 °F (36.6 °C)   Wt (!) 396 lb (179.6 kg)   LMP 01/01/2002   SpO2 97%   BMI 60.21 kg/m²   Physical Exam  Constitutional:       Appearance: She is well-developed. She is not ill-appearing. Eyes:      Pupils: Pupils are equal, round, and reactive to light. Cardiovascular:      Rate and Rhythm: Normal rate and regular rhythm. Heart sounds: No murmur heard. No friction rub. Pulmonary:      Effort: Pulmonary effort is normal. No respiratory distress. Breath sounds: Normal breath sounds. No wheezing or rales. Abdominal:      General: Bowel sounds are normal. There is no distension. Palpations: Abdomen is soft. Tenderness: There is no abdominal tenderness. There is no guarding or rebound. Musculoskeletal:      Cervical back: Normal range of motion and neck supple. Neurological:      Mental Status: She is alert.    Psychiatric:         Behavior: Behavior normal.           Recent Results (from the past 672 hour(s))   CBC with Auto Differential    Collection Time: 03/08/22  8:15 AM   Result Value Ref Range    WBC 7.7 4.8 - 10.8 K/uL    RBC 4.79 4.20 - 5.40 M/uL    Hemoglobin 13.6 12.0 - 16.0 g/dL    Hematocrit 43.6 37.0 - 47.0 %    MCV 91.0 81.0 - 99.0 fL    MCH 28.4 27.0 - 31.0 pg    MCHC 31.2 (L) 33.0 - 37.0 g/dL    RDW 14.5 11.5 - 14.5 %    Platelets 934 776 - 835 K/uL    MPV 11.6 9.4 - 12.3 fL    Neutrophils % 69.9 (H) 50.0 - 65.0 %    Lymphocytes % 20.9 20.0 - 40.0 %    Monocytes % 6.3 0.0 - 10.0 %    Eosinophils % 1.8 0.0 - 5.0 %    Basophils % 0.7 0.0 - 1.0 %    Neutrophils Absolute 5.4 1.5 - 7.5 K/uL    Immature Granulocytes # 0.0 K/uL    Lymphocytes Absolute 1.6 1.1 - 4.5 K/uL    Monocytes Absolute 0.50 0.00 - 0.90 K/uL    Eosinophils Absolute 0.10 0.00 - 0.60 K/uL    Basophils Absolute 0.10 0.00 - 0.20 K/uL   Urinalysis    Collection Time: 03/08/22  8:15 AM   Result Value Ref Range    Color, UA YELLOW Straw/Yellow Clarity, UA CLOUDY (A) Clear    Glucose, Ur Negative Negative mg/dL    Bilirubin Urine Negative Negative    Ketones, Urine Negative Negative mg/dL    Specific Gravity, UA 1.013 1.005 - 1.030    Blood, Urine Negative Negative    pH, UA 6.5 5.0 - 8.0    Protein, UA Negative Negative mg/dL    Urobilinogen, Urine 0.2 <2.0 E.U./dL    Nitrite, Urine Negative Negative    Leukocyte Esterase, Urine MODERATE (A) Negative   Microscopic Urinalysis    Collection Time: 03/08/22  8:15 AM   Result Value Ref Range    Bacteria, UA NEGATIVE (A) None Seen /HPF    Crystals, UA NEG (A) None Seen /HPF    Hyaline Casts, UA 5 0 - 8 /HPF    WBC, UA 23 (H) 0 - 5 /HPF    RBC, UA 1 0 - 4 /HPF    Epithelial Cells, UA 11 0 - 5 /HPF   Creatinine, Random Urine    Collection Time: 03/08/22  8:15 AM   Result Value Ref Range    Creatinine, Ur 127.3 4.2 - 622.0 mg/dL   Protein, urine, random    Collection Time: 03/08/22  8:15 AM   Result Value Ref Range    Protein, Ur 15 15 - 45 mg/dL   Comprehensive Metabolic Panel    Collection Time: 03/08/22  8:15 AM   Result Value Ref Range    Sodium 140 136 - 145 mmol/L    Potassium 4.0 3.5 - 5.0 mmol/L    Chloride 102 98 - 111 mmol/L    CO2 24 22 - 29 mmol/L    Anion Gap 14 7 - 19 mmol/L    Glucose 143 (H) 74 - 109 mg/dL    BUN 16 8 - 23 mg/dL    CREATININE 1.1 (H) 0.5 - 0.9 mg/dL    GFR Non-African American 50 (A) >60    GFR African American >59 >59    Calcium 9.6 8.8 - 10.2 mg/dL    Total Protein 7.6 6.6 - 8.7 g/dL    Albumin 4.1 3.5 - 5.2 g/dL    Total Bilirubin 0.5 0.2 - 1.2 mg/dL    Alkaline Phosphatase 84 35 - 104 U/L    ALT 17 5 - 33 U/L    AST 18 5 - 32 U/L   Phosphorus    Collection Time: 03/08/22  8:15 AM   Result Value Ref Range    Phosphorus 3.4 2.5 - 4.5 mg/dL   Magnesium    Collection Time: 03/08/22  8:15 AM   Result Value Ref Range    Magnesium 1.9 1.6 - 2.4 mg/dL   Uric Acid    Collection Time: 03/08/22  8:15 AM   Result Value Ref Range    Uric Acid, Serum 8.2 (H) 2.4 - 5.7 mg/dL   PTH, Intact Collection Time: 03/08/22  8:15 AM   Result Value Ref Range    PTH 74.2 (H) 15.0 - 65.0 pg/mL   Vitamin D 25 Hydroxy    Collection Time: 03/08/22  8:15 AM   Result Value Ref Range    Vit D, 25-Hydroxy 43.7 >=30 ng/mL   Hemoglobin A1C    Collection Time: 03/08/22  8:17 AM   Result Value Ref Range    Hemoglobin A1C 7.1 (H) 4.0 - 6.0 %     Lab Results   Component Value Date    LABA1C 7.1 (H) 03/08/2022    LABA1C 7.1 (H) 12/03/2021    LABA1C 7.2 (H) 09/15/2021     Lab Results   Component Value Date    LABMICR 5.20 12/06/2021    LDLCALC 71 12/06/2021    CREATININE 1.1 (H) 03/08/2022     Lab Results   Component Value Date    CHOL 144 (L) 12/06/2021    CHOL 158 (L) 09/15/2021    CHOL 149 (L) 02/23/2021     Lab Results   Component Value Date    TRIG 114 12/06/2021    TRIG 91 09/15/2021    TRIG 101 02/23/2021     Lab Results   Component Value Date    HDL 50 (L) 12/06/2021    HDL 51 (L) 09/15/2021    HDL 52 (L) 02/23/2021     Lab Results   Component Value Date    LDLCALC 71 12/06/2021    LDLCALC 89 09/15/2021    LDLCALC 77 02/23/2021     No results found for: LABVLDL, VLDL  No results found for: CHOLHDLRATIO            Assessment & Plan: The following diagnoses and conditions are stable with no further orders unless indicated:  1. Type 2 diabetes mellitus with diabetic polyneuropathy, with long-term current use of insulin ([address]  Advised her to set her alarm and check her blood sugar at 2 AM 2-3 times check for hypoglycemia. Reduce Levemir to 45 units at night  Continue with insulin aspart take 6 units with breakfast, 6 units with lunch, 10 units with dinner  We will likely need to reduce her basal insulin and adjust her mealtime insulin based on her glucose readings  Advised her to send me readings in 2 weeks for adjustment  Discussed options of eating a snack at night as well  - Hemoglobin A1C; Future    2.  Essential (primary) hypertension  BP Readings from Last 3 Encounters:   03/22/22 132/86   12/21/21 138/88 (periodic limb movement disorder)     Renal mass     cat scan done 9/13/18; to see dr. Analilia Kevin coming up    Thyroid disease     Type 2 diabetes mellitus without complication ([facility] [address]     Type II or unspecified type diabetes mellitus without mention of complication, not stated as uncontrolled       Past Surgical History:   Procedure Laterality Date    BREAST BIOPSY Right 2012    CARPAL TUNNEL RELEASE      bilateral    CHOLECYSTECTOMY      HYSTERECTOMY      [place] LAP, RADICAL NEPHRECTOMY Left 10/3/2018    NEPHRECTOMY LAPAROSCOPIC HAND ASSISTED performed by Dony Peterson MD at [facility] [address] LAP,CHOLECYSTECTOMY/GRAPH N/A 10/31/2017    CHOLECYSTECTOMY LAPAROSCOPIC performed by Ed Jean MD at [facility] N/A 9/22/2020    [address] [facility] performed by Ed Jean MD at [facility] History   Problem Relation Age of Onset    Diabetes Mother     Cancer Mother         breast    Heart Disease Father     Cancer Father         colon, over 79    Cancer Sister 43        breast, negative genetic testing    Cancer Maternal Grandmother 76        breast    Diabetes Maternal Grandmother     Heart Attack Maternal Grandfather     Hypertension Other     Elevated Lipids Other     Coronary Art Dis Other     Heart Attack Paternal Grandmother        Social History     Tobacco Use    Smoking status: Never Smoker    Smokeless tobacco: Never Used   Substance Use Topics    Alcohol use: No     Current Outpatient Medications   Medication Sig Dispense Refill    hydroCHLOROthiazide (MICROZIDE) 12.5 MG capsule Take 1 capsule by mouth daily      insulin detemir (LEVEMIR FLEXTOUCH) 100 UNIT/ML injection pen Inject 54 Units into the skin nightly (Patient taking differently: Inject 50 Units into the skin nightly ) 6 pen 1    levothyroxine (SYNTHROID) 137 MCG tablet TAKE 1 TABLET BY MOUTH DAILY 90 tablet 1    Insulin Aspart FlexPen 100 UNIT/ML SOPN INJECT 6 UNITS UNDER THE SKIN WITH BREAKFAST AND LUNCH AND 10 UNITS WITH DINNER 9 mL 3    lisinopril (PRINIVIL;ZESTRIL) 40 MG tablet Take 1 tablet by mouth daily  5    pantoprazole (PROTONIX) 40 MG tablet TAKE 1 TABLET BY MOUTH DAILY 90 tablet 3    Misc. Devices MISC 1 pair of Diabetic Shoes w/ 3 sets of heat moldable inserts ICD 10: E11.9 1 each 0    atorvastatin (LIPITOR) 10 MG tablet TAKE 1 TABLET BY MOUTH DAILY 90 tablet 3    atenolol (TENORMIN) 50 MG tablet TAKE ONE-HALF TABLET BY MOUTH EVERY MORNING AND ONE-HALF TABLET EVERY EVENING. 90 tablet 3    B-D UF III MINI PEN NEEDLES 31G X 5 MM MISC USE FOUR TIMES DAILY AS DIRECTED 400 each 3    Cholecalciferol (VITAMIN D3) 1.25 MG (81923 UT) CAPS Take 1 capsule by mouth once a week      clotrimazole-betamethasone (LOTRISONE) 1-0.05 % cream Apply topically 2 times daily. 15 g 0    dilTIAZem (CARDIZEM CD) 180 MG extended release capsule TAKE 1 CAPSULE BY MOUTH DAILY 90 capsule 3    CONTOUR NEXT TEST strip TEST FIVE TIMES DAILY 500 strip 5    Multiple Vitamins-Minerals (MULTIPLE VITAMINS/WOMENS PO) Take 1 tablet by mouth daily       fexofenadine (ALLEGRA ALLERGY) 180 MG tablet Take 180 mg by mouth daily      Omega-3 Fatty Acids (FISH OIL) 1000 MG CAPS Take 1,000 mg by mouth 2 times daily       aspirin 81 MG tablet Take 81 mg by mouth daily 3 days a wk       No current facility-administered medications for this visit.      Allergies   Allergen Reactions    Adhesive Tape Other (See Comments)     Post op incision infection    Dermabond Other (See Comments)     Post op incision infection       Health Maintenance   Topic Date Due    Hepatitis C screen  Never done    Diabetic foot exam  Never done    HIV screen  Never done    Diabetic retinal exam  01/22/2020    Diabetic microalbuminuria test  12/06/2022    Lipid screen  12/06/2022    A1C test (Diabetic or Prediabetic)  03/08/2023    Potassium monitoring  03/08/2023    Creatinine monitoring  03/08/2023    Depression Screen 03/22/2023    Breast cancer screen  02/21/2024    Pneumococcal 0-64 years Vaccine (2 of 2 - PPSV23) 02/28/2026    Colorectal Cancer Screen  06/28/2026    DTaP/Tdap/Td vaccine (2 - Td or Tdap) 06/07/2031    Flu vaccine  Completed    Shingles Vaccine  Completed    COVID-19 Vaccine  Completed    Hepatitis A vaccine  Aged Out    Hib vaccine  Aged Out    Meningococcal (ACWY) vaccine  Aged Out       _______________________________________________________________    Note dictated using [id number] [facility]  Sometimes this dictation software makes erroneous transcriptions.

## 2022-03-24 ASSESSMENT — ENCOUNTER SYMPTOMS
DIARRHEA: 0
SHORTNESS OF BREATH: 0
CONSTIPATION: 0
ANAL BLEEDING: 0
CHEST TIGHTNESS: 0
ABDOMINAL PAIN: 0
COUGH: 0
NAUSEA: 0

## 2022-04-05 ENCOUNTER — TELEPHONE (OUTPATIENT)
Dept: UROLOGY | Age: 62
End: 2022-04-05

## 2022-04-05 DIAGNOSIS — E11.42 TYPE 2 DIABETES MELLITUS WITH DIABETIC POLYNEUROPATHY, WITH LONG-TERM CURRENT USE OF INSULIN (HCC): Primary | ICD-10-CM

## 2022-04-05 DIAGNOSIS — Z79.4 TYPE 2 DIABETES MELLITUS WITH DIABETIC POLYNEUROPATHY, WITH LONG-TERM CURRENT USE OF INSULIN (HCC): Primary | ICD-10-CM

## 2022-04-05 RX ORDER — BLOOD-GLUCOSE,RECEIVER,CONT
1 EACH MISCELLANEOUS DAILY
Qty: 1 EACH | Refills: 3 | Status: SHIPPED | OUTPATIENT
Start: 2022-04-05

## 2022-04-05 RX ORDER — BLOOD-GLUCOSE SENSOR
1 EACH MISCELLANEOUS
Qty: 1 EACH | Refills: 11 | Status: SHIPPED | OUTPATIENT
Start: 2022-04-05

## 2022-04-05 RX ORDER — BLOOD-GLUCOSE TRANSMITTER
1 EACH MISCELLANEOUS
Qty: 1 EACH | Refills: 3 | Status: SHIPPED | OUTPATIENT
Start: 2022-04-05

## 2022-04-05 NOTE — TELEPHONE ENCOUNTER
[facility] 77. called to request a refill on her medication.       Last office visit : 3/22/2022   Next office visit : 2022     Requested Prescriptions     Signed Prescriptions Disp Refills    Continuous Blood Gluc Sensor (DEXCOM G6 SENSOR) MISC 1 each 11     Si each by Does not apply route every 30 days     Authorizing Provider: Berhane Antis     Ordering User: Felix Laureano    Continuous Blood Gluc Transmit (DEXCOM G6 TRANSMITTER) MISC 1 each 3     Si Device by Does not apply route every 3 months     Authorizing Provider: Berhane Antis     Ordering User: IntYo    Continuous Blood Gluc  ([address]) JAYA 1 each 3     Si Device by Does not apply route daily     Authorizing Provider: Berhane Antis     Ordering User: Felix Laureano    glucagon 1 MG injection 1 kit 0     Sig: Inject 1 mg into the muscle once for 1 dose     Authorizing Provider: Berhane Antis     Ordering User: Mona Siu

## 2022-04-05 NOTE — TELEPHONE ENCOUNTER
PT WAS UNABLE TO SPEAK (AT WORK) LEFT A MESSAGE WITH  FOR HER TO CALL OFFICE BACK TO DISCUSS FURTHER TESTING AND RESCHEDULING

## 2022-04-05 NOTE — TELEPHONE ENCOUNTER
[place] requests that offfice return their call. The best time to reach her is Anytime. Patient is wondering if she can have her future testing done at the [facility] instead of the [facility].     Thank you.

## 2022-05-02 DIAGNOSIS — I10 ESSENTIAL (PRIMARY) HYPERTENSION: ICD-10-CM

## 2022-05-02 RX ORDER — DILTIAZEM HYDROCHLORIDE 180 MG/1
CAPSULE, COATED, EXTENDED RELEASE ORAL
Qty: 90 CAPSULE | Refills: 3 | Status: SHIPPED | OUTPATIENT
Start: 2022-05-02

## 2022-05-02 NOTE — TELEPHONE ENCOUNTER
[facility] 77. called to request a refill on her medication.       Last office visit : 3/22/2022   Next office visit : 6/24/2022     Requested Prescriptions     Signed Prescriptions Disp Refills    dilTIAZem (CARDIZEM CD) 180 MG extended release capsule 90 capsule 3     Sig: TAKE 1 CAPSULE BY MOUTH DAILY     Authorizing Provider: Tiffany Woodruff     Ordering User: Gregory Serna

## 2022-05-05 ENCOUNTER — OFFICE VISIT (OUTPATIENT)
Dept: FAMILY MEDICINE CLINIC | Age: 62
End: 2022-05-05
Payer: COMMERCIAL

## 2022-05-05 VITALS
OXYGEN SATURATION: 97 % | BODY MASS INDEX: 60.67 KG/M2 | WEIGHT: 293 LBS | TEMPERATURE: 98.1 F | HEART RATE: 65 BPM | SYSTOLIC BLOOD PRESSURE: 142 MMHG | DIASTOLIC BLOOD PRESSURE: 92 MMHG

## 2022-05-05 DIAGNOSIS — M25.572 ACUTE LEFT ANKLE PAIN: ICD-10-CM

## 2022-05-05 DIAGNOSIS — M25.572 ACUTE LEFT ANKLE PAIN: Primary | ICD-10-CM

## 2022-05-05 PROCEDURE — 99213 OFFICE O/P EST LOW 20 MIN: CPT | Performed by: FAMILY MEDICINE

## 2022-05-05 NOTE — PROGRESS NOTES
[facility] PHYSICIAN SERVICES  [facility] FAMILY MEDICINE  [address] [facility] [address]  [facility] [address] [address]  Dept: [phone]  Dept Fax: [phone]  Loc: [phone]    Sarah Handy is a 65 y.o. female who presents today for her medical conditions/complaints as noted below. Sarah Handy is here for Leg Pain (LEFT)        HPI:   CC: Here today to discuss the following:    She developed left ankle pain on April 30. She denies any trauma or other injury. She noticed some swelling of the forefoot as well. There is no redness or heat. Pain describes a dull ache. Worse with ambulation. She states she is having difficulty bearing weight. She is having to utilize a cane. She is primarily using a shuffling gait for a few days. She has been taking Tylenol without much relief. She states the pain was sharp and stabbing and occasionally throbbing. HPI    Subjective:      Review of Systems    GEN: No fever chills  MSK left ankle pain  SeeHPI for visit specific review of symptoms. All others negative      Objective:   BP (!) 142/92   Pulse 65   Temp 98.1 °F (36.7 °C)   Wt (!) 399 lb (181 kg)   LMP 01/01/2002   SpO2 97%   BMI 60.67 kg/m²   Physical Exam  MSK: Minimally tender forefoot left foot. No redness. Minimal swelling. Extremities: No calf tenderness. No discoloration    No results found for this or any previous visit (from the past 672 hour(s)). Assessment & Plan: The following diagnoses and conditions are stable with no further orders unless indicated:  1. Acute left ankle pain  Will obtain an x-ray. She has a history of chronic kidney disease with hyperuricemia. Possible flareup of gout but does appear to be improving. She does try to drink a minimum of 60 ounces of water per day. - XR ANKLE LEFT (MIN 3 VIEWS); Future            Return if symptoms worsen or fail to improve.            Discussed use, benefit, and side effects of prescribed medications. All patient questions answered. Pt voiced understanding. Reviewed health maintenance. Instructedto continue current medications, diet and exercise. Patient agreed with treatmentplan.  Follow up as directed.     _______________________________________________________________      Past Medical History:   Diagnosis Date    Allergic rhinitis     Ankle fracture     3 year ago; increasing difficulty walking; has bone fragments    Arthritis     sees dr. Yasmine Connell as ambulation aid     right foot per dr. Peggy Costa    CPAP (continuous positive airway pressure) dependence     12cm    Diabetes ([facility] [address]     Hiatal hernia 9/27/2017    History of kidney cancer     Hyperlipidemia     Hypertension     Hypothyroidism     Murmur     Obesity     ZOILA (obstructive sleep apnea)     AHI:  28.5 per [facility], 9/2014    PLMD (periodic limb movement disorder)     Renal mass     cat scan done 9/13/18; to see dr. Duane Antunez coming up    Thyroid disease     Type 2 diabetes mellitus without complication ([facility] [address]     Type II or unspecified type diabetes mellitus without mention of complication, not stated as uncontrolled       Past Surgical History:   Procedure Laterality Date    BREAST BIOPSY Right 2012    CARPAL TUNNEL RELEASE      bilateral    CHOLECYSTECTOMY      HYSTERECTOMY      [place] LAP, RADICAL NEPHRECTOMY Left 10/3/2018    NEPHRECTOMY LAPAROSCOPIC HAND ASSISTED performed by Adrian Stallworth MD at [facility] [address] LAP,CHOLECYSTECTOMY/GRAPH N/A 10/31/2017    CHOLECYSTECTOMY LAPAROSCOPIC performed by Reed Franks MD at [address] N/A 9/22/2020    VENTRAL HERNIA REPAIR WITH MESH performed by Reed Franks MD at [facility] History   Problem Relation Age of Onset    Diabetes Mother     Cancer Mother         breast    Heart Disease Father     Cancer Father         colon, over 79    Cancer Sister 43        breast, negative genetic testing    Cancer Maternal Grandmother 76 breast    Diabetes Maternal Grandmother     Heart Attack Maternal Grandfather     Hypertension Other     Elevated Lipids Other     Coronary Art Dis Other     Heart Attack Paternal Grandmother        Social History     Tobacco Use    Smoking status: Never Smoker    Smokeless tobacco: Never Used   Substance Use Topics    Alcohol use: No     Current Outpatient Medications   Medication Sig Dispense Refill    dilTIAZem (CARDIZEM CD) 180 MG extended release capsule TAKE 1 CAPSULE BY MOUTH DAILY 90 capsule 3    Continuous Blood Gluc Sensor (DEXCOM G6 SENSOR) MISC 1 each by Does not apply route every 30 days 1 each 11    Continuous Blood Gluc Transmit (DEXCOM G6 TRANSMITTER) MISC 1 Device by Does not apply route every 3 months 1 each 3    Continuous Blood Gluc  (DEXCOM G6 ) JAYA 1 Device by Does not apply route daily 1 each 3    glucagon 1 MG injection Inject 1 mg into the muscle once for 1 dose 1 kit 0    hydroCHLOROthiazide (MICROZIDE) 12.5 MG capsule Take 1 capsule by mouth daily      insulin detemir (LEVEMIR FLEXTOUCH) 100 UNIT/ML injection pen Inject 54 Units into the skin nightly (Patient taking differently: Inject 50 Units into the skin nightly ) 6 pen 1    levothyroxine (SYNTHROID) 137 MCG tablet TAKE 1 TABLET BY MOUTH DAILY 90 tablet 1    Insulin Aspart FlexPen 100 UNIT/ML SOPN INJECT 6 UNITS UNDER THE SKIN WITH BREAKFAST AND LUNCH AND 10 UNITS WITH DINNER 9 mL 3    lisinopril (PRINIVIL;ZESTRIL) 40 MG tablet Take 1 tablet by mouth daily  5    pantoprazole (PROTONIX) 40 MG tablet TAKE 1 TABLET BY MOUTH DAILY 90 tablet 3    Misc. Devices MISC 1 pair of Diabetic Shoes w/ 3 sets of heat moldable inserts ICD 10: E11.9 1 each 0    atorvastatin (LIPITOR) 10 MG tablet TAKE 1 TABLET BY MOUTH DAILY 90 tablet 3    atenolol (TENORMIN) 50 MG tablet TAKE ONE-HALF TABLET BY MOUTH EVERY MORNING AND ONE-HALF TABLET EVERY EVENING.  90 tablet 3    B-D UF III MINI PEN NEEDLES 31G X 5 MM MISC USE FOUR TIMES DAILY AS DIRECTED 400 each 3    Cholecalciferol (VITAMIN D3) 1.25 MG (44950 UT) CAPS Take 1 capsule by mouth once a week      clotrimazole-betamethasone (LOTRISONE) 1-0.05 % cream Apply topically 2 times daily. 15 g 0    CONTOUR NEXT TEST strip TEST FIVE TIMES DAILY 500 strip 5    Multiple Vitamins-Minerals (MULTIPLE VITAMINS/WOMENS PO) Take 1 tablet by mouth daily       fexofenadine (ALLEGRA ALLERGY) 180 MG tablet Take 180 mg by mouth daily      Omega-3 Fatty Acids (FISH OIL) 1000 MG CAPS Take 1,000 mg by mouth 2 times daily       aspirin 81 MG tablet Take 81 mg by mouth daily 3 days a wk       No current facility-administered medications for this visit. Allergies   Allergen Reactions    Adhesive Tape Other (See Comments)     Post op incision infection    Dermabond Other (See Comments)     Post op incision infection       Health Maintenance   Topic Date Due    Diabetic foot exam  Never done    HIV screen  Never done    Hepatitis C screen  Never done    Pneumococcal 0-64 years Vaccine (2 - PCV) 01/21/2014    Diabetic retinal exam  10/20/2022    Diabetic microalbuminuria test  12/06/2022    Lipids  12/06/2022    A1C test (Diabetic or Prediabetic)  03/08/2023    Potassium  03/08/2023    Creatinine  03/08/2023    Depression Screen  03/22/2023    Breast cancer screen  02/21/2024    Colorectal Cancer Screen  06/28/2026    DTaP/Tdap/Td vaccine (2 - Td or Tdap) 06/07/2031    Flu vaccine  Completed    Shingles vaccine  Completed    COVID-19 Vaccine  Completed    Hepatitis A vaccine  Aged Out    Hib vaccine  Aged Out    Meningococcal (ACWY) vaccine  Aged Out       _______________________________________________________________    Note dictated using Dragon Dictation software  Sometimes this dictation software makes erroneous transcriptions.

## 2022-05-05 NOTE — PATIENT INSTRUCTIONS
We are committed to providing you with the best care possible. In order to help us achieve these goals please remember to bring all medications, herbal products, and over the counter supplements with you to each visit. If your provider has ordered testing for you, please be sure to follow up with our office if you have not received results within 7 days after the testing took place. *If you receive a survey after visiting one of our offices, please take time to share your experience concerning your physician office visit. These surveys are confidential and no health information about you is shared. We are eager to improve for you and we are counting on your feedback to help make that happen.    _______________________________________________________________    RICE  · Rest:   Try to avoid strain or pressure on the painful joint. · Ice:  Place a cold compress or ice pack on the joint for 15-20 minutes three times a day  · Compression:  Wear compression bandage on joint to relieve and prevent swelling. · Elevation:  Keep elevated as much as possible until the joint heals.

## 2022-05-12 DIAGNOSIS — Z79.4 TYPE 2 DIABETES MELLITUS WITH DIABETIC POLYNEUROPATHY, WITH LONG-TERM CURRENT USE OF INSULIN (HCC): ICD-10-CM

## 2022-05-12 DIAGNOSIS — E11.42 TYPE 2 DIABETES MELLITUS WITH DIABETIC POLYNEUROPATHY, WITH LONG-TERM CURRENT USE OF INSULIN (HCC): ICD-10-CM

## 2022-05-12 RX ORDER — INSULIN ASPART 100 [IU]/ML
INJECTION, SOLUTION INTRAVENOUS; SUBCUTANEOUS
Qty: 9 ML | Refills: 3 | Status: SHIPPED | OUTPATIENT
Start: 2022-05-12 | End: 2022-06-24 | Stop reason: SDUPTHER

## 2022-05-18 DIAGNOSIS — Z79.4 TYPE 2 DIABETES MELLITUS WITH DIABETIC POLYNEUROPATHY, WITH LONG-TERM CURRENT USE OF INSULIN (HCC): ICD-10-CM

## 2022-05-18 DIAGNOSIS — E11.42 TYPE 2 DIABETES MELLITUS WITH DIABETIC POLYNEUROPATHY, WITH LONG-TERM CURRENT USE OF INSULIN (HCC): ICD-10-CM

## 2022-05-18 RX ORDER — INSULIN DETEMIR 100 [IU]/ML
INJECTION, SOLUTION SUBCUTANEOUS
Qty: 18 ML | Refills: 2 | Status: SHIPPED | OUTPATIENT
Start: 2022-05-18 | End: 2022-08-18

## 2022-06-17 DIAGNOSIS — E03.9 PRIMARY HYPOTHYROIDISM: ICD-10-CM

## 2022-06-17 DIAGNOSIS — Z79.4 TYPE 2 DIABETES MELLITUS WITH DIABETIC POLYNEUROPATHY, WITH LONG-TERM CURRENT USE OF INSULIN (HCC): ICD-10-CM

## 2022-06-17 DIAGNOSIS — E78.00 HYPERCHOLESTEROLEMIA: ICD-10-CM

## 2022-06-17 DIAGNOSIS — I10 ESSENTIAL (PRIMARY) HYPERTENSION: ICD-10-CM

## 2022-06-17 DIAGNOSIS — E11.42 TYPE 2 DIABETES MELLITUS WITH DIABETIC POLYNEUROPATHY, WITH LONG-TERM CURRENT USE OF INSULIN (HCC): ICD-10-CM

## 2022-06-17 LAB
ALBUMIN SERPL-MCNC: 4 G/DL (ref 3.5–5.2)
ALP BLD-CCNC: 81 U/L (ref 35–104)
ALT SERPL-CCNC: 18 U/L (ref 5–33)
ANION GAP SERPL CALCULATED.3IONS-SCNC: 10 MMOL/L (ref 7–19)
AST SERPL-CCNC: 21 U/L (ref 5–32)
BASOPHILS ABSOLUTE: 0.1 K/UL (ref 0–0.2)
BASOPHILS RELATIVE PERCENT: 0.8 % (ref 0–1)
BILIRUB SERPL-MCNC: 0.4 MG/DL (ref 0.2–1.2)
BUN BLDV-MCNC: 20 MG/DL (ref 8–23)
CALCIUM SERPL-MCNC: 9.7 MG/DL (ref 8.8–10.2)
CHLORIDE BLD-SCNC: 104 MMOL/L (ref 98–111)
CHOLESTEROL, TOTAL: 145 MG/DL (ref 160–199)
CO2: 27 MMOL/L (ref 22–29)
CREAT SERPL-MCNC: 1.1 MG/DL (ref 0.5–0.9)
EOSINOPHILS ABSOLUTE: 0.1 K/UL (ref 0–0.6)
EOSINOPHILS RELATIVE PERCENT: 2 % (ref 0–5)
GFR AFRICAN AMERICAN: >59
GFR NON-AFRICAN AMERICAN: 50
GLUCOSE BLD-MCNC: 147 MG/DL (ref 74–109)
HBA1C MFR BLD: 7.4 % (ref 4–6)
HCT VFR BLD CALC: 42.9 % (ref 37–47)
HDLC SERPL-MCNC: 44 MG/DL (ref 65–121)
HEMOGLOBIN: 13.6 G/DL (ref 12–16)
IMMATURE GRANULOCYTES #: 0 K/UL
LDL CHOLESTEROL CALCULATED: 78 MG/DL
LYMPHOCYTES ABSOLUTE: 1.7 K/UL (ref 1.1–4.5)
LYMPHOCYTES RELATIVE PERCENT: 25.6 % (ref 20–40)
MCH RBC QN AUTO: 29.1 PG (ref 27–31)
MCHC RBC AUTO-ENTMCNC: 31.7 G/DL (ref 33–37)
MCV RBC AUTO: 91.7 FL (ref 81–99)
MONOCYTES ABSOLUTE: 0.4 K/UL (ref 0–0.9)
MONOCYTES RELATIVE PERCENT: 6.5 % (ref 0–10)
NEUTROPHILS ABSOLUTE: 4.3 K/UL (ref 1.5–7.5)
NEUTROPHILS RELATIVE PERCENT: 64.6 % (ref 50–65)
PDW BLD-RTO: 13.8 % (ref 11.5–14.5)
PLATELET # BLD: 283 K/UL (ref 130–400)
PMV BLD AUTO: 10.7 FL (ref 9.4–12.3)
POTASSIUM SERPL-SCNC: 4.3 MMOL/L (ref 3.5–5)
RBC # BLD: 4.68 M/UL (ref 4.2–5.4)
SODIUM BLD-SCNC: 141 MMOL/L (ref 136–145)
T4 FREE: 1.35 NG/DL (ref 0.93–1.7)
TOTAL PROTEIN: 7.7 G/DL (ref 6.6–8.7)
TRIGL SERPL-MCNC: 114 MG/DL (ref 0–149)
TSH SERPL DL<=0.05 MIU/L-ACNC: 0.81 UIU/ML (ref 0.27–4.2)
WBC # BLD: 6.6 K/UL (ref 4.8–10.8)

## 2022-06-23 NOTE — PROGRESS NOTES
[facility] PHYSICIAN SERVICES  [facility] FAMILY MEDICINE  [address] [address]  [facility] [address]  Dept: [phone]  Dept Fax: [phone]  Loc: [phone]    Jin Manuel is a 65 y.o. female who presents today for her medical conditions/complaints as noted below. Jin Manuel is here for 3 Month Follow-Up        HPI:   CC: Here today to discuss the following:    Diabetes Mellitus  Has been compliant with medications. No side effects of medications since last visit. No polyuria, polydipsia, or vision changes since last visit. No symptomatic episodes of hypoglycemia. Hypertension  Compliant with medications. No adverse effects from medication. No lightheadedness, palpitations, or chest pain. Hyperlipidemia  Tolerating current cholesterol medication without side effects. . Attempting to reduce processed sugar and cholesterol from diet. Hypothyroidism  Symptoms are stable. No temperature intolerance, fatigue, or mood disturbance from baseline. Complaint with current medication. Gastroesophageal Reflux Disease  Symptoms currently under control. Medication adequately controls symptoms. No hematochezia or melena. HPI    Subjective:      Review of Systems    Review of Systems   Constitutional: Negative for chills and fever. HENT: Negative for congestion. Respiratory: Negative for cough, chest tightness and shortness of breath. Cardiovascular: Negative for chest pain, palpitations and leg swelling. Gastrointestinal: Negative for abdominal pain, anal bleeding, constipation, diarrhea and nausea. Genitourinary: Negative for difficulty urinating. Psychiatric/Behavioral: Negative. SeeHPI for visit specific review of symptoms.   All others negative      Objective:   /84   Pulse 63   Temp 97.5 °F (36.4 °C)   Wt (!) 395 lb (179.2 kg)   LMP 01/01/2002   SpO2 97%   BMI 60.06 kg/m²   Physical Exam    Physical Exam   Constitutional: She appears well-developed. Does not appear ill. Neck: Neck supple. No masses. Neck Symmetric. Normal tracheal position. No thyroid enlargement  Cardiovascular: Normal rate and regular rhythm. Exam reveals no friction rub. No murmur heard. Respiratory:  Effort normal and breath sounds normal. No respiratory distress. No wheezes. No rales. No use of accessory muscles or intercostal retractions. Neurological: alert. Psychiatric: normal mood and affect.  Her behavior is normal.     Recent Results (from the past 672 hour(s))   T4, Free    Collection Time: 06/17/22  7:22 AM   Result Value Ref Range    T4 Free 1.35 0.93 - 1.70 ng/dL   TSH    Collection Time: 06/17/22  7:22 AM   Result Value Ref Range    TSH 0.809 0.270 - 4.200 uIU/mL   Lipid Panel    Collection Time: 06/17/22  7:22 AM   Result Value Ref Range    Cholesterol, Total 145 (L) 160 - 199 mg/dL    Triglycerides 114 0 - 149 mg/dL    HDL 44 (L) 65 - 121 mg/dL    LDL Calculated 78 <100 mg/dL   Hemoglobin A1C    Collection Time: 06/17/22  7:22 AM   Result Value Ref Range    Hemoglobin A1C 7.4 (H) 4.0 - 6.0 %   Comprehensive Metabolic Panel    Collection Time: 06/17/22  7:22 AM   Result Value Ref Range    Sodium 141 136 - 145 mmol/L    Potassium 4.3 3.5 - 5.0 mmol/L    Chloride 104 98 - 111 mmol/L    CO2 27 22 - 29 mmol/L    Anion Gap 10 7 - 19 mmol/L    Glucose 147 (H) 74 - 109 mg/dL    BUN 20 8 - 23 mg/dL    CREATININE 1.1 (H) 0.5 - 0.9 mg/dL    GFR Non-African American 50 (A) >60    GFR African American >59 >59    Calcium 9.7 8.8 - 10.2 mg/dL    Total Protein 7.7 6.6 - 8.7 g/dL    Albumin 4.0 3.5 - 5.2 g/dL    Total Bilirubin 0.4 0.2 - 1.2 mg/dL    Alkaline Phosphatase 81 35 - 104 U/L    ALT 18 5 - 33 U/L    AST 21 5 - 32 U/L   CBC Auto Differential    Collection Time: 06/17/22  7:22 AM   Result Value Ref Range    WBC 6.6 4.8 - 10.8 K/uL    RBC 4.68 4.20 - 5.40 M/uL    Hemoglobin 13.6 12.0 - 16.0 g/dL    Hematocrit 42.9 37.0 - 47.0 %    MCV 91.7 81.0 - 99.0 fL    MCH 29.1 27.0 - 31.0 pg    MCHC 31.7 (L) 33.0 - 37.0 g/dL    RDW 13.8 11.5 - 14.5 %    Platelets 027 077 - 006 K/uL    MPV 10.7 9.4 - 12.3 fL    Neutrophils % 64.6 50.0 - 65.0 %    Lymphocytes % 25.6 20.0 - 40.0 %    Monocytes % 6.5 0.0 - 10.0 %    Eosinophils % 2.0 0.0 - 5.0 %    Basophils % 0.8 0.0 - 1.0 %    Neutrophils Absolute 4.3 1.5 - 7.5 K/uL    Immature Granulocytes # 0.0 K/uL    Lymphocytes Absolute 1.7 1.1 - 4.5 K/uL    Monocytes Absolute 0.40 0.00 - 0.90 K/uL    Eosinophils Absolute 0.10 0.00 - 0.60 K/uL    Basophils Absolute 0.10 0.00 - 0.20 K/uL               Assessment & Plan: The following diagnoses and conditions are stable with no further orders unless indicated:  1. Type 2 diabetes mellitus with diabetic polyneuropathy, with long-term current use of insulin (HCC)  Current medications:  Levemir 40 units at night  Insulin aspart type:  8-12 with breakfast  14 units with lunch  16 units with dinner. She has continuous glucose monitoring at home. Reviewed her glucose readings. Encouraged ADA diet  Discussed the addition of GLP-1 inhibitors for assistance in glycemic control and weight loss. She will discuss it further with her nephrologist.      2. Primary hypertension  Current medications:  Atenolol 25 mg in the morning and 25 mg at night  Diltiazem  mg daily  Blood pressure controlled    3.  Hypercholesterolemia  Lab Results   Component Value Date    CHOL 145 (L) 06/17/2022    CHOL 144 (L) 12/06/2021    CHOL 158 (L) 09/15/2021     Lab Results   Component Value Date    TRIG 114 06/17/2022    TRIG 114 12/06/2021    TRIG 91 09/15/2021     Lab Results   Component Value Date    HDL 44 (L) 06/17/2022    HDL 50 (L) 12/06/2021    HDL 51 (L) 09/15/2021     Lab Results   Component Value Date    LDLCALC 78 06/17/2022    LDLCALC 71 12/06/2021    LDLCALC 89 09/15/2021     No results found for: LABVLDL, VLDL  No results found for: CHOLHDLRATIO  Current medication: Atorvastatin 10 mg daily  Increase atorvastatin 20 mg to get desired LDL below 70  4. Primary hypothyroidism  Lab Results   Component Value Date    TSH 0.809 06/17/2022    T4FREE 1.35 06/17/2022   Thyroid studies stable      5. Gastroesophageal reflux disease without esophagitis  Protonix 40 mg daily            No follow-ups on file. Discussed use, benefit, and side effects of prescribed medications. All patient questions answered. Pt voiced understanding. Reviewed health maintenance. Instructedto continue current medications, diet and exercise. Patient agreed with treatmentplan.  Follow up as directed.     _______________________________________________________________      Past Medical History:   Diagnosis Date    Allergic rhinitis     Ankle fracture     3 year ago; increasing difficulty walking; has bone fragments    Arthritis     sees dr. Monika Gomez as ambulation aid     right foot per dr. Israel Carmona    CPAP (continuous positive airway pressure) dependence     12cm    Diabetes ([facility]     Hiatal hernia 9/27/2017    History of kidney cancer     Hyperlipidemia     Hypertension     Hypothyroidism     Murmur     Obesity     ZOILA (obstructive sleep apnea)     AHI:  28.5 per PSG, 9/2014    PLMD (periodic limb movement disorder)     Renal mass     cat scan done 9/13/18; to see dr. Eliceo Li coming up    Thyroid disease     Type 2 diabetes mellitus without complication ([facility]     Type II or unspecified type diabetes mellitus without mention of complication, not stated as uncontrolled       Past Surgical History:   Procedure Laterality Date    BREAST BIOPSY Right 2012    CARPAL TUNNEL RELEASE      bilateral    CHOLECYSTECTOMY      HYSTERECTOMY      [place] LAP, RADICAL NEPHRECTOMY Left 10/3/2018    NEPHRECTOMY LAPAROSCOPIC HAND ASSISTED performed by Jerald Norton MD at [address] LAP,CHOLECYSTECTOMY/GRAPH N/A 10/31/2017    CHOLECYSTECTOMY LAPAROSCOPIC performed by Darlene Jones MD at [facility] VENTRAL HERNIA REPAIR N/A 9/22/2020    VENTRAL HERNIA REPAIR WITH MESH performed by Polly Burch MD at [facility] History   Problem Relation Age of Onset    Diabetes Mother     Cancer Mother         breast    Heart Disease Father     Cancer Father         colon, over 79    Cancer Sister 43        breast, negative genetic testing    Cancer Maternal Grandmother 76        breast    Diabetes Maternal Grandmother     Heart Attack Maternal Grandfather     Hypertension Other     Elevated Lipids Other     Coronary Art Dis Other     Heart Attack Paternal Grandmother        Social History     Tobacco Use    Smoking status: Never Smoker    Smokeless tobacco: Never Used   Substance Use Topics    Alcohol use: No     Current Outpatient Medications   Medication Sig Dispense Refill    atorvastatin (LIPITOR) 20 MG tablet Take 0.5 tablets by mouth daily 90 tablet 3    Insulin Aspart FlexPen 100 UNIT/ML SOPN INJECT 20 UNITS UNDER THE SKIN WITH BREAKFAST,LUNCH, DINNER 18 pen 3    LEVEMIR FLEXTOUCH 100 UNIT/ML injection pen ADMINISTER 54 UNITS UNDER THE SKIN EVERY NIGHT (Patient taking differently: Inject 40 Units into the skin nightly ) 18 mL 2    dilTIAZem (CARDIZEM CD) 180 MG extended release capsule TAKE 1 CAPSULE BY MOUTH DAILY 90 capsule 3    Continuous Blood Gluc Sensor (DEXCOM G6 SENSOR) MISC 1 each by Does not apply route every 30 days 1 each 11    Continuous Blood Gluc Transmit (DEXCOM G6 TRANSMITTER) MISC 1 Device by Does not apply route every 3 months 1 each 3    Continuous Blood Gluc  (DEXCOM G6 ) JAYA 1 Device by Does not apply route daily 1 each 3    hydroCHLOROthiazide (MICROZIDE) 12.5 MG capsule Take 1 capsule by mouth daily      levothyroxine (SYNTHROID) 137 MCG tablet TAKE 1 TABLET BY MOUTH DAILY 90 tablet 1    lisinopril (PRINIVIL;ZESTRIL) 40 MG tablet Take 1 tablet by mouth daily  5    pantoprazole (PROTONIX) 40 MG tablet TAKE 1 TABLET BY MOUTH DAILY 90 tablet 3 _______________________________________________________________    Note dictated using Dragon Dictation software  Sometimes this dictation software makes erroneous transcriptions.

## 2022-06-24 ENCOUNTER — OFFICE VISIT (OUTPATIENT)
Dept: FAMILY MEDICINE CLINIC | Age: 62
End: 2022-06-24
Payer: COMMERCIAL

## 2022-06-24 VITALS
BODY MASS INDEX: 60.06 KG/M2 | WEIGHT: 293 LBS | OXYGEN SATURATION: 97 % | DIASTOLIC BLOOD PRESSURE: 84 MMHG | HEART RATE: 63 BPM | TEMPERATURE: 97.5 F | SYSTOLIC BLOOD PRESSURE: 118 MMHG

## 2022-06-24 DIAGNOSIS — I10 PRIMARY HYPERTENSION: ICD-10-CM

## 2022-06-24 DIAGNOSIS — E78.00 HYPERCHOLESTEROLEMIA: ICD-10-CM

## 2022-06-24 DIAGNOSIS — E03.9 PRIMARY HYPOTHYROIDISM: ICD-10-CM

## 2022-06-24 DIAGNOSIS — Z79.4 TYPE 2 DIABETES MELLITUS WITH DIABETIC POLYNEUROPATHY, WITH LONG-TERM CURRENT USE OF INSULIN (HCC): Primary | ICD-10-CM

## 2022-06-24 DIAGNOSIS — K21.9 GASTROESOPHAGEAL REFLUX DISEASE WITHOUT ESOPHAGITIS: ICD-10-CM

## 2022-06-24 DIAGNOSIS — E11.42 TYPE 2 DIABETES MELLITUS WITH DIABETIC POLYNEUROPATHY, WITH LONG-TERM CURRENT USE OF INSULIN (HCC): Primary | ICD-10-CM

## 2022-06-24 PROCEDURE — 99214 OFFICE O/P EST MOD 30 MIN: CPT | Performed by: FAMILY MEDICINE

## 2022-06-24 PROCEDURE — 3051F HG A1C>EQUAL 7.0%<8.0%: CPT | Performed by: FAMILY MEDICINE

## 2022-06-24 RX ORDER — INSULIN ASPART 100 [IU]/ML
INJECTION, SOLUTION INTRAVENOUS; SUBCUTANEOUS
Qty: 18 PEN | Refills: 3
Start: 2022-06-24 | End: 2022-07-08 | Stop reason: SDUPTHER

## 2022-06-24 RX ORDER — ATORVASTATIN CALCIUM 20 MG/1
10 TABLET, FILM COATED ORAL DAILY
Qty: 90 TABLET | Refills: 3 | Status: SHIPPED | OUTPATIENT
Start: 2022-06-24 | End: 2022-06-27 | Stop reason: SDUPTHER

## 2022-06-24 NOTE — PATIENT INSTRUCTIONS
We are committed to providing you with the best care possible. In order to help us achieve these goals please remember to bring all medications, herbal products, and over the counter supplements with you to each visit. If your provider has ordered testing for you, please be sure to follow up with our office if you have not received results within 7 days after the testing took place. *If you receive a survey after visiting one of our offices, please take time to share your experience concerning your physician office visit. These surveys are confidential and no health information about you is shared. We are eager to improve for you and we are counting on your feedback to help make that happen.      _______________________________________________________________    Ask your nephrologist if ozempic is ok with your kidney function.

## 2022-06-27 DIAGNOSIS — E78.00 HYPERCHOLESTEROLEMIA: ICD-10-CM

## 2022-06-27 RX ORDER — ATORVASTATIN CALCIUM 20 MG/1
20 TABLET, FILM COATED ORAL DAILY
Qty: 90 TABLET | Refills: 3 | Status: SHIPPED | OUTPATIENT
Start: 2022-06-27 | End: 2022-09-26 | Stop reason: SDUPTHER

## 2022-06-27 NOTE — TELEPHONE ENCOUNTER
[facility]. called to request a refill on her medication.       Last office visit : 6/24/2022   Next office visit : 9/26/2022     Requested Prescriptions     Signed Prescriptions Disp Refills    atorvastatin (LIPITOR) 20 MG tablet 90 tablet 3     Sig: Take 1 tablet by mouth daily     Authorizing Provider: Keisha Vences     Ordering User: Jah Stout

## 2022-07-08 ENCOUNTER — PATIENT MESSAGE (OUTPATIENT)
Dept: FAMILY MEDICINE CLINIC | Age: 62
End: 2022-07-08

## 2022-07-08 DIAGNOSIS — E11.42 TYPE 2 DIABETES MELLITUS WITH DIABETIC POLYNEUROPATHY, WITH LONG-TERM CURRENT USE OF INSULIN (HCC): ICD-10-CM

## 2022-07-08 DIAGNOSIS — Z79.4 TYPE 2 DIABETES MELLITUS WITH DIABETIC POLYNEUROPATHY, WITH LONG-TERM CURRENT USE OF INSULIN (HCC): ICD-10-CM

## 2022-07-08 RX ORDER — INSULIN ASPART 100 [IU]/ML
INJECTION, SOLUTION INTRAVENOUS; SUBCUTANEOUS
Qty: 18 PEN | Refills: 3 | Status: SHIPPED | OUTPATIENT
Start: 2022-07-08 | End: 2022-10-13

## 2022-07-08 NOTE — TELEPHONE ENCOUNTER
From: [place]  To: Dr. Aguilar Nanci: 7/8/2022 11:05 AM CDT  Subject: Novolog insulin prescription    When I saw Dr. Nga Clayton a few weeks ago he told me he was changing my Novolog prescription so that I could get more each month as we work on fine-tuning my dose. He told me to just let your office know when I needed to get it filled. Can you go ahead and send that new prescription to [facility] in [place] so that I can pick it up this weekend? Thank you!

## 2022-08-17 DIAGNOSIS — E11.42 TYPE 2 DIABETES MELLITUS WITH DIABETIC POLYNEUROPATHY, WITH LONG-TERM CURRENT USE OF INSULIN (HCC): ICD-10-CM

## 2022-08-17 DIAGNOSIS — Z79.4 TYPE 2 DIABETES MELLITUS WITH DIABETIC POLYNEUROPATHY, WITH LONG-TERM CURRENT USE OF INSULIN (HCC): ICD-10-CM

## 2022-08-18 RX ORDER — INSULIN DETEMIR 100 [IU]/ML
40 INJECTION, SOLUTION SUBCUTANEOUS NIGHTLY
Qty: 13 ML | Refills: 3 | Status: SHIPPED | OUTPATIENT
Start: 2022-08-18

## 2022-08-28 DIAGNOSIS — E03.9 PRIMARY HYPOTHYROIDISM: ICD-10-CM

## 2022-08-29 RX ORDER — LEVOTHYROXINE SODIUM 137 UG/1
137 TABLET ORAL DAILY
Qty: 90 TABLET | Refills: 1 | Status: SHIPPED | OUTPATIENT
Start: 2022-08-29

## 2022-09-15 DIAGNOSIS — E11.42 TYPE 2 DIABETES MELLITUS WITH DIABETIC POLYNEUROPATHY, WITH LONG-TERM CURRENT USE OF INSULIN (HCC): ICD-10-CM

## 2022-09-15 DIAGNOSIS — Z79.4 TYPE 2 DIABETES MELLITUS WITH DIABETIC POLYNEUROPATHY, WITH LONG-TERM CURRENT USE OF INSULIN (HCC): ICD-10-CM

## 2022-09-15 DIAGNOSIS — E78.00 HYPERCHOLESTEROLEMIA: ICD-10-CM

## 2022-09-15 DIAGNOSIS — I10 PRIMARY HYPERTENSION: ICD-10-CM

## 2022-09-15 LAB
ALBUMIN SERPL-MCNC: 4.2 G/DL (ref 3.5–5.2)
ALP BLD-CCNC: 86 U/L (ref 35–104)
ALT SERPL-CCNC: 16 U/L (ref 5–33)
ANION GAP SERPL CALCULATED.3IONS-SCNC: 11 MMOL/L (ref 7–19)
AST SERPL-CCNC: 18 U/L (ref 5–32)
BASOPHILS ABSOLUTE: 0 K/UL (ref 0–0.2)
BASOPHILS RELATIVE PERCENT: 0.6 % (ref 0–1)
BILIRUB SERPL-MCNC: 0.5 MG/DL (ref 0.2–1.2)
BUN BLDV-MCNC: 23 MG/DL (ref 8–23)
CALCIUM SERPL-MCNC: 9.8 MG/DL (ref 8.8–10.2)
CHLORIDE BLD-SCNC: 103 MMOL/L (ref 98–111)
CHOLESTEROL, TOTAL: 144 MG/DL (ref 160–199)
CO2: 27 MMOL/L (ref 22–29)
CREAT SERPL-MCNC: 1.3 MG/DL (ref 0.5–0.9)
EOSINOPHILS ABSOLUTE: 0.2 K/UL (ref 0–0.6)
EOSINOPHILS RELATIVE PERCENT: 2.1 % (ref 0–5)
GFR AFRICAN AMERICAN: 50
GFR NON-AFRICAN AMERICAN: 42
GLUCOSE BLD-MCNC: 143 MG/DL (ref 74–109)
HBA1C MFR BLD: 7.3 % (ref 4–6)
HCT VFR BLD CALC: 42.7 % (ref 37–47)
HDLC SERPL-MCNC: 51 MG/DL (ref 65–121)
HEMOGLOBIN: 13.4 G/DL (ref 12–16)
IMMATURE GRANULOCYTES #: 0 K/UL
LDL CHOLESTEROL CALCULATED: 73 MG/DL
LYMPHOCYTES ABSOLUTE: 1.6 K/UL (ref 1.1–4.5)
LYMPHOCYTES RELATIVE PERCENT: 21.8 % (ref 20–40)
MCH RBC QN AUTO: 29.1 PG (ref 27–31)
MCHC RBC AUTO-ENTMCNC: 31.4 G/DL (ref 33–37)
MCV RBC AUTO: 92.8 FL (ref 81–99)
MONOCYTES ABSOLUTE: 0.4 K/UL (ref 0–0.9)
MONOCYTES RELATIVE PERCENT: 6.1 % (ref 0–10)
NEUTROPHILS ABSOLUTE: 5 K/UL (ref 1.5–7.5)
NEUTROPHILS RELATIVE PERCENT: 69.1 % (ref 50–65)
PDW BLD-RTO: 14.4 % (ref 11.5–14.5)
PLATELET # BLD: 210 K/UL (ref 130–400)
PMV BLD AUTO: 11.5 FL (ref 9.4–12.3)
POTASSIUM SERPL-SCNC: 4.2 MMOL/L (ref 3.5–5)
RBC # BLD: 4.6 M/UL (ref 4.2–5.4)
SODIUM BLD-SCNC: 141 MMOL/L (ref 136–145)
TOTAL PROTEIN: 7.4 G/DL (ref 6.6–8.7)
TRIGL SERPL-MCNC: 100 MG/DL (ref 0–149)
WBC # BLD: 7.2 K/UL (ref 4.8–10.8)

## 2022-09-22 ENCOUNTER — TELEPHONE (OUTPATIENT)
Dept: NEUROLOGY | Age: 62
End: 2022-09-22

## 2022-09-22 NOTE — TELEPHONE ENCOUNTER
Called and left patient a VM to let her know that her appointment for 11-25-22 with IGOR Simpson was being rescheduled due to office being closed. Left on VM of when I have her appointment rescheduled too.

## 2022-09-26 ENCOUNTER — OFFICE VISIT (OUTPATIENT)
Dept: FAMILY MEDICINE CLINIC | Age: 62
End: 2022-09-26
Payer: COMMERCIAL

## 2022-09-26 VITALS
HEART RATE: 59 BPM | TEMPERATURE: 97.1 F | SYSTOLIC BLOOD PRESSURE: 126 MMHG | BODY MASS INDEX: 61.73 KG/M2 | DIASTOLIC BLOOD PRESSURE: 88 MMHG | OXYGEN SATURATION: 98 % | WEIGHT: 293 LBS

## 2022-09-26 DIAGNOSIS — K21.9 GASTROESOPHAGEAL REFLUX DISEASE WITHOUT ESOPHAGITIS: ICD-10-CM

## 2022-09-26 DIAGNOSIS — E11.42 TYPE 2 DIABETES MELLITUS WITH DIABETIC POLYNEUROPATHY, WITH LONG-TERM CURRENT USE OF INSULIN (HCC): Primary | ICD-10-CM

## 2022-09-26 DIAGNOSIS — E78.00 HYPERCHOLESTEROLEMIA: ICD-10-CM

## 2022-09-26 DIAGNOSIS — I10 PRIMARY HYPERTENSION: ICD-10-CM

## 2022-09-26 DIAGNOSIS — Z79.4 TYPE 2 DIABETES MELLITUS WITH DIABETIC POLYNEUROPATHY, WITH LONG-TERM CURRENT USE OF INSULIN (HCC): Primary | ICD-10-CM

## 2022-09-26 DIAGNOSIS — E03.9 PRIMARY HYPOTHYROIDISM: ICD-10-CM

## 2022-09-26 PROCEDURE — 99214 OFFICE O/P EST MOD 30 MIN: CPT | Performed by: FAMILY MEDICINE

## 2022-09-26 PROCEDURE — 3051F HG A1C>EQUAL 7.0%<8.0%: CPT | Performed by: FAMILY MEDICINE

## 2022-09-26 RX ORDER — ATENOLOL 50 MG/1
TABLET ORAL
Qty: 90 TABLET | Refills: 3 | Status: SHIPPED | OUTPATIENT
Start: 2022-09-26

## 2022-09-26 RX ORDER — ATORVASTATIN CALCIUM 40 MG/1
40 TABLET, FILM COATED ORAL DAILY
Qty: 90 TABLET | Refills: 3 | Status: SHIPPED | OUTPATIENT
Start: 2022-09-26

## 2022-09-26 NOTE — PROGRESS NOTES
[facility] PHYSICIAN SERVICES  [facility] FAMILY MEDICINE  [address] [address]  [facility] [address] [address] [address]  Dept: [phone]  Dept Fax: [phone]  Loc: [phone]    Lilliam Stone is a 65 y.o. female who presents today for her medical conditions/complaints as noted below. Lilliam Stone is here for 3 Month Follow-Up        HPI:   CC: Here today to discuss the following:    Diabetes Mellitus  Has been compliant with medications. No side effects of medications since last visit. No polyuria, polydipsia, or vision changes since last visit. No symptomatic episodes of hypoglycemia. Hyperlipidemia  Tolerating cholesterol medication without adverse effects. Hypertension  Compliant with medications. No adverse effects from medication. No lightheadedness, palpitations, or chest pain. Hypothyroidism  Symptoms are stable. No temperature intolerance, fatigue, or mood disturbance from baseline. Complaint with current medication. Gastroesophageal Reflux Disease  Symptoms currently under control. Medication adequately controls symptoms. No hematochezia or melena. HPI    Subjective:      Review of Systems  Review of Systems   Constitutional: Negative for chills and fever. HENT: Negative for congestion. Respiratory: Negative for cough, chest tightness and shortness of breath. Cardiovascular: Negative for chest pain, palpitations and leg swelling. Gastrointestinal: Negative for abdominal pain, anal bleeding, constipation, diarrhea and nausea. Genitourinary: Negative for difficulty urinating. Psychiatric/Behavioral: Negative. SeeHPI for visit specific review of symptoms. All others negative      Objective:   BP (!) 126/92   Pulse 59   Temp 97.1 °F (36.2 °C)   Wt (!) 406 lb (184.2 kg)   LMP 01/01/2002   SpO2 98%   BMI 61.73 kg/m²   Physical Exam  Physical Exam   Constitutional: She appears well-developed. Does not appear ill. Neck: Neck supple. No masses. Neck Symmetric. Normal tracheal position. No thyroid enlargement  Cardiovascular: Normal rate and regular rhythm. Exam reveals no friction rub. No murmur heard. Respiratory:  Effort normal and breath sounds normal. No respiratory distress. No wheezes. No rales. No use of accessory muscles or intercostal retractions. Neurological: alert. Psychiatric: normal mood and affect.  Her behavior is normal.       Recent Results (from the past 672 hour(s))   Hemoglobin A1C    Collection Time: 09/15/22  9:25 AM   Result Value Ref Range    Hemoglobin A1C 7.3 (H) 4.0 - 6.0 %   CBC with Auto Differential    Collection Time: 09/15/22  9:25 AM   Result Value Ref Range    WBC 7.2 4.8 - 10.8 K/uL    RBC 4.60 4.20 - 5.40 M/uL    Hemoglobin 13.4 12.0 - 16.0 g/dL    Hematocrit 42.7 37.0 - 47.0 %    MCV 92.8 81.0 - 99.0 fL    MCH 29.1 27.0 - 31.0 pg    MCHC 31.4 (L) 33.0 - 37.0 g/dL    RDW 14.4 11.5 - 14.5 %    Platelets 401 468 - 105 K/uL    MPV 11.5 9.4 - 12.3 fL    Neutrophils % 69.1 (H) 50.0 - 65.0 %    Lymphocytes % 21.8 20.0 - 40.0 %    Monocytes % 6.1 0.0 - 10.0 %    Eosinophils % 2.1 0.0 - 5.0 %    Basophils % 0.6 0.0 - 1.0 %    Neutrophils Absolute 5.0 1.5 - 7.5 K/uL    Immature Granulocytes # 0.0 K/uL    Lymphocytes Absolute 1.6 1.1 - 4.5 K/uL    Monocytes Absolute 0.40 0.00 - 0.90 K/uL    Eosinophils Absolute 0.20 0.00 - 0.60 K/uL    Basophils Absolute 0.00 0.00 - 0.20 K/uL   Lipid Panel    Collection Time: 09/15/22  9:25 AM   Result Value Ref Range    Cholesterol, Total 144 (L) 160 - 199 mg/dL    Triglycerides 100 0 - 149 mg/dL    HDL 51 (L) 65 - 121 mg/dL    LDL Calculated 73 <100 mg/dL   Comprehensive Metabolic Panel    Collection Time: 09/15/22  9:25 AM   Result Value Ref Range    Sodium 141 136 - 145 mmol/L    Potassium 4.2 3.5 - 5.0 mmol/L    Chloride 103 98 - 111 mmol/L    CO2 27 22 - 29 mmol/L    Anion Gap 11 7 - 19 mmol/L    Glucose 143 (H) 74 - 109 mg/dL    BUN 23 8 - 23 mg/dL    Creatinine 1.3 (H) 0.5 - 0.9 mg/dL    GFR Non- 42 (A) >60    GFR  50 (L) >59    Calcium 9.8 8.8 - 10.2 mg/dL    Total Protein 7.4 6.6 - 8.7 g/dL    Albumin 4.2 3.5 - 5.2 g/dL    Total Bilirubin 0.5 0.2 - 1.2 mg/dL    Alkaline Phosphatase 86 35 - 104 U/L    ALT 16 5 - 33 U/L    AST 18 5 - 32 U/L               Assessment & Plan: The following diagnoses and conditions are stable with no further orders unless indicated:  1. Type 2 diabetes mellitus with diabetic polyneuropathy, with long-term current use of insulin ([facility])  Lab Results   Component Value Date    LABA1C 7.3 (H) 09/15/2022    LABA1C 7.4 (H) 06/17/2022    LABA1C 7.1 (H) 03/08/2022     Lab Results   Component Value Date    LABMICR 5.20 12/06/2021    LDLCALC 73 09/15/2022    CREATININE 1.3 (H) 09/15/2022   Levemir:40 units  Insulin aspart same. Breakfast: 8-12 units  Lunch: 14 units  Dinner: 16 units  Suboptimal  Ozempic:  - 0.25 mg q. weekly for 4 weeks  -Then 0.5 mg q. weekly  Discussed risks and benefits of this new medication. Discussed potential side effects. She voiced understanding. 2. Primary hypertension  Current medication:  Atenolol 25 mg twice daily  Diltiazem CD1 180 mg daily  Blood pressure stable continue with current medication    3. Hypercholesterolemia  Lab Results   Component Value Date    CHOL 144 (L) 09/15/2022    CHOL 145 (L) 06/17/2022    CHOL 144 (L) 12/06/2021     Lab Results   Component Value Date    TRIG 100 09/15/2022    TRIG 114 06/17/2022    TRIG 114 12/06/2021     Lab Results   Component Value Date    HDL 51 (L) 09/15/2022    HDL 44 (L) 06/17/2022    HDL 50 (L) 12/06/2021     Lab Results   Component Value Date    LDLCALC 73 09/15/2022    LDLCALC 78 06/17/2022    [address] 12/06/2021     No results found for: LABVLDL, VLDL  No results found for: CHOLHDLRATIO  Last visit her atorvastatin was increased from 10 mg to 20 mg. Increase atorvastatin to 40 mg daily    4.  Primary hypothyroidism  Lab Results   Component Value Date    TSH 0.809 06/17/2022    T4FREE 1.35 06/17/2022     Current medication: Levothyroxine 137 mcg daily  Stable  5. Gastroesophageal reflux disease without esophagitis  Protonix 40 mg daily  Chronic issue stable      No follow-ups on file. Discussed use, benefit, and side effects of prescribed medications. All patient questions answered. Pt voiced understanding. Reviewed health maintenance. Instructedto continue current medications, diet and exercise. Patient agreed with treatmentplan.  Follow up as directed.     _______________________________________________________________      Past Medical History:   Diagnosis Date    Allergic rhinitis     Ankle fracture     3 year ago; increasing difficulty walking; has bone fragments    Arthritis     sees dr. Flor Son as ambulation aid     right foot per dr. Chay Linares    CPAP (continuous positive airway pressure) dependence     12cm    Diabetes ([facility] [address]     Hiatal hernia 9/27/2017    History of kidney cancer     Hyperlipidemia     Hypertension     Hypothyroidism     Murmur     Obesity     ZOILA (obstructive sleep apnea)     AHI:  28.5 per PSG, 9/2014    PLMD (periodic limb movement disorder)     Renal mass     cat scan done 9/13/18; to see dr. Dulce Grimm coming up    Thyroid disease     Type 2 diabetes mellitus without complication ([facility] [address]     Type II or unspecified type diabetes mellitus without mention of complication, not stated as uncontrolled       Past Surgical History:   Procedure Laterality Date    BREAST BIOPSY Right 2012    CARPAL TUNNEL RELEASE      bilateral    CHOLECYSTECTOMY      HYSTERECTOMY      [place] LAP, RADICAL NEPHRECTOMY Left 10/3/2018    NEPHRECTOMY LAPAROSCOPIC HAND ASSISTED performed by Atiya Hernandez MD at [address] [place] [address] LAP,CHOLECYSTECTOMY/GRAPH N/A 10/31/2017    CHOLECYSTECTOMY LAPAROSCOPIC performed by Jorge Young MD at [facility] N/A 9/22/2020    VENTRAL HERNIA REPAIR WITH MESH performed by Jorge Young MD at [facility] OR       Family History   Problem Relation Age of Onset    Diabetes Mother     Cancer Mother         breast    Heart Disease Father     Cancer Father         colon, over 79    Cancer Sister 43        breast, negative genetic testing    Cancer Maternal Grandmother 76        breast    Diabetes Maternal Grandmother     Heart Attack Maternal Grandfather     Hypertension Other     Elevated Lipids Other     Coronary Art Dis Other     Heart Attack Paternal Grandmother        Social History     Tobacco Use    Smoking status: Never    Smokeless tobacco: Never   Substance Use Topics    Alcohol use: No     Current Outpatient Medications   Medication Sig Dispense Refill    atenolol (TENORMIN) 50 MG tablet TAKE ONE-HALF TABLET BY MOUTH EVERY MORNING AND ONE-HALF TABLET EVERY EVENING.  90 tablet 3    atorvastatin (LIPITOR) 40 MG tablet Take 1 tablet by mouth daily 90 tablet 3    Semaglutide,0.25 or 0.5MG/DOS, 2 MG/1.5ML SOPN Inject 0.5 mg into the skin every 7 days 1 Adjustable Dose Pre-filled Pen Syringe 5    levothyroxine (SYNTHROID) 137 MCG tablet TAKE 1 TABLET BY MOUTH DAILY 90 tablet 1    insulin detemir (LEVEMIR FLEXTOUCH) 100 UNIT/ML injection pen Inject 40 Units into the skin nightly 13 mL 3    Insulin Aspart FlexPen 100 UNIT/ML SOPN INJECT 20 UNITS UNDER THE SKIN WITH BREAKFAST,LUNCH, DINNER (Patient taking differently: INJECT 12 UNITS UNDER THE SKIN WITH BREAKFAST,14 at LUNCH, 16 at [facility]) 18 pen 3    dilTIAZem (CARDIZEM CD) 180 MG extended release capsule TAKE 1 CAPSULE BY MOUTH DAILY 90 capsule 3    Continuous Blood Gluc Sensor (DEXCOM G6 SENSOR) MISC 1 each by Does not apply route every 30 days 1 each 11    Continuous Blood Gluc Transmit (DEXCOM G6 TRANSMITTER) MISC 1 Device by Does not apply route every 3 months 1 each 3    Continuous Blood Gluc  (DEXCOM G6 ) JAYA 1 Device by Does not apply route daily 1 each 3    hydroCHLOROthiazide (MICROZIDE) 12.5 MG capsule Take 1 capsule by mouth daily Out       _______________________________________________________________    Note dictated using Dragon Dictation software  Sometimes this dictation software makes erroneous transcriptions.

## 2022-10-11 DIAGNOSIS — E11.42 TYPE 2 DIABETES MELLITUS WITH DIABETIC POLYNEUROPATHY, WITH LONG-TERM CURRENT USE OF INSULIN (HCC): ICD-10-CM

## 2022-10-11 DIAGNOSIS — Z79.4 TYPE 2 DIABETES MELLITUS WITH DIABETIC POLYNEUROPATHY, WITH LONG-TERM CURRENT USE OF INSULIN (HCC): ICD-10-CM

## 2022-10-13 RX ORDER — INSULIN ASPART 100 [IU]/ML
INJECTION, SOLUTION INTRAVENOUS; SUBCUTANEOUS
Qty: 18 ADJUSTABLE DOSE PRE-FILLED PEN SYRINGE | Refills: 5 | Status: SHIPPED | OUTPATIENT
Start: 2022-10-13

## 2022-10-28 RX ORDER — SEMAGLUTIDE 1.34 MG/ML
1 INJECTION, SOLUTION SUBCUTANEOUS WEEKLY
Qty: 3 ML | Refills: 5 | Status: SHIPPED | OUTPATIENT
Start: 2022-10-28

## 2022-11-01 ENCOUNTER — OFFICE VISIT (OUTPATIENT)
Dept: FAMILY MEDICINE CLINIC | Age: 62
End: 2022-11-01
Payer: COMMERCIAL

## 2022-11-01 VITALS
WEIGHT: 293 LBS | SYSTOLIC BLOOD PRESSURE: 120 MMHG | OXYGEN SATURATION: 98 % | TEMPERATURE: 97.9 F | DIASTOLIC BLOOD PRESSURE: 86 MMHG | BODY MASS INDEX: 61.12 KG/M2 | HEART RATE: 64 BPM

## 2022-11-01 DIAGNOSIS — Z79.4 TYPE 2 DIABETES MELLITUS WITH DIABETIC POLYNEUROPATHY, WITH LONG-TERM CURRENT USE OF INSULIN (HCC): Primary | ICD-10-CM

## 2022-11-01 DIAGNOSIS — E11.42 TYPE 2 DIABETES MELLITUS WITH DIABETIC POLYNEUROPATHY, WITH LONG-TERM CURRENT USE OF INSULIN (HCC): Primary | ICD-10-CM

## 2022-11-01 PROCEDURE — 3051F HG A1C>EQUAL 7.0%<8.0%: CPT | Performed by: FAMILY MEDICINE

## 2022-11-01 PROCEDURE — 3074F SYST BP LT 130 MM HG: CPT | Performed by: FAMILY MEDICINE

## 2022-11-01 PROCEDURE — 99213 OFFICE O/P EST LOW 20 MIN: CPT | Performed by: FAMILY MEDICINE

## 2022-11-01 PROCEDURE — 3078F DIAST BP <80 MM HG: CPT | Performed by: FAMILY MEDICINE

## 2022-11-01 NOTE — PATIENT INSTRUCTIONS
We are committed to providing you with the best care possible. In order to help us achieve these goals please remember to bring all medications, herbal products, and over the counter supplements with you to each visit. If your provider has ordered testing for you, please be sure to follow up with our office if you have not received results within 7 days after the testing took place. *If you receive a survey after visiting one of our offices, please take time to share your experience concerning your physician office visit. These surveys are confidential and no health information about you is shared. We are eager to improve for you and we are counting on your feedback to help make that happen.       _______________________________________________________________      Plan  Increase ozempic to 0.5 mg once a week. Reduce Levemir to 20 units at night  Reduce: Aspartate: Take 12 units with breakfast  Take 14 units with lunch   Take 14 units with dinner.

## 2022-11-01 NOTE — PROGRESS NOTES
[facility] PHYSICIAN SERVICES  [facility] FAMILY MEDICINE  [address] [facility] [address] [address]  Dept: [phone]  Dept Fax: [phone]  Loc: [phone]    Keira Amaro is a 65 y.o. female who presents today for her medical conditions/complaints as noted below. Keira Amaro is here for Discuss Medications        HPI:   CC: Here today to discuss the following:    She was started on Ozempic 0.25 mg q. weekly last visit. She was advised to increase to 0.5 mg after that. There was some confusion about increasing the dose moving forward. Also, she is experiencing increased hypoglycemia since initiating the Ozempic  Appointment was scheduled today to discuss these. HPI    Subjective:      Review of Systems  Review of Systems   Constitutional: Negative for chills and fever. HENT: Negative for congestion. Respiratory: Negative for cough, chest tightness and shortness of breath. Cardiovascular: Negative for chest pain, palpitations and leg swelling. Gastrointestinal: Negative for abdominal pain, anal bleeding, constipation, diarrhea and nausea. Genitourinary: Negative for difficulty urinating. Psychiatric/Behavioral: Negative. SeeHPI for visit specific review of symptoms. All others negative      Objective:   /86   Pulse 64   Temp 97.9 °F (36.6 °C)   Wt (!) 402 lb (182.3 kg)   LMP 01/01/2002   SpO2 98%   BMI 61.12 kg/m²   Physical Exam  Physical Exam   Constitutional: She appears well-developed. Does not appear ill. Neck: Neck supple. No masses. Neck Symmetric. Normal tracheal position. No thyroid enlargement  Cardiovascular: Normal rate and regular rhythm. Exam reveals no friction rub. No murmur heard. Respiratory:  Effort normal and breath sounds normal. No respiratory distress. No wheezes. No rales. No use of accessory muscles or intercostal retractions. Neurological: alert. Psychiatric: normal mood and affect.  Her behavior is normal. No results found for this or any previous visit (from the past 672 hour(s)). Assessment & Plan: The following diagnoses and conditions are stable with no further orders unless indicated:  1. Type 2 diabetes mellitus with diabetic polyneuropathy, with long-term current use of insulin ([address]  - Discussed there are 2 goals with initiating Ozempic. Glycemic control and weight loss.  -As her Ozempic dose continues to be increased it is possible she will experience more hypoglycemia requiring a reduction in her basal insulin.        _______________________________________________________________      Plan  Increase ozempic to 0.5 mg once a week. Reduce Levemir to 20 units at night  Reduce: Aspartate: Take 12 units with breakfast  Take 14 units with lunch   Take 14 units with dinner. If she continues to experience hypoglycemia, reduce basal insulin-Levemir-10% per evening until hypoglycemia resolves. Submit glucose readings to adjust mealtime insulin doses as well. Her hypoglycemic episodes primarily occur at night while she is asleep    No orders of the defined types were placed in this encounter. Return if symptoms worsen or fail to improve. Discussed use, benefit, and side effects of prescribed medications. All patient questions answered. Pt voiced understanding. Reviewed health maintenance. Instructedto continue current medications, diet and exercise. Patient agreed with treatmentplan.  Follow up as directed.     _______________________________________________________________      Past Medical History:   Diagnosis Date    Allergic rhinitis     Ankle fracture     3 year ago; increasing difficulty walking; has bone fragments    Arthritis     sees dr. Nya Cummings as ambulation aid     right foot per dr. Pippa Chand    CPAP (continuous positive airway pressure) dependence     12cm    Diabetes ([address]     Hiatal hernia 9/27/2017    History of kidney cancer Hyperlipidemia     Hypertension     Hypothyroidism     Murmur     Obesity     ZOILA (obstructive sleep apnea)     AHI:  28.5 per PSG, 9/2014    PLMD (periodic limb movement disorder)     Renal mass     cat scan done 9/13/18; to see dr. Anjum Shelby coming up    Thyroid disease     Type 2 diabetes mellitus without complication ([facility]     Type II or unspecified type diabetes mellitus without mention of complication, not stated as uncontrolled       Past Surgical History:   Procedure Laterality Date    BREAST BIOPSY Right 2012    CARPAL TUNNEL RELEASE      bilateral    CHOLECYSTECTOMY      HYSTERECTOMY      [place] LAP, RADICAL NEPHRECTOMY Left 10/3/2018    NEPHRECTOMY LAPAROSCOPIC HAND ASSISTED performed by Jocelyn Denis MD at [facility] LAP,CHOLECYSTECTOMY/GRAPH N/A 10/31/2017    CHOLECYSTECTOMY LAPAROSCOPIC performed by Dom Ruvalcaba MD at [place] N/A 9/22/2020    VENTRAL HERNIA REPAIR WITH MESH performed by Dom Ruvalcaba MD at [facility] History   Problem Relation Age of Onset    Diabetes Mother     Cancer Mother         breast    Heart Disease Father     Cancer Father         colon, over 79    Cancer Sister 43        breast, negative genetic testing    Cancer Maternal Grandmother 75        breast    Diabetes Maternal Grandmother     Heart Attack Maternal Grandfather     Hypertension Other     Elevated Lipids Other     Coronary Art Dis Other     Heart Attack Paternal Grandmother        Social History     Tobacco Use    Smoking status: Never    Smokeless tobacco: Never   Substance Use Topics    Alcohol use: No     Current Outpatient Medications   Medication Sig Dispense Refill    Semaglutide, 1 MG/DOSE, (OZEMPIC, 1 MG/DOSE,) 4 MG/3ML SOPN Inject 1 mg into the skin once a week (Patient taking differently: Inject 1 mg into the skin once a week . 25mg weekly) 3 mL 5    Insulin Aspart FlexPen 100 UNIT/ML SOPN INJECT 12 UNITS UNDER THE SKIN WITH BREAKFAST,14 at [facility]CH, 16 at [facility] Adjustable Dose Pre-filled Pen Syringe 5    atenolol (TENORMIN) 50 MG tablet TAKE ONE-HALF TABLET BY MOUTH EVERY MORNING AND ONE-HALF TABLET EVERY EVENING. 90 tablet 3    atorvastatin (LIPITOR) 40 MG tablet Take 1 tablet by mouth daily 90 tablet 3    levothyroxine (SYNTHROID) 137 MCG tablet TAKE 1 TABLET BY MOUTH DAILY 90 tablet 1    insulin detemir (LEVEMIR FLEXTOUCH) 100 UNIT/ML injection pen Inject 40 Units into the skin nightly (Patient taking differently: Inject 36 Units into the skin nightly) 13 mL 3    dilTIAZem (CARDIZEM CD) 180 MG extended release capsule TAKE 1 CAPSULE BY MOUTH DAILY 90 capsule 3    Continuous Blood Gluc Sensor (DEXCOM G6 SENSOR) MISC 1 each by Does not apply route every 30 days 1 each 11    Continuous Blood Gluc Transmit (DEXCOM G6 TRANSMITTER) MISC 1 Device by Does not apply route every 3 months 1 each 3    Continuous Blood Gluc  (DEXCOM G6 ) JAYA 1 Device by Does not apply route daily 1 each 3    hydroCHLOROthiazide (MICROZIDE) 12.5 MG capsule Take 1 capsule by mouth daily      lisinopril (PRINIVIL;ZESTRIL) 40 MG tablet Take 1 tablet by mouth daily  5    pantoprazole (PROTONIX) 40 MG tablet TAKE 1 TABLET BY MOUTH DAILY 90 tablet 3    Misc. Devices MISC 1 pair of Diabetic Shoes w/ 3 sets of heat moldable inserts ICD 10: E11.9 1 each 0    B-D UF III MINI PEN NEEDLES 31G X 5 MM MISC USE FOUR TIMES DAILY AS DIRECTED 400 each 3    Cholecalciferol (VITAMIN D3) 1.25 MG (89263 UT) CAPS Take 1 capsule by mouth once a week      clotrimazole-betamethasone (LOTRISONE) 1-0.05 % cream Apply topically 2 times daily.  15 g 0    CONTOUR NEXT TEST strip TEST FIVE TIMES DAILY 500 strip 5    Multiple Vitamins-Minerals (MULTIPLE VITAMINS/WOMENS PO) Take 1 tablet by mouth daily       fexofenadine (ALLEGRA) 180 MG tablet Take 180 mg by mouth daily      Omega-3 Fatty Acids (FISH OIL) 1000 MG CAPS Take 1,000 mg by mouth 2 times daily       aspirin 81 MG tablet Take 81 mg by mouth daily 3 days a wk      glucagon 1 MG injection Inject 1 mg into the muscle once for 1 dose 1 kit 0     No current facility-administered medications for this visit. Allergies   Allergen Reactions    Adhesive Tape Other (See Comments)     Post op incision infection    Dermabond Other (See Comments)     Post op incision infection       Health Maintenance   Topic Date Due    Diabetic foot exam  Never done    HIV screen  Never done    Hepatitis C screen  Never done    COVID-19 Vaccine (4 - Booster for Pfizer series) 12/28/2021    Flu vaccine (1) 08/01/2022    Diabetic retinal exam  10/20/2022    Diabetic microalbuminuria test  12/06/2022    Depression Screen  03/22/2023    A1C test (Diabetic or Prediabetic)  09/15/2023    Lipids  09/15/2023    Breast cancer screen  02/21/2024    Colorectal Cancer Screen  06/28/2026    DTaP/Tdap/Td vaccine (2 - Td or Tdap) 06/07/2031    Shingles vaccine  Completed    Pneumococcal 0-64 years Vaccine  Completed    Hepatitis A vaccine  Aged Out    Hib vaccine  Aged Out    Meningococcal (ACWY) vaccine  Aged Out       _______________________________________________________________    Note dictated using Dragon Dictation software  Sometimes this dictation software makes erroneous transcriptions.

## 2022-11-30 ENCOUNTER — OFFICE VISIT (OUTPATIENT)
Dept: NEUROLOGY | Age: 62
End: 2022-11-30
Payer: COMMERCIAL

## 2022-11-30 VITALS
DIASTOLIC BLOOD PRESSURE: 76 MMHG | BODY MASS INDEX: 44.41 KG/M2 | OXYGEN SATURATION: 98 % | HEIGHT: 68 IN | WEIGHT: 293 LBS | HEART RATE: 63 BPM | SYSTOLIC BLOOD PRESSURE: 122 MMHG

## 2022-11-30 DIAGNOSIS — K44.9 HIATAL HERNIA: ICD-10-CM

## 2022-11-30 DIAGNOSIS — G47.33 OSA (OBSTRUCTIVE SLEEP APNEA): Primary | ICD-10-CM

## 2022-11-30 DIAGNOSIS — G47.61 PLMD (PERIODIC LIMB MOVEMENT DISORDER): ICD-10-CM

## 2022-11-30 DIAGNOSIS — Z99.89 CPAP (CONTINUOUS POSITIVE AIRWAY PRESSURE) DEPENDENCE: ICD-10-CM

## 2022-11-30 PROCEDURE — 3074F SYST BP LT 130 MM HG: CPT | Performed by: PHYSICIAN ASSISTANT

## 2022-11-30 PROCEDURE — 99214 OFFICE O/P EST MOD 30 MIN: CPT | Performed by: PHYSICIAN ASSISTANT

## 2022-11-30 PROCEDURE — 3078F DIAST BP <80 MM HG: CPT | Performed by: PHYSICIAN ASSISTANT

## 2022-11-30 NOTE — PATIENT INSTRUCTIONS

## 2022-11-30 NOTE — LETTER
[facility]  [address] [place] [address] Drive, [address] [place] [address]  Phone [phone]  Fax [phone]             Re:  [place]    22  :  1960  Address: [address] [place] [address]       Replinishible PAP Supplies, 1 year supply  Item HPCPS Code Frequency   Mask of choice  or  1 per 3 months   Nasal Mask cushion/pillows  or  2 per 30 days   Full Face Mask Interface  1 per 30 days   Headgear  1 per 6 months   Tubing, length of choice  or  1 per 3 months   Water Chamber  1 per 6 months   Chinstrap  1 per 6 months   Disposable Filters  2 per 30 days   Reusable Filters  1 per 6 months     Diagnoses:  Obstructive sleep apnea (G47.33)  Length of Need: Lifetime, 99    Ordering Provider: Alison Mayes PA-C  NPI:  [id number]        Signature: @JAY@        Date: 2022      Electronically Signed by Alison Mayes PA-C  on 2022 at 9:48 AM

## 2022-11-30 NOTE — PROGRESS NOTES
[facility]  [address] [place] [address] Drive, [address]  [facility]  Phone [phone]  Fax [phone]       [facility] Sleep Follow Up Encounter      Information:   Patient Name: Nancy Doe  :   1960  Age:   65 y.o. MRN:   [id number]  Account #:  [de-identified]  Today:                22    Provider:  Lisa Ferguson PA-C    Chief Complaint   Patient presents with    Sleep Apnea        Subjective:   Nancy Doe is a 65 y.o. female  with a history of ZOILA and PLMD who comes in for a sleep clinic follow up. The PSG, 2014 revealed an AHI of 28.5. She is prescribed auto CPAP with a pressure range of 8cm to 16cm. The compliance report indicates that she is averaging >10 hours of CPAP use per day. She uses a nasal mask. She reports that consistent PAP use has alleviated the previous ZOILA symptoms. Her sleep is restorative. The PLMD is stable. She did have episodes of hypoglycemia <40 during the night. The insulin has since been adjusted and in October was started on Ozempic.       Location or symptom:  ZOILA  Onset:  PS  Timing:  q hs  Severity:  Moderate  Associated:  Snoring, witnessed apneas, and excessive daytime somnolence  Alleviated:  CPAP (2nd CPAP, 2021)      Objective:     Past Medical History:   Diagnosis Date    Allergic rhinitis     Ankle fracture     3 year ago; increasing difficulty walking; has bone fragments    Arthritis     sees dr. Roney Genao as ambulation aid     right foot per dr. Erazo Passerwin    CPAP (continuous positive airway pressure) dependence     12cm    Diabetes ([address]     Hiatal hernia 2017    History of kidney cancer     Hyperlipidemia     Hypertension     Hypothyroidism     Murmur     Obesity     ZOILA (obstructive sleep apnea)     AHI:  28.5 per PSG, 2014    PLMD (periodic limb movement disorder)     Renal mass     cat scan done 18; to see dr. Justo Montoya coming up    Thyroid disease     Type 2 diabetes mellitus without complication ([address] Type II or unspecified type diabetes mellitus without mention of complication, not stated as uncontrolled        Past Surgical History:   Procedure Laterality Date    BREAST BIOPSY Right 2012    CARPAL TUNNEL RELEASE      bilateral    CHOLECYSTECTOMY      HYSTERECTOMY (CERVIX STATUS UNKNOWN)      [place] LAP, RADICAL NEPHRECTOMY Left 10/3/2018    NEPHRECTOMY LAPAROSCOPIC HAND ASSISTED performed by Shonna Patiño MD at [address] [place] [address] LAP,CHOLECYSTECTOMY/GRAPH N/A 10/31/2017    CHOLECYSTECTOMY LAPAROSCOPIC performed by Alison Evans MD at [place] N/A 9/22/2020    VENTRAL HERNIA REPAIR WITH MESH performed by Alison Evans MD at [facility]      Significant Injuries      Family History   Problem Relation Age of Onset    Diabetes Mother     Cancer Mother         breast    Heart Disease Father     Cancer Father         colon, over 79    Cancer Sister 43        breast, negative genetic testing    Cancer Maternal Grandmother 75        breast    Diabetes Maternal Grandmother     Heart Attack Maternal Grandfather     Hypertension Other     Elevated Lipids Other     Coronary Art Dis Other     Heart Attack Paternal Grandmother        Social History  Social History     Tobacco Use   Smoking Status Never   Smokeless Tobacco Never     Social History     Substance and Sexual Activity   Alcohol Use No     Social History     Substance and Sexual Activity   Drug Use Never         Current Outpatient Medications   Medication Sig Dispense Refill    Semaglutide, 1 MG/DOSE, (OZEMPIC, 1 MG/DOSE,) 4 MG/3ML SOPN Inject 1 mg into the skin once a week (Patient taking differently: Inject 1 mg into the skin once a week . 25mg weekly) 3 mL 5    Insulin Aspart FlexPen 100 UNIT/ML SOPN INJECT 12 UNITS UNDER THE SKIN WITH BREAKFAST,14 at [facility], 16 at [facility] 18 Adjustable Dose Pre-filled Pen Syringe 5    atenolol (TENORMIN) 50 MG tablet TAKE ONE-HALF TABLET BY MOUTH EVERY MORNING AND ONE-HALF TABLET EVERY EVENING. 90 tablet 3    atorvastatin (LIPITOR) 40 MG tablet Take 1 tablet by mouth daily 90 tablet 3    levothyroxine (SYNTHROID) 137 MCG tablet TAKE 1 TABLET BY MOUTH DAILY 90 tablet 1    insulin detemir (LEVEMIR FLEXTOUCH) 100 UNIT/ML injection pen Inject 40 Units into the skin nightly (Patient taking differently: Inject 36 Units into the skin nightly) 13 mL 3    dilTIAZem (CARDIZEM CD) 180 MG extended release capsule TAKE 1 CAPSULE BY MOUTH DAILY 90 capsule 3    Continuous Blood Gluc Sensor (DEXCOM G6 SENSOR) MISC 1 each by Does not apply route every 30 days 1 each 11    Continuous Blood Gluc Transmit (DEXCOM G6 TRANSMITTER) MISC 1 Device by Does not apply route every 3 months 1 each 3    Continuous Blood Gluc  (DEXCOM G6 ) JAYA 1 Device by Does not apply route daily 1 each 3    hydroCHLOROthiazide (MICROZIDE) 12.5 MG capsule Take 1 capsule by mouth daily      lisinopril (PRINIVIL;ZESTRIL) 40 MG tablet Take 1 tablet by mouth daily  5    pantoprazole (PROTONIX) 40 MG tablet TAKE 1 TABLET BY MOUTH DAILY 90 tablet 3    Misc. Devices MISC 1 pair of Diabetic Shoes w/ 3 sets of heat moldable inserts ICD 10: E11.9 1 each 0    B-D UF III MINI PEN NEEDLES 31G X 5 MM MISC USE FOUR TIMES DAILY AS DIRECTED 400 each 3    Cholecalciferol (VITAMIN D3) 1.25 MG (75917 UT) CAPS Take 1 capsule by mouth once a week      clotrimazole-betamethasone (LOTRISONE) 1-0.05 % cream Apply topically 2 times daily.  15 g 0    CONTOUR NEXT TEST strip TEST FIVE TIMES DAILY 500 strip 5    Multiple Vitamins-Minerals (MULTIPLE VITAMINS/WOMENS PO) Take 1 tablet by mouth daily       fexofenadine (ALLEGRA) 180 MG tablet Take 180 mg by mouth daily      Omega-3 Fatty Acids (FISH OIL) 1000 MG CAPS Take 1,000 mg by mouth 2 times daily       aspirin 81 MG tablet Take 81 mg by mouth daily 3 days a wk      glucagon 1 MG injection Inject 1 mg into the muscle once for 1 dose 1 kit 0     No current facility-administered medications for this visit. Allergies:  Adhesive tape and Dermabond    REVIEW OF SYSTEMS     Constitutional: []Fever []Sweats []Chills [] Recent Injury   [x] Denies all unless marked  HENT:[]Headache  [] Head Injury  [] Sore Throat  [] Ear Pain  [] Dizziness [] Hearing Loss   [x] Denies all unless marked  Musculoskeletal: [] Arthralgia  [] Myalgias [] Muscle cramps  [] Muscle twitches   [x] Denies all unless marked   Spine:  [] Neck pain  [] Back pain  [] Sciatica  [x] Denies all unless marked  Neurological:[] Visual Disturbance [] Double Vision [] Slurred Speech [] Trouble swallowing  [] Vertigo [] Tingling [] Numbness [] Weakness [] Loss of Balance   [] Loss of Consciousness [] Memory Loss [] Seizures  [x] Denies all unless marked  Psychiatric/Behavioral:[] Depression [] Anxiety  [x] Denies all unless marked  Sleep: []  Insomnia [] Sleep Disturbance [] Snoring [] Restless Legs [] Daytime Sleepiness [x] Sleep Apnea  [] Denies all unless marked    The MA has completed the ROS with the patient. I have reviewed it in its' entirety with the patient and agree with the documentation. PHYSICAL EXAM  /76   Pulse 63   Ht 5' 8\" (1.727 m)   Wt (!) 400 lb (181.4 kg)   LMP 01/01/2002   SpO2 98%   BMI 60.82 kg/m²      Constitutional -  Alert in NAD, well developed, pleasant and cooperative with exam; body habitus obes as indicated by BMI  HEENT- Conjunctiva normal.  No scars, masses, or lesions over external nose or ears, hearing intact, no neck masses noted, no jugular vein distension, no bruit  Cardiac- Regular rate and rhythm; Grade I/VI murmur  Pulmonary- Clear to auscultation, good expansion, normal effort without use of accessory muscles  Musculoskeletal - No significant wasting of muscles noted, no bony deformities  Extremities - No clubbing, cyanosis or edema  Skin - Warm, dry, and intact.   No rash, erythema, or pallor  Psychiatric - Mood, affect, and behavior appear normal      Neurological exam  Awake, alert, fluent oriented  appropriate affect  Attention and concentration appear appropriate  Recent and remote memory appears unremarkable  Speech normal without dysarthria  No clear issues with language of fund of knowledge    Cranial Nerve Exam     CN III, IV,VI-EOMI, No nystagmus, conjugate eye movements, no ptosis  CN VII-No facial assymetry    Motor Exam    Antigravity throughout upper and lower extremities bilaterally    Tremors and coordination    No tremors in hands or head noted     Gait    Normal base and speed  No ataxia      I reviewed the following studies:       []  :  Clinical laboratory test results     []  :  Radiology reports                    [x]  :  Review and summarization of medical records-compliance report     []  :  Previous/recent polysomnogram report(s)     []  :  [place] Sleepiness Scale       [x]  :  Compliance download: The auto CPAP is set at a pressure range of 8cm to 16cm. Compliance download shows that she uses device: 100% of the time;  percentage of days with usage >=4 hours: 97%. AHI: 0.1 (Reviewed the compliance report that she presented, as well.)    Assessment:       ICD-10-CM    1. ZOILA (obstructive sleep apnea)  G47.33       2. PLMD (periodic limb movement disorder)  G47.61       3. CPAP (continuous positive airway pressure) dependence  Z99.89                [x]  :  Stable-PLMD     []  :  Improved                       [x]  :  Well controlled-ZOILA              []  :  Resolving     []  :  Resolved     []  :  Inadequately controlled     []  :  Worsening     []  :  Additional workup planned    Patient is compliant and benefiting from therapy as indicated by compliance evaluation and patient report. Plan:     No orders of the defined types were placed in this encounter. 1.   Previously or presently advised of the etiology,  pathophysiology, diagnosis, treatment options, and risks of untreated ZOILA.  Risks may include, but are not limited to  hypertension, coronary artery disease, atrial fibrillation, CHF, diabetes, stroke, weight gain, impaired cognition, daytime somnolence, and motor vehicle accidents. Advised to abstain from driving or operating heavy machinery when drowsy and the use of respiratory suppressants. Discussed diagnostic studies and potential treatment plan. 2.  Will evaluate for PAP clinical benefit and and compliance during a 30 day period within the preceding 90 days PRN. 3.  The following educational material has been included in this visit after visit summary for your review: ZOILA/PAP guidelines-Discussed with the patient and all questions fully answered. 4.  Continue PAP therapy. The patient voices understanding and recognizes the need for adherence to the prescribed therapy. 5.  Order-supplies-[facility]  6.   Follow up in 1 year      Replinishible PAP Supplies, 1 year supply  Item HPCPS Code Frequency   Mask of choice  or  1 per 3 months   Nasal Mask cushion/pillows  or  2 per 30 days   Full Face Mask Interface  1 per 30 days   Headgear  1 per 6 months   Tubing, length of choice  or  1 per 3 months   Water Chamber  1 per 6 months   Chinstrap  1 per 6 months   Disposable Filters  2 per 30 days   Reusable Filters  1 per 6 months     Diagnoses:  Obstructive sleep apnea (G47.33)  Length of Need: Lifetime, 99    Ordering Provider: Vidal Primrose, PA-C  NPI:  [id number]

## 2022-12-01 NOTE — TELEPHONE ENCOUNTER
[facility]. called to request a refill on her medication.       Last office visit : 11/1/2022   Next office visit : 12/29/2022     Requested Prescriptions     Pending Prescriptions Disp Refills    pantoprazole (PROTONIX) 40 MG tablet [Pharmacy Med Name: PANTOPRAZOLE 40MG TABLETS] 90 tablet 3     Sig: TAKE 1 TABLET BY MOUTH DAILY            Yancy Kim MA

## 2022-12-02 RX ORDER — PANTOPRAZOLE SODIUM 40 MG/1
40 TABLET, DELAYED RELEASE ORAL DAILY
Qty: 90 TABLET | Refills: 1 | Status: SHIPPED | OUTPATIENT
Start: 2022-12-02

## 2022-12-06 ENCOUNTER — HOSPITAL ENCOUNTER (OUTPATIENT)
Dept: CT IMAGING | Age: 62
Discharge: HOME | End: 2022-12-06
Payer: COMMERCIAL

## 2022-12-06 DIAGNOSIS — C64.2 RENAL CELL CARCINOMA OF LEFT KIDNEY (HCC): ICD-10-CM

## 2022-12-06 DIAGNOSIS — E11.42 TYPE 2 DIABETES MELLITUS WITH DIABETIC POLYNEUROPATHY, WITH LONG-TERM CURRENT USE OF INSULIN (HCC): ICD-10-CM

## 2022-12-06 DIAGNOSIS — Z79.4 TYPE 2 DIABETES MELLITUS WITH DIABETIC POLYNEUROPATHY, WITH LONG-TERM CURRENT USE OF INSULIN (HCC): ICD-10-CM

## 2022-12-06 LAB
ALBUMIN SERPL-MCNC: 3.9 G/DL (ref 3.5–5.2)
ALP BLD-CCNC: 83 U/L (ref 35–104)
ALT SERPL-CCNC: 19 U/L (ref 5–33)
ANION GAP SERPL CALCULATED.3IONS-SCNC: 9 MMOL/L (ref 7–19)
AST SERPL-CCNC: 20 U/L (ref 5–32)
BILIRUB SERPL-MCNC: 0.5 MG/DL (ref 0.2–1.2)
BUN BLDV-MCNC: 16 MG/DL (ref 8–23)
CALCIUM SERPL-MCNC: 10.4 MG/DL (ref 8.8–10.2)
CHLORIDE BLD-SCNC: 101 MMOL/L (ref 98–111)
CO2: 30 MMOL/L (ref 22–29)
CREAT SERPL-MCNC: 1.2 MG/DL (ref 0.5–0.9)
GFR SERPL CREATININE-BSD FRML MDRD: 51 ML/MIN/{1.73_M2}
GLUCOSE BLD-MCNC: 145 MG/DL (ref 74–109)
POTASSIUM SERPL-SCNC: 4.5 MMOL/L (ref 3.5–5)
SODIUM BLD-SCNC: 140 MMOL/L (ref 136–145)
TOTAL PROTEIN: 7.2 G/DL (ref 6.6–8.7)

## 2022-12-06 PROCEDURE — 6360000004 HC RX CONTRAST MEDICATION: Performed by: UROLOGY

## 2022-12-06 PROCEDURE — 71260 CT THORAX DX C+: CPT

## 2022-12-06 PROCEDURE — 74178 CT ABD&PLV WO CNTR FLWD CNTR: CPT

## 2022-12-06 RX ADMIN — IOPAMIDOL 90 ML: 755 INJECTION, SOLUTION INTRAVENOUS at 10:28

## 2022-12-08 ENCOUNTER — OFFICE VISIT (OUTPATIENT)
Dept: UROLOGY | Age: 62
End: 2022-12-08
Payer: COMMERCIAL

## 2022-12-08 VITALS — BODY MASS INDEX: 44.41 KG/M2 | WEIGHT: 293 LBS | TEMPERATURE: 97 F | HEIGHT: 68 IN

## 2022-12-08 DIAGNOSIS — C64.2 RENAL CELL CARCINOMA OF LEFT KIDNEY (HCC): ICD-10-CM

## 2022-12-08 LAB
BACTERIA URINE, POC: 0
BILIRUBIN URINE: 0 MG/DL
BLOOD, URINE: NEGATIVE
CASTS URINE, POC: NORMAL
CLARITY: CLEAR
COLOR: YELLOW
CRYSTALS URINE, POC: 0
EPI CELLS URINE, POC: NORMAL
GLUCOSE URINE: NORMAL
KETONES, URINE: NEGATIVE
LEUKOCYTE EST, POC: NORMAL
NITRITE, URINE: NEGATIVE
PH UA: 6.5 (ref 4.5–8)
PROTEIN UA: NEGATIVE
RBC URINE, POC: 0
SPECIFIC GRAVITY UA: 1.01 (ref 1–1.03)
UROBILINOGEN, URINE: NORMAL
WBC URINE, POC: 4
YEAST URINE, POC: 0

## 2022-12-08 PROCEDURE — 99213 OFFICE O/P EST LOW 20 MIN: CPT | Performed by: UROLOGY

## 2022-12-08 PROCEDURE — 81001 URINALYSIS AUTO W/SCOPE: CPT | Performed by: UROLOGY

## 2022-12-08 RX ORDER — INSULIN DETEMIR 100 [IU]/ML
20 INJECTION, SOLUTION SUBCUTANEOUS NIGHTLY
Qty: 18 ML | Refills: 3 | Status: SHIPPED | OUTPATIENT
Start: 2022-12-08

## 2022-12-08 RX ORDER — ERGOCALCIFEROL 1.25 MG/1
CAPSULE ORAL
COMMUNITY
Start: 2022-11-04

## 2022-12-08 RX ORDER — FEXOFENADINE HCL 180 MG/1
TABLET ORAL
COMMUNITY
Start: 2013-01-09

## 2022-12-08 RX ORDER — CHLORAL HYDRATE 500 MG
1 CAPSULE ORAL
COMMUNITY
Start: 2014-07-28

## 2022-12-08 RX ORDER — ATORVASTATIN CALCIUM 10 MG/1
TABLET, FILM COATED ORAL
COMMUNITY
Start: 2017-03-16

## 2022-12-08 ASSESSMENT — ENCOUNTER SYMPTOMS
FACIAL SWELLING: 0
VOMITING: 0
BACK PAIN: 0
NAUSEA: 0
EYE REDNESS: 0
SORE THROAT: 0
EYE DISCHARGE: 0
CHEST TIGHTNESS: 0
WHEEZING: 0

## 2022-12-08 NOTE — PROGRESS NOTES
Landry Romo is a 65 y.o. female who presents today   Chief Complaint   Patient presents with    Follow-up     I am here today for a yearly fu. I had my imaging and cmp done prior. Renal Cancer:  Patient is here today for a history of renal carcinoma which was diagnosed approximately 4 years(s) ago. The cancer was: left   The treatment received was left hand-assisted laparoscopic nephrectomy on 10/3/2018. Final pathology stage: T1 a N0 M0 patient has microscopic extension into the renal sinus fat but no gross extension therefore this was not designated as T3a. She did see medical oncology and it was felt she did not qualify for Sutent adjuvant treatment. Flank pain? no  Hematuria? None  Patient underwent a ventral hernia repair  History is unchanged as above she is here for her annual follow-up.   She denies any new symptoms no flank pain no hematuria      Past Medical History:   Diagnosis Date    Allergic rhinitis     Ankle fracture     3 year ago; increasing difficulty walking; has bone fragments    Arthritis     sees dr. Velia Pemberton as ambulation aid     right foot per dr. Vahid Vizcarra    CPAP (continuous positive airway pressure) dependence     12cm    Diabetes ([address]     Hiatal hernia 9/27/2017    History of kidney cancer     Hyperlipidemia     Hypertension     Hypothyroidism     Murmur     Obesity     ZOILA (obstructive sleep apnea)     AHI:  28.5 per [facility], 9/2014    PLMD (periodic limb movement disorder)     Renal mass     cat scan done 9/13/18; to see dr. Saniya Alcaraz coming up    Thyroid disease     Type 2 diabetes mellitus without complication ([address]     Type II or unspecified type diabetes mellitus without mention of complication, not stated as uncontrolled        Past Surgical History:   Procedure Laterality Date    BREAST BIOPSY Right 2012    CARPAL TUNNEL RELEASE      bilateral    CHOLECYSTECTOMY      HYSTERECTOMY (CERVIX STATUS UNKNOWN)      NC LAP, RADICAL NEPHRECTOMY Left 10/3/2018 NEPHRECTOMY LAPAROSCOPIC HAND ASSISTED performed by Shonna Patiño MD at [address] [place] [address] LAP,CHOLECYSTECTOMY/GRAPH N/A 10/31/2017    CHOLECYSTECTOMY LAPAROSCOPIC performed by Alison Evans MD at [place] N/A 9/22/2020    VENTRAL HERNIA REPAIR WITH MESH performed by Alison Evans MD at [facility] OR       Current Outpatient Medications   Medication Sig Dispense Refill    vitamin D (ERGOCALCIFEROL) 1.25 MG (98159 UT) CAPS capsule TAKE 1 CAPSULE BY MOUTH WEEKLY      Zinc Sulfate (ZINC 15 PO) Zinc      Multiple Vitamins-Calcium (TGT DAILY MULTIVITAMIN WOMENS) TABS 1 tablet Oral Daily      Omega-3 Fatty Acids (FISH OIL) 1000 MG capsule 1 capsule      atorvastatin (LIPITOR) 10 MG tablet 1 tablet Oral Daily      fexofenadine (ALLEGRA) 180 MG tablet 1 tablet as needed Orally Once a day      pantoprazole (PROTONIX) 40 MG tablet TAKE 1 TABLET BY MOUTH DAILY 90 tablet 1    Semaglutide, 1 MG/DOSE, (OZEMPIC, 1 MG/DOSE,) 4 MG/3ML SOPN Inject 1 mg into the skin once a week (Patient taking differently: Inject 1 mg into the skin once a week . 25mg weekly) 3 mL 5    Insulin Aspart FlexPen 100 UNIT/ML SOPN INJECT 12 UNITS UNDER THE SKIN WITH BREAKFAST,14 at [facility], 16 at [facility] 18 Adjustable Dose Pre-filled Pen Syringe 5    atenolol (TENORMIN) 50 MG tablet TAKE ONE-HALF TABLET BY MOUTH EVERY MORNING AND ONE-HALF TABLET EVERY EVENING.  90 tablet 3    levothyroxine (SYNTHROID) 137 MCG tablet TAKE 1 TABLET BY MOUTH DAILY 90 tablet 1    insulin detemir (LEVEMIR FLEXTOUCH) 100 UNIT/ML injection pen Inject 40 Units into the skin nightly (Patient taking differently: Inject 36 Units into the skin nightly) 13 mL 3    dilTIAZem (CARDIZEM CD) 180 MG extended release capsule TAKE 1 CAPSULE BY MOUTH DAILY 90 capsule 3    Continuous Blood Gluc Sensor (DEXCOM G6 SENSOR) MISC 1 each by Does not apply route every 30 days 1 each 11    Continuous Blood Gluc Transmit (DEXCOM G6 TRANSMITTER) MISC 1 Device by Does not apply route every 3 months 1 each 3    Continuous Blood Gluc  (DEXCOM G6 ) JAYA 1 Device by Does not apply route daily 1 each 3    hydroCHLOROthiazide (MICROZIDE) 12.5 MG capsule Take 1 capsule by mouth daily      lisinopril (PRINIVIL;ZESTRIL) 40 MG tablet Take 1 tablet by mouth daily  5    Misc. Devices MISC 1 pair of Diabetic Shoes w/ 3 sets of heat moldable inserts ICD 10: E11.9 1 each 0    B-D UF III MINI PEN NEEDLES 31G X 5 MM MISC USE FOUR TIMES DAILY AS DIRECTED 400 each 3    Cholecalciferol (VITAMIN D3) 1.25 MG (72600 UT) CAPS Take 1 capsule by mouth once a week      clotrimazole-betamethasone (LOTRISONE) 1-0.05 % cream Apply topically 2 times daily. 15 g 0    CONTOUR NEXT TEST strip TEST FIVE TIMES DAILY 500 strip 5    Multiple Vitamins-Minerals (MULTIPLE VITAMINS/WOMENS PO) Take 1 tablet by mouth daily       aspirin 81 MG tablet Take 81 mg by mouth daily 3 days a wk      glucagon 1 MG injection Inject 1 mg into the muscle once for 1 dose 1 kit 0     No current facility-administered medications for this visit.        Allergies   Allergen Reactions    Adhesive Tape Other (See Comments)     Post op incision infection    Dermabond Other (See Comments)     Post op incision infection       Social History     Socioeconomic History    Marital status:      Spouse name: None    Number of children: None    Years of education: None    Highest education level: None   Tobacco Use    Smoking status: Never    Smokeless tobacco: Never   Vaping Use    Vaping Use: Never used   Substance and Sexual Activity    Alcohol use: No    Drug use: Never    Sexual activity: Yes       Family History   Problem Relation Age of Onset    Diabetes Mother     Cancer Mother         breast    Heart Disease Father     Cancer Father         colon, over 79    Cancer Sister 43        breast, negative genetic testing    Cancer Maternal Grandmother 76        breast    Diabetes Maternal Grandmother     Heart Attack Maternal Grandfather Hypertension Other     Elevated Lipids Other     Coronary Art Dis Other     Heart Attack Paternal Grandmother        REVIEW OF SYSTEMS:  Review of Systems   Constitutional:  Negative for chills and fever. HENT:  Negative for facial swelling and sore throat. Eyes:  Negative for discharge and redness. Respiratory:  Negative for chest tightness and wheezing. Cardiovascular:  Negative for chest pain and palpitations. Gastrointestinal:  Negative for nausea and vomiting. Endocrine: Negative for polyphagia and polyuria. Genitourinary:  Negative for decreased urine volume, difficulty urinating, dyspareunia, dysuria, enuresis, flank pain, frequency, genital sores, hematuria, menstrual problem, pelvic pain, urgency, vaginal bleeding, vaginal discharge and vaginal pain. Musculoskeletal:  Negative for back pain and neck stiffness. Skin:  Negative for rash and wound. Neurological:  Negative for dizziness and headaches. Hematological:  Negative for adenopathy. Does not bruise/bleed easily. Psychiatric/Behavioral:  Negative for confusion and hallucinations. PHYSICAL EXAM:  Temp 97 °F (36.1 °C)   Ht 5' 8\" (1.727 m)   Wt (!) 400 lb (181.4 kg)   LMP 01/01/2002   BMI 60.82 kg/m²   Physical Exam  Vitals reviewed. Constitutional:       Appearance: Normal appearance. She is well-developed. She is obese. HENT:      Head: Normocephalic and atraumatic. Nose: Nose normal.      Mouth/Throat:      Mouth: Mucous membranes are moist.      Pharynx: Oropharynx is clear. Eyes:      General: No scleral icterus. Conjunctiva/sclera: Conjunctivae normal.      Pupils: Pupils are equal, round, and reactive to light. Cardiovascular:      Rate and Rhythm: Normal rate and regular rhythm. Pulses: Normal pulses. Pulmonary:      Effort: Pulmonary effort is normal. No respiratory distress. Breath sounds: Normal breath sounds. Chest:      Chest wall: No tenderness.    Abdominal:      General: A surgical scar is present. There is no distension. Palpations: Abdomen is soft. There is no mass. Tenderness: There is no abdominal tenderness. Musculoskeletal:         General: No tenderness or deformity. Normal range of motion. Cervical back: Normal range of motion and neck supple. Lymphadenopathy:      Cervical: No cervical adenopathy. Skin:     General: Skin is warm and dry. Neurological:      General: No focal deficit present. Mental Status: She is alert and oriented to person, place, and time. Psychiatric:         Behavior: Behavior normal.           DATA:  CBC:   Lab Results   Component Value Date/Time    WBC 7.2 09/15/2022 09:25 AM    RBC 4.60 09/15/2022 09:25 AM    HGB 13.4 09/15/2022 09:25 AM    HCT 42.7 09/15/2022 09:25 AM    MCV 92.8 09/15/2022 09:25 AM    MCH 29.1 09/15/2022 09:25 AM    MCHC 31.4 09/15/2022 09:25 AM    RDW 14.4 09/15/2022 09:25 AM     09/15/2022 09:25 AM    MPV 11.5 09/15/2022 09:25 AM     CMP:    Lab Results   Component Value Date/Time     12/06/2022 10:23 AM    K 4.5 12/06/2022 10:23 AM    K 4.3 10/05/2018 03:28 AM     12/06/2022 10:23 AM    CO2 30 12/06/2022 10:23 AM    BUN 16 12/06/2022 10:23 AM    CREATININE 1.2 12/06/2022 10:23 AM    GFRAA 50 09/15/2022 09:25 AM    LABGLOM 51 12/06/2022 10:23 AM    GLUCOSE 145 12/06/2022 10:23 AM    PROT 7.2 12/06/2022 10:23 AM    LABALBU 3.9 12/06/2022 10:23 AM    CALCIUM 10.4 12/06/2022 10:23 AM    BILITOT 0.5 12/06/2022 10:23 AM    ALKPHOS 83 12/06/2022 10:23 AM    AST 20 12/06/2022 10:23 AM    ALT 19 12/06/2022 10:23 AM         IMAGING:  CT scan of the chest with contrast final reading from the radiologist is not available and is pending. I reviewed the films myself he does have a hiatal hernia but otherwise no evidence of pulmonary metastasis. CT scan of the abdomen pelvis with and without contrast shows evidence of prior left nephrectomy.   There are some nonpathologic periaortic lymph node which is unchanged from 1 year ago other than that normal-appearing right kidney no hydronephrosis no stones and no evidence of abdominal or retroperitoneal metastasis. 1. Renal cell carcinoma of left kidney (HCC)  Follow-up in 1 year. - Comprehensive Metabolic Panel; Future  - CT ABDOMEN PELVIS W WO CONTRAST Additional Contrast? None (renal mass protocol); Future  - XR CHEST (2 VW); Future  - POCT Urinalysis Dipstick w/ Micro (Auto)    Orders Placed This Encounter   Procedures    CT ABDOMEN PELVIS W WO CONTRAST Additional Contrast? None (renal mass protocol)     Renal mass protocol     Standing Status:   Future     Standing Expiration Date:   12/8/2023     Order Specific Question:   Additional Contrast?     Answer:   None     Comments:   renal mass protocol     Order Specific Question:   STAT Creatinine as needed:     Answer:   No     Order Specific Question:   Reason for exam:     Answer:   Status post left nephrectomy rule out recurrence or metastasis    XR CHEST (2 VW)     Standing Status:   Future     Standing Expiration Date:   12/8/2023     Order Specific Question:   Reason for exam:     Answer:   Rule out metastasis    Comprehensive Metabolic Panel     Standing Status:   Future     Standing Expiration Date:   12/8/2023    POCT Urinalysis Dipstick w/ Micro (Auto)        Return for office visit after xray study, F/U after lab test..    All information inputted into the note by the MA to include chief complaint, past medical history, past surgical history, medications, allergies, social and family history and review of systems has been reviewed and updated as needed by me. EMR Dragon/transcription disclaimer: Much of this documentt is electronic  transcription/translation of spoken language to printed text. The  electronic translation of spoken language may be erroneous, or at times,  nonsensical words or phrases may be inadvertently transcribed.  Although I  have reviewed the document for such errors, some may still exist.

## 2022-12-08 NOTE — TELEPHONE ENCOUNTER
Luigihiro Mingone called to request a refill on her medication.       Last office visit : 11/1/2022   Next office visit : 12/29/2022     Requested Prescriptions     Pending Prescriptions Disp Refills    LEVEMIR FLEXTOUCH 100 UNIT/ML injection pen [Pharmacy Med Name: LEVEMIR FLEX TOUCH PEN INJ 3ML] 12 mL      Sig: ADMINISTER Al. Mady Whitfield 41 NIGHT            Brandi Cullen MA

## 2022-12-22 DIAGNOSIS — Z79.4 TYPE 2 DIABETES MELLITUS WITH DIABETIC POLYNEUROPATHY, WITH LONG-TERM CURRENT USE OF INSULIN (HCC): ICD-10-CM

## 2022-12-22 DIAGNOSIS — I10 PRIMARY HYPERTENSION: ICD-10-CM

## 2022-12-22 DIAGNOSIS — E03.9 PRIMARY HYPOTHYROIDISM: ICD-10-CM

## 2022-12-22 DIAGNOSIS — E78.00 HYPERCHOLESTEROLEMIA: ICD-10-CM

## 2022-12-22 DIAGNOSIS — E11.42 TYPE 2 DIABETES MELLITUS WITH DIABETIC POLYNEUROPATHY, WITH LONG-TERM CURRENT USE OF INSULIN (HCC): ICD-10-CM

## 2022-12-22 LAB
ALBUMIN SERPL-MCNC: 4.2 G/DL (ref 3.5–5.2)
ALP BLD-CCNC: 79 U/L (ref 35–104)
ALT SERPL-CCNC: 19 U/L (ref 5–33)
ANION GAP SERPL CALCULATED.3IONS-SCNC: 14 MMOL/L (ref 7–19)
AST SERPL-CCNC: 21 U/L (ref 5–32)
BILIRUB SERPL-MCNC: 0.5 MG/DL (ref 0.2–1.2)
BUN BLDV-MCNC: 21 MG/DL (ref 8–23)
CALCIUM SERPL-MCNC: 9.8 MG/DL (ref 8.8–10.2)
CHLORIDE BLD-SCNC: 102 MMOL/L (ref 98–111)
CHOLESTEROL, TOTAL: 129 MG/DL (ref 160–199)
CO2: 26 MMOL/L (ref 22–29)
CREAT SERPL-MCNC: 1.4 MG/DL (ref 0.5–0.9)
GFR SERPL CREATININE-BSD FRML MDRD: 43 ML/MIN/{1.73_M2}
GLUCOSE BLD-MCNC: 141 MG/DL (ref 74–109)
HBA1C MFR BLD: 7.2 % (ref 4–6)
HCT VFR BLD CALC: 40.4 % (ref 37–47)
HDLC SERPL-MCNC: 58 MG/DL (ref 65–121)
HEMOGLOBIN: 13.2 G/DL (ref 12–16)
LDL CHOLESTEROL CALCULATED: 52 MG/DL
MCH RBC QN AUTO: 30.1 PG (ref 27–31)
MCHC RBC AUTO-ENTMCNC: 32.7 G/DL (ref 33–37)
MCV RBC AUTO: 92 FL (ref 81–99)
PDW BLD-RTO: 14.2 % (ref 11.5–14.5)
PLATELET # BLD: 219 K/UL (ref 130–400)
PMV BLD AUTO: 11.5 FL (ref 9.4–12.3)
POTASSIUM SERPL-SCNC: 4.6 MMOL/L (ref 3.5–5)
RBC # BLD: 4.39 M/UL (ref 4.2–5.4)
SODIUM BLD-SCNC: 142 MMOL/L (ref 136–145)
T4 FREE: 1.34 NG/DL (ref 0.93–1.7)
TOTAL PROTEIN: 7.2 G/DL (ref 6.6–8.7)
TRIGL SERPL-MCNC: 97 MG/DL (ref 0–149)
TSH SERPL DL<=0.05 MIU/L-ACNC: 2.09 UIU/ML (ref 0.27–4.2)
WBC # BLD: 7.4 K/UL (ref 4.8–10.8)

## 2022-12-29 ENCOUNTER — OFFICE VISIT (OUTPATIENT)
Dept: PRIMARY CARE CLINIC | Age: 62
End: 2022-12-29
Payer: COMMERCIAL

## 2022-12-29 VITALS
HEART RATE: 68 BPM | BODY MASS INDEX: 44.41 KG/M2 | WEIGHT: 293 LBS | OXYGEN SATURATION: 97 % | DIASTOLIC BLOOD PRESSURE: 80 MMHG | SYSTOLIC BLOOD PRESSURE: 118 MMHG | HEIGHT: 68 IN | TEMPERATURE: 97.7 F

## 2022-12-29 DIAGNOSIS — E11.42 TYPE 2 DIABETES MELLITUS WITH DIABETIC POLYNEUROPATHY, WITH LONG-TERM CURRENT USE OF INSULIN (HCC): Primary | ICD-10-CM

## 2022-12-29 DIAGNOSIS — K21.9 GASTROESOPHAGEAL REFLUX DISEASE WITHOUT ESOPHAGITIS: ICD-10-CM

## 2022-12-29 DIAGNOSIS — I10 PRIMARY HYPERTENSION: ICD-10-CM

## 2022-12-29 DIAGNOSIS — Z79.4 TYPE 2 DIABETES MELLITUS WITH DIABETIC POLYNEUROPATHY, WITH LONG-TERM CURRENT USE OF INSULIN (HCC): Primary | ICD-10-CM

## 2022-12-29 DIAGNOSIS — G47.33 OSA (OBSTRUCTIVE SLEEP APNEA): ICD-10-CM

## 2022-12-29 DIAGNOSIS — E78.00 HYPERCHOLESTEROLEMIA: ICD-10-CM

## 2022-12-29 DIAGNOSIS — Z99.89 CPAP (CONTINUOUS POSITIVE AIRWAY PRESSURE) DEPENDENCE: ICD-10-CM

## 2022-12-29 DIAGNOSIS — E03.9 PRIMARY HYPOTHYROIDISM: ICD-10-CM

## 2022-12-29 DIAGNOSIS — E60 ZINC DEFICIENCY: ICD-10-CM

## 2022-12-29 PROCEDURE — 3051F HG A1C>EQUAL 7.0%<8.0%: CPT | Performed by: FAMILY MEDICINE

## 2022-12-29 PROCEDURE — 3074F SYST BP LT 130 MM HG: CPT | Performed by: FAMILY MEDICINE

## 2022-12-29 PROCEDURE — 3078F DIAST BP <80 MM HG: CPT | Performed by: FAMILY MEDICINE

## 2022-12-29 PROCEDURE — 99214 OFFICE O/P EST MOD 30 MIN: CPT | Performed by: FAMILY MEDICINE

## 2022-12-29 RX ORDER — SEMAGLUTIDE 1.34 MG/ML
1 INJECTION, SOLUTION SUBCUTANEOUS WEEKLY
Qty: 3 ML | Refills: 5 | Status: SHIPPED | OUTPATIENT
Start: 2022-12-29

## 2022-12-29 NOTE — PROGRESS NOTES
[address] [facility] CARE  [address] [address] [place] [address]  Dept: [phone]  Dept Fax: [phone]  Loc: [phone]    Doug Clement is a 65 y.o. female who presents today for her medical conditions/complaints as noted below. Doug Clement is here for 3 Month Follow-Up        HPI:   CC: Here today to discuss the following:    ***        HPI    Subjective:      Review of Systems      [facility] for visit specific review of symptoms.   All others negative      Objective:   /80   Pulse 68   Temp 97.7 °F (36.5 °C)   Ht 5' 8\" (1.727 m)   Wt (!) 401 lb (181.9 kg)   LMP 01/01/2002   SpO2 97%   BMI 60.97 kg/m²   Physical Exam      Recent Results (from the past 672 hour(s))   Comprehensive Metabolic Panel    Collection Time: 12/06/22 10:23 AM   Result Value Ref Range    Sodium 140 136 - 145 mmol/L    Potassium 4.5 3.5 - 5.0 mmol/L    Chloride 101 98 - 111 mmol/L    CO2 30 (H) 22 - 29 mmol/L    Anion Gap 9 7 - 19 mmol/L    Glucose 145 (H) 74 - 109 mg/dL    BUN 16 8 - 23 mg/dL    Creatinine 1.2 (H) 0.5 - 0.9 mg/dL    Est, Glom Filt Rate 51 (A) >60    Calcium 10.4 (H) 8.8 - 10.2 mg/dL    Total Protein 7.2 6.6 - 8.7 g/dL    Albumin 3.9 3.5 - 5.2 g/dL    Total Bilirubin 0.5 0.2 - 1.2 mg/dL    Alkaline Phosphatase 83 35 - 104 U/L    ALT 19 5 - 33 U/L    AST 20 5 - 32 U/L   POCT Urinalysis Dipstick w/ Micro (Auto)    Collection Time: 12/08/22 12:00 AM   Result Value Ref Range    Color, UA Yellow     Clarity, UA Clear Clear    Glucose, Ur neg     Bilirubin Urine 0 mg/dL    Ketones, Urine Negative     Specific Gravity, UA 1.015 1.005 - 1.030    Blood, Urine Negative     pH, UA 6.5 4.5 - 8.0    Protein, UA Negative Negative    Nitrite, Urine Negative     Leukocytes, UA moderate     Urobilinogen, Urine Normal     RBC Urine, POC 0     WBC Urine, POC 4     Bacteria Urine, POC 0     yeast urine, poc 0     Casts Urine, POC +     Epi Cells Urine, POC 2+     crystals urine, poc 0    CBC    Collection Time: 12/22/22 10:12 AM   Result Value Ref Range    WBC 7.4 4.8 - 10.8 K/uL    RBC 4.39 4.20 - 5.40 M/uL    Hemoglobin 13.2 12.0 - 16.0 g/dL    Hematocrit 40.4 37.0 - 47.0 %    MCV 92.0 81.0 - 99.0 fL    MCH 30.1 27.0 - 31.0 pg    MCHC 32.7 (L) 33.0 - 37.0 g/dL    RDW 14.2 11.5 - 14.5 %    Platelets 987 201 - 371 K/uL    MPV 11.5 9.4 - 12.3 fL   T4, Free    Collection Time: 12/22/22 10:12 AM   Result Value Ref Range    T4 Free 1.34 0.93 - 1.70 ng/dL   TSH    Collection Time: 12/22/22 10:12 AM   Result Value Ref Range    TSH 2.090 0.270 - 4.200 uIU/mL   Lipid Panel    Collection Time: 12/22/22 10:12 AM   Result Value Ref Range    Cholesterol, Total 129 (L) 160 - 199 mg/dL    Triglycerides 97 0 - 149 mg/dL    HDL 58 (L) 65 - 121 mg/dL    LDL Calculated 52 <100 mg/dL   Comprehensive Metabolic Panel    Collection Time: 12/22/22 10:12 AM   Result Value Ref Range    Sodium 142 136 - 145 mmol/L    Potassium 4.6 3.5 - 5.0 mmol/L    Chloride 102 98 - 111 mmol/L    CO2 26 22 - 29 mmol/L    Anion Gap 14 7 - 19 mmol/L    Glucose 141 (H) 74 - 109 mg/dL    BUN 21 8 - 23 mg/dL    Creatinine 1.4 (H) 0.5 - 0.9 mg/dL    Est, Glom Filt Rate 43 (A) >60    Calcium 9.8 8.8 - 10.2 mg/dL    Total Protein 7.2 6.6 - 8.7 g/dL    Albumin 4.2 3.5 - 5.2 g/dL    Total Bilirubin 0.5 0.2 - 1.2 mg/dL    Alkaline Phosphatase 79 35 - 104 U/L    ALT 19 5 - 33 U/L    AST 21 5 - 32 U/L   Hemoglobin A1C    Collection Time: 12/22/22 10:12 AM   Result Value Ref Range    Hemoglobin A1C 7.2 (H) 4.0 - 6.0 %               Assessment & Plan: The following diagnoses and conditions are stable with no further orders unless indicated:  1.  Type 2 diabetes mellitus with diabetic polyneuropathy, with long-term current use of insulin (HCC)  Lab Results   Component Value Date    LABA1C 7.2 (H) 12/22/2022    LABA1C 7.3 (H) 09/15/2022    LABA1C 7.4 (H) 06/17/2022     Lab Results   Component Value Date    LABMICR 5.20 12/06/2021 LDLCALC 52 12/22/2022    CREATININE 1.4 (H) 12/22/2022   Current medications:  Levemir: 20 units nightly  Insulin aspart:  -8 to 12 units with breakfast  -8 to 14 units with lunch  -14 units with dinner  Ozempic: 0.5 mg q. weekly  Dexcom G6  _______________________________________________________________  Changes:  Increase Ozempic to 1 mg q. weekly continue other doses as indicated above      2. ZOILA (obstructive sleep apnea)  3. CPAP (continuous positive airway pressure) dependence  Compliant and satisfied with results    4. Primary hypertension  Current medications:  Atenolol 25 mg in the morning 25 mg at night  Diltiazem CD1 80 mg daily  Lisinopril  Hydrochlorothiazide  Blood pressure stable  5. Primary hypothyroidism  Lab Results   Component Value Date    TSH 2.090 12/22/2022    T4FREE 1.34 12/22/2022     Current medication: Levothyroxine 137 mcg daily  Continue monitoring  Stable  6. Gastroesophageal reflux disease without esophagitis  Protonix 40 mg daily  Stable  7. Hypercholesterolemia  Lab Results   Component Value Date    CHOL 129 (L) 12/22/2022    CHOL 144 (L) 09/15/2022    CHOL 145 (L) 06/17/2022     Lab Results   Component Value Date    TRIG 97 12/22/2022    TRIG 100 09/15/2022    TRIG 114 06/17/2022     Lab Results   Component Value Date    HDL 58 (L) 12/22/2022    HDL 51 (L) 09/15/2022    HDL 44 (L) 06/17/2022     Lab Results   Component Value Date    LDLCALC 52 12/22/2022    LDLCALC 73 09/15/2022    LDLCALC 78 06/17/2022     No results found for: LABVLDL, VLDL  No results found for: CHOLHDLRATIO  Current medication: Atorvastatin 10 mg daily  Stable  8. Chronic kidney disease stage III:  Continues to be followed by nephrology  Slight reduction in her GFR.   Encouraged increased water intake  Orders Placed This Encounter   Procedures    Hemoglobin A1C     Standing Status:   Future     Standing Expiration Date:   12/29/2023    Comprehensive Metabolic Panel     Standing Status:   Future     Standing Expiration Date:   12/29/2023    Zinc     Standing Status:   Future     Standing Expiration Date:   12/29/2023           Return in about 3 months (around 3/29/2023) for Routine follow up - 20 minutes. Discussed use, benefit, and side effects of prescribed medications. All patient questions answered. Pt voiced understanding. Reviewed health maintenance. Instructedto continue current medications, diet and exercise. Patient agreed with treatmentplan.  Follow up as directed.     _______________________________________________________________      Past Medical History:   Diagnosis Date    Allergic rhinitis     Ankle fracture     3 year ago; increasing difficulty walking; has bone fragments    Arthritis     sees dr. Vandana Madsen as ambulation aid     right foot per dr. Shaan Linder    CPAP (continuous positive airway pressure) dependence     12cm    Diabetes ([facility] [address]     Hiatal hernia 9/27/2017    History of kidney cancer     Hyperlipidemia     Hypertension     Hypothyroidism     Murmur     Obesity     ZOILA (obstructive sleep apnea)     AHI:  28.5 per PS[facility], 9/2014    PLMD (periodic limb movement disorder)     Renal mass     cat scan done 9/13/18; to see dr. Elidia Biswas coming up    Thyroid disease     Type 2 diabetes mellitus without complication ([facility] [address]     Type II or unspecified type diabetes mellitus without mention of complication, not stated as uncontrolled       Past Surgical History:   Procedure Laterality Date    BREAST BIOPSY Right 2012    CARPAL TUNNEL RELEASE      bilateral    CHOLECYSTECTOMY      HYSTERECTOMY (CERVIX STATUS UNKNOWN)      ID LAP, RADICAL NEPHRECTOMY Left 10/3/2018    NEPHRECTOMY LAPAROSCOPIC HAND ASSISTED performed by Ean Balbuena MD at [facility] LAP,CHOLECYSTECTOMY/GRAPH N/A 10/31/2017    CHOLECYSTECTOMY LAPAROSCOPIC performed by Priscila Duarte MD at [place] N/A 9/22/2020    VENTRAL HERNIA REPAIR WITH MESH performed by Priscila Duarte MD at [facility] Family History   Problem Relation Age of Onset    Diabetes Mother     Cancer Mother         breast    Heart Disease Father     Cancer Father         colon, over 79    Cancer Sister 43        breast, negative genetic testing    Cancer Maternal Grandmother 76        breast    Diabetes Maternal Grandmother     Heart Attack Maternal Grandfather     Hypertension Other     Elevated Lipids Other     Coronary Art Dis Other     Heart Attack Paternal Grandmother        Social History     Tobacco Use    Smoking status: Never    Smokeless tobacco: Never   Substance Use Topics    Alcohol use: No     Current Outpatient Medications   Medication Sig Dispense Refill    Semaglutide, 1 MG/DOSE, (OZEMPIC, 1 MG/DOSE,) 4 MG/3ML SOPN Inject 1 mg into the skin once a week 3 mL 5    insulin detemir (LEVEMIR FLEXTOUCH) 100 UNIT/ML injection pen Inject 20 Units into the skin nightly 18 mL 3    vitamin D (ERGOCALCIFEROL) 1.25 MG (24598 UT) CAPS capsule TAKE 1 CAPSULE BY MOUTH WEEKLY      Zinc Sulfate (ZINC 15 PO) Zinc      Multiple Vitamins-Calcium (TGT DAILY MULTIVITAMIN WOMENS) TABS 1 tablet Oral Daily      Omega-3 Fatty Acids (FISH OIL) 1000 MG capsule 1 capsule      atorvastatin (LIPITOR) 10 MG tablet 1 tablet Oral Daily      fexofenadine (ALLEGRA) 180 MG tablet 1 tablet as needed Orally Once a day      pantoprazole (PROTONIX) 40 MG tablet TAKE 1 TABLET BY MOUTH DAILY 90 tablet 1    Insulin Aspart FlexPen 100 UNIT/ML SOPN INJECT 12 UNITS UNDER THE SKIN WITH BREAKFAST,14 at LUNCH, 16 at DINNER 18 Adjustable Dose Pre-filled Pen Syringe 5    atenolol (TENORMIN) 50 MG tablet TAKE ONE-HALF TABLET BY MOUTH EVERY MORNING AND ONE-HALF TABLET EVERY EVENING.  90 tablet 3    levothyroxine (SYNTHROID) 137 MCG tablet TAKE 1 TABLET BY MOUTH DAILY 90 tablet 1    dilTIAZem (CARDIZEM CD) 180 MG extended release capsule TAKE 1 CAPSULE BY MOUTH DAILY 90 capsule 3    Continuous Blood Gluc Sensor (DEXCOM G6 SENSOR) MISC 1 each by Does not apply route every 30 days 1 each 11    Continuous Blood Gluc Transmit (DEXCOM G6 TRANSMITTER) MISC 1 Device by Does not apply route every 3 months 1 each 3    Continuous Blood Gluc  (DEXCOM G6 ) JAYA 1 Device by Does not apply route daily 1 each 3    hydroCHLOROthiazide (MICROZIDE) 12.5 MG capsule Take 1 capsule by mouth daily      lisinopril (PRINIVIL;ZESTRIL) 40 MG tablet Take 1 tablet by mouth daily  5    Misc. Devices MISC 1 pair of Diabetic Shoes w/ 3 sets of heat moldable inserts ICD 10: E11.9 1 each 0    B-D UF III MINI PEN NEEDLES 31G X 5 MM MISC USE FOUR TIMES DAILY AS DIRECTED 400 each 3    Cholecalciferol (VITAMIN D3) 1.25 MG (75031 UT) CAPS Take 1 capsule by mouth once a week      clotrimazole-betamethasone (LOTRISONE) 1-0.05 % cream Apply topically 2 times daily. 15 g 0    CONTOUR NEXT TEST strip TEST FIVE TIMES DAILY 500 strip 5    Multiple Vitamins-Minerals (MULTIPLE VITAMINS/WOMENS PO) Take 1 tablet by mouth daily       aspirin 81 MG tablet Take 81 mg by mouth daily 3 days a wk      glucagon 1 MG injection Inject 1 mg into the muscle once for 1 dose 1 kit 0     No current facility-administered medications for this visit.      Allergies   Allergen Reactions    Adhesive Tape Other (See Comments)     Post op incision infection    Dermabond Other (See Comments)     Post op incision infection       Health Maintenance   Topic Date Due    Diabetic foot exam  Never done    HIV screen  Never done    Hepatitis C screen  Never done    COVID-19 Vaccine (4 - Booster for Pfizer series) 12/28/2021    Diabetic retinal exam  10/20/2022    Diabetic Alb to Cr ratio (uACR) test  12/06/2022    Depression Screen  03/22/2023    A1C test (Diabetic or Prediabetic)  12/22/2023    Lipids  12/22/2023    GFR test (Diabetes, CKD 3-4, OR last GFR 15-59)  12/22/2023    Breast cancer screen  02/21/2024    Colorectal Cancer Screen  06/28/2026    DTaP/Tdap/Td vaccine (2 - Td or Tdap) 06/07/2031    Flu vaccine  Completed    Shingles vaccine  Completed    Pneumococcal 0-64 years Vaccine  Completed    Hepatitis A vaccine  Aged Out    Hib vaccine  Aged Out    Meningococcal (ACWY) vaccine  Aged Out       _______________________________________________________________    Note dictated using Dragon Dictation software  Sometimes this dictation software makes erroneous transcriptions.

## 2022-12-29 NOTE — PROGRESS NOTES
[address] [facility] CARE  [address] [address] [id number] [address] [address]  Dept: [phone]  Dept Fax: [phone]  Loc: [phone]    Sarah Willoughby is a 65 y.o. female who presents today for her medical conditions/complaints as noted below. Sarah Willoughby is here for 3 Month Follow-Up        HPI:   CC: Here today to discuss the following:    Diabetes Mellitus  Has been compliant with medications. No side effects of medications since last visit. No polyuria, polydipsia, or vision changes since last visit. No symptomatic episodes of hypoglycemia. ZOILA on CPAP  Compliant with CPAP. No daytime somnolence. Feels rested for the most part when wearing CPAP. Hypertension  Compliant with medications. No adverse effects from medication. No lightheadedness, palpitations, or chest pain. Hypothyroidism  Symptoms are stable. No temperature intolerance, fatigue, or mood disturbance from baseline. Complaint with current medication. Gastroesophageal Reflux Disease  Symptoms currently under control. Medication adequately controls symptoms. No hematochezia or melena. Hyperlipidemia  Tolerating current cholesterol medication without side effects. . Attempting to reduce processed sugar and cholesterol from diet. HPI    Subjective:      Review of Systems  Review of Systems   Constitutional: Negative for chills and fever. HENT: Negative for congestion. Respiratory: Negative for cough, chest tightness and shortness of breath. Cardiovascular: Negative for chest pain, palpitations and leg swelling. Gastrointestinal: Negative for abdominal pain, anal bleeding, constipation, diarrhea and nausea. SeeHPI for visit specific review of symptoms.   All others negative      Objective:   /80   Pulse 68   Temp 97.7 °F (36.5 °C)   Ht 5' 8\" (1.727 m)   Wt (!) 401 lb (181.9 kg)   LMP 01/01/2002   SpO2 97%   BMI 60.97 kg/m²   Physical Exam    Physical Exam   Constitutional: She appears well. Does not appear ill. Neck: Neck supple. No masses. Neck Symmetric. Normal tracheal position. No thyroid enlargement  Cardiovascular: Normal rate and regular rhythm. Exam reveals no friction rub. No murmur heard. Respiratory:  Effort normal and breath sounds normal. No respiratory distress. No wheezes. No rales. No use of accessory muscles or intercostal retractions. Neurological: alert. Psychiatric: normal mood and affect.  Her behavior is normal.     Recent Results (from the past 672 hour(s))   Comprehensive Metabolic Panel    Collection Time: 12/06/22 10:23 AM   Result Value Ref Range    Sodium 140 136 - 145 mmol/L    Potassium 4.5 3.5 - 5.0 mmol/L    Chloride 101 98 - 111 mmol/L    CO2 30 (H) 22 - 29 mmol/L    Anion Gap 9 7 - 19 mmol/L    Glucose 145 (H) 74 - 109 mg/dL    BUN 16 8 - 23 mg/dL    Creatinine 1.2 (H) 0.5 - 0.9 mg/dL    Est, Glom Filt Rate 51 (A) >60    Calcium 10.4 (H) 8.8 - 10.2 mg/dL    Total Protein 7.2 6.6 - 8.7 g/dL    Albumin 3.9 3.5 - 5.2 g/dL    Total Bilirubin 0.5 0.2 - 1.2 mg/dL    Alkaline Phosphatase 83 35 - 104 U/L    ALT 19 5 - 33 U/L    AST 20 5 - 32 U/L   POCT Urinalysis Dipstick w/ Micro (Auto)    Collection Time: 12/08/22 12:00 AM   Result Value Ref Range    Color, UA Yellow     Clarity, UA Clear Clear    Glucose, Ur neg     Bilirubin Urine 0 mg/dL    Ketones, Urine Negative     Specific Gravity, UA 1.015 1.005 - 1.030    Blood, Urine Negative     pH, UA 6.5 4.5 - 8.0    Protein, UA Negative Negative    Nitrite, Urine Negative     Leukocytes, UA moderate     Urobilinogen, Urine Normal     RBC Urine, POC 0     WBC Urine, POC 4     Bacteria Urine, POC 0     yeast urine, poc 0     Casts Urine, POC +     Epi Cells Urine, POC 2+     crystals urine, poc 0    CBC    Collection Time: 12/22/22 10:12 AM   Result Value Ref Range    WBC 7.4 4.8 - 10.8 K/uL    RBC 4.39 4.20 - 5.40 M/uL    Hemoglobin 13.2 12.0 - 16.0 g/dL Hematocrit 40.4 37.0 - 47.0 %    MCV 92.0 81.0 - 99.0 fL    MCH 30.1 27.0 - 31.0 pg    MCHC 32.7 (L) 33.0 - 37.0 g/dL    RDW 14.2 11.5 - 14.5 %    Platelets 651 092 - 605 K/uL    MPV 11.5 9.4 - 12.3 fL   T4, Free    Collection Time: 12/22/22 10:12 AM   Result Value Ref Range    T4 Free 1.34 0.93 - 1.70 ng/dL   TSH    Collection Time: 12/22/22 10:12 AM   Result Value Ref Range    TSH 2.090 0.270 - 4.200 uIU/mL   Lipid Panel    Collection Time: 12/22/22 10:12 AM   Result Value Ref Range    Cholesterol, Total 129 (L) 160 - 199 mg/dL    Triglycerides 97 0 - 149 mg/dL    HDL 58 (L) 65 - 121 mg/dL    LDL Calculated 52 <100 mg/dL   Comprehensive Metabolic Panel    Collection Time: 12/22/22 10:12 AM   Result Value Ref Range    Sodium 142 136 - 145 mmol/L    Potassium 4.6 3.5 - 5.0 mmol/L    Chloride 102 98 - 111 mmol/L    CO2 26 22 - 29 mmol/L    Anion Gap 14 7 - 19 mmol/L    Glucose 141 (H) 74 - 109 mg/dL    BUN 21 8 - 23 mg/dL    Creatinine 1.4 (H) 0.5 - 0.9 mg/dL    Est, Glom Filt Rate 43 (A) >60    Calcium 9.8 8.8 - 10.2 mg/dL    Total Protein 7.2 6.6 - 8.7 g/dL    Albumin 4.2 3.5 - 5.2 g/dL    Total Bilirubin 0.5 0.2 - 1.2 mg/dL    Alkaline Phosphatase 79 35 - 104 U/L    ALT 19 5 - 33 U/L    AST 21 5 - 32 U/L   Hemoglobin A1C    Collection Time: 12/22/22 10:12 AM   Result Value Ref Range    Hemoglobin A1C 7.2 (H) 4.0 - 6.0 %               Assessment & Plan: The following diagnoses and conditions are stable with no further orders unless indicated:  1.  Type 2 diabetes mellitus with diabetic polyneuropathy, with long-term current use of insulin (HCC)        Lab Results   Component Value Date     LABA1C 7.2 (H) 12/22/2022     LABA1C 7.3 (H) 09/15/2022     LABA1C 7.4 (H) 06/17/2022            Lab Results   Component Value Date     LABMICR 5.20 12/06/2021     LDLCALC 52 12/22/2022     CREATININE 1.4 (H) 12/22/2022   Current medications:  Levemir: 20 units nightly  Insulin aspart:  -8 to 12 units with breakfast  -8 to 14 units with lunch  -14 units with dinner  Ozempic: 0.5 mg q. weekly  Dexcom G6  _______________________________________________________________  Changes:  Increase Ozempic to 1 mg q. weekly continue other doses as indicated above        2. ZOILA (obstructive sleep apnea)  3. CPAP (continuous positive airway pressure) dependence  Compliant and satisfied with results     4. Primary hypertension  Current medications:  Atenolol 25 mg in the morning 25 mg at night  Diltiazem CD1 80 mg daily  Lisinopril  Hydrochlorothiazide  Blood pressure stable  5. Primary hypothyroidism        Lab Results   Component Value Date     TSH 2.090 12/22/2022     T4FREE 1.34 12/22/2022      Current medication: Levothyroxine 137 mcg daily  Continue monitoring  Stable  6. Gastroesophageal reflux disease without esophagitis  Protonix 40 mg daily  Stable  7. Hypercholesterolemia        Lab Results   Component Value Date     CHOL 129 (L) 12/22/2022     CHOL 144 (L) 09/15/2022     CHOL 145 (L) 06/17/2022            Lab Results   Component Value Date     TRIG 97 12/22/2022     TRIG 100 09/15/2022     TRIG 114 06/17/2022            Lab Results   Component Value Date     HDL 58 (L) 12/22/2022     HDL 51 (L) 09/15/2022     HDL 44 (L) 06/17/2022            Lab Results   Component Value Date     LDLCALC 52 12/22/2022     LDLCALC 73 09/15/2022     LDLCALC 78 06/17/2022      No results found for: LABVLDL, VLDL  No results found for: CHOLHDLRATIO  Current medication: Atorvastatin 10 mg daily  Stable  8. Chronic kidney disease stage III:  Continues to be followed by nephrology  Slight reduction in her GFR.   Encouraged increased water intake        Orders Placed This Encounter   Procedures    Hemoglobin A1C     Standing Status:   Future     Standing Expiration Date:   12/29/2023    Comprehensive Metabolic Panel     Standing Status:   Future     Standing Expiration Date:   12/29/2023    Zinc     Standing Status:   Future     Standing Expiration Date: 12/29/2023         Return in about 3 months (around 3/29/2023) for Routine follow up - 20 minutes. Discussed use, benefit, and side effects of prescribed medications. All patient questions answered. Pt voiced understanding. Reviewed health maintenance. Instructedto continue current medications, diet and exercise. Patient agreed with treatmentplan.  Follow up as directed.     _______________________________________________________________      Past Medical History:   Diagnosis Date    Allergic rhinitis     Ankle fracture     3 year ago; increasing difficulty walking; has bone fragments    Arthritis     sees dr. Dayanara Cardenas as ambulation aid     right foot per dr. Charletta Leventhal    CPAP (continuous positive airway pressure) dependence     12cm    Diabetes ([facility] [address]     Hiatal hernia 9/27/2017    History of kidney cancer     Hyperlipidemia     Hypertension     Hypothyroidism     Murmur     Obesity     ZOILA (obstructive sleep apnea)     AHI:  28.5 per PS[facility], 9/2014    PLMD (periodic limb movement disorder)     Renal mass     cat scan done 9/13/18; to see dr. Alonzo Perkins coming up    Thyroid disease     Type 2 diabetes mellitus without complication ([facility] [address]     Type II or unspecified type diabetes mellitus without mention of complication, not stated as uncontrolled       Past Surgical History:   Procedure Laterality Date    BREAST BIOPSY Right 2012    CARPAL TUNNEL RELEASE      bilateral    CHOLECYSTECTOMY      HYSTERECTOMY (CERVIX STATUS UNKNOWN)      MD LAP, RADICAL NEPHRECTOMY Left 10/3/2018    NEPHRECTOMY LAPAROSCOPIC HAND ASSISTED performed by Amanda Dixon MD at [address] [place] [address] LAP,CHOLECYSTECTOMY/GRAPH N/A 10/31/2017    CHOLECYSTECTOMY LAPAROSCOPIC performed by Paz Favre, MD at [place] N/A 9/22/2020    VENTRAL HERNIA REPAIR WITH MESH performed by Paz Favre, MD at [facility] OR       Family History   Problem Relation Age of Onset    Diabetes Mother     Cancer Mother         breast Heart Disease Father     Cancer Father         colon, over 79    Cancer Sister 43        breast, negative genetic testing    Cancer Maternal Grandmother 76        breast    Diabetes Maternal Grandmother     Heart Attack Maternal Grandfather     Hypertension Other     Elevated Lipids Other     Coronary Art Dis Other     Heart Attack Paternal Grandmother        Social History     Tobacco Use    Smoking status: Never    Smokeless tobacco: Never   Substance Use Topics    Alcohol use: No     Current Outpatient Medications   Medication Sig Dispense Refill    Semaglutide, 1 MG/DOSE, (OZEMPIC, 1 MG/DOSE,) 4 MG/3ML SOPN Inject 1 mg into the skin once a week 3 mL 5    insulin detemir (LEVEMIR FLEXTOUCH) 100 UNIT/ML injection pen Inject 20 Units into the skin nightly 18 mL 3    vitamin D (ERGOCALCIFEROL) 1.25 MG (46596 UT) CAPS capsule TAKE 1 CAPSULE BY MOUTH WEEKLY      Zinc Sulfate (ZINC 15 PO) Zinc      Multiple Vitamins-Calcium (TGT DAILY MULTIVITAMIN WOMENS) TABS 1 tablet Oral Daily      Omega-3 Fatty Acids (FISH OIL) 1000 MG capsule 1 capsule      atorvastatin (LIPITOR) 10 MG tablet 1 tablet Oral Daily      fexofenadine (ALLEGRA) 180 MG tablet 1 tablet as needed Orally Once a day      pantoprazole (PROTONIX) 40 MG tablet TAKE 1 TABLET BY MOUTH DAILY 90 tablet 1    Insulin Aspart FlexPen 100 UNIT/ML SOPN INJECT 12 UNITS UNDER THE SKIN WITH BREAKFAST,14 at LUNCH, 16 at DINNER 18 Adjustable Dose Pre-filled Pen Syringe 5    atenolol (TENORMIN) 50 MG tablet TAKE ONE-HALF TABLET BY MOUTH EVERY MORNING AND ONE-HALF TABLET EVERY EVENING.  90 tablet 3    levothyroxine (SYNTHROID) 137 MCG tablet TAKE 1 TABLET BY MOUTH DAILY 90 tablet 1    dilTIAZem (CARDIZEM CD) 180 MG extended release capsule TAKE 1 CAPSULE BY MOUTH DAILY 90 capsule 3    Continuous Blood Gluc Sensor (DEXCOM G6 SENSOR) MISC 1 each by Does not apply route every 30 days 1 each 11    Continuous Blood Gluc Transmit (DEXCOM G6 TRANSMITTER) MISC 1 Device by Does not apply route every 3 months 1 each 3    Continuous Blood Gluc  (DEXCOM G6 ) JAYA 1 Device by Does not apply route daily 1 each 3    hydroCHLOROthiazide (MICROZIDE) 12.5 MG capsule Take 1 capsule by mouth daily      lisinopril (PRINIVIL;ZESTRIL) 40 MG tablet Take 1 tablet by mouth daily  5    Misc. Devices MISC 1 pair of Diabetic Shoes w/ 3 sets of heat moldable inserts ICD 10: E11.9 1 each 0    B-D UF III MINI PEN NEEDLES 31G X 5 MM MISC USE FOUR TIMES DAILY AS DIRECTED 400 each 3    Cholecalciferol (VITAMIN D3) 1.25 MG (92490 UT) CAPS Take 1 capsule by mouth once a week      clotrimazole-betamethasone (LOTRISONE) 1-0.05 % cream Apply topically 2 times daily. 15 g 0    CONTOUR NEXT TEST strip TEST FIVE TIMES DAILY 500 strip 5    Multiple Vitamins-Minerals (MULTIPLE VITAMINS/WOMENS PO) Take 1 tablet by mouth daily       aspirin 81 MG tablet Take 81 mg by mouth daily 3 days a wk      glucagon 1 MG injection Inject 1 mg into the muscle once for 1 dose 1 kit 0     No current facility-administered medications for this visit.      Allergies   Allergen Reactions    Adhesive Tape Other (See Comments)     Post op incision infection    Dermabond Other (See Comments)     Post op incision infection       Health Maintenance   Topic Date Due    Diabetic foot exam  Never done    HIV screen  Never done    Hepatitis C screen  Never done    COVID-19 Vaccine (4 - Booster for Pfizer series) 12/28/2021    Diabetic retinal exam  10/20/2022    Diabetic Alb to Cr ratio (uACR) test  12/06/2022    Depression Screen  03/22/2023    A1C test (Diabetic or Prediabetic)  12/22/2023    Lipids  12/22/2023    GFR test (Diabetes, CKD 3-4, OR last GFR 15-59)  12/22/2023    Breast cancer screen  02/21/2024    Colorectal Cancer Screen  06/28/2026    DTaP/Tdap/Td vaccine (2 - Td or Tdap) 06/07/2031    Flu vaccine  Completed    Shingles vaccine  Completed    Pneumococcal 0-64 years Vaccine  Completed    Hepatitis A vaccine  Aged Out    Hib vaccine  Aged Out    Meningococcal (ACWY) vaccine  Aged Out       _______________________________________________________________    Note dictated using Dragon Dictation software  Sometimes this dictation software makes erroneous transcriptions.

## 2023-02-24 DIAGNOSIS — E03.9 PRIMARY HYPOTHYROIDISM: ICD-10-CM

## 2023-02-27 RX ORDER — LEVOTHYROXINE SODIUM 137 UG/1
137 TABLET ORAL DAILY
Qty: 90 TABLET | Refills: 1 | Status: SHIPPED | OUTPATIENT
Start: 2023-02-27

## 2023-03-06 DIAGNOSIS — E11.42 TYPE 2 DIABETES MELLITUS WITH DIABETIC POLYNEUROPATHY, WITH LONG-TERM CURRENT USE OF INSULIN (HCC): ICD-10-CM

## 2023-03-06 DIAGNOSIS — Z79.4 TYPE 2 DIABETES MELLITUS WITH DIABETIC POLYNEUROPATHY, WITH LONG-TERM CURRENT USE OF INSULIN (HCC): ICD-10-CM

## 2023-03-06 RX ORDER — BLOOD-GLUCOSE SENSOR
EACH MISCELLANEOUS
Qty: 3 EACH | Refills: 3 | Status: SHIPPED | OUTPATIENT
Start: 2023-03-06

## 2023-03-16 ENCOUNTER — OFFICE VISIT (OUTPATIENT)
Dept: PRIMARY CARE CLINIC | Age: 63
End: 2023-03-16
Payer: COMMERCIAL

## 2023-03-16 VITALS
DIASTOLIC BLOOD PRESSURE: 80 MMHG | HEART RATE: 75 BPM | BODY MASS INDEX: 45.99 KG/M2 | TEMPERATURE: 97.2 F | SYSTOLIC BLOOD PRESSURE: 140 MMHG | HEIGHT: 67 IN | WEIGHT: 293 LBS | OXYGEN SATURATION: 98 %

## 2023-03-16 DIAGNOSIS — R19.7 DIARRHEA OF PRESUMED INFECTIOUS ORIGIN: ICD-10-CM

## 2023-03-16 DIAGNOSIS — E11.42 TYPE 2 DIABETES MELLITUS WITH DIABETIC POLYNEUROPATHY, WITH LONG-TERM CURRENT USE OF INSULIN (HCC): ICD-10-CM

## 2023-03-16 DIAGNOSIS — Z79.4 TYPE 2 DIABETES MELLITUS WITH DIABETIC POLYNEUROPATHY, WITH LONG-TERM CURRENT USE OF INSULIN (HCC): ICD-10-CM

## 2023-03-16 LAB — SARS-COV-2 N GENE RESP QL NAA+PROBE: NOT DETECTED

## 2023-03-16 PROCEDURE — 3077F SYST BP >= 140 MM HG: CPT | Performed by: NURSE PRACTITIONER

## 2023-03-16 PROCEDURE — 3079F DIAST BP 80-89 MM HG: CPT | Performed by: NURSE PRACTITIONER

## 2023-03-16 PROCEDURE — 99214 OFFICE O/P EST MOD 30 MIN: CPT | Performed by: NURSE PRACTITIONER

## 2023-03-16 RX ORDER — ONDANSETRON HYDROCHLORIDE 8 MG/1
8 TABLET, FILM COATED ORAL EVERY 8 HOURS PRN
Qty: 12 TABLET | Refills: 0 | Status: SHIPPED | OUTPATIENT
Start: 2023-03-16

## 2023-03-16 SDOH — ECONOMIC STABILITY: INCOME INSECURITY: HOW HARD IS IT FOR YOU TO PAY FOR THE VERY BASICS LIKE FOOD, HOUSING, MEDICAL CARE, AND HEATING?: NOT HARD AT ALL

## 2023-03-16 SDOH — ECONOMIC STABILITY: HOUSING INSECURITY
IN THE LAST 12 MONTHS, WAS THERE A TIME WHEN YOU DID NOT HAVE A STEADY PLACE TO SLEEP OR SLEPT IN A SHELTER (INCLUDING NOW)?: NO

## 2023-03-16 SDOH — ECONOMIC STABILITY: FOOD INSECURITY: WITHIN THE PAST 12 MONTHS, YOU WORRIED THAT YOUR FOOD WOULD RUN OUT BEFORE YOU GOT MONEY TO BUY MORE.: NEVER TRUE

## 2023-03-16 SDOH — ECONOMIC STABILITY: FOOD INSECURITY: WITHIN THE PAST 12 MONTHS, THE FOOD YOU BOUGHT JUST DIDN'T LAST AND YOU DIDN'T HAVE MONEY TO GET MORE.: NEVER TRUE

## 2023-03-16 ASSESSMENT — ENCOUNTER SYMPTOMS
COLOR CHANGE: 0
SHORTNESS OF BREATH: 0
SORE THROAT: 0
NAUSEA: 0
ABDOMINAL PAIN: 0
DIARRHEA: 1
VOMITING: 0
CHEST TIGHTNESS: 0
COUGH: 0

## 2023-03-16 NOTE — ASSESSMENT & PLAN NOTE
Patient here today with complaints of diarrhea for the past 2 days. Patient reports multiple loose and runny stools daily. She denies change in diet or medications. Patient reports no associated fever or known ill contacts. No recent travel. She states she has taken Imodium and it has provided some improvement. Encouraged her to increase hydration and rest. Once stools slow may start with a bland diet and increase as tolerated. Instructed her to stop Imodium and start Zofran in attempts to slow motility.

## 2023-03-16 NOTE — PROGRESS NOTES
3/16/2023     Kade [facility]. (:  1960) is a 59 y.o. female,Established patient, here for evaluation of the following chief complaint(s):  Diarrhea (Since 03/15/23 )      ASSESSMENT/PLAN:  1. Diarrhea of presumed infectious origin  Assessment & Plan:  Patient here today with complaints of diarrhea for the past 2 days. Patient reports multiple loose and runny stools daily. She denies change in diet or medications. Patient reports no associated fever or known ill contacts. No recent travel. She states she has taken Imodium and it has provided some improvement. Encouraged her to increase hydration and rest. Once stools slow may start with a bland diet and increase as tolerated. Instructed her to stop Imodium and start Zofran in attempts to slow motility. Orders:  -     ondansetron (ZOFRAN) 8 MG tablet; Take 1 tablet by mouth every 8 hours as needed for Nausea or Other (diarrhea), Disp-12 tablet, R-0Normal  -     COVID-19; Future      Return if symptoms worsen or fail to improve. SUBJECTIVE/OBJECTIVE:  Diarrhea   Pertinent negatives include no abdominal pain, arthralgias, coughing, fever, myalgias or vomiting. Prior to Visit Medications    Medication Sig Taking?  Authorizing Provider   ondansetron (ZOFRAN) 8 MG tablet Take 1 tablet by mouth every 8 hours as needed for Nausea or Other (diarrhea) Yes Thomasenia Memory, APRN - CNP   Continuous Blood Gluc Sensor (DEXCOM G6 SENSOR) MISC APPLY TO SKIN AS DIRECTED Yes Rico Ballesteros MD   levothyroxine (SYNTHROID) 137 MCG tablet TAKE 1 TABLET BY MOUTH DAILY Yes Rico Ballesteros MD   Semaglutide, 1 MG/DOSE, (OZEMPIC, 1 MG/DOSE,) 4 MG/3ML SOPN Inject 1 mg into the skin once a week Yes Rico Ballesteros MD   insulin detemir (LEVEMIR FLEXTOUCH) 100 UNIT/ML injection pen Inject 20 Units into the skin nightly Yes Rico Ballesteros MD   vitamin D (ERGOCALCIFEROL) 1.25 MG (93599 UT) CAPS capsule TAKE 1 CAPSULE BY MOUTH WEEKLY Yes Historical Provider, MD Multiple Vitamins-Calcium (TGT DAILY MULTIVITAMIN WOMENS) TABS 1 tablet Oral Daily Yes Historical Provider, MD   Omega-3 Fatty Acids (FISH OIL) 1000 MG capsule 1 capsule Yes Historical Provider, MD   atorvastatin (LIPITOR) 10 MG tablet Take 40 mg by mouth daily Yes Historical Provider, MD   fexofenadine (ALLEGRA) 180 MG tablet 1 tablet as needed Orally Once a day Yes Historical Provider, MD   pantoprazole (PROTONIX) 40 MG tablet TAKE 1 TABLET BY MOUTH DAILY Yes Romario Talavera MD   Insulin Aspart FlexPen 100 UNIT/ML SOPN INJECT 12 UNITS UNDER THE SKIN WITH BREAKFAST,14 at LUNCH, 16 at [facility] Yes Romario Talavera MD   atenolol (TENORMIN) 50 MG tablet TAKE ONE-HALF TABLET BY MOUTH EVERY MORNING AND ONE-HALF TABLET EVERY EVENING. Yes Romario Talavera MD   dilTIAZem (CARDIZEM CD) 180 MG extended release capsule TAKE 1 CAPSULE BY MOUTH DAILY Yes Romario Talavera MD   Continuous Blood Gluc Transmit (DEXCOM G6 TRANSMITTER) MISC 1 Device by Does not apply route every 3 months Yes Romario Talavera MD   Continuous Blood Gluc  (DEXCOM G6 ) JAYA 1 Device by Does not apply route daily Yes Romario Talavera MD   glucagon 1 MG injection Inject 1 mg into the muscle once for 1 dose Yes Romario Talavera MD   hydroCHLOROthiazide (MICROZIDE) 12.5 MG capsule Take 1 capsule by mouth daily Yes Historical Provider, MD   lisinopril (PRINIVIL;ZESTRIL) 40 MG tablet Take 1 tablet by mouth daily Yes Romario Talavera MD   Misc. Devices MISC 1 pair of Diabetic Shoes w/ 3 sets of heat moldable inserts ICD 10: E11.9 Yes Romario Talavera MD   B-D UF III MINI PEN NEEDLES 31G X 5 MM MISC USE FOUR TIMES DAILY AS DIRECTED Yes Romario Talavera MD   Cholecalciferol (VITAMIN D3) 1.25 MG (65823 UT) CAPS Take 1 capsule by mouth once a week Yes Historical Provider, MD   clotrimazole-betamethasone (LOTRISONE) 1-0.05 % cream Apply topically 2 times daily.  Yes Lyndon Elliott, CHERYLE   CONTOUR NEXT TEST strip TEST FIVE TIMES DAILY Yes Reena Santiago MD   Multiple Vitamins-Minerals (MULTIPLE VITAMINS/WOMENS PO) Take 1 tablet by mouth daily  Yes Historical Provider, MD   aspirin 81 MG tablet Take 81 mg by mouth daily 3 days a wk Yes Historical Provider, MD   Zinc Sulfate (ZINC 15 PO) Zinc  Patient not taking: Reported on 3/16/2023  Historical Provider, MD       Review of Systems   Constitutional:  Negative for activity change and fever. HENT:  Negative for congestion, ear pain and sore throat. Respiratory:  Negative for cough, chest tightness and shortness of breath. Cardiovascular:  Negative for chest pain. Gastrointestinal:  Positive for diarrhea. Negative for abdominal pain, nausea and vomiting. Genitourinary:  Negative for frequency and urgency. Musculoskeletal:  Negative for arthralgias and myalgias. Skin:  Negative for color change. Neurological:  Negative for dizziness, weakness and numbness. Psychiatric/Behavioral:  Negative for agitation. The patient is not nervous/anxious. BP (!) 140/80 (Site: Left Upper Arm, Position: Sitting, Cuff Size: Large Adult)   Pulse 75   Temp 97.2 °F (36.2 °C) (Temporal)   Ht 5' 7\" (1.702 m)   Wt (!) 389 lb 9.6 oz (176.7 kg)   LMP 01/01/2002   SpO2 98%   BMI 61.02 kg/m²    Physical Exam  Constitutional:       Appearance: Normal appearance. HENT:      Head: Normocephalic. Cardiovascular:      Rate and Rhythm: Normal rate and regular rhythm. Pulses: Normal pulses. Heart sounds: Normal heart sounds. Pulmonary:      Effort: Pulmonary effort is normal.      Breath sounds: Normal breath sounds. Abdominal:      General: Bowel sounds are normal.      Palpations: Abdomen is soft. Skin:     General: Skin is warm and dry. Neurological:      General: No focal deficit present. Mental Status: She is alert. Psychiatric:         Mood and Affect: Mood normal.         Behavior: Behavior normal.         Thought Content:  Thought content normal.         Judgment: Judgment normal.       Electronically signed by CHERYLE Lawson CNP on 3/16/2023 at 12:47 PM.

## 2023-03-17 NOTE — TELEPHONE ENCOUNTER
[facility] 77. called to request a refill on her medication.       Last office visit : 3/16/2023   Next office visit : 3/30/2023     Requested Prescriptions     Pending Prescriptions Disp Refills    Continuous Blood Gluc Transmit (DEXCOM G6 TRANSMITTER) MISC [Pharmacy Med Name: Wagnerlokesh Swathi 1 each 3     Sig: USE AS DIRECTED EVERY 3 MONTHS            Susan Hunt LPN

## 2023-03-20 RX ORDER — PROCHLORPERAZINE 25 MG/1
SUPPOSITORY RECTAL
Qty: 1 EACH | Refills: 3 | Status: SHIPPED | OUTPATIENT
Start: 2023-03-20

## 2023-03-21 ENCOUNTER — TELEPHONE (OUTPATIENT)
Dept: PRIMARY CARE CLINIC | Age: 63
End: 2023-03-21

## 2023-03-21 NOTE — TELEPHONE ENCOUNTER
----- Message from CHERYLE Nye CNP sent at 3/21/2023  7:41 AM CDT -----  Please notify patient of negative result

## 2023-03-21 NOTE — TELEPHONE ENCOUNTER
----- Message from CHERYLE Lloyd CNP sent at 3/21/2023  7:41 AM CDT -----  Please notify patient of negative result

## 2023-03-24 DIAGNOSIS — E11.42 TYPE 2 DIABETES MELLITUS WITH DIABETIC POLYNEUROPATHY, WITH LONG-TERM CURRENT USE OF INSULIN (HCC): ICD-10-CM

## 2023-03-24 DIAGNOSIS — Z79.4 TYPE 2 DIABETES MELLITUS WITH DIABETIC POLYNEUROPATHY, WITH LONG-TERM CURRENT USE OF INSULIN (HCC): ICD-10-CM

## 2023-03-24 DIAGNOSIS — E60 ZINC DEFICIENCY: ICD-10-CM

## 2023-03-24 LAB
ALBUMIN SERPL-MCNC: 3.8 G/DL (ref 3.5–5.2)
ALP SERPL-CCNC: 72 U/L (ref 35–104)
ALT SERPL-CCNC: 20 U/L (ref 5–33)
ANION GAP SERPL CALCULATED.3IONS-SCNC: 11 MMOL/L (ref 7–19)
AST SERPL-CCNC: 20 U/L (ref 5–32)
BILIRUB SERPL-MCNC: 0.7 MG/DL (ref 0.2–1.2)
BUN SERPL-MCNC: 22 MG/DL (ref 8–23)
CALCIUM SERPL-MCNC: 9.8 MG/DL (ref 8.8–10.2)
CHLORIDE SERPL-SCNC: 103 MMOL/L (ref 98–111)
CO2 SERPL-SCNC: 27 MMOL/L (ref 22–29)
CREAT SERPL-MCNC: 1.4 MG/DL (ref 0.5–0.9)
GLUCOSE SERPL-MCNC: 144 MG/DL (ref 74–109)
HBA1C MFR BLD: 7 % (ref 4–6)
POTASSIUM SERPL-SCNC: 4.2 MMOL/L (ref 3.5–5)
PROT SERPL-MCNC: 7.4 G/DL (ref 6.6–8.7)
SODIUM SERPL-SCNC: 141 MMOL/L (ref 136–145)

## 2023-03-26 LAB — ZINC SERPL-MCNC: 77.9 UG/DL (ref 60–120)

## 2023-03-29 PROBLEM — N18.31 CHRONIC KIDNEY DISEASE, STAGE 3A (HCC): Status: ACTIVE | Noted: 2023-03-29

## 2023-03-29 NOTE — PROGRESS NOTES
[address] [facility] CARE  [address] [facility] [address] [address]  Dept: [phone]  Dept Fax: [phone]  Loc: [phone]    Joana Baxter is a 59 y.o. female who presents today for her medical conditions/complaints as noted below. Joana Baxter is here for 3 Month Follow-Up        HPI:   CC: Here today to discuss the following:    She was in the office on March 16 with diarrhea.  -She was given Zofran as needed and advised to stick with a bland diet and follow-up today to discuss her response  - Overall she is feels she is doing much better. No more diarrhea.  -Of note, she was not experiencing diarrhea when she initiated Ozempic. Diabetes Mellitus  Has been compliant with medications. No side effects of medications since last visit. No polyuria, polydipsia, or vision changes since last visit. No symptomatic episodes of hypoglycemia. Hyperlipidemia  Tolerating cholesterol medication without adverse effects. Hypertension  Compliant with medications. No adverse effects from medication. No lightheadedness, palpitations, or chest pain. Hypothyroidism  Symptoms are stable. No temperature intolerance, fatigue, or mood disturbance from baseline. Complaint with current medication. HPI    Subjective:      Review of Systems  Review of Systems   Constitutional: Negative for chills and fever. HENT: Negative for congestion. Respiratory: Negative for cough, chest tightness and shortness of breath. Cardiovascular: Negative for chest pain, palpitations and leg swelling. Gastrointestinal: Negative for abdominal pain, anal bleeding, constipation, diarrhea and nausea. SeeHPI for visit specific review of symptoms.   All others negative      Objective:   /86   Pulse 74   Temp 97.9 °F (36.6 °C)   Wt (!) 397 lb (180.1 kg)   LMP 01/01/2002   SpO2 98%   BMI 62.18 kg/m²   Physical Exam  Physical Exam   Constitutional: She

## 2023-03-30 ENCOUNTER — OFFICE VISIT (OUTPATIENT)
Dept: PRIMARY CARE CLINIC | Age: 63
End: 2023-03-30
Payer: COMMERCIAL

## 2023-03-30 VITALS
BODY MASS INDEX: 62.18 KG/M2 | DIASTOLIC BLOOD PRESSURE: 86 MMHG | WEIGHT: 293 LBS | SYSTOLIC BLOOD PRESSURE: 134 MMHG | OXYGEN SATURATION: 98 % | HEART RATE: 74 BPM | TEMPERATURE: 97.9 F

## 2023-03-30 DIAGNOSIS — E78.00 HYPERCHOLESTEROLEMIA: ICD-10-CM

## 2023-03-30 DIAGNOSIS — K21.9 GASTROESOPHAGEAL REFLUX DISEASE WITHOUT ESOPHAGITIS: ICD-10-CM

## 2023-03-30 DIAGNOSIS — E11.42 TYPE 2 DIABETES MELLITUS WITH DIABETIC POLYNEUROPATHY, WITH LONG-TERM CURRENT USE OF INSULIN (HCC): ICD-10-CM

## 2023-03-30 DIAGNOSIS — Z79.4 TYPE 2 DIABETES MELLITUS WITH DIABETIC POLYNEUROPATHY, WITH LONG-TERM CURRENT USE OF INSULIN (HCC): ICD-10-CM

## 2023-03-30 DIAGNOSIS — E03.9 PRIMARY HYPOTHYROIDISM: ICD-10-CM

## 2023-03-30 DIAGNOSIS — N18.31 CHRONIC KIDNEY DISEASE, STAGE 3A (HCC): ICD-10-CM

## 2023-03-30 DIAGNOSIS — I10 ESSENTIAL (PRIMARY) HYPERTENSION: Primary | ICD-10-CM

## 2023-03-30 PROCEDURE — 3079F DIAST BP 80-89 MM HG: CPT | Performed by: FAMILY MEDICINE

## 2023-03-30 PROCEDURE — 99214 OFFICE O/P EST MOD 30 MIN: CPT | Performed by: FAMILY MEDICINE

## 2023-03-30 PROCEDURE — 3051F HG A1C>EQUAL 7.0%<8.0%: CPT | Performed by: FAMILY MEDICINE

## 2023-03-30 PROCEDURE — 3075F SYST BP GE 130 - 139MM HG: CPT | Performed by: FAMILY MEDICINE

## 2023-04-23 DIAGNOSIS — I10 ESSENTIAL (PRIMARY) HYPERTENSION: ICD-10-CM

## 2023-04-25 RX ORDER — DILTIAZEM HYDROCHLORIDE 180 MG/1
CAPSULE, COATED, EXTENDED RELEASE ORAL
Qty: 90 CAPSULE | Refills: 3 | Status: SHIPPED | OUTPATIENT
Start: 2023-04-25

## 2023-05-02 ENCOUNTER — OFFICE VISIT (OUTPATIENT)
Dept: PRIMARY CARE CLINIC | Age: 63
End: 2023-05-02
Payer: COMMERCIAL

## 2023-05-02 VITALS
DIASTOLIC BLOOD PRESSURE: 70 MMHG | WEIGHT: 293 LBS | SYSTOLIC BLOOD PRESSURE: 95 MMHG | OXYGEN SATURATION: 96 % | HEART RATE: 76 BPM | TEMPERATURE: 97.7 F | BODY MASS INDEX: 60.61 KG/M2

## 2023-05-02 DIAGNOSIS — R19.7 DIARRHEA, UNSPECIFIED TYPE: Primary | ICD-10-CM

## 2023-05-02 DIAGNOSIS — N30.00 ACUTE CYSTITIS WITHOUT HEMATURIA: ICD-10-CM

## 2023-05-02 DIAGNOSIS — N18.31 CHRONIC KIDNEY DISEASE, STAGE 3A (HCC): ICD-10-CM

## 2023-05-02 DIAGNOSIS — N17.9 AKI (ACUTE KIDNEY INJURY) (HCC): ICD-10-CM

## 2023-05-02 DIAGNOSIS — R19.7 DIARRHEA, UNSPECIFIED TYPE: ICD-10-CM

## 2023-05-02 LAB
ALBUMIN SERPL-MCNC: 3.9 G/DL (ref 3.5–5.2)
ALP SERPL-CCNC: 73 U/L (ref 35–104)
ALT SERPL-CCNC: 32 U/L (ref 5–33)
ANION GAP SERPL CALCULATED.3IONS-SCNC: 13 MMOL/L (ref 7–19)
AST SERPL-CCNC: 26 U/L (ref 5–32)
BACTERIA URNS QL MICRO: ABNORMAL /HPF
BASOPHILS # BLD: 0.1 K/UL (ref 0–0.2)
BASOPHILS NFR BLD: 0.5 % (ref 0–1)
BILIRUB SERPL-MCNC: 0.7 MG/DL (ref 0.2–1.2)
BILIRUB UR QL STRIP: ABNORMAL
BUN SERPL-MCNC: 15 MG/DL (ref 8–23)
C DIFF TOX A+B STL QL IA: NORMAL
CALCIUM SERPL-MCNC: 10.3 MG/DL (ref 8.8–10.2)
CHLORIDE SERPL-SCNC: 104 MMOL/L (ref 98–111)
CLARITY UR: ABNORMAL
CO2 SERPL-SCNC: 23 MMOL/L (ref 22–29)
COLOR UR: ABNORMAL
CREAT SERPL-MCNC: 1.6 MG/DL (ref 0.5–0.9)
CRYPTOSP AG STL QL IA: NORMAL
CRYSTALS URNS MICRO: ABNORMAL /HPF
EOSINOPHIL # BLD: 0.2 K/UL (ref 0–0.6)
EOSINOPHIL NFR BLD: 1.6 % (ref 0–5)
EPI CELLS #/AREA URNS AUTO: 36 /HPF (ref 0–5)
ERYTHROCYTE [DISTWIDTH] IN BLOOD BY AUTOMATED COUNT: 13.9 % (ref 11.5–14.5)
G LAMBLIA AG STL QL IA: NORMAL
GLUCOSE SERPL-MCNC: 144 MG/DL (ref 74–109)
GLUCOSE UR STRIP.AUTO-MCNC: NEGATIVE MG/DL
HCT VFR BLD AUTO: 40.7 % (ref 37–47)
HGB BLD-MCNC: 13.3 G/DL (ref 12–16)
HGB UR STRIP.AUTO-MCNC: NEGATIVE MG/L
HYALINE CASTS #/AREA URNS AUTO: 21 /HPF (ref 0–8)
IMM GRANULOCYTES # BLD: 0 K/UL
KETONES UR STRIP.AUTO-MCNC: ABNORMAL MG/DL
LEUKOCYTE ESTERASE UR QL STRIP.AUTO: ABNORMAL
LYMPHOCYTES # BLD: 1.5 K/UL (ref 1.1–4.5)
LYMPHOCYTES NFR BLD: 15.9 % (ref 20–40)
MCH RBC QN AUTO: 29.9 PG (ref 27–31)
MCHC RBC AUTO-ENTMCNC: 32.7 G/DL (ref 33–37)
MCV RBC AUTO: 91.5 FL (ref 81–99)
MONOCYTES # BLD: 0.6 K/UL (ref 0–0.9)
MONOCYTES NFR BLD: 6.8 % (ref 0–10)
NEUTROPHILS # BLD: 7.1 K/UL (ref 1.5–7.5)
NEUTS SEG NFR BLD: 75 % (ref 50–65)
NITRITE UR QL STRIP.AUTO: NEGATIVE
PH UR STRIP.AUTO: 5.5 [PH] (ref 5–8)
PLATELET # BLD AUTO: 243 K/UL (ref 130–400)
PMV BLD AUTO: 11.2 FL (ref 9.4–12.3)
POTASSIUM SERPL-SCNC: 4.1 MMOL/L (ref 3.5–5)
PROT SERPL-MCNC: 7.4 G/DL (ref 6.6–8.7)
PROT UR STRIP.AUTO-MCNC: ABNORMAL MG/DL
RBC # BLD AUTO: 4.45 M/UL (ref 4.2–5.4)
RBC #/AREA URNS AUTO: 3 /HPF (ref 0–4)
SODIUM SERPL-SCNC: 140 MMOL/L (ref 136–145)
SP GR UR STRIP.AUTO: 1.02 (ref 1–1.03)
UROBILINOGEN UR STRIP.AUTO-MCNC: 0.2 E.U./DL
WBC # BLD AUTO: 9.5 K/UL (ref 4.8–10.8)
WBC #/AREA URNS AUTO: 14 /HPF (ref 0–5)

## 2023-05-02 PROCEDURE — 99214 OFFICE O/P EST MOD 30 MIN: CPT | Performed by: FAMILY MEDICINE

## 2023-05-02 PROCEDURE — 3078F DIAST BP <80 MM HG: CPT | Performed by: FAMILY MEDICINE

## 2023-05-02 PROCEDURE — 3074F SYST BP LT 130 MM HG: CPT | Performed by: FAMILY MEDICINE

## 2023-05-02 RX ORDER — SULFAMETHOXAZOLE AND TRIMETHOPRIM 800; 160 MG/1; MG/1
1 TABLET ORAL 2 TIMES DAILY
Qty: 14 TABLET | Refills: 0 | Status: SHIPPED | OUTPATIENT
Start: 2023-05-02 | End: 2023-05-09

## 2023-05-02 ASSESSMENT — ENCOUNTER SYMPTOMS
NAUSEA: 0
SHORTNESS OF BREATH: 0
DIARRHEA: 1
ANAL BLEEDING: 0
CHEST TIGHTNESS: 0
ABDOMINAL PAIN: 1
COUGH: 0
CONSTIPATION: 0

## 2023-05-02 NOTE — PROGRESS NOTES
[address] [facility] CARE  [address] [place] [id number] [address] [address]  Dept: [phone]  Dept Fax: [phone]  Loc: [phone]    Joleen Gilmore is a 59 y.o. female who presents today for her medical conditions/complaints as noted below. Joleen Gilmore is here for Abdominal Cramping, Diarrhea, Other (Pt states she's been having cramping chills and diarrhea since Sunday morning. Pt has not been eating much but states she doesn't feel sick and has not been running a fever.), and Chills        HPI:   CC: Here today to discuss the following:    She was here on March 16 with diarrhea. She was taking Imodium and Zofran.  -She followed up on March 30 and the diarrhea had improved. - \"this is a whole different thing\". - -- last time it was \"forceful\" and \"water\". - this time it's more stomach cramping.    - Location: diffuse lower quadrants. - Timing: comes intermittently  - Severity: mild to moderate   - Quality: crampy  - Worsening Factors: no obvious triggers such as eating.   - Relieving Factors: nothing seems to help. She was in the bathroom most of the night, last night.           _______________________________________________________________        HPI    Subjective:      Review of Systems   Constitutional:  Positive for fatigue. Negative for chills and fever. Respiratory:  Negative for cough, chest tightness and shortness of breath. Cardiovascular:  Negative for chest pain, palpitations and leg swelling. Gastrointestinal:  Positive for abdominal pain and diarrhea. Negative for anal bleeding, constipation and nausea. Genitourinary:  Negative for difficulty urinating. Psychiatric/Behavioral: Negative. SeeHPI for visit specific review of symptoms.   All others negative      Objective:   BP 95/70   Pulse 76   Temp 97.7 °F (36.5 °C)   Wt (!) 387 lb (175.5 kg)   LMP 01/01/2002   SpO2 96%   BMI 60.61 kg/m²   Physical Exam  Physical

## 2023-05-02 NOTE — PATIENT INSTRUCTIONS
Patient Instructions  We will contact you when the labs are available. -If you are able to obtain a loose watery stool specimen, bring it back to the lab since possible. Increase water intake to at least 80 ounces per day. You may back off your basal insulin, Levemir, while your diarrhea persists.

## 2023-05-04 LAB
BACTERIA STL CULT: NORMAL
C JEJUNI+C COLI AG STL QL: NORMAL
E COLI SXT STL QL IA: NORMAL

## 2023-05-09 ENCOUNTER — OFFICE VISIT (OUTPATIENT)
Dept: PRIMARY CARE CLINIC | Age: 63
End: 2023-05-09
Payer: COMMERCIAL

## 2023-05-09 VITALS
DIASTOLIC BLOOD PRESSURE: 88 MMHG | OXYGEN SATURATION: 98 % | TEMPERATURE: 97.9 F | SYSTOLIC BLOOD PRESSURE: 130 MMHG | BODY MASS INDEX: 45.99 KG/M2 | HEIGHT: 67 IN | HEART RATE: 70 BPM | WEIGHT: 293 LBS

## 2023-05-09 DIAGNOSIS — R30.0 DYSURIA: Primary | ICD-10-CM

## 2023-05-09 LAB
APPEARANCE FLUID: CLEAR
BILIRUBIN, POC: NORMAL
BLOOD URINE, POC: NORMAL
CLARITY, POC: CLEAR
COLOR, POC: YELLOW
GLUCOSE URINE, POC: NORMAL
KETONES, POC: NORMAL
LEUKOCYTE EST, POC: NORMAL
NITRITE, POC: NORMAL
PH, POC: 6
PROTEIN, POC: NORMAL
SPECIFIC GRAVITY, POC: 1.01
UROBILINOGEN, POC: NORMAL

## 2023-05-09 PROCEDURE — 81002 URINALYSIS NONAUTO W/O SCOPE: CPT | Performed by: NURSE PRACTITIONER

## 2023-05-09 PROCEDURE — 3074F SYST BP LT 130 MM HG: CPT | Performed by: NURSE PRACTITIONER

## 2023-05-09 PROCEDURE — 3078F DIAST BP <80 MM HG: CPT | Performed by: NURSE PRACTITIONER

## 2023-05-09 PROCEDURE — 99213 OFFICE O/P EST LOW 20 MIN: CPT | Performed by: NURSE PRACTITIONER

## 2023-05-11 ENCOUNTER — OFFICE VISIT (OUTPATIENT)
Dept: PRIMARY CARE CLINIC | Age: 63
End: 2023-05-11
Payer: COMMERCIAL

## 2023-05-11 VITALS
WEIGHT: 293 LBS | TEMPERATURE: 97 F | OXYGEN SATURATION: 97 % | SYSTOLIC BLOOD PRESSURE: 118 MMHG | BODY MASS INDEX: 45.99 KG/M2 | HEART RATE: 70 BPM | HEIGHT: 67 IN | DIASTOLIC BLOOD PRESSURE: 74 MMHG

## 2023-05-11 DIAGNOSIS — R09.81 NASAL CONGESTION: Primary | ICD-10-CM

## 2023-05-11 LAB
BACTERIA UR CULT: ABNORMAL
BACTERIA UR CULT: ABNORMAL
ORGANISM: ABNORMAL

## 2023-05-11 PROCEDURE — 99213 OFFICE O/P EST LOW 20 MIN: CPT | Performed by: NURSE PRACTITIONER

## 2023-05-11 PROCEDURE — 3074F SYST BP LT 130 MM HG: CPT | Performed by: NURSE PRACTITIONER

## 2023-05-11 PROCEDURE — 3078F DIAST BP <80 MM HG: CPT | Performed by: NURSE PRACTITIONER

## 2023-05-13 ENCOUNTER — OFFICE VISIT (OUTPATIENT)
Age: 63
End: 2023-05-13
Payer: COMMERCIAL

## 2023-05-13 VITALS
SYSTOLIC BLOOD PRESSURE: 130 MMHG | RESPIRATION RATE: 18 BRPM | OXYGEN SATURATION: 96 % | BODY MASS INDEX: 44.41 KG/M2 | HEIGHT: 68 IN | TEMPERATURE: 97.7 F | HEART RATE: 79 BPM | WEIGHT: 293 LBS | DIASTOLIC BLOOD PRESSURE: 80 MMHG

## 2023-05-13 DIAGNOSIS — Z11.52 ENCOUNTER FOR SCREENING FOR COVID-19: ICD-10-CM

## 2023-05-13 DIAGNOSIS — J06.9 VIRAL URI WITH COUGH: Primary | ICD-10-CM

## 2023-05-13 LAB — SARS-COV-2 N GENE RESP QL NAA+PROBE: DETECTED

## 2023-05-13 PROCEDURE — 99212 OFFICE O/P EST SF 10 MIN: CPT

## 2023-05-13 PROCEDURE — 3075F SYST BP GE 130 - 139MM HG: CPT

## 2023-05-13 PROCEDURE — 3079F DIAST BP 80-89 MM HG: CPT

## 2023-05-13 NOTE — PROGRESS NOTES
Laurie Rowland presents to the clinic today requesting COVID-19 testing. She complains of cough and congestion. She had a positive home SSAXA22 test and requests confirmation on PCR testing for her work. She has not had positive exposure that she is aware of. On exam, patient appears in no acute distress. Speech is clear. Breathing is unlabored. Moves all extremities and is ambulatory. We will order a COVID test today to be performed in clinic. The patient and appropriate authorities will be informed of the results. She should quarantine at home until results are returned. If she tests positive, she should quarantine for a total of 10 days after symptoms began and 24 hours after fever resolves without fever reducing medications per CDC guidelines. Patient was advised that even if asymptomatic, after a positive result she should quarantine for 10 days per [facility] guidelines. Patient left in stable condition. Total of 15 minutes spent, which includes face to face with patient, record review, evaluation, planning, and education as well as coordination of her care.

## 2023-05-15 PROBLEM — R30.0 DYSURIA: Status: ACTIVE | Noted: 2023-05-15

## 2023-05-15 NOTE — ASSESSMENT & PLAN NOTE
Patient here today with concerns of increased urine frequency and nocturia over the past 2-3 days. She reports that she has recently been ill with GI issues, and just completed treatment for a urinary tract infection. She states she has slight burning with urination, but denies any associated fever or chills. Urinalysis in office is unremarkable, but will see send for culture. Encouraged patient to continue to increase hydration and will treat based on the results. Recommend patient to call or return to clinic if not improving.

## 2023-05-15 NOTE — PROGRESS NOTES
2023     [facility] 77. (:  1960) is a 59 y.o. female,Established patient, here for evaluation of the following chief complaint(s):  Dysuria, Nausea, and Fatigue      ASSESSMENT/PLAN:  1. Dysuria  Assessment & Plan:   Patient here today with concerns of increased urine frequency and nocturia over the past 2-3 days. She reports that she has recently been ill with GI issues, and just completed treatment for a urinary tract infection. She states she has slight burning with urination, but denies any associated fever or chills. Urinalysis in office is unremarkable, but will see send for culture. Encouraged patient to continue to increase hydration and will treat based on the results. Recommend patient to call or return to clinic if not improving. Orders:  -     POCT Urinalysis no Micro      Return if symptoms worsen or fail to improve. SUBJECTIVE/OBJECTIVE:  Dysuria   Associated symptoms include frequency. Pertinent negatives include no chills or hematuria. Fatigue  Associated symptoms include fatigue. Pertinent negatives include no chills or fever. Prior to Visit Medications    Medication Sig Taking?  Authorizing Provider   dilTIAZem (CARDIZEM CD) 180 MG extended release capsule TAKE 1 CAPSULE BY MOUTH DAILY Yes Romario Talavera MD   Semaglutide, 2 MG/DOSE, 8 MG/3ML SOPN Inject 2 mg into the skin every 7 days Yes Romario Talavera MD   Continuous Blood Gluc Transmit (DEXCOM G6 TRANSMITTER) MISC USE AS DIRECTED EVERY 3 MONTHS Yes Romario Talavera MD   ondansetron (ZOFRAN) 8 MG tablet Take 1 tablet by mouth every 8 hours as needed for Nausea or Other (diarrhea) Yes CHERYLE Coulter CNP   Continuous Blood Gluc Sensor (DEXCOM G6 SENSOR) MISC APPLY TO SKIN AS DIRECTED Yes Romario Talavera MD   levothyroxine (SYNTHROID) 137 MCG tablet TAKE 1 TABLET BY MOUTH DAILY Yes Romario Talavera MD   insulin detemir (LEVEMIR FLEXTOUCH) 100 UNIT/ML injection pen Inject 20 Units into the skin

## 2023-05-18 ASSESSMENT — ENCOUNTER SYMPTOMS
SORE THROAT: 0
NAUSEA: 0
COLOR CHANGE: 0
COUGH: 1
SHORTNESS OF BREATH: 0
CHEST TIGHTNESS: 0
VOMITING: 0
ABDOMINAL PAIN: 0
DIARRHEA: 0

## 2023-05-18 NOTE — PROGRESS NOTES
TEST FIVE TIMES DAILY Yes Marlan Councilman, MD   Multiple Vitamins-Minerals (MULTIPLE VITAMINS/WOMENS PO) Take 1 tablet by mouth daily  Yes Historical Provider, MD   aspirin 81 MG tablet Take 1 tablet by mouth daily Indications: Monday, Wednesday, Friday in the evening 3 days a wk Yes Historical Provider, MD   glucagon 1 MG injection Inject 1 mg into the muscle once for 1 dose  Marlan Councilman, MD       Review of Systems   Constitutional:  Positive for fatigue. Negative for activity change and fever. HENT:  Positive for congestion. Negative for ear pain and sore throat. Respiratory:  Positive for cough. Negative for chest tightness and shortness of breath. Cardiovascular:  Negative for chest pain. Gastrointestinal:  Negative for abdominal pain, diarrhea, nausea and vomiting. Genitourinary:  Negative for frequency and urgency. Musculoskeletal:  Negative for arthralgias and myalgias. Skin:  Negative for color change. Neurological:  Negative for dizziness, weakness and numbness. Psychiatric/Behavioral:  Negative for agitation. The patient is not nervous/anxious. /74 (Site: Left Upper Arm, Position: Sitting, Cuff Size: Large Adult)   Pulse 70   Temp 97 °F (36.1 °C) (Temporal)   Ht 5' 7\" (1.702 m)   Wt (!) 383 lb 6.4 oz (173.9 kg)   LMP 01/01/2002   SpO2 97%   BMI 60.05 kg/m²    Physical Exam  Constitutional:       Appearance: Normal appearance. HENT:      Head: Normocephalic. Right Ear: Tympanic membrane normal.      Left Ear: Tympanic membrane normal.      Nose: Congestion and rhinorrhea present. Mouth/Throat:      Mouth: Mucous membranes are moist.      Pharynx: Oropharynx is clear. Eyes:      Extraocular Movements: Extraocular movements intact. Pupils: Pupils are equal, round, and reactive to light. Cardiovascular:      Rate and Rhythm: Normal rate and regular rhythm. Pulses: Normal pulses. Heart sounds: Normal heart sounds.    Pulmonary:

## 2023-05-23 DIAGNOSIS — K44.9 HIATAL HERNIA: ICD-10-CM

## 2023-05-23 RX ORDER — PANTOPRAZOLE SODIUM 40 MG/1
40 TABLET, DELAYED RELEASE ORAL DAILY
Qty: 90 TABLET | Refills: 1 | Status: SHIPPED | OUTPATIENT
Start: 2023-05-23

## 2023-06-23 DIAGNOSIS — Z79.4 TYPE 2 DIABETES MELLITUS WITH DIABETIC POLYNEUROPATHY, WITH LONG-TERM CURRENT USE OF INSULIN (HCC): ICD-10-CM

## 2023-06-23 DIAGNOSIS — E03.9 PRIMARY HYPOTHYROIDISM: ICD-10-CM

## 2023-06-23 DIAGNOSIS — E11.42 TYPE 2 DIABETES MELLITUS WITH DIABETIC POLYNEUROPATHY, WITH LONG-TERM CURRENT USE OF INSULIN (HCC): ICD-10-CM

## 2023-06-23 LAB
ALBUMIN SERPL-MCNC: 3.8 G/DL (ref 3.5–5.2)
ALP SERPL-CCNC: 74 U/L (ref 35–104)
ALT SERPL-CCNC: 18 U/L (ref 5–33)
ANION GAP SERPL CALCULATED.3IONS-SCNC: 13 MMOL/L (ref 7–19)
AST SERPL-CCNC: 18 U/L (ref 5–32)
BILIRUB SERPL-MCNC: 0.5 MG/DL (ref 0.2–1.2)
BUN SERPL-MCNC: 23 MG/DL (ref 8–23)
CALCIUM SERPL-MCNC: 10.1 MG/DL (ref 8.8–10.2)
CHLORIDE SERPL-SCNC: 103 MMOL/L (ref 98–111)
CO2 SERPL-SCNC: 25 MMOL/L (ref 22–29)
CREAT SERPL-MCNC: 1.4 MG/DL (ref 0.5–0.9)
GLUCOSE SERPL-MCNC: 157 MG/DL (ref 74–109)
HBA1C MFR BLD: 6.8 % (ref 4–6)
POTASSIUM SERPL-SCNC: 4.5 MMOL/L (ref 3.5–5)
PROT SERPL-MCNC: 7.2 G/DL (ref 6.6–8.7)
SODIUM SERPL-SCNC: 141 MMOL/L (ref 136–145)
T4 FREE SERPL-MCNC: 1.34 NG/DL (ref 0.93–1.7)
TSH SERPL DL<=0.005 MIU/L-ACNC: 1.08 UIU/ML (ref 0.27–4.2)

## 2023-06-30 ENCOUNTER — OFFICE VISIT (OUTPATIENT)
Dept: PRIMARY CARE CLINIC | Age: 63
End: 2023-06-30
Payer: COMMERCIAL

## 2023-06-30 VITALS
TEMPERATURE: 96.9 F | SYSTOLIC BLOOD PRESSURE: 116 MMHG | HEART RATE: 83 BPM | BODY MASS INDEX: 57.93 KG/M2 | DIASTOLIC BLOOD PRESSURE: 80 MMHG | OXYGEN SATURATION: 98 % | WEIGHT: 293 LBS

## 2023-06-30 DIAGNOSIS — K21.9 GASTROESOPHAGEAL REFLUX DISEASE WITHOUT ESOPHAGITIS: ICD-10-CM

## 2023-06-30 DIAGNOSIS — N18.31 CHRONIC KIDNEY DISEASE, STAGE 3A (HCC): ICD-10-CM

## 2023-06-30 DIAGNOSIS — Z79.4 TYPE 2 DIABETES MELLITUS WITH DIABETIC POLYNEUROPATHY, WITH LONG-TERM CURRENT USE OF INSULIN (HCC): Primary | ICD-10-CM

## 2023-06-30 DIAGNOSIS — Z99.89 CPAP (CONTINUOUS POSITIVE AIRWAY PRESSURE) DEPENDENCE: ICD-10-CM

## 2023-06-30 DIAGNOSIS — E11.42 TYPE 2 DIABETES MELLITUS WITH DIABETIC POLYNEUROPATHY, WITH LONG-TERM CURRENT USE OF INSULIN (HCC): Primary | ICD-10-CM

## 2023-06-30 DIAGNOSIS — E78.00 HYPERCHOLESTEROLEMIA: ICD-10-CM

## 2023-06-30 DIAGNOSIS — E03.9 PRIMARY HYPOTHYROIDISM: ICD-10-CM

## 2023-06-30 DIAGNOSIS — I10 PRIMARY HYPERTENSION: ICD-10-CM

## 2023-06-30 PROCEDURE — 99214 OFFICE O/P EST MOD 30 MIN: CPT | Performed by: FAMILY MEDICINE

## 2023-06-30 PROCEDURE — 3044F HG A1C LEVEL LT 7.0%: CPT | Performed by: FAMILY MEDICINE

## 2023-06-30 PROCEDURE — 3074F SYST BP LT 130 MM HG: CPT | Performed by: FAMILY MEDICINE

## 2023-06-30 PROCEDURE — 3079F DIAST BP 80-89 MM HG: CPT | Performed by: FAMILY MEDICINE

## 2023-06-30 RX ORDER — ATENOLOL 50 MG/1
TABLET ORAL
Qty: 90 TABLET | Refills: 3 | Status: SHIPPED | OUTPATIENT
Start: 2023-06-30

## 2023-06-30 RX ORDER — ATORVASTATIN CALCIUM 10 MG/1
40 TABLET, FILM COATED ORAL DAILY
Qty: 90 TABLET | Refills: 3 | Status: SHIPPED | OUTPATIENT
Start: 2023-06-30

## 2023-06-30 RX ORDER — PERPHENAZINE 16 MG/1
TABLET, FILM COATED ORAL
Qty: 500 STRIP | Refills: 5 | Status: SHIPPED | OUTPATIENT
Start: 2023-06-30

## 2023-06-30 RX ORDER — LEVOTHYROXINE SODIUM 137 UG/1
137 TABLET ORAL DAILY
Qty: 90 TABLET | Refills: 1 | Status: SHIPPED | OUTPATIENT
Start: 2023-06-30

## 2023-06-30 RX ORDER — LISINOPRIL 40 MG/1
40 TABLET ORAL DAILY
Qty: 90 TABLET | Refills: 3 | Status: SHIPPED | OUTPATIENT
Start: 2023-06-30

## 2023-07-03 ENCOUNTER — TELEPHONE (OUTPATIENT)
Dept: PRIMARY CARE CLINIC | Age: 63
End: 2023-07-03

## 2023-07-03 NOTE — TELEPHONE ENCOUNTER
----- Message from Enrique Gomez MD sent at 6/30/2023  5:45 PM CDT -----  Regarding: Diabetic shoes  Can you send the order to [facility] for diabetic shoes.   Thanks

## 2023-07-05 RX ORDER — ATORVASTATIN CALCIUM 40 MG/1
40 TABLET, FILM COATED ORAL DAILY
Qty: 90 TABLET | Refills: 1 | Status: SHIPPED | OUTPATIENT
Start: 2023-07-05

## 2023-07-05 NOTE — TELEPHONE ENCOUNTER
[address] called to request a refill on her medication.       Last office visit : 6/30/2023   Next office visit : 12/29/2023     Requested Prescriptions     Pending Prescriptions Disp Refills    atorvastatin (LIPITOR) 40 MG tablet 30 tablet 1     Sig: Take 1 tablet by mouth daily            Natividad Duncan MA

## 2023-07-27 DIAGNOSIS — E11.42 DIABETIC PERIPHERAL NEUROPATHY (HCC): ICD-10-CM

## 2023-09-01 ENCOUNTER — PATIENT MESSAGE (OUTPATIENT)
Dept: PRIMARY CARE CLINIC | Age: 63
End: 2023-09-01

## 2023-09-01 DIAGNOSIS — Z79.4 TYPE 2 DIABETES MELLITUS WITH DIABETIC POLYNEUROPATHY, WITH LONG-TERM CURRENT USE OF INSULIN (HCC): ICD-10-CM

## 2023-09-01 DIAGNOSIS — E11.42 TYPE 2 DIABETES MELLITUS WITH DIABETIC POLYNEUROPATHY, WITH LONG-TERM CURRENT USE OF INSULIN (HCC): ICD-10-CM

## 2023-09-01 NOTE — TELEPHONE ENCOUNTER
From: [place]  Sent: 9/1/2023 2:24 PM CDT  To: [facility]  Practice Staff  Subject: Levemir    I am taking 20 units nightly.

## 2023-09-13 DIAGNOSIS — E11.42 TYPE 2 DIABETES MELLITUS WITH DIABETIC POLYNEUROPATHY, WITH LONG-TERM CURRENT USE OF INSULIN (HCC): ICD-10-CM

## 2023-09-13 DIAGNOSIS — Z79.4 TYPE 2 DIABETES MELLITUS WITH DIABETIC POLYNEUROPATHY, WITH LONG-TERM CURRENT USE OF INSULIN (HCC): ICD-10-CM

## 2023-09-15 RX ORDER — INSULIN ASPART 100 [IU]/ML
INJECTION, SOLUTION INTRAVENOUS; SUBCUTANEOUS
Qty: 38 ML | Refills: 3 | Status: SHIPPED | OUTPATIENT
Start: 2023-09-15

## 2023-09-22 ENCOUNTER — OFFICE VISIT (OUTPATIENT)
Dept: PRIMARY CARE CLINIC | Age: 63
End: 2023-09-22
Payer: COMMERCIAL

## 2023-09-22 VITALS
BODY MASS INDEX: 44.41 KG/M2 | HEIGHT: 68 IN | OXYGEN SATURATION: 97 % | HEART RATE: 69 BPM | WEIGHT: 293 LBS | DIASTOLIC BLOOD PRESSURE: 80 MMHG | SYSTOLIC BLOOD PRESSURE: 118 MMHG | TEMPERATURE: 97.3 F
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
## 2023-09-22 PROCEDURE — 3078F DIAST BP <80 MM HG: CPT | Performed by: NURSE PRACTITIONER

## 2023-09-22 PROCEDURE — 99213 OFFICE O/P EST LOW 20 MIN: CPT | Performed by: NURSE PRACTITIONER

## 2023-09-22 PROCEDURE — 3074F SYST BP LT 130 MM HG: CPT | Performed by: NURSE PRACTITIONER

## 2023-09-22 RX ORDER — CYCLOBENZAPRINE HCL 5 MG
5 TABLET ORAL
Qty: 20 TABLET | Refills: 0 | Status: SHIPPED | OUTPATIENT
Start: 2023-09-22 | End: 2023-10-12
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
## 2023-10-01 ASSESSMENT — ENCOUNTER SYMPTOMS
DIARRHEA: 0
NAUSEA: 0
ABDOMINAL PAIN: 0
COUGH: 0
VOMITING: 0
CHEST TIGHTNESS: 0
COLOR CHANGE: 0
SHORTNESS OF BREATH: 0
SORE THROAT: 0
BACK PAIN: 1

## 2023-10-26 ENCOUNTER — HOSPITAL ENCOUNTER (OUTPATIENT)
Dept: GENERAL RADIOLOGY | Age: 63
Discharge: HOME | End: 2023-10-26
Payer: COMMERCIAL

## 2023-10-26 ENCOUNTER — OFFICE VISIT (OUTPATIENT)
Dept: PRIMARY CARE CLINIC | Age: 63
End: 2023-10-26
Payer: COMMERCIAL

## 2023-10-26 VITALS
DIASTOLIC BLOOD PRESSURE: 90 MMHG | HEIGHT: 67 IN | SYSTOLIC BLOOD PRESSURE: 118 MMHG | HEART RATE: 71 BPM | TEMPERATURE: 97 F | OXYGEN SATURATION: 99 % | BODY MASS INDEX: 45.99 KG/M2 | WEIGHT: 293 LBS

## 2023-10-26 DIAGNOSIS — M25.552 LEFT HIP PAIN: ICD-10-CM

## 2023-10-26 PROCEDURE — 3080F DIAST BP >= 90 MM HG: CPT | Performed by: NURSE PRACTITIONER

## 2023-10-26 PROCEDURE — 3074F SYST BP LT 130 MM HG: CPT | Performed by: NURSE PRACTITIONER

## 2023-10-26 PROCEDURE — 99213 OFFICE O/P EST LOW 20 MIN: CPT | Performed by: NURSE PRACTITIONER

## 2023-10-26 PROCEDURE — 73502 X-RAY EXAM HIP UNI 2-3 VIEWS: CPT

## 2023-10-26 RX ORDER — SODIUM BICARBONATE 325 MG/1
325 TABLET ORAL 2 TIMES DAILY
COMMUNITY
Start: 2023-09-01

## 2023-10-26 ASSESSMENT — ENCOUNTER SYMPTOMS
SHORTNESS OF BREATH: 0
DIARRHEA: 0
CHEST TIGHTNESS: 0
NAUSEA: 0
COLOR CHANGE: 0
SORE THROAT: 0
VOMITING: 0
ABDOMINAL PAIN: 0
COUGH: 0

## 2023-10-26 NOTE — PROGRESS NOTES
Musculoskeletal:      Left hip: Tenderness and bony tenderness present. Decreased range of motion. Skin:     General: Skin is warm and dry. Neurological:      Mental Status: She is alert and oriented to person, place, and time.    Psychiatric:         Mood and Affect: Mood normal.         Behavior: Behavior normal.         Electronically signed by CHERYLE Diaz CNP on 10/26/2023 at 10:27 AM.

## 2023-10-26 NOTE — ASSESSMENT & PLAN NOTE
Monday began having pain in right hip when she went to the car couldn't lift her leg into the car. Pain radiates down the left leg and is described as a \"soreness\". Patient admits she has been more sedentary recently, but no known injuries Pain is not increased with walking, but notes decreased range of motion. She has been taking Tylenol arthritis, which has not provided relief. She also took a prescribed muscle relaxer one night when she was having associated cramping in the left foot. She states this helped a little and allowed her to sleep. No notable color change, and only slight edema on exam. Patient feels as if the pain is slightly improving,but still has decreased range of motion. Will proceed today by obtaining imaging of the left hip, then will treat based on results.

## 2023-11-14 DIAGNOSIS — K44.9 HIATAL HERNIA: ICD-10-CM

## 2023-11-14 RX ORDER — PANTOPRAZOLE SODIUM 40 MG/1
40 TABLET, DELAYED RELEASE ORAL DAILY
Qty: 90 TABLET | Refills: 1 | Status: SHIPPED | OUTPATIENT
Start: 2023-11-14

## 2023-11-30 ENCOUNTER — OFFICE VISIT (OUTPATIENT)
Dept: NEUROLOGY | Age: 63
End: 2023-11-30
Payer: COMMERCIAL

## 2023-11-30 VITALS
HEART RATE: 70 BPM | WEIGHT: 293 LBS | BODY MASS INDEX: 45.99 KG/M2 | SYSTOLIC BLOOD PRESSURE: 132 MMHG | DIASTOLIC BLOOD PRESSURE: 84 MMHG | HEIGHT: 67 IN | OXYGEN SATURATION: 98 %

## 2023-11-30 DIAGNOSIS — Z99.89 CPAP (CONTINUOUS POSITIVE AIRWAY PRESSURE) DEPENDENCE: ICD-10-CM

## 2023-11-30 DIAGNOSIS — G47.61 PLMD (PERIODIC LIMB MOVEMENT DISORDER): ICD-10-CM

## 2023-11-30 DIAGNOSIS — G47.33 OSA (OBSTRUCTIVE SLEEP APNEA): Primary | ICD-10-CM

## 2023-11-30 PROCEDURE — 3079F DIAST BP 80-89 MM HG: CPT | Performed by: PHYSICIAN ASSISTANT

## 2023-11-30 PROCEDURE — 3075F SYST BP GE 130 - 139MM HG: CPT | Performed by: PHYSICIAN ASSISTANT

## 2023-11-30 PROCEDURE — 99213 OFFICE O/P EST LOW 20 MIN: CPT | Performed by: PHYSICIAN ASSISTANT

## 2023-11-30 NOTE — PROGRESS NOTES
REVIEW OF SYSTEMS    Constitutional: []Fever []Sweats []Chills [] Recent Injury   [x] Denies all unless marked  HENT:[]Headache  [] Head Injury  [] Sore Throat  [] Ear Pain  [] Dizziness [] Hearing Loss   [x] Denies all unless marked  Musculoskeletal: [] Arthralgia  [] Myalgias [] Muscle cramps  [] Muscle twitches   [x] Denies all unless marked   Spine:  [] Neck pain  [] Back pain  [] Sciatica  [x] Denies all unless marked  Neurological:[] Visual Disturbance [] Double Vision [] Slurred Speech [] Trouble swallowing  [] Vertigo [] Tingling [] Numbness [] Weakness [] Loss of Balance   [] Loss of Consciousness [] Memory Loss [] Seizures  [x] Denies all unless marked  Psychiatric/Behavioral:[] Depression [] Anxiety  [x] Denies all unless marked  Sleep: []  Insomnia [] Sleep Disturbance [] Snoring [] Restless Legs [] Daytime Sleepiness [x] Sleep Apnea  [] Denies all unless marked
per 6 months   Chinstrap  1 per 6 months   Disposable Filters  2 per 30 days   Reusable Filters  1 per 6 months     Diagnoses:  Obstructive sleep apnea (G47.33)  Length of Need: Lifetime, 99    Ordering Provider: Jocelyn Galvez PA-C  NPI:  [id number]

## 2023-12-07 ENCOUNTER — HOSPITAL ENCOUNTER (OUTPATIENT)
Dept: CT IMAGING | Age: 63
Discharge: HOME | End: 2023-12-07
Payer: COMMERCIAL

## 2023-12-07 ENCOUNTER — HOSPITAL ENCOUNTER (OUTPATIENT)
Dept: GENERAL RADIOLOGY | Age: 63
Discharge: HOME | End: 2023-12-07
Payer: COMMERCIAL

## 2023-12-07 DIAGNOSIS — E78.00 HYPERCHOLESTEROLEMIA: ICD-10-CM

## 2023-12-07 DIAGNOSIS — E03.9 PRIMARY HYPOTHYROIDISM: ICD-10-CM

## 2023-12-07 DIAGNOSIS — Z79.4 TYPE 2 DIABETES MELLITUS WITH DIABETIC POLYNEUROPATHY, WITH LONG-TERM CURRENT USE OF INSULIN (HCC): ICD-10-CM

## 2023-12-07 DIAGNOSIS — E11.42 TYPE 2 DIABETES MELLITUS WITH DIABETIC POLYNEUROPATHY, WITH LONG-TERM CURRENT USE OF INSULIN (HCC): ICD-10-CM

## 2023-12-07 DIAGNOSIS — C64.2 RENAL CELL CARCINOMA OF LEFT KIDNEY (HCC): ICD-10-CM

## 2023-12-07 DIAGNOSIS — I10 PRIMARY HYPERTENSION: ICD-10-CM

## 2023-12-07 LAB
ALBUMIN SERPL-MCNC: 3.6 G/DL (ref 3.5–5.2)
ALP SERPL-CCNC: 79 U/L (ref 35–104)
ALT SERPL-CCNC: 14 U/L (ref 5–33)
ANION GAP SERPL CALCULATED.3IONS-SCNC: 10 MMOL/L (ref 7–19)
AST SERPL-CCNC: 17 U/L (ref 5–32)
BASOPHILS # BLD: 0 K/UL (ref 0–0.2)
BASOPHILS NFR BLD: 0.6 % (ref 0–1)
BILIRUB SERPL-MCNC: 0.5 MG/DL (ref 0.2–1.2)
BUN SERPL-MCNC: 25 MG/DL (ref 8–23)
CALCIUM SERPL-MCNC: 10 MG/DL (ref 8.8–10.2)
CHLORIDE SERPL-SCNC: 104 MMOL/L (ref 98–111)
CHOLEST SERPL-MCNC: 115 MG/DL (ref 160–199)
CO2 SERPL-SCNC: 28 MMOL/L (ref 22–29)
CREAT SERPL-MCNC: 1.1 MG/DL (ref 0.5–0.9)
CREAT SERPL-MCNC: 1.5 MG/DL (ref 0.3–1.3)
EOSINOPHIL # BLD: 0.2 K/UL (ref 0–0.6)
EOSINOPHIL NFR BLD: 2.3 % (ref 0–5)
ERYTHROCYTE [DISTWIDTH] IN BLOOD BY AUTOMATED COUNT: 13.6 % (ref 11.5–14.5)
GLUCOSE SERPL-MCNC: 150 MG/DL (ref 74–109)
HBA1C MFR BLD: 6.6 % (ref 4–6)
HCT VFR BLD AUTO: 39.4 % (ref 37–47)
HDLC SERPL-MCNC: 47 MG/DL (ref 65–121)
HGB BLD-MCNC: 12.6 G/DL (ref 12–16)
IMM GRANULOCYTES # BLD: 0 K/UL
LDLC SERPL CALC-MCNC: 51 MG/DL
LYMPHOCYTES # BLD: 1.4 K/UL (ref 1.1–4.5)
LYMPHOCYTES NFR BLD: 20.3 % (ref 20–40)
MCH RBC QN AUTO: 29.9 PG (ref 27–31)
MCHC RBC AUTO-ENTMCNC: 32 G/DL (ref 33–37)
MCV RBC AUTO: 93.4 FL (ref 81–99)
MONOCYTES # BLD: 0.5 K/UL (ref 0–0.9)
MONOCYTES NFR BLD: 6.9 % (ref 0–10)
NEUTROPHILS # BLD: 4.6 K/UL (ref 1.5–7.5)
NEUTS SEG NFR BLD: 69.6 % (ref 50–65)
PLATELET # BLD AUTO: 216 K/UL (ref 130–400)
PMV BLD AUTO: 10.9 FL (ref 9.4–12.3)
POTASSIUM SERPL-SCNC: 4.5 MMOL/L (ref 3.5–5)
PROT SERPL-MCNC: 7.3 G/DL (ref 6.6–8.7)
RBC # BLD AUTO: 4.22 M/UL (ref 4.2–5.4)
SODIUM SERPL-SCNC: 142 MMOL/L (ref 136–145)
T4 FREE SERPL-MCNC: 1.57 NG/DL (ref 0.93–1.7)
TRIGL SERPL-MCNC: 83 MG/DL (ref 0–149)
TSH SERPL DL<=0.005 MIU/L-ACNC: 0.08 UIU/ML (ref 0.27–4.2)
WBC # BLD AUTO: 6.6 K/UL (ref 4.8–10.8)

## 2023-12-07 PROCEDURE — 82565 ASSAY OF CREATININE: CPT

## 2023-12-07 PROCEDURE — 71046 X-RAY EXAM CHEST 2 VIEWS: CPT

## 2023-12-07 PROCEDURE — 74178 CT ABD&PLV WO CNTR FLWD CNTR: CPT

## 2023-12-07 PROCEDURE — 6360000004 HC RX CONTRAST MEDICATION: Performed by: UROLOGY

## 2023-12-07 RX ADMIN — IOPAMIDOL 75 ML: 755 INJECTION, SOLUTION INTRAVENOUS at 08:54

## 2023-12-29 ENCOUNTER — OFFICE VISIT (OUTPATIENT)
Dept: PRIMARY CARE CLINIC | Age: 63
End: 2023-12-29
Payer: COMMERCIAL

## 2023-12-29 VITALS
TEMPERATURE: 98.6 F | WEIGHT: 293 LBS | HEIGHT: 67 IN | DIASTOLIC BLOOD PRESSURE: 86 MMHG | HEART RATE: 78 BPM | SYSTOLIC BLOOD PRESSURE: 138 MMHG | BODY MASS INDEX: 45.99 KG/M2 | OXYGEN SATURATION: 97 %

## 2023-12-29 DIAGNOSIS — K21.9 GASTROESOPHAGEAL REFLUX DISEASE WITHOUT ESOPHAGITIS: ICD-10-CM

## 2023-12-29 DIAGNOSIS — I10 ESSENTIAL (PRIMARY) HYPERTENSION: ICD-10-CM

## 2023-12-29 DIAGNOSIS — Z79.4 TYPE 2 DIABETES MELLITUS WITH DIABETIC POLYNEUROPATHY, WITH LONG-TERM CURRENT USE OF INSULIN (HCC): Primary | ICD-10-CM

## 2023-12-29 DIAGNOSIS — E78.00 HYPERCHOLESTEROLEMIA: ICD-10-CM

## 2023-12-29 DIAGNOSIS — N18.31 CHRONIC KIDNEY DISEASE, STAGE 3A (HCC): ICD-10-CM

## 2023-12-29 DIAGNOSIS — E03.9 PRIMARY HYPOTHYROIDISM: ICD-10-CM

## 2023-12-29 DIAGNOSIS — G47.33 OSA ON CPAP: ICD-10-CM

## 2023-12-29 DIAGNOSIS — E11.42 TYPE 2 DIABETES MELLITUS WITH DIABETIC POLYNEUROPATHY, WITH LONG-TERM CURRENT USE OF INSULIN (HCC): Primary | ICD-10-CM

## 2023-12-29 PROCEDURE — 3044F HG A1C LEVEL LT 7.0%: CPT | Performed by: FAMILY MEDICINE

## 2023-12-29 PROCEDURE — 99214 OFFICE O/P EST MOD 30 MIN: CPT | Performed by: FAMILY MEDICINE

## 2023-12-29 PROCEDURE — 3075F SYST BP GE 130 - 139MM HG: CPT | Performed by: FAMILY MEDICINE

## 2023-12-29 PROCEDURE — 3079F DIAST BP 80-89 MM HG: CPT | Performed by: FAMILY MEDICINE

## 2023-12-29 RX ORDER — CLOTRIMAZOLE 1 %
CREAM (GRAM) TOPICAL
Qty: 60 G | Refills: 1 | Status: SHIPPED | OUTPATIENT
Start: 2023-12-29 | End: 2024-01-05

## 2023-12-29 RX ORDER — TRIAMCINOLONE ACETONIDE 1 MG/G
CREAM TOPICAL
Qty: 45 G | Refills: 1 | Status: SHIPPED | OUTPATIENT
Start: 2023-12-29

## 2024-02-06 DIAGNOSIS — C64.2 RENAL CELL CARCINOMA OF LEFT KIDNEY (HCC): Primary | ICD-10-CM

## 2024-02-07 LAB
ALBUMIN SERPL-MCNC: 3.8 G/DL (ref 3.5–5.2)
ALP SERPL-CCNC: 81 U/L (ref 35–104)
ALT SERPL-CCNC: 17 U/L (ref 5–33)
ANION GAP SERPL CALCULATED.3IONS-SCNC: 9 MMOL/L (ref 7–19)
AST SERPL-CCNC: 17 U/L (ref 5–32)
BILIRUB SERPL-MCNC: 0.6 MG/DL (ref 0.2–1.2)
BUN SERPL-MCNC: 19 MG/DL (ref 8–23)
CALCIUM SERPL-MCNC: 10 MG/DL (ref 8.8–10.2)
CHLORIDE SERPL-SCNC: 102 MMOL/L (ref 98–111)
CO2 SERPL-SCNC: 29 MMOL/L (ref 22–29)
CREAT SERPL-MCNC: 1.2 MG/DL (ref 0.5–0.9)
GLUCOSE SERPL-MCNC: 160 MG/DL (ref 74–109)
POTASSIUM SERPL-SCNC: 4.4 MMOL/L (ref 3.5–5)
PROT SERPL-MCNC: 7.5 G/DL (ref 6.6–8.7)
SODIUM SERPL-SCNC: 140 MMOL/L (ref 136–145)

## 2024-02-08 ENCOUNTER — OFFICE VISIT (OUTPATIENT)
Dept: UROLOGY | Age: 64
End: 2024-02-08
Payer: COMMERCIAL

## 2024-02-08 VITALS — HEIGHT: 67 IN | WEIGHT: 293 LBS | BODY MASS INDEX: 45.99 KG/M2 | TEMPERATURE: 98.2 F

## 2024-02-08 DIAGNOSIS — C64.2 RENAL CELL CARCINOMA OF LEFT KIDNEY (HCC): Primary | ICD-10-CM

## 2024-02-08 LAB
APPEARANCE FLUID: CLEAR
BILIRUBIN, POC: NORMAL
BLOOD URINE, POC: NORMAL
CLARITY, POC: CLEAR
COLOR, POC: YELLOW
GLUCOSE URINE, POC: NORMAL
KETONES, POC: NORMAL
LEUKOCYTE EST, POC: NORMAL
NITRITE, POC: NORMAL
PH, POC: 5
PROTEIN, POC: NORMAL
SPECIFIC GRAVITY, POC: 1.02
UROBILINOGEN, POC: 0.2

## 2024-02-08 PROCEDURE — 99213 OFFICE O/P EST LOW 20 MIN: CPT | Performed by: UROLOGY

## 2024-02-08 PROCEDURE — 81002 URINALYSIS NONAUTO W/O SCOPE: CPT | Performed by: UROLOGY

## 2024-02-08 ASSESSMENT — ENCOUNTER SYMPTOMS
BACK PAIN: 0
VOMITING: 0
FACIAL SWELLING: 0
WHEEZING: 0
EYE REDNESS: 0
SORE THROAT: 0
EYE DISCHARGE: 0
NAUSEA: 0
CHEST TIGHTNESS: 0

## 2024-02-08 NOTE — PROGRESS NOTES
April Tubbs is a 63 y.o. female who presents today   Chief Complaint   Patient presents with    Follow-up     I am here today for my yearly follow up. My labs and scans are done.        Renal Cancer:  Patient is here today for a history of renal carcinoma which was diagnosed approximately 5.5 years(s) ago.  The cancer was: left   The treatment received was left hand-assisted laparoscopic nephrectomy on 10/3/2018.  Final pathology stage: T1 a N0 M0 patient has microscopic extension into the renal sinus fat but no gross extension therefore this was not designated as T3a.  She did see medical oncology and it was felt she did not qualify for Sutent adjuvant treatment.  Flank pain? no  Hematuria?  None  Patient underwent a ventral hernia repair  History is unchanged as above she is here for her annual follow-up.  She denies any new symptoms no flank pain no hematuria       Past Medical History:   Diagnosis Date    Allergic rhinitis     Ankle fracture     3 year ago; increasing difficulty walking; has bone fragments    Arthritis     sees dr. silva    Braces as ambulation aid     right foot per dr. silva    CPAP (continuous positive airway pressure) dependence     Diabetes (HCC)     Hiatal hernia 09/27/2017    History of kidney cancer     Hyperlipidemia     Hypertension     Hypothyroidism     Murmur     Obesity     ZOILA (obstructive sleep apnea)     AHI:  28.5 per [facility], 9/2014    PLMD (periodic limb movement disorder)     Renal mass     cat scan done 9/13/18; to see dr. todd coming up    Thyroid disease     Type 2 diabetes mellitus without complication (HCC)     Type II or unspecified type diabetes mellitus without mention of complication, not stated as uncontrolled        Past Surgical History:   Procedure Laterality Date    BREAST BIOPSY Right 2012    CARPAL TUNNEL RELEASE      bilateral    CHOLECYSTECTOMY      HYSTERECTOMY (CERVIX STATUS UNKNOWN)      AR LAPAROSCOPY RADICAL NEPHRECTOMY Left 10/3/2018

## 2024-02-15 DIAGNOSIS — E03.9 PRIMARY HYPOTHYROIDISM: ICD-10-CM

## 2024-02-15 RX ORDER — LEVOTHYROXINE SODIUM 137 UG/1
137 TABLET ORAL DAILY
Qty: 90 TABLET | Refills: 1 | Status: SHIPPED | OUTPATIENT
Start: 2024-02-15

## 2024-02-29 DIAGNOSIS — Z79.4 TYPE 2 DIABETES MELLITUS WITH DIABETIC POLYNEUROPATHY, WITH LONG-TERM CURRENT USE OF INSULIN (HCC): ICD-10-CM

## 2024-02-29 DIAGNOSIS — E11.42 TYPE 2 DIABETES MELLITUS WITH DIABETIC POLYNEUROPATHY, WITH LONG-TERM CURRENT USE OF INSULIN (HCC): ICD-10-CM

## 2024-02-29 RX ORDER — PROCHLORPERAZINE 25 MG/1
SUPPOSITORY RECTAL
Qty: 9 EACH | Refills: 3 | Status: SHIPPED | OUTPATIENT
Start: 2024-02-29

## 2024-03-07 ENCOUNTER — PATIENT MESSAGE (OUTPATIENT)
Dept: PRIMARY CARE CLINIC | Age: 64
End: 2024-03-07

## 2024-03-07 DIAGNOSIS — Z12.31 BREAST CANCER SCREENING BY MAMMOGRAM: Primary | ICD-10-CM

## 2024-03-08 NOTE — TELEPHONE ENCOUNTER
From: April Tubbs  To: Dr. Jose Lechuga  Sent: 3/7/2024 1:56 PM CST  Subject: Mammogram    I just received a text message from [facility]. They asked that I have you fax an order for my mammogram. They did not give me a fax number in the text, so I hope you have it. Their phone # is [phone].    My mammogram appointment is March 14, 2024 at 11:00 am.    Thank you.

## 2024-03-14 RX ORDER — ATORVASTATIN CALCIUM 40 MG/1
40 TABLET, FILM COATED ORAL DAILY
Qty: 90 TABLET | Refills: 1 | Status: SHIPPED | OUTPATIENT
Start: 2024-03-14

## 2024-03-14 NOTE — TELEPHONE ENCOUNTER
April Tubbs called to request a refill on her medication.      Last office visit : 12/29/2023   Next office visit : 4/9/2024     Requested Prescriptions     Pending Prescriptions Disp Refills    atorvastatin (LIPITOR) 40 MG tablet [Pharmacy Med Name: ATORVASTATIN 40MG TABLETS] 90 tablet 1     Sig: TAKE 1 TABLET BY MOUTH DAILY            Sandie Colindres

## 2024-03-15 DIAGNOSIS — Z12.31 BREAST CANCER SCREENING BY MAMMOGRAM: ICD-10-CM

## 2024-04-01 DIAGNOSIS — E11.42 TYPE 2 DIABETES MELLITUS WITH DIABETIC POLYNEUROPATHY, WITH LONG-TERM CURRENT USE OF INSULIN (HCC): ICD-10-CM

## 2024-04-01 DIAGNOSIS — E03.9 PRIMARY HYPOTHYROIDISM: ICD-10-CM

## 2024-04-01 DIAGNOSIS — Z79.4 TYPE 2 DIABETES MELLITUS WITH DIABETIC POLYNEUROPATHY, WITH LONG-TERM CURRENT USE OF INSULIN (HCC): ICD-10-CM

## 2024-04-01 LAB
ALBUMIN SERPL-MCNC: 3.9 G/DL (ref 3.5–5.2)
ALP SERPL-CCNC: 90 U/L (ref 35–104)
ALT SERPL-CCNC: 20 U/L (ref 5–33)
ANION GAP SERPL CALCULATED.3IONS-SCNC: 15 MMOL/L (ref 7–19)
AST SERPL-CCNC: 20 U/L (ref 5–32)
BILIRUB SERPL-MCNC: 0.7 MG/DL (ref 0.2–1.2)
BUN SERPL-MCNC: 22 MG/DL (ref 8–23)
CALCIUM SERPL-MCNC: 10.1 MG/DL (ref 8.8–10.2)
CHLORIDE SERPL-SCNC: 101 MMOL/L (ref 98–111)
CO2 SERPL-SCNC: 25 MMOL/L (ref 22–29)
CREAT SERPL-MCNC: 1.3 MG/DL (ref 0.5–0.9)
GLUCOSE SERPL-MCNC: 143 MG/DL (ref 74–109)
HBA1C MFR BLD: 6.8 % (ref 4–6)
POTASSIUM SERPL-SCNC: 4.1 MMOL/L (ref 3.5–5)
PROT SERPL-MCNC: 7.5 G/DL (ref 6.6–8.7)
SODIUM SERPL-SCNC: 141 MMOL/L (ref 136–145)
T4 FREE SERPL-MCNC: 1.84 NG/DL (ref 0.93–1.7)
TSH SERPL DL<=0.005 MIU/L-ACNC: 0.01 UIU/ML (ref 0.27–4.2)

## 2024-04-07 SDOH — ECONOMIC STABILITY: TRANSPORTATION INSECURITY
IN THE PAST 12 MONTHS, HAS LACK OF TRANSPORTATION KEPT YOU FROM MEETINGS, WORK, OR FROM GETTING THINGS NEEDED FOR DAILY LIVING?: NO

## 2024-04-07 SDOH — ECONOMIC STABILITY: FOOD INSECURITY: WITHIN THE PAST 12 MONTHS, YOU WORRIED THAT YOUR FOOD WOULD RUN OUT BEFORE YOU GOT MONEY TO BUY MORE.: NEVER TRUE

## 2024-04-07 SDOH — ECONOMIC STABILITY: INCOME INSECURITY: HOW HARD IS IT FOR YOU TO PAY FOR THE VERY BASICS LIKE FOOD, HOUSING, MEDICAL CARE, AND HEATING?: NOT HARD AT ALL

## 2024-04-07 SDOH — ECONOMIC STABILITY: FOOD INSECURITY: WITHIN THE PAST 12 MONTHS, THE FOOD YOU BOUGHT JUST DIDN'T LAST AND YOU DIDN'T HAVE MONEY TO GET MORE.: NEVER TRUE

## 2024-04-07 ASSESSMENT — PATIENT HEALTH QUESTIONNAIRE - PHQ9
SUM OF ALL RESPONSES TO PHQ QUESTIONS 1-9: 0
2. FEELING DOWN, DEPRESSED OR HOPELESS: NOT AT ALL
SUM OF ALL RESPONSES TO PHQ QUESTIONS 1-9: 0
1. LITTLE INTEREST OR PLEASURE IN DOING THINGS: NOT AT ALL
SUM OF ALL RESPONSES TO PHQ9 QUESTIONS 1 & 2: 0
SUM OF ALL RESPONSES TO PHQ9 QUESTIONS 1 & 2: 0
2. FEELING DOWN, DEPRESSED OR HOPELESS: NOT AT ALL
1. LITTLE INTEREST OR PLEASURE IN DOING THINGS: NOT AT ALL

## 2024-04-09 ENCOUNTER — OFFICE VISIT (OUTPATIENT)
Dept: PRIMARY CARE CLINIC | Age: 64
End: 2024-04-09
Payer: COMMERCIAL

## 2024-04-09 VITALS
HEIGHT: 67 IN | WEIGHT: 293 LBS | BODY MASS INDEX: 45.99 KG/M2 | SYSTOLIC BLOOD PRESSURE: 138 MMHG | HEART RATE: 71 BPM | OXYGEN SATURATION: 98 % | TEMPERATURE: 97.1 F | DIASTOLIC BLOOD PRESSURE: 86 MMHG

## 2024-04-09 DIAGNOSIS — E03.9 PRIMARY HYPOTHYROIDISM: ICD-10-CM

## 2024-04-09 DIAGNOSIS — Z79.4 TYPE 2 DIABETES MELLITUS WITH DIABETIC POLYNEUROPATHY, WITH LONG-TERM CURRENT USE OF INSULIN (HCC): Primary | ICD-10-CM

## 2024-04-09 DIAGNOSIS — E11.42 TYPE 2 DIABETES MELLITUS WITH DIABETIC POLYNEUROPATHY, WITH LONG-TERM CURRENT USE OF INSULIN (HCC): Primary | ICD-10-CM

## 2024-04-09 DIAGNOSIS — N18.31 CHRONIC KIDNEY DISEASE, STAGE 3A (HCC): ICD-10-CM

## 2024-04-09 DIAGNOSIS — E78.00 HYPERCHOLESTEROLEMIA: ICD-10-CM

## 2024-04-09 DIAGNOSIS — I10 ESSENTIAL (PRIMARY) HYPERTENSION: ICD-10-CM

## 2024-04-09 DIAGNOSIS — G47.33 OSA (OBSTRUCTIVE SLEEP APNEA): ICD-10-CM

## 2024-04-09 DIAGNOSIS — K21.9 GASTROESOPHAGEAL REFLUX DISEASE WITHOUT ESOPHAGITIS: ICD-10-CM

## 2024-04-09 PROCEDURE — 99214 OFFICE O/P EST MOD 30 MIN: CPT | Performed by: FAMILY MEDICINE

## 2024-04-09 PROCEDURE — 3079F DIAST BP 80-89 MM HG: CPT | Performed by: FAMILY MEDICINE

## 2024-04-09 PROCEDURE — 3075F SYST BP GE 130 - 139MM HG: CPT | Performed by: FAMILY MEDICINE

## 2024-04-09 PROCEDURE — 3044F HG A1C LEVEL LT 7.0%: CPT | Performed by: FAMILY MEDICINE

## 2024-04-09 RX ORDER — LEVOTHYROXINE SODIUM 0.12 MG/1
125 TABLET ORAL DAILY
Qty: 90 TABLET | Refills: 3 | Status: SHIPPED | OUTPATIENT
Start: 2024-04-09

## 2024-04-09 RX ORDER — INSULIN ASPART 100 [IU]/ML
INJECTION, SOLUTION INTRAVENOUS; SUBCUTANEOUS
Qty: 5 ADJUSTABLE DOSE PRE-FILLED PEN SYRINGE | Refills: 11
Start: 2024-04-09

## 2024-04-09 NOTE — PATIENT INSTRUCTIONS
The following may help for colds and allergies:   Antihistamines: Help nasal drainage, watery eyes, sneezing, sore throat.   Benadryl (diphenhydramine) 25mg every 6 hours as needed   Zyrtec (cetirizine) 10mg once a day  Xyzal 5 mg once a day  Allegra (fexofenadine) 180mg once a day  Claritin or Alavert (loratadine) 10mg once a day    Expectorants: Loosen mucous and help nasal and chest congestion  Mucinex 600mg twice a day    Nasal Steroid: Help nasal drainage, nasal congestion, sneezing, and sinus pressure  Nasacort 1 spray each nostril once a day  Flonase 1 spray each nostril once a day  Rhinocort 1 spray each nostril once a day

## 2024-04-09 NOTE — PROGRESS NOTES
6.8  TSH suppressed at 0.009  T4 elevated at 1.84.            Assessment Plan:   1. Type 2 diabetes mellitus with diabetic polyneuropathy, with long-term current use of insulin ([facility])    2. Primary hypothyroidism    3. Hypercholesterolemia    4. Gastroesophageal reflux disease without esophagitis    5. ZOILA (obstructive sleep apnea)    6. Essential (primary) hypertension    7. Chronic kidney disease, stage 3a ([facility])       1.  Levemir: 22 units  Insulin aspart  - Insurance is making her switch to NovoLog  Ozempic 2 mg weekly  Continue with A1c:   2.  Levothyroxine  TSH suppressed.  Reduced Synthroid to 125  3.  Atorvastatin  Stable  4.  Protonix stable  5.  Compliant and satisfied with results  6.  Atenolol  Diltiazem  Lisinopril  Stable  7. Reinforced goals of adequate hydration. Recommended she avoid NSAIDs such as naproxen and ibuprofen.  Recommendations per nephrology        Doses of medications listed below   -     If symptoms worsen or new symptoms develop,  return to clinic or go to ED.     Orders Placed This Encounter    Comprehensive Metabolic Panel     Standing Status:   Future     Standing Expiration Date:   4/9/2025    Hemoglobin A1C     Standing Status:   Future     Standing Expiration Date:   4/9/2025    TSH     Standing Status:   Future     Standing Expiration Date:   4/9/2025    T4, Free     Standing Status:   Future     Standing Expiration Date:   4/9/2025    levothyroxine (SYNTHROID) 125 MCG tablet     Sig: Take 1 tablet by mouth Daily     Dispense:  90 tablet     Refill:  3        Current Outpatient Medications   Medication Sig Dispense Refill    levothyroxine (SYNTHROID) 125 MCG tablet Take 1 tablet by mouth Daily 90 tablet 3    atorvastatin (LIPITOR) 40 MG tablet TAKE 1 TABLET BY MOUTH DAILY 90 tablet 1    Continuous Blood Gluc Sensor (DEXCOM G6 SENSOR) MISC APPLY TO SKIN AS DIRECTED 9 each 3    triamcinolone (KENALOG) 0.1 % cream Apply topically 2 times daily. 45 g 1    pantoprazole (PROTONIX) 40

## 2024-04-18 DIAGNOSIS — E11.42 TYPE 2 DIABETES MELLITUS WITH DIABETIC POLYNEUROPATHY, WITH LONG-TERM CURRENT USE OF INSULIN (HCC): ICD-10-CM

## 2024-04-18 DIAGNOSIS — Z79.4 TYPE 2 DIABETES MELLITUS WITH DIABETIC POLYNEUROPATHY, WITH LONG-TERM CURRENT USE OF INSULIN (HCC): ICD-10-CM

## 2024-04-18 RX ORDER — PROCHLORPERAZINE 25 MG/1
SUPPOSITORY RECTAL
Qty: 1 EACH | Refills: 3 | Status: SHIPPED | OUTPATIENT
Start: 2024-04-18

## 2024-04-22 DIAGNOSIS — I10 ESSENTIAL (PRIMARY) HYPERTENSION: ICD-10-CM

## 2024-04-22 RX ORDER — DILTIAZEM HYDROCHLORIDE 180 MG/1
180 CAPSULE, COATED, EXTENDED RELEASE ORAL DAILY
Qty: 90 CAPSULE | Refills: 3 | Status: SHIPPED | OUTPATIENT
Start: 2024-04-22

## 2024-05-02 DIAGNOSIS — I10 ESSENTIAL (PRIMARY) HYPERTENSION: ICD-10-CM

## 2024-05-02 RX ORDER — DILTIAZEM HYDROCHLORIDE 180 MG/1
180 CAPSULE, COATED, EXTENDED RELEASE ORAL DAILY
Qty: 90 CAPSULE | Refills: 3 | OUTPATIENT
Start: 2024-05-02

## 2024-05-03 RX ORDER — DILTIAZEM HYDROCHLORIDE 180 MG/1
180 CAPSULE, COATED, EXTENDED RELEASE ORAL DAILY
Qty: 90 CAPSULE | Refills: 3 | Status: SHIPPED | OUTPATIENT
Start: 2024-05-03

## 2024-05-06 DIAGNOSIS — Z79.4 TYPE 2 DIABETES MELLITUS WITH DIABETIC POLYNEUROPATHY, WITH LONG-TERM CURRENT USE OF INSULIN (HCC): ICD-10-CM

## 2024-05-06 DIAGNOSIS — E11.42 TYPE 2 DIABETES MELLITUS WITH DIABETIC POLYNEUROPATHY, WITH LONG-TERM CURRENT USE OF INSULIN (HCC): ICD-10-CM

## 2024-05-13 DIAGNOSIS — K44.9 HIATAL HERNIA: ICD-10-CM

## 2024-05-13 RX ORDER — PANTOPRAZOLE SODIUM 40 MG/1
40 TABLET, DELAYED RELEASE ORAL DAILY
Qty: 90 TABLET | Refills: 1 | Status: SHIPPED | OUTPATIENT
Start: 2024-05-13

## 2024-06-05 ENCOUNTER — OFFICE VISIT (OUTPATIENT)
Dept: GASTROENTEROLOGY | Facility: CLINIC | Age: 64
End: 2024-06-05
Payer: COMMERCIAL

## 2024-06-05 VITALS
WEIGHT: 293 LBS | HEIGHT: 68 IN | TEMPERATURE: 97.5 F | BODY MASS INDEX: 44.41 KG/M2 | SYSTOLIC BLOOD PRESSURE: 130 MMHG | OXYGEN SATURATION: 97 % | DIASTOLIC BLOOD PRESSURE: 72 MMHG | HEART RATE: 74 BPM

## 2024-06-05 DIAGNOSIS — Z86.010 HX OF ADENOMATOUS COLONIC POLYPS: Primary | ICD-10-CM

## 2024-06-05 DIAGNOSIS — Z80.0 FAMILY HISTORY OF COLON CANCER: ICD-10-CM

## 2024-06-05 RX ORDER — SODIUM BICARBONATE 325 MG/1
325 TABLET ORAL 2 TIMES DAILY
COMMUNITY

## 2024-06-05 RX ORDER — HYDROCHLOROTHIAZIDE 12.5 MG/1
12.5 CAPSULE, GELATIN COATED ORAL EVERY MORNING
COMMUNITY
Start: 2024-05-15

## 2024-06-05 NOTE — PROGRESS NOTES
Primary Physician: Leon Cummings MD    Chief Complaint   Patient presents with    Colon Cancer Screening     Pt presents today for colon recall-last colon was 6/28/2021; Personal history of adenomatous and hyperplastic polyps; Family history of colon cancer       Ying Hayes is a 64 y.o. female.    HPI  Personal history of adenomatous colon polyps   6/28/2021 colonoscopy with multiple small mouthed diverticula of the left colon, 2 tubular adenomatous polyps in the cecum, 2 tubular adenomatous polyps ascending colon.    4/1/2024 GFR 46    Pt denies any change in bowel habits.  NO diarrhea, constipation, abd pain or rectal bleeding.    Family history of colon cancer  Father colon cancer age 71    Past Medical History:   Diagnosis Date    Arthritis     Disease of thyroid gland     Diverticulosis     Family history of colon cancer     GERD (gastroesophageal reflux disease)     History of adenomatous polyp of colon     History of colon polyps     History of kidney cancer     Hypertension     Sleep apnea     cpap at hs    Type 2 diabetes mellitus        Past Surgical History:   Procedure Laterality Date    CARPAL TUNNEL RELEASE      CHOLECYSTECTOMY  10/31/2017    COLONOSCOPY  07/18/2016    Diverticulosis in the sigmoid colon; One 7mm polyp in the descending colon-path shows benign granulation tissue polyp demonstating ulceration and moderate acute inflammation, nonspecific; The examination was otherwise normal on direct and retroflexion views; Repeat 5 years    COLONOSCOPY  06/28/2011    One 2mm hyperplastic polyp in the ascending colon; One 5mm hyperplastic polyp in the rectum; Diverticulosis sigmoid colon and descending colon; Repeat 5 years    COLONOSCOPY N/A 06/28/2021    Diverticulosis in the left colon; Two 5mm tubular adenomatous polyps in the cecum; Two 5-6mm tubular adenomatous polyps in the distal ascending colon; Repeat 3 years    HERNIA REPAIR  09/2020    HYSTERECTOMY       NEPHRECTOMY Left 10/03/2018        Current Outpatient Medications:     Aspirin Buf,CaCarb-MgCarb-MgO, 81 MG tablet, Take 81 mg by mouth 3 (Three) Times a Week., Disp: , Rfl:     atenolol (TENORMIN) 50 MG tablet, Take 0.5 tablets by mouth 2 (Two) Times a Day., Disp: , Rfl:     atorvastatin (LIPITOR) 40 MG tablet, Take 1 tablet by mouth Daily., Disp: , Rfl:     dilTIAZem (TIAZAC) 180 MG 24 hr capsule, Take 1 capsule by mouth Daily., Disp: , Rfl:     fexofenadine (ALLEGRA) 180 MG tablet, Take 1 tablet by mouth Daily., Disp: , Rfl:     hydroCHLOROthiazide (MICROZIDE) 12.5 MG capsule, Take 1 capsule by mouth Every Morning., Disp: , Rfl:     insulin aspart (novoLOG FLEXPEN) 100 UNIT/ML solution pen-injector sc pen, Inject 6-10 Units under the skin into the appropriate area as directed 3 (Three) Times a Day., Disp: , Rfl:     Levemir FlexTouch 100 UNIT/ML injection, Inject 22 Units under the skin into the appropriate area as directed Daily., Disp: , Rfl:     levothyroxine (SYNTHROID, LEVOTHROID) 125 MCG tablet, Take 1 tablet by mouth Daily., Disp: , Rfl:     lisinopril (PRINIVIL,ZESTRIL) 40 MG tablet, Take 1 tablet by mouth Daily., Disp: , Rfl:     multivitamin with minerals (MULTIVITAMIN ADULT PO), Take 1 tablet by mouth Daily., Disp: , Rfl:     Omega-3 Fatty Acids (fish oil) 1000 MG capsule capsule, Take 1 capsule by mouth 2 (Two) Times a Day With Meals., Disp: , Rfl:     pantoprazole (PROTONIX) 40 MG EC tablet, Take 1 tablet by mouth Daily., Disp: , Rfl:     Semaglutide, 2 MG/DOSE, (OZEMPIC) 8 MG/3ML solution pen-injector, Inject 2 mg under the skin into the appropriate area as directed 1 (One) Time Per Week., Disp: , Rfl:     vitamin D (ERGOCALCIFEROL) 1.25 MG (92269 UT) capsule capsule, Take 1 capsule by mouth Every 7 (Seven) Days., Disp: , Rfl:     polyethylene glycol (GoLYTELY) 236 g solution, Take as directed split dose prep, Disp: 4000 mL, Rfl: 0    sodium bicarbonate 325 MG tablet, Take 1 tablet by mouth 2 (Two)  "Times a Day., Disp: , Rfl:     Allergies   Allergen Reactions    Adhesive Tape Other (See Comments)     And Dermabond  Caused incisions to get infected       Social History     Socioeconomic History    Marital status:    Tobacco Use    Smoking status: Never    Smokeless tobacco: Never   Vaping Use    Vaping status: Never Used   Substance and Sexual Activity    Alcohol use: Never    Drug use: Never    Sexual activity: Not Currently     Partners: Male     Birth control/protection: Hysterectomy       Family History   Problem Relation Age of Onset    Colon cancer Father 71    Colon polyps Neg Hx     Esophageal cancer Neg Hx     Liver cancer Neg Hx     Liver disease Neg Hx     Rectal cancer Neg Hx     Stomach cancer Neg Hx        Review of Systems   Constitutional:  Negative for unexpected weight change.   Respiratory:  Negative for shortness of breath.    Cardiovascular:  Negative for chest pain.   Gastrointestinal:  Negative for abdominal pain and blood in stool.       Objective     /72 (BP Location: Left arm, Patient Position: Sitting, Cuff Size: Adult)   Pulse 74   Temp 97.5 °F (36.4 °C) (Infrared)   Ht 172.7 cm (68\")   Wt (!) 172 kg (380 lb)   SpO2 97%   Breastfeeding No   BMI 57.78 kg/m²     Physical Exam  Vitals reviewed.   Constitutional:       Appearance: Normal appearance.   Cardiovascular:      Rate and Rhythm: Normal rate and regular rhythm.      Heart sounds: Normal heart sounds.   Pulmonary:      Effort: Pulmonary effort is normal.      Breath sounds: Normal breath sounds.   Neurological:      Mental Status: She is alert.             Lab Results - Last 18 Months   Lab Units 12/07/23  0846 05/02/23  1059 12/22/22  1012   HEMOGLOBIN g/dL 12.6 13.3 13.2   HEMATOCRIT % 39.4 40.7 40.4   MCV fL 93.4 91.5 92.0   WBC K/uL 6.6 9.5 7.4   RDW % 13.6 13.9 14.2   MPV fL 10.9 11.2 11.5   PLATELETS K/uL 216 243 219       Lab Results - Last 18 Months   Lab Units 04/01/24  1025 12/07/23  0846 " 06/23/23  0744 12/22/22  1012   TSH uIU/mL 0.009* 0.079* 1.080 2.090              IMPRESSION/PLAN:    Assessment & Plan      Problem List Items Addressed This Visit       Hx of adenomatous colonic polyps - Primary    Overview     6/28/2021 two adenomas in cecum, two adenomas AC          Relevant Medications    polyethylene glycol (GoLYTELY) 236 g solution    Other Relevant Orders    Case Request (Completed)    Family history of colon cancer    Overview     Father colon cancer age 71          Colonoscopy per Dr. Kendell Hernandez Prep given CKD  Pt to hold Ozempic 1 week prior to procedure.        ..The risks, benefits, and alternatives of colonoscopy were reviewed with the patient today.  Risks including perforation of the colon possibly requiring surgery or colostomy.  Additional risks include risk of bleeding from biopsies or removal of colon tissue.  There is also the risk of a drug reaction or problems with anesthesia.  This will be discussed with the further by the anesthesia team on the day of the procedure.  Lastly there is a possibility of missing a colon polyp or cancer.  The benefits include the diagnosis and management of disease of the colon and rectum.  Alternatives to colonoscopy include barium enema, laboratory testing, radiographic evaluation, or no intervention.  The patient verbalizes understanding and agrees.    In accordance with requirements under the Affordable Care Act, [facility] has provided pricing for all hospital services and items on each of its websites. However, a patient's actual cost may differ based on the services the patient receives to meet individual healthcare needs and based on the benefits provided under the patient’s insurance coverage.        Dorina Galan, APRN  06/05/24  13:34 CDT    Part of this note may be an electronic transcription/translation of spoken language to printed text.

## 2024-06-07 DIAGNOSIS — E11.42 TYPE 2 DIABETES MELLITUS WITH DIABETIC POLYNEUROPATHY, WITH LONG-TERM CURRENT USE OF INSULIN (HCC): ICD-10-CM

## 2024-06-07 DIAGNOSIS — Z79.4 TYPE 2 DIABETES MELLITUS WITH DIABETIC POLYNEUROPATHY, WITH LONG-TERM CURRENT USE OF INSULIN (HCC): ICD-10-CM

## 2024-06-07 RX ORDER — PROCHLORPERAZINE 25 MG/1
1 SUPPOSITORY RECTAL
Qty: 9 EACH | Refills: 3 | Status: SHIPPED | OUTPATIENT
Start: 2024-06-07

## 2024-06-07 RX ORDER — ATENOLOL 50 MG/1
TABLET ORAL
Qty: 90 TABLET | Refills: 3 | Status: SHIPPED | OUTPATIENT
Start: 2024-06-07

## 2024-06-07 RX ORDER — PROCHLORPERAZINE 25 MG/1
SUPPOSITORY RECTAL
Qty: 9 EACH | Refills: 3 | Status: CANCELLED | OUTPATIENT
Start: 2024-06-07

## 2024-06-11 DIAGNOSIS — Z79.4 TYPE 2 DIABETES MELLITUS WITH DIABETIC POLYNEUROPATHY, WITH LONG-TERM CURRENT USE OF INSULIN (HCC): ICD-10-CM

## 2024-06-11 DIAGNOSIS — E11.42 TYPE 2 DIABETES MELLITUS WITH DIABETIC POLYNEUROPATHY, WITH LONG-TERM CURRENT USE OF INSULIN (HCC): ICD-10-CM

## 2024-06-11 RX ORDER — INSULIN ASPART 100 [IU]/ML
INJECTION, SOLUTION INTRAVENOUS; SUBCUTANEOUS
Qty: 15 ADJUSTABLE DOSE PRE-FILLED PEN SYRINGE | Refills: 3 | Status: SHIPPED | OUTPATIENT
Start: 2024-06-11

## 2024-06-11 RX ORDER — INSULIN ASPART 100 [IU]/ML
INJECTION, SOLUTION INTRAVENOUS; SUBCUTANEOUS
Qty: 5 ADJUSTABLE DOSE PRE-FILLED PEN SYRINGE | Refills: 11 | Status: CANCELLED | OUTPATIENT
Start: 2024-06-11

## 2024-07-05 DIAGNOSIS — E03.9 PRIMARY HYPOTHYROIDISM: ICD-10-CM

## 2024-07-05 DIAGNOSIS — E11.42 TYPE 2 DIABETES MELLITUS WITH DIABETIC POLYNEUROPATHY, WITH LONG-TERM CURRENT USE OF INSULIN (HCC): ICD-10-CM

## 2024-07-05 DIAGNOSIS — Z79.4 TYPE 2 DIABETES MELLITUS WITH DIABETIC POLYNEUROPATHY, WITH LONG-TERM CURRENT USE OF INSULIN (HCC): ICD-10-CM

## 2024-07-05 LAB
ALBUMIN SERPL-MCNC: 3.6 G/DL (ref 3.5–5.2)
ALP SERPL-CCNC: 83 U/L (ref 35–104)
ALT SERPL-CCNC: 14 U/L (ref 5–33)
ANION GAP SERPL CALCULATED.3IONS-SCNC: 11 MMOL/L (ref 7–19)
AST SERPL-CCNC: 16 U/L (ref 5–32)
BILIRUB SERPL-MCNC: 0.4 MG/DL (ref 0.2–1.2)
BUN SERPL-MCNC: 22 MG/DL (ref 8–23)
CALCIUM SERPL-MCNC: 9.8 MG/DL (ref 8.8–10.2)
CHLORIDE SERPL-SCNC: 102 MMOL/L (ref 98–111)
CO2 SERPL-SCNC: 26 MMOL/L (ref 22–29)
CREAT SERPL-MCNC: 1.2 MG/DL (ref 0.5–0.9)
GLUCOSE SERPL-MCNC: 160 MG/DL (ref 74–109)
HBA1C MFR BLD: 6.5 % (ref 4–6)
POTASSIUM SERPL-SCNC: 4.4 MMOL/L (ref 3.5–5)
PROT SERPL-MCNC: 7 G/DL (ref 6.6–8.7)
SODIUM SERPL-SCNC: 139 MMOL/L (ref 136–145)
T4 FREE SERPL-MCNC: 1.76 NG/DL (ref 0.93–1.7)
TSH SERPL DL<=0.005 MIU/L-ACNC: 0.01 UIU/ML (ref 0.27–4.2)

## 2024-07-08 ENCOUNTER — OFFICE VISIT (OUTPATIENT)
Dept: PRIMARY CARE CLINIC | Age: 64
End: 2024-07-08
Payer: COMMERCIAL

## 2024-07-08 VITALS
SYSTOLIC BLOOD PRESSURE: 134 MMHG | HEART RATE: 80 BPM | WEIGHT: 293 LBS | BODY MASS INDEX: 59.67 KG/M2 | OXYGEN SATURATION: 98 % | DIASTOLIC BLOOD PRESSURE: 88 MMHG | TEMPERATURE: 98.3 F

## 2024-07-08 DIAGNOSIS — M25.572 CHRONIC PAIN OF BOTH ANKLES: ICD-10-CM

## 2024-07-08 DIAGNOSIS — M17.12 PRIMARY OSTEOARTHRITIS OF LEFT KNEE: ICD-10-CM

## 2024-07-08 DIAGNOSIS — N18.31 CHRONIC KIDNEY DISEASE, STAGE 3A (HCC): ICD-10-CM

## 2024-07-08 DIAGNOSIS — I10 ESSENTIAL (PRIMARY) HYPERTENSION: ICD-10-CM

## 2024-07-08 DIAGNOSIS — E78.00 HYPERCHOLESTEROLEMIA: ICD-10-CM

## 2024-07-08 DIAGNOSIS — E03.9 PRIMARY HYPOTHYROIDISM: ICD-10-CM

## 2024-07-08 DIAGNOSIS — G89.29 CHRONIC PAIN OF BOTH ANKLES: ICD-10-CM

## 2024-07-08 DIAGNOSIS — E11.42 TYPE 2 DIABETES MELLITUS WITH DIABETIC POLYNEUROPATHY, WITH LONG-TERM CURRENT USE OF INSULIN (HCC): Primary | ICD-10-CM

## 2024-07-08 DIAGNOSIS — M25.571 CHRONIC PAIN OF BOTH ANKLES: ICD-10-CM

## 2024-07-08 DIAGNOSIS — Z79.4 TYPE 2 DIABETES MELLITUS WITH DIABETIC POLYNEUROPATHY, WITH LONG-TERM CURRENT USE OF INSULIN (HCC): Primary | ICD-10-CM

## 2024-07-08 DIAGNOSIS — K21.9 GASTROESOPHAGEAL REFLUX DISEASE WITHOUT ESOPHAGITIS: ICD-10-CM

## 2024-07-08 DIAGNOSIS — Z99.89 CPAP (CONTINUOUS POSITIVE AIRWAY PRESSURE) DEPENDENCE: ICD-10-CM

## 2024-07-08 PROCEDURE — 3079F DIAST BP 80-89 MM HG: CPT | Performed by: FAMILY MEDICINE

## 2024-07-08 PROCEDURE — 3044F HG A1C LEVEL LT 7.0%: CPT | Performed by: FAMILY MEDICINE

## 2024-07-08 PROCEDURE — 3075F SYST BP GE 130 - 139MM HG: CPT | Performed by: FAMILY MEDICINE

## 2024-07-08 PROCEDURE — 99214 OFFICE O/P EST MOD 30 MIN: CPT | Performed by: FAMILY MEDICINE

## 2024-07-08 RX ORDER — LEVOTHYROXINE SODIUM 0.1 MG/1
100 TABLET ORAL DAILY
Qty: 90 TABLET | Refills: 3 | Status: SHIPPED | OUTPATIENT
Start: 2024-07-08

## 2024-07-08 RX ORDER — PROCHLORPERAZINE 25 MG/1
SUPPOSITORY RECTAL
Qty: 1 EACH | Refills: 3 | Status: SHIPPED | OUTPATIENT
Start: 2024-07-08

## 2024-07-08 NOTE — PROGRESS NOTES
[facility] *** diaetic shoes.       Lab Results   Component Value Date    CHOL 115 (L) 12/07/2023    TRIG 83 12/07/2023    HDL 47 (L) 12/07/2023    LDL 51 12/07/2023     *** [place] [employer]  Bilateral ankle  Left knee: kristi.   
Take 1 tablet by mouth Daily 90 tablet 3    Continuous Glucose Transmitter (DEXCOM G6 TRANSMITTER) MISC USE AS DIRECTED EVERY 3 MONTHS 1 each 3    NOVOLOG FLEXPEN 100 UNIT/ML injection pen 8 units with small meals/snacks, 12 units with breakfast, 12 units with lunch, and 14 units with dinner 15 Adjustable Dose Pre-filled Pen Syringe 3    atenolol (TENORMIN) 50 MG tablet TAKE ONE-HALF TABLET BY MOUTH EVERY MORNING AND ONE-HALF TABLET EVERY EVENING. 90 tablet 3    Continuous Glucose Sensor (DEXCOM G6 SENSOR) MISC 1 Device by Does not apply route every 10 days 9 each 3    pantoprazole (PROTONIX) 40 MG tablet Take 1 tablet by mouth daily 90 tablet 1    semaglutide, 2 MG/DOSE, (OZEMPIC) 8 MG/3ML SOPN sc injection Inject 2 mg into the skin every 7 days 3 mL 11    dilTIAZem (CARDIZEM CD) 180 MG extended release capsule Take 1 capsule by mouth daily 90 capsule 3    atorvastatin (LIPITOR) 40 MG tablet TAKE 1 TABLET BY MOUTH DAILY 90 tablet 1    triamcinolone (KENALOG) 0.1 % cream Apply topically 2 times daily. 45 g 1    sodium bicarbonate 325 MG tablet Take 1 tablet by mouth 2 times daily      insulin detemir (LEVEMIR FLEXTOUCH) 100 UNIT/ML injection pen Inject 20 Units into the skin nightly (Patient taking differently: Inject 22 Units into the skin nightly) 18 mL 3    Misc. Devices MISC 1 pair of Diabetic Shoes w/ 3 sets of heat moldable inserts ICD 10: E11.9 1 each 0    lisinopril (PRINIVIL;ZESTRIL) 40 MG tablet Take 1 tablet by mouth daily 90 tablet 3    blood glucose test strips (CONTOUR NEXT TEST) strip TEST FIVE TIMES DAILY 500 strip 5    ondansetron (ZOFRAN) 8 MG tablet Take 1 tablet by mouth every 8 hours as needed for Nausea or Other (diarrhea) 12 tablet 0    vitamin D (ERGOCALCIFEROL) 1.25 MG (25339 UT) CAPS capsule TAKE 1 CAPSULE BY MOUTH WEEKLY      Multiple Vitamins-Calcium (TGT DAILY MULTIVITAMIN WOMENS) TABS 1 tablet Oral Daily      Omega-3 Fatty Acids (FISH OIL) 1000 MG capsule 1 capsule      fexofenadine

## 2024-07-09 PROBLEM — M17.12 PRIMARY OSTEOARTHRITIS OF LEFT KNEE: Status: ACTIVE | Noted: 2024-07-09

## 2024-07-09 PROBLEM — M25.572 CHRONIC PAIN OF BOTH ANKLES: Status: ACTIVE | Noted: 2024-07-09

## 2024-07-09 PROBLEM — G89.29 CHRONIC PAIN OF BOTH ANKLES: Status: ACTIVE | Noted: 2024-07-09

## 2024-07-09 PROBLEM — M25.571 CHRONIC PAIN OF BOTH ANKLES: Status: ACTIVE | Noted: 2024-07-09

## 2024-07-23 DIAGNOSIS — E11.42 DIABETIC PERIPHERAL NEUROPATHY (HCC): ICD-10-CM

## 2024-07-26 ENCOUNTER — HOSPITAL ENCOUNTER (OUTPATIENT)
Facility: HOSPITAL | Age: 64
Setting detail: HOSPITAL OUTPATIENT SURGERY
Discharge: HOME | End: 2024-07-26
Attending: INTERNAL MEDICINE | Admitting: INTERNAL MEDICINE
Payer: COMMERCIAL

## 2024-07-26 ENCOUNTER — ANESTHESIA EVENT (OUTPATIENT)
Dept: GASTROENTEROLOGY | Facility: HOSPITAL | Age: 64
End: 2024-07-26
Payer: COMMERCIAL

## 2024-07-26 ENCOUNTER — ANESTHESIA (OUTPATIENT)
Dept: GASTROENTEROLOGY | Facility: HOSPITAL | Age: 64
End: 2024-07-26
Payer: COMMERCIAL

## 2024-07-26 VITALS
WEIGHT: 293 LBS | RESPIRATION RATE: 19 BRPM | OXYGEN SATURATION: 98 % | BODY MASS INDEX: 44.41 KG/M2 | HEIGHT: 68 IN | TEMPERATURE: 96.8 F | HEART RATE: 69 BPM | SYSTOLIC BLOOD PRESSURE: 109 MMHG | DIASTOLIC BLOOD PRESSURE: 61 MMHG

## 2024-07-26 DIAGNOSIS — Z86.010 HX OF ADENOMATOUS COLONIC POLYPS: ICD-10-CM

## 2024-07-26 LAB — GLUCOSE BLDC GLUCOMTR-MCNC: 153 MG/DL (ref 70–130)

## 2024-07-26 PROCEDURE — 45385 COLONOSCOPY W/LESION REMOVAL: CPT | Performed by: INTERNAL MEDICINE

## 2024-07-26 PROCEDURE — 82948 REAGENT STRIP/BLOOD GLUCOSE: CPT

## 2024-07-26 PROCEDURE — 25810000003 SODIUM CHLORIDE 0.9 % SOLUTION: Performed by: ANESTHESIOLOGY

## 2024-07-26 PROCEDURE — 88305 TISSUE EXAM BY PATHOLOGIST: CPT | Performed by: INTERNAL MEDICINE

## 2024-07-26 PROCEDURE — 25010000002 PROPOFOL 10 MG/ML EMULSION: Performed by: NURSE ANESTHETIST, CERTIFIED REGISTERED

## 2024-07-26 RX ORDER — LIDOCAINE HYDROCHLORIDE 20 MG/ML
INJECTION, SOLUTION EPIDURAL; INFILTRATION; INTRACAUDAL; PERINEURAL AS NEEDED
Status: DISCONTINUED | OUTPATIENT
Start: 2024-07-26 | End: 2024-07-26 | Stop reason: SURG

## 2024-07-26 RX ORDER — PROPOFOL 10 MG/ML
VIAL (ML) INTRAVENOUS AS NEEDED
Status: DISCONTINUED | OUTPATIENT
Start: 2024-07-26 | End: 2024-07-26 | Stop reason: SURG

## 2024-07-26 RX ORDER — SODIUM CHLORIDE 9 MG/ML
500 INJECTION, SOLUTION INTRAVENOUS CONTINUOUS PRN
Status: DISCONTINUED | OUTPATIENT
Start: 2024-07-26 | End: 2024-07-26 | Stop reason: HOSPADM

## 2024-07-26 RX ORDER — SODIUM CHLORIDE 0.9 % (FLUSH) 0.9 %
10 SYRINGE (ML) INJECTION AS NEEDED
Status: DISCONTINUED | OUTPATIENT
Start: 2024-07-26 | End: 2024-07-26 | Stop reason: HOSPADM

## 2024-07-26 RX ORDER — LIDOCAINE HYDROCHLORIDE 10 MG/ML
0.5 INJECTION, SOLUTION EPIDURAL; INFILTRATION; INTRACAUDAL; PERINEURAL ONCE AS NEEDED
Status: DISCONTINUED | OUTPATIENT
Start: 2024-07-26 | End: 2024-07-26 | Stop reason: HOSPADM

## 2024-07-26 RX ADMIN — SODIUM CHLORIDE 500 ML: 9 INJECTION, SOLUTION INTRAVENOUS at 08:56

## 2024-07-26 RX ADMIN — LIDOCAINE HYDROCHLORIDE 50 MG: 20 INJECTION, SOLUTION EPIDURAL; INFILTRATION; INTRACAUDAL; PERINEURAL at 09:36

## 2024-07-26 RX ADMIN — PROPOFOL 500 MG: 10 INJECTION, EMULSION INTRAVENOUS at 09:35

## 2024-07-26 NOTE — ANESTHESIA PREPROCEDURE EVALUATION
Anesthesia Evaluation     Patient summary reviewed and Nursing notes reviewed   no history of anesthetic complications:   NPO Solid Status: > 8 hours  NPO Liquid Status: > 2 hours           Airway   Mallampati: I  TM distance: >3 FB  Neck ROM: full  No difficulty expected  Dental - normal exam     Pulmonary    (+) ,sleep apnea on CPAP  (-) asthma, not a smoker  Cardiovascular   Exercise tolerance: good (4-7 METS)    (+) hypertension  (-) past MI, CAD, dysrhythmias, cardiac stents      Neuro/Psych  (-) seizures, TIA, CVA  GI/Hepatic/Renal/Endo    (+) morbid obesity, GERD, renal disease (kidney cancer s/p nephrectomy)-, diabetes mellitus type 2 using insulin, thyroid problem   (-) liver disease    Musculoskeletal     Abdominal    Substance History      OB/GYN          Other                      Anesthesia Plan    ASA 3     MAC     intravenous induction     Anesthetic plan, risks, benefits, and alternatives have been provided, discussed and informed consent has been obtained with: patient.

## 2024-07-26 NOTE — H&P
Chief Complaint:   Colon polyps    Subjective     HPI:   4 adenomas removed 6/2021.    Past Medical History:   Past Medical History:   Diagnosis Date    Arthritis     Disease of thyroid gland     Diverticulosis     Elevated cholesterol     Family history of colon cancer     GERD (gastroesophageal reflux disease)     History of adenomatous polyp of colon     History of colon polyps     History of kidney cancer     Hypertension     Sleep apnea     cpap at hs    Type 2 diabetes mellitus        Past Surgical History:  Past Surgical History:   Procedure Laterality Date    BREAST SURGERY Right     biopsy    CARPAL TUNNEL RELEASE      CHOLECYSTECTOMY  10/31/2017    COLONOSCOPY  07/18/2016    Diverticulosis in the sigmoid colon; One 7mm polyp in the descending colon-path shows benign granulation tissue polyp demonstating ulceration and moderate acute inflammation, nonspecific; The examination was otherwise normal on direct and retroflexion views; Repeat 5 years    COLONOSCOPY  06/28/2011    One 2mm hyperplastic polyp in the ascending colon; One 5mm hyperplastic polyp in the rectum; Diverticulosis sigmoid colon and descending colon; Repeat 5 years    COLONOSCOPY N/A 06/28/2021    Diverticulosis in the left colon; Two 5mm tubular adenomatous polyps in the cecum; Two 5-6mm tubular adenomatous polyps in the distal ascending colon; Repeat 3 years    HERNIA REPAIR  09/2020    HYSTERECTOMY      NEPHRECTOMY Left 10/03/2018        Family History:  Family History   Problem Relation Age of Onset    Breast cancer Mother     Kidney cancer Mother     Colon cancer Father 71    Colon polyps Neg Hx     Esophageal cancer Neg Hx     Liver cancer Neg Hx     Liver disease Neg Hx     Rectal cancer Neg Hx     Stomach cancer Neg Hx        Social History:   reports that she has never smoked. She has never used smokeless tobacco. She reports that she does not drink alcohol and does not use drugs.    Medications:   Medications Prior to Admission    Medication Sig Dispense Refill Last Dose    atenolol (TENORMIN) 50 MG tablet Take 0.5 tablets by mouth 2 (Two) Times a Day.   7/26/2024 at 0630    atorvastatin (LIPITOR) 40 MG tablet Take 1 tablet by mouth Daily.   7/25/2024    dilTIAZem (TIAZAC) 180 MG 24 hr capsule Take 1 capsule by mouth Daily.   7/26/2024 at 0630    fexofenadine (ALLEGRA) 180 MG tablet Take 1 tablet by mouth Daily.   7/25/2024    hydroCHLOROthiazide (MICROZIDE) 12.5 MG capsule Take 1 capsule by mouth Every Morning.   7/26/2024 at 0630    insulin aspart (novoLOG FLEXPEN) 100 UNIT/ML solution pen-injector sc pen Inject 6-10 Units under the skin into the appropriate area as directed 3 (Three) Times a Day.   7/25/2024    Levemir FlexTouch 100 UNIT/ML injection Inject 22 Units under the skin into the appropriate area as directed Daily.   7/25/2024    levothyroxine (SYNTHROID, LEVOTHROID) 125 MCG tablet Take 1 tablet by mouth Daily.   7/25/2024    lisinopril (PRINIVIL,ZESTRIL) 40 MG tablet Take 1 tablet by mouth Daily.   7/26/2024 at 0630    multivitamin with minerals (MULTIVITAMIN ADULT PO) Take 1 tablet by mouth Daily.   Past Week    Omega-3 Fatty Acids (fish oil) 1000 MG capsule capsule Take 1 capsule by mouth 2 (Two) Times a Day With Meals.   Past Week    pantoprazole (PROTONIX) 40 MG EC tablet Take 1 tablet by mouth Daily.   7/25/2024    sodium bicarbonate 325 MG tablet Take 1 tablet by mouth 2 (Two) Times a Day.   7/25/2024    vitamin D (ERGOCALCIFEROL) 1.25 MG (77528 UT) capsule capsule Take 1 capsule by mouth Every 7 (Seven) Days.   Past Week    Aspirin Buf,CaCarb-MgCarb-MgO, 81 MG tablet Take 81 mg by mouth 3 (Three) Times a Week.   7/24/2024    Semaglutide, 2 MG/DOSE, (OZEMPIC) 8 MG/3ML solution pen-injector Inject 2 mg under the skin into the appropriate area as directed 1 (One) Time Per Week.   7/13/2024       Allergies:  Adhesive tape    ROS:    Resp: No SOA  Cardiovascular: No CP      Objective     /86 (Patient Position: Sitting)  "  Pulse 78   Temp 96.8 °F (36 °C) (Temporal)   Resp 18   Ht 172.7 cm (68\")   Wt (!) 172 kg (379 lb)   SpO2 97%   BMI 57.63 kg/m²     Physical Exam   Constitutional: Patient is oriented to person, place, and in no distress.  Pulmonary/Chest: No distress.  No audible wheezes  Psychiatric: Mood, memory, affect and judgment appear normal.     Assessment & Plan     Diagnosis:  Colon polyps    Anticipated Surgical Procedure:  Colonoscopy    The risks, benefits, and alternatives of colonoscopy were reviewed with the patient today.  Risks including perforation of the colon possibly requiring surgery or colostomy.  Additional risks include risk of bleeding from biopsies or removal of colon tissue.  There is also the risk of a drug reaction or problems with anesthesia.  This will be discussed with the patient further by the anesthesia team on the day of the procedure.  Lastly there is a possibility of missing a colon polyp or cancer.  The benefits include the diagnosis and management of disease of the colon and rectum.  Alternatives to colonoscopy include barium enema, laboratory testing, radiographic evaluation, or no intervention.  The patient verbalizes understanding and agrees.        Please note that portions of this note were completed with a voice recognition program.         "

## 2024-07-26 NOTE — ANESTHESIA POSTPROCEDURE EVALUATION
Patient: Norma Hayes    Procedure Summary       Date: 07/26/24 Room / Location: [facility] ENDOSCOPY 2 / [facility] ENDOSCOPY    Anesthesia Start: 0933 Anesthesia Stop: 0959    Procedure: COLONOSCOPY WITH ANESTHESIA Diagnosis:       Hx of adenomatous colonic polyps      (Hx of adenomatous colonic polyps [Z86.010])    Surgeons: Sagrario Rdz MD Provider: BOBBI Kern CRNA    Anesthesia Type: MAC ASA Status: 3            Anesthesia Type: MAC    Vitals  No vitals data found for the desired time range.          Post Anesthesia Care and Evaluation    Patient location during evaluation: floor  Patient participation: complete - patient participated  Level of consciousness: awake and alert  Pain management: adequate    Airway patency: patent  Anesthetic complications: No anesthetic complications    Cardiovascular status: acceptable  Respiratory status: acceptable  Hydration status: acceptable

## 2024-07-29 LAB
CYTO UR: NORMAL
LAB AP CASE REPORT: NORMAL
Lab: NORMAL
PATH REPORT.FINAL DX SPEC: NORMAL
PATH REPORT.GROSS SPEC: NORMAL

## 2024-08-19 RX ORDER — LISINOPRIL 40 MG/1
40 TABLET ORAL DAILY
Qty: 90 TABLET | Refills: 1 | Status: SHIPPED | OUTPATIENT
Start: 2024-08-19

## 2024-08-30 ENCOUNTER — OFFICE VISIT (OUTPATIENT)
Dept: PRIMARY CARE CLINIC | Age: 64
End: 2024-08-30
Payer: COMMERCIAL

## 2024-08-30 VITALS
TEMPERATURE: 97.6 F | BODY MASS INDEX: 59.05 KG/M2 | OXYGEN SATURATION: 98 % | SYSTOLIC BLOOD PRESSURE: 118 MMHG | HEART RATE: 75 BPM | DIASTOLIC BLOOD PRESSURE: 88 MMHG | WEIGHT: 293 LBS

## 2024-08-30 DIAGNOSIS — N39.0 URINARY TRACT INFECTION WITHOUT HEMATURIA, SITE UNSPECIFIED: Primary | ICD-10-CM

## 2024-08-30 DIAGNOSIS — N18.31 CHRONIC KIDNEY DISEASE, STAGE 3A (HCC): ICD-10-CM

## 2024-08-30 DIAGNOSIS — Z79.4 TYPE 2 DIABETES MELLITUS WITH DIABETIC POLYNEUROPATHY, WITH LONG-TERM CURRENT USE OF INSULIN (HCC): ICD-10-CM

## 2024-08-30 DIAGNOSIS — E11.42 TYPE 2 DIABETES MELLITUS WITH DIABETIC POLYNEUROPATHY, WITH LONG-TERM CURRENT USE OF INSULIN (HCC): ICD-10-CM

## 2024-08-30 DIAGNOSIS — R19.7 DIARRHEA, UNSPECIFIED TYPE: ICD-10-CM

## 2024-08-30 PROCEDURE — 3079F DIAST BP 80-89 MM HG: CPT | Performed by: FAMILY MEDICINE

## 2024-08-30 PROCEDURE — 3074F SYST BP LT 130 MM HG: CPT | Performed by: FAMILY MEDICINE

## 2024-08-30 PROCEDURE — 99214 OFFICE O/P EST MOD 30 MIN: CPT | Performed by: FAMILY MEDICINE

## 2024-08-30 PROCEDURE — 3044F HG A1C LEVEL LT 7.0%: CPT | Performed by: FAMILY MEDICINE

## 2024-08-30 RX ORDER — CIPROFLOXACIN 500 MG/1
500 TABLET, FILM COATED ORAL 2 TIMES DAILY
Qty: 14 TABLET | Refills: 0 | Status: SHIPPED | OUTPATIENT
Start: 2024-08-30 | End: 2024-09-06

## 2024-08-30 RX ORDER — SEMAGLUTIDE 1.34 MG/ML
1 INJECTION, SOLUTION SUBCUTANEOUS WEEKLY
Qty: 3 ML | Refills: 5 | Status: SHIPPED | OUTPATIENT
Start: 2024-08-30

## 2024-08-30 ASSESSMENT — ENCOUNTER SYMPTOMS
CONSTIPATION: 0
COUGH: 0
NAUSEA: 0
SHORTNESS OF BREATH: 0
ANAL BLEEDING: 0
CHEST TIGHTNESS: 0
ABDOMINAL PAIN: 0
DIARRHEA: 1

## 2024-08-30 NOTE — PATIENT INSTRUCTIONS
Probiotic:   Align and culturelle.     Antibiotic  Ciprofloxacin 500 mg twice a day for 7 days.     Hold ozempic for 2 weeks.   Restart ozempic 1 mg weekly  After ozempic 1 mg weekly for 4 weeks, may increase to 2 mg.

## 2024-08-30 NOTE — PROGRESS NOTES
[facility]  [address]  [address]  [place] [address]  Dept: [phone]  Dept Fax: [phone]  Loc: [phone]    April Tubbs is a 64 y.o. female who presents today for her medical conditions/complaints as noted below.  April Tubbs is here for Diarrhea        Subjective:   CC:  Here today to discuss the followin-year-old here today for a history of diarrhea over the past week or so.  She states the episodes have been intermittent.  She will have a day where she has loose watery stools which seem to resolve the following day.  This has happened at least 3 times.  She notices a common trigger is when she starts eating again.  She has had no nausea or vomiting.  She has had no fever or chills.  No hematochezia or melena.  She states multiple people at work have been sick as well.  She is having no dysuria or hematuria.      Review of Systems   Constitutional:  Negative for chills and fever.   HENT:  Negative for congestion.    Respiratory:  Negative for cough, chest tightness and shortness of breath.    Cardiovascular:  Negative for chest pain, palpitations and leg swelling.   Gastrointestinal:  Positive for diarrhea. Negative for abdominal pain, anal bleeding, constipation and nausea.   Genitourinary:  Negative for difficulty urinating.   Psychiatric/Behavioral: Negative.       Objective:   /88   Pulse 75   Temp 97.6 °F (36.4 °C)   Wt (!) 171 kg (377 lb)   LMP 2002   SpO2 98%   BMI 59.05 kg/m²   BP Readings from Last 3 Encounters:   24 118/88   24 134/88   24 138/86    Wt Readings from Last 3 Encounters:   24 (!) 171 kg (377 lb)   24 (!) 172.8 kg (381 lb)   24 (!) 174.2 kg (384 lb)           Physical Exam  Constitutional:       Appearance: Normal appearance. She is well-developed. She is not ill-appearing.   Cardiovascular:      Rate and Rhythm: Normal rate and regular rhythm.

## 2024-09-19 RX ORDER — ATORVASTATIN CALCIUM 40 MG/1
40 TABLET, FILM COATED ORAL DAILY
Qty: 90 TABLET | Refills: 1 | Status: SHIPPED | OUTPATIENT
Start: 2024-09-19

## 2024-10-01 DIAGNOSIS — I10 ESSENTIAL (PRIMARY) HYPERTENSION: ICD-10-CM

## 2024-10-01 DIAGNOSIS — E78.00 HYPERCHOLESTEROLEMIA: ICD-10-CM

## 2024-10-01 DIAGNOSIS — Z79.4 TYPE 2 DIABETES MELLITUS WITH DIABETIC POLYNEUROPATHY, WITH LONG-TERM CURRENT USE OF INSULIN (HCC): ICD-10-CM

## 2024-10-01 DIAGNOSIS — E11.42 TYPE 2 DIABETES MELLITUS WITH DIABETIC POLYNEUROPATHY, WITH LONG-TERM CURRENT USE OF INSULIN (HCC): ICD-10-CM

## 2024-10-01 LAB
ALBUMIN SERPL-MCNC: 3.9 G/DL (ref 3.5–5.2)
ALP SERPL-CCNC: 97 U/L (ref 35–104)
ALT SERPL-CCNC: 14 U/L (ref 5–33)
ANION GAP SERPL CALCULATED.3IONS-SCNC: 11 MMOL/L (ref 7–19)
AST SERPL-CCNC: 17 U/L (ref 5–32)
BASOPHILS # BLD: 0.1 K/UL (ref 0–0.2)
BASOPHILS NFR BLD: 0.8 % (ref 0–1)
BILIRUB SERPL-MCNC: 0.5 MG/DL (ref 0.2–1.2)
BUN SERPL-MCNC: 24 MG/DL (ref 8–23)
CALCIUM SERPL-MCNC: 9.9 MG/DL (ref 8.8–10.2)
CHLORIDE SERPL-SCNC: 103 MMOL/L (ref 98–111)
CHOLEST SERPL-MCNC: 126 MG/DL (ref 0–199)
CO2 SERPL-SCNC: 27 MMOL/L (ref 22–29)
CREAT SERPL-MCNC: 1.3 MG/DL (ref 0.5–0.9)
EOSINOPHIL # BLD: 0.1 K/UL (ref 0–0.6)
EOSINOPHIL NFR BLD: 1.5 % (ref 0–5)
ERYTHROCYTE [DISTWIDTH] IN BLOOD BY AUTOMATED COUNT: 13.5 % (ref 11.5–14.5)
GLUCOSE SERPL-MCNC: 138 MG/DL (ref 70–99)
HBA1C MFR BLD: 6.8 % (ref 4–5.6)
HCT VFR BLD AUTO: 39.1 % (ref 37–47)
HDLC SERPL-MCNC: 49 MG/DL (ref 40–60)
HGB BLD-MCNC: 12 G/DL (ref 12–16)
IMM GRANULOCYTES # BLD: 0 K/UL
LDLC SERPL CALC-MCNC: 52 MG/DL
LYMPHOCYTES # BLD: 1.4 K/UL (ref 1.1–4.5)
LYMPHOCYTES NFR BLD: 19.5 % (ref 20–40)
MCH RBC QN AUTO: 27.6 PG (ref 27–31)
MCHC RBC AUTO-ENTMCNC: 30.7 G/DL (ref 33–37)
MCV RBC AUTO: 90.1 FL (ref 81–99)
MONOCYTES # BLD: 0.5 K/UL (ref 0–0.9)
MONOCYTES NFR BLD: 6.9 % (ref 0–10)
NEUTROPHILS # BLD: 5.1 K/UL (ref 1.5–7.5)
NEUTS SEG NFR BLD: 71.2 % (ref 50–65)
PLATELET # BLD AUTO: 229 K/UL (ref 130–400)
PMV BLD AUTO: 10.8 FL (ref 9.4–12.3)
POTASSIUM SERPL-SCNC: 4.6 MMOL/L (ref 3.5–5)
PROT SERPL-MCNC: 7.5 G/DL (ref 6.4–8.3)
RBC # BLD AUTO: 4.34 M/UL (ref 4.2–5.4)
SODIUM SERPL-SCNC: 141 MMOL/L (ref 136–145)
TRIGL SERPL-MCNC: 126 MG/DL (ref 0–149)
WBC # BLD AUTO: 7.2 K/UL (ref 4.8–10.8)

## 2024-10-08 ENCOUNTER — OFFICE VISIT (OUTPATIENT)
Dept: PRIMARY CARE CLINIC | Age: 64
End: 2024-10-08
Payer: COMMERCIAL

## 2024-10-08 VITALS
WEIGHT: 293 LBS | SYSTOLIC BLOOD PRESSURE: 130 MMHG | BODY MASS INDEX: 45.99 KG/M2 | HEIGHT: 67 IN | TEMPERATURE: 97 F | OXYGEN SATURATION: 97 % | DIASTOLIC BLOOD PRESSURE: 86 MMHG | HEART RATE: 78 BPM

## 2024-10-08 DIAGNOSIS — N18.31 CHRONIC KIDNEY DISEASE, STAGE 3A (HCC): ICD-10-CM

## 2024-10-08 DIAGNOSIS — I10 PRIMARY HYPERTENSION: ICD-10-CM

## 2024-10-08 DIAGNOSIS — E78.00 HYPERCHOLESTEROLEMIA: ICD-10-CM

## 2024-10-08 DIAGNOSIS — K21.9 GASTROESOPHAGEAL REFLUX DISEASE WITHOUT ESOPHAGITIS: ICD-10-CM

## 2024-10-08 DIAGNOSIS — Z23 FLU VACCINE NEED: ICD-10-CM

## 2024-10-08 DIAGNOSIS — Z79.4 TYPE 2 DIABETES MELLITUS WITH DIABETIC POLYNEUROPATHY, WITH LONG-TERM CURRENT USE OF INSULIN (HCC): Primary | ICD-10-CM

## 2024-10-08 DIAGNOSIS — E11.42 TYPE 2 DIABETES MELLITUS WITH DIABETIC POLYNEUROPATHY, WITH LONG-TERM CURRENT USE OF INSULIN (HCC): Primary | ICD-10-CM

## 2024-10-08 DIAGNOSIS — E03.9 PRIMARY HYPOTHYROIDISM: ICD-10-CM

## 2024-10-08 DIAGNOSIS — Z99.89 CPAP (CONTINUOUS POSITIVE AIRWAY PRESSURE) DEPENDENCE: ICD-10-CM

## 2024-10-08 PROCEDURE — 3044F HG A1C LEVEL LT 7.0%: CPT | Performed by: FAMILY MEDICINE

## 2024-10-08 PROCEDURE — 90471 IMMUNIZATION ADMIN: CPT | Performed by: FAMILY MEDICINE

## 2024-10-08 PROCEDURE — 3075F SYST BP GE 130 - 139MM HG: CPT | Performed by: FAMILY MEDICINE

## 2024-10-08 PROCEDURE — 99214 OFFICE O/P EST MOD 30 MIN: CPT | Performed by: FAMILY MEDICINE

## 2024-10-08 PROCEDURE — 90661 CCIIV3 VAC ABX FR 0.5 ML IM: CPT | Performed by: FAMILY MEDICINE

## 2024-10-08 PROCEDURE — 3079F DIAST BP 80-89 MM HG: CPT | Performed by: FAMILY MEDICINE

## 2024-10-08 NOTE — PROGRESS NOTES
[facility]  [address]  [address]  [place] [address]  Dept: [phone]  Dept Fax: [phone]  Loc: [phone]    April uTbbs is a 64 y.o. female who presents today for her medical conditions/complaints as noted below.  April Tubbs is here for 3 Month Follow-Up (Pts bp machine showed  114 77)        Subjective:   CC:  Here today to discuss the following:    She was last here on August 30 complaining of diarrhea.  -Her Ozempic was held and a gastrointestinal panel was ordered.  -She did not return the gastrointestinal panel as her symptoms improved.  - -Her diarrhea resolved and she resumed her Ozempic 2 weeks later.    Diabetes Mellitus  Has been compliant with medications.   No side effects of medications since last visit.   No polyuria, polydipsia, or vision changes since last visit.   No symptomatic episodes of hypoglycemia.     Hyperlipidemia  Tolerating cholesterol medication without adverse effects.    Hypertension  Compliant with medications.  No adverse effects from medication.  No lightheadedness, palpitations, or chest pain.  ZOILA on CPAP  Compliant with CPAP.  No daytime somnolence.   Feels rested for the most part when wearing CPAP.  Gastroesophageal Reflux Disease  Symptoms currently under control.   Medication adequately controls symptoms.  No hematochezia or melena.     Hypothyroidism  Symptoms are stable.  No temperature intolerance, fatigue, or mood disturbance from baseline. Complaint with current medication.    Review of Systems   Constitutional: Negative for chills and fever.   HENT: Negative for congestion.    Respiratory: Negative for cough, chest tightness and shortness of breath.  Cardiovascular: Negative for chest pain, palpitations and leg swelling.   Gastrointestinal: Negative for abdominal pain, anal bleeding, constipation, diarrhea and nausea.         Review of Systems  Objective:   /86   Pulse 78   Temp 97

## 2024-10-30 NOTE — PROGRESS NOTES
Abnormal   [] Mouth/Throat: Mucous membranes are moist.     External Ears [x] Normal  [] Abnormal-     Neck: [x] No visualized mass     Pulmonary/Chest: [x] Respiratory effort normal.  [x] No visualized signs of difficulty breathing or respiratory distress        [] Abnormal-      Musculoskeletal:   [] Normal gait with no signs of ataxia         [] Normal range of motion of neck        [] Abnormal-       Neurological:        [] No Facial Asymmetry (Cranial nerve 7 motor function) (limited exam to video visit)          [] No gaze palsy        [] Abnormal-         Skin:        [x] No significant exanthematous lesions or discoloration noted on facial skin         [] Abnormal-            Psychiatric:       [x] Normal Affect [x] No Hallucinations        [] Abnormal-     Other pertinent observable physical exam findings-     ASSESSMENT/PLAN:  1. Dysuria  Due to the current coronavirus pandemic suggested we treat this empirically. Given Cipro 500 mg twice daily for 7 days  Advised to contact me if symptoms worsen  - ciprofloxacin (CIPRO) 500 MG tablet; Take 1 tablet by mouth 2 times daily for 7 days  Dispense: 14 tablet; Refill: 0      Return if symptoms worsen or fail to improve. Alexsandra Abraham is a 62 y.o. female being evaluated by a Virtual Visit (video visit) encounter to address concerns as mentioned above. A caregiver was present when appropriate. Due to this being a TeleHealth encounter (During [place]R-86 public health emergency), evaluation of the following organ systems was limited: Vitals/Constitutional/EENT/Resp/CV/GI//MS/Neuro/Skin/Heme-Lymph-Imm.   Pursuant to the emergency declaration under the [address] [place] [address] authority and the [employer] Act, this Virtual Visit was conducted with patient's (and/or legal guardian's) consent, to reduce the patient's risk of exposure to COVID-19 and provide necessary medical care. The patient (and/or legal guardian) has also been advised to contact this office for worsening conditions or problems, and seek emergency medical treatment and/or call 911 if deemed necessary. Patient identification was verified at the start of the visit: Yes    Total time spent on this encounter: Not billed by time    Services were provided through a video synchronous discussion virtually to substitute for in-person clinic visit. Patient and provider were located at their individual homes. --Robby Thompson MD on 5/4/2020 at 1:03 PM    An electronic signature was used to authenticate this note. This visit was completed using [employer] Telehealth with audio and visual capabilities:   Wed Apr 29 2020  9:39:48 AM  9:43:29 AM    00:03:40 done done

## 2024-11-08 DIAGNOSIS — K44.9 HIATAL HERNIA: ICD-10-CM

## 2024-11-08 RX ORDER — PANTOPRAZOLE SODIUM 40 MG/1
40 TABLET, DELAYED RELEASE ORAL DAILY
Qty: 90 TABLET | Refills: 1 | Status: SHIPPED | OUTPATIENT
Start: 2024-11-08

## 2024-11-25 ENCOUNTER — OFFICE VISIT (OUTPATIENT)
Dept: NEUROLOGY | Age: 64
End: 2024-11-25
Payer: COMMERCIAL

## 2024-11-25 VITALS
WEIGHT: 293 LBS | DIASTOLIC BLOOD PRESSURE: 77 MMHG | HEIGHT: 67 IN | HEART RATE: 81 BPM | SYSTOLIC BLOOD PRESSURE: 137 MMHG | OXYGEN SATURATION: 95 % | BODY MASS INDEX: 45.99 KG/M2

## 2024-11-25 DIAGNOSIS — G47.61 PLMD (PERIODIC LIMB MOVEMENT DISORDER): ICD-10-CM

## 2024-11-25 DIAGNOSIS — Z99.89 CPAP (CONTINUOUS POSITIVE AIRWAY PRESSURE) DEPENDENCE: ICD-10-CM

## 2024-11-25 DIAGNOSIS — G47.33 OSA (OBSTRUCTIVE SLEEP APNEA): Primary | ICD-10-CM

## 2024-11-25 PROCEDURE — 99213 OFFICE O/P EST LOW 20 MIN: CPT | Performed by: PHYSICIAN ASSISTANT

## 2024-11-25 PROCEDURE — 3078F DIAST BP <80 MM HG: CPT | Performed by: PHYSICIAN ASSISTANT

## 2024-11-25 PROCEDURE — 3075F SYST BP GE 130 - 139MM HG: CPT | Performed by: PHYSICIAN ASSISTANT

## 2024-11-25 NOTE — PROGRESS NOTES
REVIEW OF SYSTEMS    Constitutional: []Fever []Sweats []Chills [] Recent Injury [x] Denies all unless marked  HEENT:[]Headache  [] Head Injury [] Hearing Loss  [] Sore Throat  [] Ear Ache [x] Denies all unless marked  Spine:  [] Neck pain  [] Back pain  [] Sciaticia  [x] Denies all unless marked  Cardiovascular:[]Heart Disease []Palpitations [] Chest Pain   [x] Denies all unless marked  Pulmonary: []Shortness of Breath []Cough   [x] Denies all unless marked  Psychiatric/Behavioral:[] Depression [] Anxiety [x] Denies all unless marked  Gastrointestinal: []Nausea  []Vomiting  []Abdominal Pain  []Constipation  []Diarrhea  [x] Denies all unless marked  Genitourinary:   [] Frequency  [] Urgency  [] Dysuria [] Incontinence  [x] Denies all unless marked  Extremities: []Pain  []Swelling  [x] Denies all unless marked  Musculoskeletal: [] Myalgias  [] Joint Pain  [] Arthritis [] Muscle Cramps [] Muscle Twitches  [x] Denies all unless marked  Sleep: []Insomnia[]Snoring []Restless Legs  [x]Sleep Apnea  []Daytime Sleepiness  [x] Denies all unless marked  Skin:[] Rash [] Color Change [x] Denies all unless marked   Neurological:[]Visual Disturbance [] Memory Loss []Loss of Balance []Slurred Speech []Weakness []Seizures  [] Dizziness [x] Denies all unless marked     
mL 3    blood glucose test strips (CONTOUR NEXT TEST) strip TEST FIVE TIMES DAILY 500 strip 5    ondansetron (ZOFRAN) 8 MG tablet Take 1 tablet by mouth every 8 hours as needed for Nausea or Other (diarrhea) 12 tablet 0    vitamin D (ERGOCALCIFEROL) 1.25 MG (91167 UT) CAPS capsule TAKE 1 CAPSULE BY MOUTH WEEKLY      Multiple Vitamins-Calcium (TGT DAILY MULTIVITAMIN WOMENS) TABS 1 tablet Oral Daily      Omega-3 Fatty Acids (FISH OIL) 1000 MG capsule 1 capsule      fexofenadine (ALLEGRA) 180 MG tablet 1 tablet as needed Orally Once a day      Continuous Blood Gluc  (DEXCOM G6 ) JAYA 1 Device by Does not apply route daily 1 each 3    glucagon 1 MG injection Inject 1 mg into the muscle once for 1 dose 1 kit 0    hydroCHLOROthiazide (MICROZIDE) 12.5 MG capsule Take 1 capsule by mouth daily      B-D UF III MINI PEN NEEDLES 31G X 5 MM MISC USE FOUR TIMES DAILY AS DIRECTED 400 each 3    Cholecalciferol (VITAMIN D3) 1.25 MG (24913 UT) CAPS Take 1 capsule by mouth once a week      clotrimazole-betamethasone (LOTRISONE) 1-0.05 % cream Apply topically 2 times daily. 15 g 0    Multiple Vitamins-Minerals (MULTIPLE VITAMINS/WOMENS PO) Take 1 tablet by mouth daily       aspirin 81 MG tablet Take 1 tablet by mouth daily Indications: Monday, Wednesday, Friday in the evening 3 days a wk       No current facility-administered medications for this visit.       Allergies:  Adhesive tape and Dermabond         REVIEW OF SYSTEMS     Constitutional: []Fever []Sweats []Chills [] Recent Injury [x] Denies all unless marked  HEENT:[]Headache  [] Head Injury [] Hearing Loss  [] Sore Throat  [] Ear Ache [x] Denies all unless marked  Spine:  [] Neck pain  [] Back pain  [] Sciaticia  [x] Denies all unless marked  Cardiovascular:[]Heart Disease []Palpitations [] Chest Pain   [x] Denies all unless marked  Pulmonary: []Shortness of Breath []Cough   [x] Denies all unless marked  Psychiatric/Behavioral:[] Depression [] Anxiety [x] Denies

## 2024-11-25 NOTE — PATIENT INSTRUCTIONS
Patient education: Sleep apnea in adults       INTRODUCTION -- Normally during sleep, air moves through the throat and in and out of the lungs at a regular rhythm. In a person with sleep apnea, air movement is periodically diminished or stopped. There are two types of sleep apnea: obstructive sleep apnea and central sleep apnea. In obstructive sleep apnea, breathing is abnormal because of narrowing or closure of the throat. In central sleep apnea, breathing is abnormal because of a change in the breathing control and rhythm.  Sleep apnea is a serious condition that can affect a person's ability to safely perform normal daily activities and can affect long term health. Approximately 25 percent of adults are at risk for sleep apnea of some degree.  Men are more commonly affected than women. Other risk factors include middle and older age, being overweight or obese, and having a small mouth and throat.  This topic review focuses on the most common type of sleep apnea in adults, obstructive sleep apnea (ZOILA).    HOW SLEEP APNEA OCCURS -- The throat is surrounded by muscles that control the airway for speaking, swallowing, and breathing. During sleep, these muscles are less active, and this causes the throat to narrow.  In most people, this narrowing does not affect breathing. In others, it can cause snoring, sometimes with reduced or completely blocked airflow.  A completely blocked airway without airflow is called an obstructive apnea. Partial obstruction with diminished airflow is called a hypopnea. A person may have apnea and hypopnea during sleep.  Insufficient breathing due to apnea or hypopnea causes oxygen levels to fall and carbon dioxide to rise. Because the airway is blocked, breathing faster or harder does not help to improve oxygen levels until the airway is reopened. Typically, the obstruction requires the person to awaken to activate the upper airway muscles. Once the airway is opened, the person then

## 2024-11-26 DIAGNOSIS — E11.42 TYPE 2 DIABETES MELLITUS WITH DIABETIC POLYNEUROPATHY, WITH LONG-TERM CURRENT USE OF INSULIN (HCC): ICD-10-CM

## 2024-11-26 DIAGNOSIS — Z79.4 TYPE 2 DIABETES MELLITUS WITH DIABETIC POLYNEUROPATHY, WITH LONG-TERM CURRENT USE OF INSULIN (HCC): ICD-10-CM

## 2024-11-26 RX ORDER — INSULIN DEGLUDEC 100 U/ML
22 INJECTION, SOLUTION SUBCUTANEOUS
Qty: 7 ADJUSTABLE DOSE PRE-FILLED PEN SYRINGE | Refills: 5 | Status: SHIPPED | OUTPATIENT
Start: 2024-11-26

## 2024-11-26 RX ORDER — INSULIN DETEMIR 100 [IU]/ML
INJECTION, SOLUTION SUBCUTANEOUS
Qty: 15 ML | Refills: 1 | OUTPATIENT
Start: 2024-11-26

## 2024-11-26 RX ORDER — INSULIN DEGLUDEC 100 U/ML
22 INJECTION, SOLUTION SUBCUTANEOUS
Qty: 7 ADJUSTABLE DOSE PRE-FILLED PEN SYRINGE | Refills: 5 | Status: SHIPPED | OUTPATIENT
Start: 2024-11-26 | End: 2024-11-26 | Stop reason: SDUPTHER

## 2024-11-26 NOTE — TELEPHONE ENCOUNTER
THIS MEDICATION HAS BEEN DISCONTINUED BY THE  - [facility]

## 2024-11-26 NOTE — TELEPHONE ENCOUNTER
Let her know that I have substituted her Levemir to Tresiba.  The dose is the same.  I have sent the new prescription to her [facility]

## 2024-12-05 DIAGNOSIS — Z79.4 TYPE 2 DIABETES MELLITUS WITH DIABETIC POLYNEUROPATHY, WITH LONG-TERM CURRENT USE OF INSULIN (HCC): ICD-10-CM

## 2024-12-05 DIAGNOSIS — E11.42 TYPE 2 DIABETES MELLITUS WITH DIABETIC POLYNEUROPATHY, WITH LONG-TERM CURRENT USE OF INSULIN (HCC): ICD-10-CM

## 2024-12-05 RX ORDER — INSULIN ASPART 100 [IU]/ML
INJECTION, SOLUTION INTRAVENOUS; SUBCUTANEOUS
Qty: 42 ML | Refills: 1 | Status: SHIPPED | OUTPATIENT
Start: 2024-12-05

## 2025-01-20 DIAGNOSIS — Z79.4 TYPE 2 DIABETES MELLITUS WITH DIABETIC POLYNEUROPATHY, WITH LONG-TERM CURRENT USE OF INSULIN (HCC): ICD-10-CM

## 2025-01-20 DIAGNOSIS — E11.42 TYPE 2 DIABETES MELLITUS WITH DIABETIC POLYNEUROPATHY, WITH LONG-TERM CURRENT USE OF INSULIN (HCC): ICD-10-CM

## 2025-01-20 DIAGNOSIS — E78.00 HYPERCHOLESTEROLEMIA: ICD-10-CM

## 2025-01-20 DIAGNOSIS — E03.9 PRIMARY HYPOTHYROIDISM: ICD-10-CM

## 2025-01-20 DIAGNOSIS — I10 PRIMARY HYPERTENSION: ICD-10-CM

## 2025-01-20 LAB
ALBUMIN SERPL-MCNC: 3.9 G/DL (ref 3.5–5.2)
ALP SERPL-CCNC: 96 U/L (ref 35–104)
ALT SERPL-CCNC: 16 U/L (ref 5–33)
ANION GAP SERPL CALCULATED.3IONS-SCNC: 13 MMOL/L (ref 7–19)
AST SERPL-CCNC: 20 U/L (ref 5–32)
BASOPHILS # BLD: 0 K/UL (ref 0–0.2)
BASOPHILS NFR BLD: 0.6 % (ref 0–1)
BILIRUB SERPL-MCNC: 0.8 MG/DL (ref 0.2–1.2)
BUN SERPL-MCNC: 19 MG/DL (ref 8–23)
CALCIUM SERPL-MCNC: 10.1 MG/DL (ref 8.8–10.2)
CHLORIDE SERPL-SCNC: 103 MMOL/L (ref 98–111)
CHOLEST SERPL-MCNC: 126 MG/DL (ref 0–199)
CO2 SERPL-SCNC: 27 MMOL/L (ref 22–29)
CREAT SERPL-MCNC: 1.2 MG/DL (ref 0.5–0.9)
EOSINOPHIL # BLD: 0.1 K/UL (ref 0–0.6)
EOSINOPHIL NFR BLD: 1.4 % (ref 0–5)
ERYTHROCYTE [DISTWIDTH] IN BLOOD BY AUTOMATED COUNT: 14.4 % (ref 11.5–14.5)
GLUCOSE SERPL-MCNC: 138 MG/DL (ref 70–99)
HBA1C MFR BLD: 7.1 % (ref 4–5.6)
HCT VFR BLD AUTO: 41 % (ref 37–47)
HDLC SERPL-MCNC: 50 MG/DL (ref 40–60)
HGB BLD-MCNC: 13.2 G/DL (ref 12–16)
IMM GRANULOCYTES # BLD: 0 K/UL
LDLC SERPL CALC-MCNC: 58 MG/DL
LYMPHOCYTES # BLD: 1.6 K/UL (ref 1.1–4.5)
LYMPHOCYTES NFR BLD: 25.2 % (ref 20–40)
MCH RBC QN AUTO: 28.9 PG (ref 27–31)
MCHC RBC AUTO-ENTMCNC: 32.2 G/DL (ref 33–37)
MCV RBC AUTO: 89.7 FL (ref 81–99)
MONOCYTES # BLD: 0.4 K/UL (ref 0–0.9)
MONOCYTES NFR BLD: 6.7 % (ref 0–10)
NEUTROPHILS # BLD: 4.2 K/UL (ref 1.5–7.5)
NEUTS SEG NFR BLD: 65.9 % (ref 50–65)
PLATELET # BLD AUTO: 233 K/UL (ref 130–400)
PMV BLD AUTO: 11 FL (ref 9.4–12.3)
POTASSIUM SERPL-SCNC: 4.2 MMOL/L (ref 3.5–5)
PROT SERPL-MCNC: 7.6 G/DL (ref 6.4–8.3)
RBC # BLD AUTO: 4.57 M/UL (ref 4.2–5.4)
SODIUM SERPL-SCNC: 143 MMOL/L (ref 136–145)
T4 FREE SERPL-MCNC: 1.73 NG/DL (ref 0.93–1.7)
TRIGL SERPL-MCNC: 89 MG/DL (ref 0–149)
TSH SERPL DL<=0.005 MIU/L-ACNC: 0.01 UIU/ML (ref 0.27–4.2)
WBC # BLD AUTO: 6.3 K/UL (ref 4.8–10.8)

## 2025-01-23 SDOH — ECONOMIC STABILITY: FOOD INSECURITY: WITHIN THE PAST 12 MONTHS, THE FOOD YOU BOUGHT JUST DIDN'T LAST AND YOU DIDN'T HAVE MONEY TO GET MORE.: NEVER TRUE

## 2025-01-23 SDOH — ECONOMIC STABILITY: INCOME INSECURITY: IN THE LAST 12 MONTHS, WAS THERE A TIME WHEN YOU WERE NOT ABLE TO PAY THE MORTGAGE OR RENT ON TIME?: NO

## 2025-01-23 SDOH — ECONOMIC STABILITY: FOOD INSECURITY: WITHIN THE PAST 12 MONTHS, YOU WORRIED THAT YOUR FOOD WOULD RUN OUT BEFORE YOU GOT MONEY TO BUY MORE.: NEVER TRUE

## 2025-01-23 SDOH — ECONOMIC STABILITY: TRANSPORTATION INSECURITY
IN THE PAST 12 MONTHS, HAS THE LACK OF TRANSPORTATION KEPT YOU FROM MEDICAL APPOINTMENTS OR FROM GETTING MEDICATIONS?: NO

## 2025-01-23 ASSESSMENT — PATIENT HEALTH QUESTIONNAIRE - PHQ9
SUM OF ALL RESPONSES TO PHQ QUESTIONS 1-9: 0
SUM OF ALL RESPONSES TO PHQ9 QUESTIONS 1 & 2: 0
1. LITTLE INTEREST OR PLEASURE IN DOING THINGS: NOT AT ALL
1. LITTLE INTEREST OR PLEASURE IN DOING THINGS: NOT AT ALL
2. FEELING DOWN, DEPRESSED OR HOPELESS: NOT AT ALL
SUM OF ALL RESPONSES TO PHQ QUESTIONS 1-9: 0
2. FEELING DOWN, DEPRESSED OR HOPELESS: NOT AT ALL
SUM OF ALL RESPONSES TO PHQ9 QUESTIONS 1 & 2: 0

## 2025-01-24 ENCOUNTER — OFFICE VISIT (OUTPATIENT)
Dept: PRIMARY CARE CLINIC | Age: 65
End: 2025-01-24
Payer: COMMERCIAL

## 2025-01-24 VITALS
BODY MASS INDEX: 59.67 KG/M2 | OXYGEN SATURATION: 97 % | SYSTOLIC BLOOD PRESSURE: 112 MMHG | TEMPERATURE: 98.2 F | WEIGHT: 293 LBS | HEART RATE: 78 BPM | DIASTOLIC BLOOD PRESSURE: 62 MMHG

## 2025-01-24 DIAGNOSIS — E78.00 HYPERCHOLESTEROLEMIA: ICD-10-CM

## 2025-01-24 DIAGNOSIS — K21.9 GASTROESOPHAGEAL REFLUX DISEASE WITHOUT ESOPHAGITIS: ICD-10-CM

## 2025-01-24 DIAGNOSIS — R53.83 OTHER FATIGUE: ICD-10-CM

## 2025-01-24 DIAGNOSIS — I10 PRIMARY HYPERTENSION: ICD-10-CM

## 2025-01-24 DIAGNOSIS — Z99.89 CPAP (CONTINUOUS POSITIVE AIRWAY PRESSURE) DEPENDENCE: ICD-10-CM

## 2025-01-24 DIAGNOSIS — J01.00 ACUTE NON-RECURRENT MAXILLARY SINUSITIS: ICD-10-CM

## 2025-01-24 DIAGNOSIS — E11.42 TYPE 2 DIABETES MELLITUS WITH DIABETIC POLYNEUROPATHY, WITH LONG-TERM CURRENT USE OF INSULIN (HCC): Primary | ICD-10-CM

## 2025-01-24 DIAGNOSIS — E03.9 PRIMARY HYPOTHYROIDISM: ICD-10-CM

## 2025-01-24 DIAGNOSIS — Z79.4 TYPE 2 DIABETES MELLITUS WITH DIABETIC POLYNEUROPATHY, WITH LONG-TERM CURRENT USE OF INSULIN (HCC): Primary | ICD-10-CM

## 2025-01-24 DIAGNOSIS — N18.31 CHRONIC KIDNEY DISEASE, STAGE 3A (HCC): ICD-10-CM

## 2025-01-24 PROCEDURE — 3074F SYST BP LT 130 MM HG: CPT | Performed by: FAMILY MEDICINE

## 2025-01-24 PROCEDURE — 99214 OFFICE O/P EST MOD 30 MIN: CPT | Performed by: FAMILY MEDICINE

## 2025-01-24 PROCEDURE — 3078F DIAST BP <80 MM HG: CPT | Performed by: FAMILY MEDICINE

## 2025-01-24 PROCEDURE — 3051F HG A1C>EQUAL 7.0%<8.0%: CPT | Performed by: FAMILY MEDICINE

## 2025-01-24 RX ORDER — LEVOTHYROXINE SODIUM 88 UG/1
88 TABLET ORAL DAILY
Qty: 90 TABLET | Refills: 3 | Status: SHIPPED | OUTPATIENT
Start: 2025-01-24

## 2025-01-24 RX ORDER — AZITHROMYCIN 250 MG/1
TABLET, FILM COATED ORAL
Qty: 1 PACKET | Refills: 0 | Status: SHIPPED | OUTPATIENT
Start: 2025-01-24

## 2025-01-24 NOTE — PROGRESS NOTES
[facility]  [address]  [address]  [place] [address]  Dept: [phone]  Dept Fax: [phone]  Loc: [phone]    April Tubbs is a 64 y.o. female who presents today for her medical conditions/complaints as noted below.  April Tubbs is here for 3 Month Follow-Up        Subjective:   CC:  Here today to discuss the following:    Complains of bilateral maxillary sinus tenderness, nasal congestion, post nasal drainage, and headache for 4 weeks  Over the counter medication hasn't helped. No fever or chills.   - tinged with blood.   - \"sinus headaches\".     Diabetes Mellitus  Has been compliant with medications.   No side effects of medications since last visit.   No polyuria, polydipsia, or vision changes since last visit.   No symptomatic episodes of hypoglycemia.     Hyperlipidemia  Tolerating cholesterol medication without adverse effects.    Hypertension  Compliant with medications.  No adverse effects from medication.  No lightheadedness, palpitations, or chest pain.  ZOILA on CPAP  Compliant with CPAP.  No daytime somnolence.   Feels rested for the most part when wearing CPAP.  Gastroesophageal Reflux Disease  Symptoms currently under control.   Medication adequately controls symptoms.  No hematochezia or melena.     Hypothyroidism  Symptoms are stable.  No temperature intolerance, fatigue, or mood disturbance from baseline. Complaint with current medication.          -   Review of Systems   Constitutional:  Negative for chills and fever.   HENT:  Positive for sinus pressure, sinus pain and sore throat. Negative for congestion.    Respiratory:  Positive for cough. Negative for chest tightness and shortness of breath.    Cardiovascular:  Negative for chest pain.   Gastrointestinal:  Negative for abdominal pain, constipation, diarrhea and nausea.   Psychiatric/Behavioral: Negative.       Objective:   /62   Pulse 78   Temp 98.2 °F (36.8 °C)

## 2025-01-25 ASSESSMENT — ENCOUNTER SYMPTOMS
SORE THROAT: 1
SHORTNESS OF BREATH: 0
SINUS PAIN: 1
CONSTIPATION: 0
ABDOMINAL PAIN: 0
NAUSEA: 0
SINUS PRESSURE: 1
DIARRHEA: 0
COUGH: 1
CHEST TIGHTNESS: 0

## 2025-01-30 ENCOUNTER — E-VISIT (OUTPATIENT)
Dept: PRIMARY CARE CLINIC | Age: 65
End: 2025-01-30
Payer: COMMERCIAL

## 2025-01-30 DIAGNOSIS — J01.00 ACUTE NON-RECURRENT MAXILLARY SINUSITIS: Primary | ICD-10-CM

## 2025-01-30 PROCEDURE — 99422 OL DIG E/M SVC 11-20 MIN: CPT | Performed by: FAMILY MEDICINE

## 2025-01-30 RX ORDER — METHYLPREDNISOLONE 4 MG/1
TABLET ORAL
Qty: 1 KIT | Refills: 0 | Status: SHIPPED | OUTPATIENT
Start: 2025-01-30

## 2025-01-30 ASSESSMENT — LIFESTYLE VARIABLES: SMOKING_STATUS: NO, I'VE NEVER SMOKED

## 2025-01-30 NOTE — PROGRESS NOTES
S: She was last here on January 24 complaining of bilateral maxillary sinus pressure has been present for the previous 4 weeks.  -She was placed on azithromycin and a Diatherix respiratory swab was obtained  - -Results returned as negative.    Submitted an e-visit today for similar symptoms  -4 to 5 weeks of sinus pressure, sore throat, sneezing and hoarse voice.  - -History of diabetes and chronic kidney disease  -She states the azithromycin did not fully address the sinus symptoms.  - -     Assessment/Plan  1. Acute non-recurrent maxillary sinusitis  -   - amoxicillin-clavulanate (AUGMENTIN) 875-125 MG per tablet; Take 1 tablet by mouth 2 times daily for 10 days  Dispense: 20 tablet; Refill: 0  - methylPREDNISolone (MEDROL, DEISY,) 4 MG tablet; Take by mouth as directed on pack  Dispense: 1 kit; Refill: 0        Total Time: minutes: 11-20 minutes

## 2025-02-11 RX ORDER — LISINOPRIL 40 MG/1
40 TABLET ORAL DAILY
Qty: 90 TABLET | Refills: 1 | Status: SHIPPED | OUTPATIENT
Start: 2025-02-11

## 2025-02-20 ENCOUNTER — OFFICE VISIT (OUTPATIENT)
Dept: PRIMARY CARE CLINIC | Age: 65
End: 2025-02-20
Payer: COMMERCIAL

## 2025-02-20 VITALS
TEMPERATURE: 97.4 F | HEART RATE: 92 BPM | HEIGHT: 67 IN | WEIGHT: 293 LBS | BODY MASS INDEX: 45.99 KG/M2 | DIASTOLIC BLOOD PRESSURE: 84 MMHG | OXYGEN SATURATION: 98 % | SYSTOLIC BLOOD PRESSURE: 140 MMHG

## 2025-02-20 DIAGNOSIS — L02.91 ABSCESS: Primary | ICD-10-CM

## 2025-02-20 PROCEDURE — 3079F DIAST BP 80-89 MM HG: CPT | Performed by: NURSE PRACTITIONER

## 2025-02-20 PROCEDURE — 99214 OFFICE O/P EST MOD 30 MIN: CPT | Performed by: NURSE PRACTITIONER

## 2025-02-20 PROCEDURE — 3077F SYST BP >= 140 MM HG: CPT | Performed by: NURSE PRACTITIONER

## 2025-02-20 RX ORDER — SULFAMETHOXAZOLE AND TRIMETHOPRIM 800; 160 MG/1; MG/1
1 TABLET ORAL 2 TIMES DAILY
Qty: 14 TABLET | Refills: 0 | Status: SHIPPED | OUTPATIENT
Start: 2025-02-20 | End: 2025-02-27

## 2025-02-25 LAB
BACTERIA SPEC ANAEROBE CULT: ABNORMAL
BACTERIA SPEC ANAEROBE+AEROBE CULT: ABNORMAL
BACTERIA SPEC ANAEROBE+AEROBE CULT: ABNORMAL
GRAM STN SPEC: ABNORMAL
ORGANISM: ABNORMAL
ORGANISM: ABNORMAL

## 2025-02-26 ASSESSMENT — ENCOUNTER SYMPTOMS
VOMITING: 0
DIARRHEA: 0
SHORTNESS OF BREATH: 0
CHEST TIGHTNESS: 0
NAUSEA: 0
SORE THROAT: 0
COUGH: 0
COLOR CHANGE: 0
ABDOMINAL PAIN: 0

## 2025-02-26 NOTE — PROGRESS NOTES
Dragon/transcription disclaimer: Some of this encounter note is an electronic transcription/translation of spoken language to printed text. The electronic translation of spoken language may permit erroneous, or at times, nonsensical words or phrases to be inadvertently transcribed. Although I have reviewed the note for such errors, some may still exist.    Electronically signed by CHERYLE Holman CNP on 2/26/2025 at 8:05 AM

## 2025-03-07 RX ORDER — ATORVASTATIN CALCIUM 40 MG/1
40 TABLET, FILM COATED ORAL DAILY
Qty: 90 TABLET | Refills: 1 | Status: SHIPPED | OUTPATIENT
Start: 2025-03-07

## 2025-03-11 ENCOUNTER — OFFICE VISIT (OUTPATIENT)
Dept: PRIMARY CARE CLINIC | Age: 65
End: 2025-03-11
Payer: COMMERCIAL

## 2025-03-11 VITALS
RESPIRATION RATE: 20 BRPM | DIASTOLIC BLOOD PRESSURE: 84 MMHG | BODY MASS INDEX: 45.99 KG/M2 | SYSTOLIC BLOOD PRESSURE: 130 MMHG | HEIGHT: 67 IN | WEIGHT: 293 LBS | TEMPERATURE: 96.9 F

## 2025-03-11 DIAGNOSIS — S31.109A OPEN WOUND OF ABDOMINAL WALL, INITIAL ENCOUNTER: Primary | ICD-10-CM

## 2025-03-11 DIAGNOSIS — E11.42 TYPE 2 DIABETES MELLITUS WITH DIABETIC POLYNEUROPATHY, WITH LONG-TERM CURRENT USE OF INSULIN (HCC): ICD-10-CM

## 2025-03-11 DIAGNOSIS — Z79.4 TYPE 2 DIABETES MELLITUS WITH DIABETIC POLYNEUROPATHY, WITH LONG-TERM CURRENT USE OF INSULIN (HCC): ICD-10-CM

## 2025-03-11 PROCEDURE — 3075F SYST BP GE 130 - 139MM HG: CPT | Performed by: FAMILY MEDICINE

## 2025-03-11 PROCEDURE — 3051F HG A1C>EQUAL 7.0%<8.0%: CPT | Performed by: FAMILY MEDICINE

## 2025-03-11 PROCEDURE — 3079F DIAST BP 80-89 MM HG: CPT | Performed by: FAMILY MEDICINE

## 2025-03-11 PROCEDURE — 99213 OFFICE O/P EST LOW 20 MIN: CPT | Performed by: FAMILY MEDICINE

## 2025-03-11 NOTE — PROGRESS NOTES
[facility]  [address]  [address]  [place] [address]  Dept: [phone]  Dept Fax: [phone]  Loc: [phone]    April Tubbs is a 65 y.o. female who presents today for her medical conditions/complaints as noted below.  April Tubbs is here for Follow-up (Abd wound fu/2 rounds of antibiotic./Wound looks like it is opening to me )        Subjective:   CC:  Here today to discuss the following:    February 20, 2025  -Seen in office by nurse practitioner, Lindsay Hastings  - -She had developed a large hard bump on her waist  - -Culture was obtained  - - -Streptococcus sanguinous, actinomyces  - - - -Switched from Bactrim to Augmentin    She continues to have an open wound just above umbilicus measuring about 3 cm.  She denies any purulent drainage.  No fever or chills.  No tenderness.  Review of Systems   Constitutional: Negative for chills and fever.   HENT: Negative for congestion.    Respiratory: Negative for cough, chest tightness and shortness of breath.  Cardiovascular: Negative for chest pain, palpitations and leg swelling.   Gastrointestinal: Negative for abdominal pain, anal bleeding, constipation, diarrhea and nausea.         Review of Systems  Objective:   /84   Temp 96.9 °F (36.1 °C)   Resp 20   Ht 1.702 m (5' 7\")   Wt (!) 174.6 kg (385 lb)   LMP 01/01/2002   BMI 60.30 kg/m²   BP Readings from Last 3 Encounters:   03/11/25 130/84   02/20/25 (!) 140/84   01/24/25 112/62    Wt Readings from Last 3 Encounters:   03/11/25 (!) 174.6 kg (385 lb)   02/20/25 (!) 174 kg (383 lb 9.6 oz)   01/24/25 (!) 172.8 kg (381 lb)         Physical Exam   Constitutional: She appears well. Does not appear ill.   Neck: Neck supple. No masses.  Neck Symmetric.  Normal tracheal position.  No thyroid enlargement  Cardiovascular: Normal rate and regular rhythm.  Exam reveals no friction rub.  No murmur heard.  Respiratory:  Effort normal and breath

## 2025-03-14 ENCOUNTER — PATIENT MESSAGE (OUTPATIENT)
Dept: PRIMARY CARE CLINIC | Age: 65
End: 2025-03-14

## 2025-03-14 DIAGNOSIS — Z12.31 BREAST CANCER SCREENING BY MAMMOGRAM: Primary | ICD-10-CM

## 2025-03-17 ENCOUNTER — HOSPITAL ENCOUNTER (OUTPATIENT)
Dept: WOUND CARE | Age: 65
Discharge: HOME | End: 2025-03-17
Attending: SURGERY
Payer: COMMERCIAL

## 2025-03-17 VITALS
RESPIRATION RATE: 18 BRPM | TEMPERATURE: 97.4 F | SYSTOLIC BLOOD PRESSURE: 128 MMHG | DIASTOLIC BLOOD PRESSURE: 79 MMHG | BODY MASS INDEX: 45.99 KG/M2 | HEIGHT: 67 IN | WEIGHT: 293 LBS | HEART RATE: 78 BPM

## 2025-03-17 DIAGNOSIS — S31.109A OPEN WOUND OF ABDOMINAL WALL, INITIAL ENCOUNTER: Primary | Chronic | ICD-10-CM

## 2025-03-17 PROCEDURE — 99204 OFFICE O/P NEW MOD 45 MIN: CPT | Performed by: SURGERY

## 2025-03-17 PROCEDURE — 11042 DBRDMT SUBQ TIS 1ST 20SQCM/<: CPT

## 2025-03-17 PROCEDURE — 99213 OFFICE O/P EST LOW 20 MIN: CPT

## 2025-03-17 PROCEDURE — 11042 DBRDMT SUBQ TIS 1ST 20SQCM/<: CPT | Performed by: SURGERY

## 2025-03-17 RX ORDER — SODIUM HYPOCHLORITE 1.25 MG/ML
SOLUTION TOPICAL
Qty: 1000 ML | Refills: 2 | Status: SHIPPED | OUTPATIENT
Start: 2025-03-17

## 2025-03-17 NOTE — PATIENT INSTRUCTIONS
[facility] Wound Care and Hyperbaric Oxygen Therapy   Physician Orders and Discharge Instructions  [address] [place] [address]  [address]  [place] [address]  Telephone: [phone]      FAX [phone]    NAME:  April Tubbs  YOB: 1960  MEDICAL RECORD NUMBER:  [id number]  DATE:  3/17/2025    Discharge condition: Stable    Discharge to: Home    Left via:Private automobile    Accompanied by: Self     ECF/HHA: None     Dressing Orders: Abdomen Wound   Soap and water wash  Dakins moist 2x2 gauze tucked into the wound, Cover with dry 2x2 and medipore tape or silicone border, change twice daily   High protein diet unless restricted by your Family Practitioner     [facility] follow up visit __________1 week___________________  (Please note your next appointment above and if you are unable to keep, kindly give a 24 hour notice. Thank you.)    If you experience any of the following, please call the Wound Care Center during business hours:    * Increase in Pain  * Temperature over 101  * Increase in drainage from your wound  * Drainage with a foul odor  * Bleeding  * Increase in swelling  * Need for compression bandage changes due to slippage, breakthrough drainage.    If you need medical attention outside of the business hours of the Wound Care Centers please contact your PCP or go to the nearest emergency room.

## 2025-03-17 NOTE — PLAN OF CARE
Problem: Cognitive:  Goal: Knowledge of wound care  Description: Knowledge of wound care  Outcome: Progressing  Goal: Understands risk factors for wounds  Description: Understands risk factors for wounds  Outcome: Progressing     Problem: Wound:  Goal: Will show signs of wound healing; wound closure and no evidence of infection  Description: Will show signs of wound healing; wound closure and no evidence of infection  Outcome: Progressing     Problem: Weight control:  Goal: Ability to maintain an optimal weight for height and age will be supported  Description: Ability to maintain an optimal weight for height and age will be supported  Outcome: Progressing     Problem: Blood Glucose:  Goal: Ability to maintain appropriate glucose levels will improve  Description: Ability to maintain appropriate glucose levels will improve  Outcome: Progressing

## 2025-03-17 NOTE — PROGRESS NOTES
or silicone border, change twice daily  High protein diet unless restricted by your Family Practitioner  [facility] follow up visit __________1 week___________________  (Please note your next appointment above and if you are unable to keep, kindly give a 24 hour notice. Thank  you.)  If you experience any of the following, please call the Wound Care Center during business hours:  * Increase in Pain  * Temperature over 101  * Increase in drainage from your wound  * Drainage with a foul odor  * Bleeding  * Increase in swelling  * Need for compression bandage changes due to slippage, breakthrough drainage.  Instructions  April Tubbs (CSN: [id number]) (MRN: [id number])  Printed by [[id number]] at 3/17/2025 9:00 AM Page 10 of 10  Instructions (continued)  If you need medical attention outside of the business hours of the Wound Care Centers please contact your PCP  or go to the nearest emergency room.    Electronically signed by Shawn Hudson MD on 3/17/25 at 8:55 AM CDT

## 2025-03-20 ENCOUNTER — RESULTS FOLLOW-UP (OUTPATIENT)
Dept: PRIMARY CARE CLINIC | Age: 65
End: 2025-03-20

## 2025-03-20 DIAGNOSIS — Z12.31 BREAST CANCER SCREENING BY MAMMOGRAM: ICD-10-CM

## 2025-03-24 ENCOUNTER — HOSPITAL ENCOUNTER (OUTPATIENT)
Dept: WOUND CARE | Age: 65
Discharge: HOME | End: 2025-03-24
Attending: SURGERY
Payer: COMMERCIAL

## 2025-03-24 VITALS
RESPIRATION RATE: 18 BRPM | HEART RATE: 73 BPM | DIASTOLIC BLOOD PRESSURE: 78 MMHG | SYSTOLIC BLOOD PRESSURE: 140 MMHG | TEMPERATURE: 97.3 F

## 2025-03-24 PROCEDURE — 97597 DBRDMT OPN WND 1ST 20 CM/<: CPT

## 2025-03-24 PROCEDURE — 97597 DBRDMT OPN WND 1ST 20 CM/<: CPT | Performed by: SURGERY

## 2025-03-24 RX ORDER — BACITRACIN ZINC AND POLYMYXIN B SULFATE 500; 1000 [USP'U]/G; [USP'U]/G
OINTMENT TOPICAL PRN
OUTPATIENT
Start: 2025-03-24

## 2025-03-24 RX ORDER — LIDOCAINE HYDROCHLORIDE 40 MG/ML
SOLUTION TOPICAL PRN
OUTPATIENT
Start: 2025-03-24

## 2025-03-24 RX ORDER — LIDOCAINE 50 MG/G
OINTMENT TOPICAL PRN
OUTPATIENT
Start: 2025-03-24

## 2025-03-24 RX ORDER — LIDOCAINE HYDROCHLORIDE 20 MG/ML
JELLY TOPICAL PRN
OUTPATIENT
Start: 2025-03-24

## 2025-03-24 RX ORDER — MUPIROCIN 20 MG/G
OINTMENT TOPICAL PRN
OUTPATIENT
Start: 2025-03-24

## 2025-03-24 RX ORDER — GENTAMICIN SULFATE 1 MG/G
OINTMENT TOPICAL PRN
OUTPATIENT
Start: 2025-03-24

## 2025-03-24 RX ORDER — NEOMYCIN/BACITRACIN/POLYMYXINB 3.5-400-5K
OINTMENT (GRAM) TOPICAL PRN
OUTPATIENT
Start: 2025-03-24

## 2025-03-24 RX ORDER — SODIUM CHLOR/HYPOCHLOROUS ACID 0.033 %
SOLUTION, IRRIGATION IRRIGATION PRN
OUTPATIENT
Start: 2025-03-24

## 2025-03-24 RX ORDER — BETAMETHASONE DIPROPIONATE 0.5 MG/G
CREAM TOPICAL PRN
OUTPATIENT
Start: 2025-03-24

## 2025-03-24 RX ORDER — GINSENG 100 MG
CAPSULE ORAL PRN
OUTPATIENT
Start: 2025-03-24

## 2025-03-24 RX ORDER — CLOBETASOL PROPIONATE 0.5 MG/G
OINTMENT TOPICAL PRN
OUTPATIENT
Start: 2025-03-24

## 2025-03-24 RX ORDER — LIDOCAINE 40 MG/G
CREAM TOPICAL PRN
OUTPATIENT
Start: 2025-03-24

## 2025-03-24 RX ORDER — TRIAMCINOLONE ACETONIDE 1 MG/G
OINTMENT TOPICAL PRN
OUTPATIENT
Start: 2025-03-24

## 2025-03-24 RX ORDER — SILVER SULFADIAZINE 10 MG/G
CREAM TOPICAL PRN
OUTPATIENT
Start: 2025-03-24

## 2025-03-24 NOTE — PROGRESS NOTES
[facility]ound [facility]   Progress Note and Procedure Note      April Tubbs  MEDICAL RECORD NUMBER:  [id number]  AGE: 65 y.o.   GENDER: female  : 1960  EPISODE DATE:  3/24/2025    Subjective:     Chief Complaint   Patient presents with    Wound Check     Mid abdomen wound check         HISTORY of PRESENT ILLNESS HPI     April Tubbs is a 65 y.o. female who presents today for wound/ulcer evaluation.   Wound Context: Pt with abdominal wound here for eval/treat  Wound/Ulcer Pain Timing/Severity: none  Quality of pain: N/A  Severity:  0 / 10   Modifying Factors: None  Associated Signs/Symptoms: none    Ulcer Identification:  Ulcer Type: pressure  Contributing Factors: none    Wound:  abdominal wound        PAST MEDICAL HISTORY        Diagnosis Date    Allergic rhinitis     Ankle fracture     3 year ago; increasing difficulty walking; has bone fragments    Arthritis     sees dr. silva    Braveena as ambulation aid     right foot per dr. silva    CPAP (continuous positive airway pressure) dependence     Diabetes (HCC)     Hiatal hernia 2017    History of kidney cancer     Hyperlipidemia     Hypertension     Hypothyroidism     Murmur     Obesity     ZOILA (obstructive sleep apnea)     AHI:  28.5 per [facility], 2014    PLMD (periodic limb movement disorder)     Renal mass     cat scan done 18; to see dr. todd coming up    Thyroid disease     Type 2 diabetes mellitus without complication (HCC)     Type II or unspecified type diabetes mellitus without mention of complication, not stated as uncontrolled        PAST SURGICAL HISTORY    Past Surgical History:   Procedure Laterality Date    BREAST BIOPSY Right     CARPAL TUNNEL RELEASE      bilateral    CHOLECYSTECTOMY      HYSTERECTOMY (CERVIX STATUS UNKNOWN)      LA LAPAROSCOPY RADICAL NEPHRECTOMY Left 10/3/2018    NEPHRECTOMY LAPAROSCOPIC HAND ASSISTED performed by Keith Todd MD at [facility] OR    LA LAPS SURG CHOLECYSTECTOMY

## 2025-03-24 NOTE — PATIENT INSTRUCTIONS
[facility] Wound Care and Hyperbaric Oxygen Therapy   Physician Orders and Discharge Instructions  [address] [place] [address] Drive  [address]  [place] [address]  Telephone: [phone]      FAX [phone]    NAME:  April Tubbs  YOB: 1960  MEDICAL RECORD NUMBER:  [id number]  DATE:  3/24/2025    Discharge condition: Stable    Discharge to: Home    Left via:Private automobile    Accompanied by: Self    Dressing Orders: Abdomen Wound  Soap and water wash  Dakins moist 2x2 gauze tucked into the wound, Cover with dry 2x2 and medipore  tape or silicone border, change twice daily  High protein diet unless restricted by your Family Practitioner    [facility] follow up visit ___________1 week__________________  (Please note your next appointment above and if you are unable to keep, kindly give a 24 hour notice. Thank you.)    If you experience any of the following, please call the Wound Care Center during business hours:    * Increase in Pain  * Temperature over 101  * Increase in drainage from your wound  * Drainage with a foul odor  * Bleeding  * Increase in swelling  * Need for compression bandage changes due to slippage, breakthrough drainage.    If you need medical attention outside of the business hours of the Wound Care Centers please contact your PCP or go to the nearest emergency room.

## 2025-03-31 ENCOUNTER — HOSPITAL ENCOUNTER (OUTPATIENT)
Dept: WOUND CARE | Age: 65
Discharge: HOME | End: 2025-03-31
Attending: SURGERY
Payer: COMMERCIAL

## 2025-03-31 VITALS
TEMPERATURE: 98 F | BODY MASS INDEX: 45.99 KG/M2 | SYSTOLIC BLOOD PRESSURE: 137 MMHG | HEART RATE: 81 BPM | DIASTOLIC BLOOD PRESSURE: 85 MMHG | WEIGHT: 293 LBS | RESPIRATION RATE: 18 BRPM | HEIGHT: 67 IN

## 2025-03-31 DIAGNOSIS — S31.109D OPEN WOUND OF ABDOMINAL WALL, SUBSEQUENT ENCOUNTER: Primary | ICD-10-CM

## 2025-03-31 PROCEDURE — 11042 DBRDMT SUBQ TIS 1ST 20SQCM/<: CPT

## 2025-03-31 PROCEDURE — 11042 DBRDMT SUBQ TIS 1ST 20SQCM/<: CPT | Performed by: SURGERY

## 2025-03-31 RX ORDER — MUPIROCIN 20 MG/G
OINTMENT TOPICAL PRN
OUTPATIENT
Start: 2025-03-31

## 2025-03-31 RX ORDER — BETAMETHASONE DIPROPIONATE 0.5 MG/G
CREAM TOPICAL PRN
OUTPATIENT
Start: 2025-03-31

## 2025-03-31 RX ORDER — GINSENG 100 MG
CAPSULE ORAL PRN
OUTPATIENT
Start: 2025-03-31

## 2025-03-31 RX ORDER — LIDOCAINE 40 MG/G
CREAM TOPICAL PRN
OUTPATIENT
Start: 2025-03-31

## 2025-03-31 RX ORDER — GENTAMICIN SULFATE 1 MG/G
OINTMENT TOPICAL PRN
OUTPATIENT
Start: 2025-03-31

## 2025-03-31 RX ORDER — LIDOCAINE HYDROCHLORIDE 40 MG/ML
SOLUTION TOPICAL PRN
OUTPATIENT
Start: 2025-03-31

## 2025-03-31 RX ORDER — LIDOCAINE HYDROCHLORIDE 20 MG/ML
JELLY TOPICAL PRN
OUTPATIENT
Start: 2025-03-31

## 2025-03-31 RX ORDER — LIDOCAINE 50 MG/G
OINTMENT TOPICAL PRN
OUTPATIENT
Start: 2025-03-31

## 2025-03-31 RX ORDER — SILVER SULFADIAZINE 10 MG/G
CREAM TOPICAL PRN
OUTPATIENT
Start: 2025-03-31

## 2025-03-31 RX ORDER — BACITRACIN ZINC AND POLYMYXIN B SULFATE 500; 1000 [USP'U]/G; [USP'U]/G
OINTMENT TOPICAL PRN
OUTPATIENT
Start: 2025-03-31

## 2025-03-31 RX ORDER — TRIAMCINOLONE ACETONIDE 1 MG/G
OINTMENT TOPICAL PRN
OUTPATIENT
Start: 2025-03-31

## 2025-03-31 RX ORDER — LIDOCAINE HYDROCHLORIDE 20 MG/ML
JELLY TOPICAL PRN
Status: DISCONTINUED | OUTPATIENT
Start: 2025-03-31 | End: 2025-04-02 | Stop reason: HOSPADM

## 2025-03-31 RX ORDER — NEOMYCIN/BACITRACIN/POLYMYXINB 3.5-400-5K
OINTMENT (GRAM) TOPICAL PRN
OUTPATIENT
Start: 2025-03-31

## 2025-03-31 RX ORDER — SODIUM CHLOR/HYPOCHLOROUS ACID 0.033 %
SOLUTION, IRRIGATION IRRIGATION PRN
OUTPATIENT
Start: 2025-03-31

## 2025-03-31 RX ORDER — CLOBETASOL PROPIONATE 0.5 MG/G
OINTMENT TOPICAL PRN
OUTPATIENT
Start: 2025-03-31

## 2025-03-31 RX ADMIN — LIDOCAINE HYDROCHLORIDE: 20 JELLY TOPICAL at 08:00

## 2025-03-31 NOTE — PATIENT INSTRUCTIONS
[facility] Wound Care and Hyperbaric Oxygen Therapy   Physician Orders and Discharge Instructions  [address] [place] [address] Drive  [address]  [place] [address]  Telephone: [phone]      FAX [phone]    NAME:  April Tubbs  YOB: 1960  MEDICAL RECORD NUMBER:  [id number]  DATE:  3/31/2025    Discharge condition: Stable    Discharge to: Home    Left via:Private automobile    Accompanied by: Family    Dressing Orders: Abdomen Wound  Soap and water wash  Dakins moist 2x2 gauze tucked into the wound, Cover with dry 2x2 and medipore  tape or silicone border, change twice daily  High protein diet unless restricted by your Family Practitioner    [facility] follow up visit ___________1 week__________________  (Please note your next appointment above and if you are unable to keep, kindly give a 24 hour notice. Thank you.)    If you experience any of the following, please call the Wound Care Center during business hours:    * Increase in Pain  * Temperature over 101  * Increase in drainage from your wound  * Drainage with a foul odor  * Bleeding  * Increase in swelling  * Need for compression bandage changes due to slippage, breakthrough drainage.    If you need medical attention outside of the business hours of the Wound Care Centers please contact your PCP or go to the nearest emergency room.

## 2025-03-31 NOTE — PROGRESS NOTES
03/31/25 0759   Wound Surface Area (cm^2) 1.08 cm^2 03/31/25 0759   Change in Wound Size % (l*w) 48.57 03/31/25 0759   Wound Volume (cm^3) 0.864 cm^3 03/31/25 0759   Wound Healing % 49 03/31/25 0759   Post-Procedure Length (cm) 0.9 cm 03/31/25 0800   Post-Procedure Width (cm) 1.2 cm 03/31/25 0800   Post-Procedure Depth (cm) 0.8 cm 03/31/25 0800   Post-Procedure Surface Area (cm^2) 1.08 cm^2 03/31/25 0800   Post-Procedure Volume (cm^3) 0.864 cm^3 03/31/25 0800   Undermining Starts ___ O'Clock 12 03/31/25 0759   Undermining Ends___ O'Clock 2 03/31/25 0759   Undermining Maxium Distance (cm) .4 03/31/25 0759   Wound Assessment Pink/red;Slough 03/31/25 0759   Drainage Amount Moderate (25-50%) 03/31/25 0759   Drainage Description Serosanguinous 03/31/25 0759   Odor None 03/31/25 0759   Heather-wound Assessment Intact 03/31/25 0759   Margins Attached edges 03/31/25 0759   Wound Thickness Description not for Pressure Injury Full thickness 03/31/25 0759   Number of days: 13             Estimated Blood Loss:  Minimal    Hemostasis Achieved:  by pressure    Procedural Pain:  0  / 10     Post Procedural Pain:  0 / 10     Response to treatment:  Well tolerated by patient.         Plan:     Problem List Items Addressed This Visit       * (Principal) Open abdominal wall wound - Primary (Chronic)    Relevant Medications    lidocaine (XYLOCAINE) 2 % uro-jet    Other Relevant Orders    MD COMMUNICATION 1    MD COMMUNICATION 2    Initiate Outpatient Wound Care Protocol    Initiate Oxygen Therapy Protocol       Cont dakin's BID RTO 1 week  Wound looks good    Treatment Note please see attached Discharge Instructions    In my professional opinion this patient would benefit from HBO Therapy: No    Written patient dismissal instructions given to patient and signed by patient or POA.         Dressing Orders: Abdomen Wound  Soap and water wash  Dakins moist 2x2 gauze tucked into the wound, Cover with dry 2x2 and medipore  tape or silicone

## 2025-04-07 ENCOUNTER — HOSPITAL ENCOUNTER (OUTPATIENT)
Dept: WOUND CARE | Age: 65
Discharge: HOME | End: 2025-04-07
Attending: SURGERY
Payer: COMMERCIAL

## 2025-04-07 VITALS
SYSTOLIC BLOOD PRESSURE: 133 MMHG | TEMPERATURE: 98.2 F | DIASTOLIC BLOOD PRESSURE: 81 MMHG | BODY MASS INDEX: 45.99 KG/M2 | HEART RATE: 80 BPM | HEIGHT: 67 IN | WEIGHT: 293 LBS | RESPIRATION RATE: 18 BRPM

## 2025-04-07 DIAGNOSIS — S31.109D OPEN WOUND OF ABDOMINAL WALL, SUBSEQUENT ENCOUNTER: Primary | ICD-10-CM

## 2025-04-07 PROCEDURE — 11042 DBRDMT SUBQ TIS 1ST 20SQCM/<: CPT | Performed by: SURGERY

## 2025-04-07 PROCEDURE — 11042 DBRDMT SUBQ TIS 1ST 20SQCM/<: CPT

## 2025-04-07 RX ORDER — LIDOCAINE HYDROCHLORIDE 20 MG/ML
JELLY TOPICAL PRN
Status: DISCONTINUED | OUTPATIENT
Start: 2025-04-07 | End: 2025-04-09 | Stop reason: HOSPADM

## 2025-04-07 RX ORDER — GINSENG 100 MG
CAPSULE ORAL PRN
OUTPATIENT
Start: 2025-04-07

## 2025-04-07 RX ORDER — LIDOCAINE HYDROCHLORIDE 20 MG/ML
JELLY TOPICAL PRN
OUTPATIENT
Start: 2025-04-07

## 2025-04-07 RX ORDER — LIDOCAINE 50 MG/G
OINTMENT TOPICAL PRN
OUTPATIENT
Start: 2025-04-07

## 2025-04-07 RX ORDER — MUPIROCIN 20 MG/G
OINTMENT TOPICAL PRN
OUTPATIENT
Start: 2025-04-07

## 2025-04-07 RX ORDER — SODIUM CHLOR/HYPOCHLOROUS ACID 0.033 %
SOLUTION, IRRIGATION IRRIGATION PRN
OUTPATIENT
Start: 2025-04-07

## 2025-04-07 RX ORDER — TRIAMCINOLONE ACETONIDE 1 MG/G
OINTMENT TOPICAL PRN
OUTPATIENT
Start: 2025-04-07

## 2025-04-07 RX ORDER — NEOMYCIN/BACITRACIN/POLYMYXINB 3.5-400-5K
OINTMENT (GRAM) TOPICAL PRN
OUTPATIENT
Start: 2025-04-07

## 2025-04-07 RX ORDER — CLOBETASOL PROPIONATE 0.5 MG/G
OINTMENT TOPICAL PRN
OUTPATIENT
Start: 2025-04-07

## 2025-04-07 RX ORDER — GENTAMICIN SULFATE 1 MG/G
OINTMENT TOPICAL PRN
OUTPATIENT
Start: 2025-04-07

## 2025-04-07 RX ORDER — BETAMETHASONE DIPROPIONATE 0.5 MG/G
CREAM TOPICAL PRN
OUTPATIENT
Start: 2025-04-07

## 2025-04-07 RX ORDER — BACITRACIN ZINC AND POLYMYXIN B SULFATE 500; 1000 [USP'U]/G; [USP'U]/G
OINTMENT TOPICAL PRN
OUTPATIENT
Start: 2025-04-07

## 2025-04-07 RX ORDER — LIDOCAINE HYDROCHLORIDE 40 MG/ML
SOLUTION TOPICAL PRN
OUTPATIENT
Start: 2025-04-07

## 2025-04-07 RX ORDER — SILVER SULFADIAZINE 10 MG/G
CREAM TOPICAL PRN
OUTPATIENT
Start: 2025-04-07

## 2025-04-07 RX ORDER — LIDOCAINE 40 MG/G
CREAM TOPICAL PRN
OUTPATIENT
Start: 2025-04-07

## 2025-04-07 RX ADMIN — LIDOCAINE HYDROCHLORIDE: 20 JELLY TOPICAL at 08:03

## 2025-04-07 NOTE — PROGRESS NOTES
Pre-filled Pen Syringe 5    pantoprazole (PROTONIX) 40 MG tablet TAKE ONE (1) TABLET BY MOUTH DAILY 90 tablet 1    Misc. Devices MISC 1 pair of Diabetic Shoes w/ 3 sets of heat moldable inserts ICD 10: E11.9 1 each 0    Continuous Glucose Transmitter (DEXCOM G6 TRANSMITTER) MISC USE AS DIRECTED EVERY 3 MONTHS 1 each 3    atenolol (TENORMIN) 50 MG tablet TAKE ONE-HALF TABLET BY MOUTH EVERY MORNING AND ONE-HALF TABLET EVERY EVENING. 90 tablet 3    Continuous Glucose Sensor (DEXCOM G6 SENSOR) MISC 1 Device by Does not apply route every 10 days 9 each 3    semaglutide, 2 MG/DOSE, (OZEMPIC) 8 MG/3ML SOPN sc injection Inject 2 mg into the skin every 7 days 3 mL 11    dilTIAZem (CARDIZEM CD) 180 MG extended release capsule Take 1 capsule by mouth daily 90 capsule 3    triamcinolone (KENALOG) 0.1 % cream Apply topically 2 times daily. 45 g 1    sodium bicarbonate 325 MG tablet Take 1 tablet by mouth 2 times daily      blood glucose test strips (CONTOUR NEXT TEST) strip TEST FIVE TIMES DAILY 500 strip 5    ondansetron (ZOFRAN) 8 MG tablet Take 1 tablet by mouth every 8 hours as needed for Nausea or Other (diarrhea) 12 tablet 0    vitamin D (ERGOCALCIFEROL) 1.25 MG (19412 UT) CAPS capsule TAKE 1 CAPSULE BY MOUTH WEEKLY      Multiple Vitamins-Calcium (TGT DAILY MULTIVITAMIN WOMENS) TABS 1 tablet Oral Daily      Omega-3 Fatty Acids (FISH OIL) 1000 MG capsule 1 capsule      fexofenadine (ALLEGRA) 180 MG tablet 1 tablet as needed Orally Once a day      Continuous Blood Gluc  (DEXCOM G6 ) JAYA 1 Device by Does not apply route daily 1 each 3    B-D UF III MINI PEN NEEDLES 31G X 5 MM MISC USE FOUR TIMES DAILY AS DIRECTED 400 each 3    Cholecalciferol (VITAMIN D3) 1.25 MG (00383 UT) CAPS Take 1 capsule by mouth once a week      clotrimazole-betamethasone (LOTRISONE) 1-0.05 % cream Apply topically 2 times daily. 15 g 0    Multiple Vitamins-Minerals (MULTIPLE VITAMINS/WOMENS PO) Take 1 tablet by mouth daily

## 2025-04-07 NOTE — PATIENT INSTRUCTIONS
[facility] Wound Care and Hyperbaric Oxygen Therapy   Physician Orders and Discharge Instructions  [address] [place] [address] Drive  [address]  [place] [address]  Telephone: [phone]      FAX [phone]    NAME:  April Tubbs  YOB: 1960  MEDICAL RECORD NUMBER:  [id number]  DATE:  4/7/2025    Discharge condition: Stable    Discharge to: Home    Left via:Private automobile    Accompanied by: Family    Dressing Orders: Abdomen Wound  Soap and water wash  Dakins moist 2x2 gauze tucked into the wound, Cover with dry 2x2 and medipore  tape or silicone border, change twice daily  High protein diet unless restricted by your Family Practitioner    [facility] follow up visit _____________1 week________________  (Please note your next appointment above and if you are unable to keep, kindly give a 24 hour notice. Thank you.)    If you experience any of the following, please call the Wound Care Center during business hours:    * Increase in Pain  * Temperature over 101  * Increase in drainage from your wound  * Drainage with a foul odor  * Bleeding  * Increase in swelling  * Need for compression bandage changes due to slippage, breakthrough drainage.    If you need medical attention outside of the business hours of the Wound Care Centers please contact your PCP or go to the nearest emergency room.

## 2025-04-14 ENCOUNTER — HOSPITAL ENCOUNTER (OUTPATIENT)
Dept: WOUND CARE | Age: 65
Discharge: HOME | End: 2025-04-14
Attending: SURGERY
Payer: COMMERCIAL

## 2025-04-14 VITALS
BODY MASS INDEX: 45.99 KG/M2 | TEMPERATURE: 98 F | HEIGHT: 67 IN | WEIGHT: 293 LBS | HEART RATE: 71 BPM | DIASTOLIC BLOOD PRESSURE: 76 MMHG | RESPIRATION RATE: 18 BRPM | SYSTOLIC BLOOD PRESSURE: 126 MMHG

## 2025-04-14 DIAGNOSIS — Z79.4 TYPE 2 DIABETES MELLITUS WITH DIABETIC POLYNEUROPATHY, WITH LONG-TERM CURRENT USE OF INSULIN (HCC): ICD-10-CM

## 2025-04-14 DIAGNOSIS — E11.42 TYPE 2 DIABETES MELLITUS WITH DIABETIC POLYNEUROPATHY, WITH LONG-TERM CURRENT USE OF INSULIN (HCC): ICD-10-CM

## 2025-04-14 DIAGNOSIS — R53.83 OTHER FATIGUE: ICD-10-CM

## 2025-04-14 DIAGNOSIS — S31.109D OPEN WOUND OF ABDOMINAL WALL, SUBSEQUENT ENCOUNTER: Primary | ICD-10-CM

## 2025-04-14 DIAGNOSIS — E03.9 PRIMARY HYPOTHYROIDISM: ICD-10-CM

## 2025-04-14 LAB
ALBUMIN SERPL-MCNC: 3.7 G/DL (ref 3.5–5.2)
ALP SERPL-CCNC: 87 U/L (ref 35–104)
ALT SERPL-CCNC: 14 U/L (ref 10–35)
ANION GAP SERPL CALCULATED.3IONS-SCNC: 10 MMOL/L (ref 8–16)
AST SERPL-CCNC: 21 U/L (ref 10–35)
BASOPHILS # BLD: 0.1 K/UL (ref 0–0.2)
BASOPHILS NFR BLD: 1.1 % (ref 0–1)
BILIRUB SERPL-MCNC: 0.4 MG/DL (ref 0.2–1.2)
BUN SERPL-MCNC: 19 MG/DL (ref 8–23)
CALCIUM SERPL-MCNC: 9.9 MG/DL (ref 8.8–10.2)
CHLORIDE SERPL-SCNC: 106 MMOL/L (ref 98–107)
CO2 SERPL-SCNC: 26 MMOL/L (ref 22–29)
CREAT SERPL-MCNC: 1.2 MG/DL (ref 0.5–0.9)
EOSINOPHIL # BLD: 0.2 K/UL (ref 0–0.6)
EOSINOPHIL NFR BLD: 3.7 % (ref 0–5)
ERYTHROCYTE [DISTWIDTH] IN BLOOD BY AUTOMATED COUNT: 14 % (ref 11.5–14.5)
GLUCOSE SERPL-MCNC: 130 MG/DL (ref 70–99)
HBA1C MFR BLD: 6.8 % (ref 4–5.6)
HCT VFR BLD AUTO: 37.2 % (ref 37–47)
HGB BLD-MCNC: 11.9 G/DL (ref 12–16)
IMM GRANULOCYTES # BLD: 0 K/UL
LYMPHOCYTES # BLD: 1.4 K/UL (ref 1.1–4.5)
LYMPHOCYTES NFR BLD: 24.1 % (ref 20–40)
MCH RBC QN AUTO: 28.7 PG (ref 27–31)
MCHC RBC AUTO-ENTMCNC: 32 G/DL (ref 33–37)
MCV RBC AUTO: 89.9 FL (ref 81–99)
MONOCYTES # BLD: 0.4 K/UL (ref 0–0.9)
MONOCYTES NFR BLD: 7.4 % (ref 0–10)
NEUTROPHILS # BLD: 3.6 K/UL (ref 1.5–7.5)
NEUTS SEG NFR BLD: 63.3 % (ref 50–65)
PLATELET # BLD AUTO: 247 K/UL (ref 130–400)
PMV BLD AUTO: 11 FL (ref 9.4–12.3)
POTASSIUM SERPL-SCNC: 4.2 MMOL/L (ref 3.5–5.1)
PROT SERPL-MCNC: 7 G/DL (ref 6.4–8.3)
RBC # BLD AUTO: 4.14 M/UL (ref 4.2–5.4)
SODIUM SERPL-SCNC: 142 MMOL/L (ref 136–145)
T4 FREE SERPL-MCNC: 1.45 NG/DL (ref 0.93–1.7)
TSH SERPL DL<=0.005 MIU/L-ACNC: 0.02 UIU/ML (ref 0.27–4.2)
WBC # BLD AUTO: 5.6 K/UL (ref 4.8–10.8)

## 2025-04-14 PROCEDURE — 11042 DBRDMT SUBQ TIS 1ST 20SQCM/<: CPT | Performed by: SURGERY

## 2025-04-14 PROCEDURE — 11042 DBRDMT SUBQ TIS 1ST 20SQCM/<: CPT

## 2025-04-14 RX ORDER — MUPIROCIN 20 MG/G
OINTMENT TOPICAL PRN
OUTPATIENT
Start: 2025-04-14

## 2025-04-14 RX ORDER — TRIAMCINOLONE ACETONIDE 1 MG/G
OINTMENT TOPICAL PRN
OUTPATIENT
Start: 2025-04-14

## 2025-04-14 RX ORDER — LIDOCAINE 40 MG/G
CREAM TOPICAL PRN
OUTPATIENT
Start: 2025-04-14

## 2025-04-14 RX ORDER — SILVER SULFADIAZINE 10 MG/G
CREAM TOPICAL PRN
OUTPATIENT
Start: 2025-04-14

## 2025-04-14 RX ORDER — LIDOCAINE 50 MG/G
OINTMENT TOPICAL PRN
OUTPATIENT
Start: 2025-04-14

## 2025-04-14 RX ORDER — LIDOCAINE HYDROCHLORIDE 40 MG/ML
SOLUTION TOPICAL PRN
OUTPATIENT
Start: 2025-04-14

## 2025-04-14 RX ORDER — LIDOCAINE HYDROCHLORIDE 20 MG/ML
JELLY TOPICAL PRN
Status: DISCONTINUED | OUTPATIENT
Start: 2025-04-14 | End: 2025-04-16 | Stop reason: HOSPADM

## 2025-04-14 RX ORDER — SODIUM CHLOR/HYPOCHLOROUS ACID 0.033 %
SOLUTION, IRRIGATION IRRIGATION PRN
OUTPATIENT
Start: 2025-04-14

## 2025-04-14 RX ORDER — LIDOCAINE HYDROCHLORIDE 20 MG/ML
JELLY TOPICAL PRN
OUTPATIENT
Start: 2025-04-14

## 2025-04-14 RX ORDER — BETAMETHASONE DIPROPIONATE 0.5 MG/G
CREAM TOPICAL PRN
OUTPATIENT
Start: 2025-04-14

## 2025-04-14 RX ORDER — CLOBETASOL PROPIONATE 0.5 MG/G
OINTMENT TOPICAL PRN
OUTPATIENT
Start: 2025-04-14

## 2025-04-14 RX ORDER — NEOMYCIN/BACITRACIN/POLYMYXINB 3.5-400-5K
OINTMENT (GRAM) TOPICAL PRN
OUTPATIENT
Start: 2025-04-14

## 2025-04-14 RX ORDER — GENTAMICIN SULFATE 1 MG/G
OINTMENT TOPICAL PRN
OUTPATIENT
Start: 2025-04-14

## 2025-04-14 RX ORDER — GINSENG 100 MG
CAPSULE ORAL PRN
OUTPATIENT
Start: 2025-04-14

## 2025-04-14 RX ORDER — BACITRACIN ZINC AND POLYMYXIN B SULFATE 500; 1000 [USP'U]/G; [USP'U]/G
OINTMENT TOPICAL PRN
OUTPATIENT
Start: 2025-04-14

## 2025-04-14 RX ADMIN — LIDOCAINE HYDROCHLORIDE: 20 JELLY TOPICAL at 08:31

## 2025-04-14 NOTE — PROGRESS NOTES
[facility] Wound Care Center   Progress Note and Procedure Note      April Tubbs  MEDICAL RECORD NUMBER:  [id number]  AGE: 65 y.o.   GENDER: female  : 1960  EPISODE DATE:  2025    Subjective:     Chief Complaint   Patient presents with    Wound Check         HISTORY of PRESENT ILLNESS HPI     April Tubbs is a 65 y.o. female who presents today for wound/ulcer evaluation.   Wound Context: Pt with abdominal wound here for eval/treat  Wound/Ulcer Pain Timing/Severity: none  Quality of pain: N/A  Severity:  0 / 10   Modifying Factors: None  Associated Signs/Symptoms: none    Ulcer Identification:  Ulcer Type: non-healing surgical  Contributing Factors: chronic pressure and shear force    Wound:  shear/pressure        PAST MEDICAL HISTORY        Diagnosis Date    Allergic rhinitis     Ankle fracture     3 year ago; increasing difficulty walking; has bone fragments    Arthritis     sees dr. silva    Braces as ambulation aid     right foot per dr. silva    CPAP (continuous positive airway pressure) dependence     Diabetes (HCC)     Hiatal hernia 2017    History of kidney cancer     Hyperlipidemia     Hypertension     Hypothyroidism     Murmur     Obesity     ZOILA (obstructive sleep apnea)     AHI:  28.5 per PSG, 2014    PLMD (periodic limb movement disorder)     Renal mass     cat scan done 18; to see dr. todd coming up    Thyroid disease     Type 2 diabetes mellitus without complication     Type II or unspecified type diabetes mellitus without mention of complication, not stated as uncontrolled        PAST SURGICAL HISTORY    Past Surgical History:   Procedure Laterality Date    BREAST BIOPSY Right     CARPAL TUNNEL RELEASE      bilateral    CHOLECYSTECTOMY      HYSTERECTOMY (CERVIX STATUS UNKNOWN)      UT LAPAROSCOPY RADICAL NEPHRECTOMY Left 10/3/2018    NEPHRECTOMY LAPAROSCOPIC HAND ASSISTED performed by Keith Todd MD at [facility] OR    UT LAPS SURG CHOLECYSTECTOMY

## 2025-04-14 NOTE — PATIENT INSTRUCTIONS
[facility] Wound Care and Hyperbaric Oxygen Therapy   Physician Orders and Discharge Instructions  [address] [place] [address] Drive  [address]  [place] [address]  Telephone: [phone]      FAX [phone]    NAME:  April Tubbs  YOB: 1960  MEDICAL RECORD NUMBER:  [id number]  DATE:  4/14/2025    Discharge condition: Stable    Discharge to: Home    Left via:Private automobile    Accompanied by: Self    Dressing Orders: Abdomen Wound  Soap and water wash  Dakins moist 2x2 gauze tucked into the wound, Cover with dry 2x2 and medipore  tape or silicone border, change twice daily  High protein diet unless restricted by your Family Practitioner    [facility] follow up visit ___________1 week__________________  (Please note your next appointment above and if you are unable to keep, kindly give a 24 hour notice. Thank you.)    If you experience any of the following, please call the Wound Care Center during business hours:    * Increase in Pain  * Temperature over 101  * Increase in drainage from your wound  * Drainage with a foul odor  * Bleeding  * Increase in swelling  * Need for compression bandage changes due to slippage, breakthrough drainage.    If you need medical attention outside of the business hours of the Wound Care Centers please contact your PCP or go to the nearest emergency room.

## 2025-04-21 ENCOUNTER — HOSPITAL ENCOUNTER (OUTPATIENT)
Dept: WOUND CARE | Age: 65
Discharge: HOME | End: 2025-04-21
Payer: COMMERCIAL

## 2025-04-21 VITALS
HEIGHT: 67 IN | TEMPERATURE: 97.8 F | HEART RATE: 71 BPM | BODY MASS INDEX: 45.99 KG/M2 | DIASTOLIC BLOOD PRESSURE: 89 MMHG | WEIGHT: 293 LBS | SYSTOLIC BLOOD PRESSURE: 138 MMHG | RESPIRATION RATE: 18 BRPM

## 2025-04-21 DIAGNOSIS — S31.109D OPEN WOUND OF ABDOMINAL WALL, SUBSEQUENT ENCOUNTER: Primary | Chronic | ICD-10-CM

## 2025-04-21 DIAGNOSIS — I10 ESSENTIAL (PRIMARY) HYPERTENSION: ICD-10-CM

## 2025-04-21 PROCEDURE — 11042 DBRDMT SUBQ TIS 1ST 20SQCM/<: CPT | Performed by: SURGERY

## 2025-04-21 PROCEDURE — 11042 DBRDMT SUBQ TIS 1ST 20SQCM/<: CPT

## 2025-04-21 RX ORDER — LIDOCAINE HYDROCHLORIDE 20 MG/ML
JELLY TOPICAL PRN
OUTPATIENT
Start: 2025-04-21

## 2025-04-21 RX ORDER — SILVER SULFADIAZINE 10 MG/G
CREAM TOPICAL PRN
OUTPATIENT
Start: 2025-04-21

## 2025-04-21 RX ORDER — MUPIROCIN 20 MG/G
OINTMENT TOPICAL PRN
OUTPATIENT
Start: 2025-04-21

## 2025-04-21 RX ORDER — SODIUM CHLOR/HYPOCHLOROUS ACID 0.033 %
SOLUTION, IRRIGATION IRRIGATION PRN
OUTPATIENT
Start: 2025-04-21

## 2025-04-21 RX ORDER — BACITRACIN ZINC AND POLYMYXIN B SULFATE 500; 1000 [USP'U]/G; [USP'U]/G
OINTMENT TOPICAL PRN
OUTPATIENT
Start: 2025-04-21

## 2025-04-21 RX ORDER — LIDOCAINE HYDROCHLORIDE 20 MG/ML
JELLY TOPICAL PRN
Status: DISCONTINUED | OUTPATIENT
Start: 2025-04-21 | End: 2025-04-23 | Stop reason: HOSPADM

## 2025-04-21 RX ORDER — CLOBETASOL PROPIONATE 0.5 MG/G
OINTMENT TOPICAL PRN
OUTPATIENT
Start: 2025-04-21

## 2025-04-21 RX ORDER — DILTIAZEM HYDROCHLORIDE 180 MG/1
180 CAPSULE, COATED, EXTENDED RELEASE ORAL DAILY
Qty: 90 CAPSULE | Refills: 1 | Status: SHIPPED | OUTPATIENT
Start: 2025-04-21

## 2025-04-21 RX ORDER — BETAMETHASONE DIPROPIONATE 0.5 MG/G
CREAM TOPICAL PRN
OUTPATIENT
Start: 2025-04-21

## 2025-04-21 RX ORDER — LIDOCAINE 50 MG/G
OINTMENT TOPICAL PRN
OUTPATIENT
Start: 2025-04-21

## 2025-04-21 RX ORDER — GENTAMICIN SULFATE 1 MG/G
OINTMENT TOPICAL PRN
OUTPATIENT
Start: 2025-04-21

## 2025-04-21 RX ORDER — TRIAMCINOLONE ACETONIDE 1 MG/G
OINTMENT TOPICAL PRN
OUTPATIENT
Start: 2025-04-21

## 2025-04-21 RX ORDER — NEOMYCIN/BACITRACIN/POLYMYXINB 3.5-400-5K
OINTMENT (GRAM) TOPICAL PRN
OUTPATIENT
Start: 2025-04-21

## 2025-04-21 RX ORDER — LIDOCAINE HYDROCHLORIDE 40 MG/ML
SOLUTION TOPICAL PRN
OUTPATIENT
Start: 2025-04-21

## 2025-04-21 RX ORDER — LIDOCAINE 40 MG/G
CREAM TOPICAL PRN
OUTPATIENT
Start: 2025-04-21

## 2025-04-21 RX ORDER — GINSENG 100 MG
CAPSULE ORAL PRN
OUTPATIENT
Start: 2025-04-21

## 2025-04-21 RX ADMIN — LIDOCAINE HYDROCHLORIDE: 20 JELLY TOPICAL at 08:21

## 2025-04-21 NOTE — PATIENT INSTRUCTIONS
[facility] Wound Care and Hyperbaric Oxygen Therapy   Physician Orders and Discharge Instructions  [address] [place] [address] Drive  [address]  [place] [address]  Telephone: [phone]      FAX [phone]    NAME:  April Tubbs  YOB: 1960  MEDICAL RECORD NUMBER:  [id number]  DATE:  4/21/2025    Discharge condition: Stable    Discharge to: Home    Left via:Private automobile    Accompanied by: Self    ECF/HHA:     Dressing Orders:    Treatment Orders:    [facility] follow up visit ___________1 week__________________  (Please note your next appointment above and if you are unable to keep, kindly give a 24 hour notice. Thank you.)    If you experience any of the following, please call the Wound Care Center during business hours:    * Increase in Pain  * Temperature over 101  * Increase in drainage from your wound  * Drainage with a foul odor  * Bleeding  * Increase in swelling  * Need for compression bandage changes due to slippage, breakthrough drainage.    If you need medical attention outside of the business hours of the Wound Care Centers please contact your PCP or go to the nearest emergency room.

## 2025-04-21 NOTE — PROGRESS NOTES
[facility] Wound Care Center   Progress Note and Procedure Note      April Tubbs  MEDICAL RECORD NUMBER:  [id number]  AGE: 65 y.o.   GENDER: female  : 1960  EPISODE DATE:  2025    Subjective:     Chief Complaint   Patient presents with    Wound Check         HISTORY of PRESENT ILLNESS HPI     April Tubbs is a 65 y.o. female who presents today for wound/ulcer evaluation.   Wound Context: Pt with abdominal wound here for eval/treat  Wound/Ulcer Pain Timing/Severity: none  Quality of pain: N/A  Severity:  0 / 10   Modifying Factors: None  Associated Signs/Symptoms: none    Ulcer Identification:  Ulcer Type: non-healing surgical  Contributing Factors: none    Wound: Surgical incision        PAST MEDICAL HISTORY        Diagnosis Date    Allergic rhinitis     Ankle fracture     3 year ago; increasing difficulty walking; has bone fragments    Arthritis     sees dr. silva    Braces as ambulation aid     right foot per dr. silva    CPAP (continuous positive airway pressure) dependence     Diabetes (HCC)     Hiatal hernia 2017    History of kidney cancer     Hyperlipidemia     Hypertension     Hypothyroidism     Murmur     Obesity     ZOILA (obstructive sleep apnea)     AHI:  28.5 per PSG, 2014    PLMD (periodic limb movement disorder)     Renal mass     cat scan done 18; to see dr. todd coming up    Thyroid disease     Type 2 diabetes mellitus without complication     Type II or unspecified type diabetes mellitus without mention of complication, not stated as uncontrolled        PAST SURGICAL HISTORY    Past Surgical History:   Procedure Laterality Date    BREAST BIOPSY Right     CARPAL TUNNEL RELEASE      bilateral    CHOLECYSTECTOMY      HYSTERECTOMY (CERVIX STATUS UNKNOWN)      CT LAPAROSCOPY RADICAL NEPHRECTOMY Left 10/3/2018    NEPHRECTOMY LAPAROSCOPIC HAND ASSISTED performed by Keith Todd MD at [facility] OR    CT LAPS SURG CHOLECYSTECTOMY W/CHOLANGIOGRAPHY N/A

## 2025-04-22 ENCOUNTER — OFFICE VISIT (OUTPATIENT)
Dept: PRIMARY CARE CLINIC | Age: 65
End: 2025-04-22
Payer: COMMERCIAL

## 2025-04-22 VITALS
OXYGEN SATURATION: 98 % | BODY MASS INDEX: 45.99 KG/M2 | HEART RATE: 75 BPM | TEMPERATURE: 96.3 F | SYSTOLIC BLOOD PRESSURE: 122 MMHG | DIASTOLIC BLOOD PRESSURE: 74 MMHG | WEIGHT: 293 LBS | HEIGHT: 67 IN

## 2025-04-22 DIAGNOSIS — I10 ESSENTIAL (PRIMARY) HYPERTENSION: ICD-10-CM

## 2025-04-22 DIAGNOSIS — R53.83 OTHER FATIGUE: ICD-10-CM

## 2025-04-22 DIAGNOSIS — E11.42 TYPE 2 DIABETES MELLITUS WITH DIABETIC POLYNEUROPATHY, WITH LONG-TERM CURRENT USE OF INSULIN (HCC): Primary | ICD-10-CM

## 2025-04-22 DIAGNOSIS — E03.9 PRIMARY HYPOTHYROIDISM: ICD-10-CM

## 2025-04-22 DIAGNOSIS — Z79.4 TYPE 2 DIABETES MELLITUS WITH DIABETIC POLYNEUROPATHY, WITH LONG-TERM CURRENT USE OF INSULIN (HCC): Primary | ICD-10-CM

## 2025-04-22 DIAGNOSIS — K21.9 GASTROESOPHAGEAL REFLUX DISEASE WITHOUT ESOPHAGITIS: ICD-10-CM

## 2025-04-22 DIAGNOSIS — E78.00 HYPERCHOLESTEROLEMIA: ICD-10-CM

## 2025-04-22 PROCEDURE — 99214 OFFICE O/P EST MOD 30 MIN: CPT | Performed by: FAMILY MEDICINE

## 2025-04-22 PROCEDURE — 3078F DIAST BP <80 MM HG: CPT | Performed by: FAMILY MEDICINE

## 2025-04-22 PROCEDURE — 3074F SYST BP LT 130 MM HG: CPT | Performed by: FAMILY MEDICINE

## 2025-04-22 PROCEDURE — 3044F HG A1C LEVEL LT 7.0%: CPT | Performed by: FAMILY MEDICINE

## 2025-04-22 RX ORDER — LEVOTHYROXINE SODIUM 75 UG/1
75 TABLET ORAL DAILY
Qty: 30 TABLET | Refills: 5 | Status: SHIPPED
Start: 2025-04-22 | End: 2025-04-22

## 2025-04-22 RX ORDER — LEVOTHYROXINE SODIUM 75 UG/1
75 TABLET ORAL DAILY
Qty: 30 TABLET | Refills: 5 | Status: SHIPPED | OUTPATIENT
Start: 2025-04-22

## 2025-04-22 NOTE — PROGRESS NOTES
[facility]  [address]  [address]  [place] [address]  Dept: [phone]  Dept Fax: [phone]  Loc: [phone]    April Tubbs is a 65 y.o. female who presents today for her medical conditions/complaints as noted below.  April Tubbs is here for 3 Month Follow-Up (Discuss Zinc supplement)    -     Subjective:   CC:  Here today to discuss the following:    Diabetes Mellitus  Has been compliant with medications.   No side effects of medications since last visit.   No polyuria, polydipsia, or vision changes since last visit.   No symptomatic episodes of hypoglycemia.     Hyperlipidemia  Tolerating cholesterol medication without adverse effects.    Hypertension  Compliant with medications.  No adverse effects from medication.  No lightheadedness, palpitations, or chest pain.  Hypothyroidism  Symptoms are stable.  No temperature intolerance, fatigue, or mood disturbance from baseline. Complaint with current medication.    Gastroesophageal Reflux Disease  Symptoms currently under control.   Medication adequately controls symptoms.  No hematochezia or melena.         Review of Systems   Constitutional: Negative for chills and fever.   HENT: Negative for congestion.    Respiratory: Negative for cough, chest tightness and shortness of breath.  Cardiovascular: Negative for chest pain, palpitations and leg swelling.   Gastrointestinal: Negative for abdominal pain, anal bleeding, constipation, diarrhea and nausea.     Review of Systems  Objective:   /74   Pulse 75   Temp (!) 96.3 °F (35.7 °C)   Ht 1.702 m (5' 7\")   Wt (!) 176 kg (388 lb)   LMP 01/01/2002   SpO2 98%   BMI 60.77 kg/m²   BP Readings from Last 3 Encounters:   04/22/25 122/74   04/21/25 138/89   04/14/25 126/76    Wt Readings from Last 3 Encounters:   04/22/25 (!) 176 kg (388 lb)   04/21/25 (!) 174.6 kg (385 lb)   04/14/25 (!) 174.6 kg (385 lb)         Physical Exam   Constitutional:

## 2025-04-28 ENCOUNTER — HOSPITAL ENCOUNTER (OUTPATIENT)
Dept: WOUND CARE | Age: 65
Discharge: HOME | End: 2025-04-28
Attending: SURGERY
Payer: COMMERCIAL

## 2025-04-28 VITALS
RESPIRATION RATE: 18 BRPM | HEART RATE: 79 BPM | HEIGHT: 67 IN | DIASTOLIC BLOOD PRESSURE: 85 MMHG | SYSTOLIC BLOOD PRESSURE: 138 MMHG | BODY MASS INDEX: 45.99 KG/M2 | TEMPERATURE: 97.6 F | WEIGHT: 293 LBS

## 2025-04-28 DIAGNOSIS — S31.109D OPEN WOUND OF ABDOMINAL WALL, SUBSEQUENT ENCOUNTER: Primary | ICD-10-CM

## 2025-04-28 PROCEDURE — 99212 OFFICE O/P EST SF 10 MIN: CPT | Performed by: SURGERY

## 2025-04-28 PROCEDURE — 99212 OFFICE O/P EST SF 10 MIN: CPT

## 2025-04-28 RX ORDER — SILVER SULFADIAZINE 10 MG/G
CREAM TOPICAL PRN
OUTPATIENT
Start: 2025-04-28

## 2025-04-28 RX ORDER — LIDOCAINE 50 MG/G
OINTMENT TOPICAL PRN
OUTPATIENT
Start: 2025-04-28

## 2025-04-28 RX ORDER — LIDOCAINE 40 MG/G
CREAM TOPICAL PRN
OUTPATIENT
Start: 2025-04-28

## 2025-04-28 RX ORDER — MUPIROCIN 20 MG/G
OINTMENT TOPICAL PRN
OUTPATIENT
Start: 2025-04-28

## 2025-04-28 RX ORDER — BETAMETHASONE DIPROPIONATE 0.5 MG/G
CREAM TOPICAL PRN
OUTPATIENT
Start: 2025-04-28

## 2025-04-28 RX ORDER — LIDOCAINE HYDROCHLORIDE 20 MG/ML
JELLY TOPICAL PRN
OUTPATIENT
Start: 2025-04-28

## 2025-04-28 RX ORDER — CLOBETASOL PROPIONATE 0.5 MG/G
OINTMENT TOPICAL PRN
OUTPATIENT
Start: 2025-04-28

## 2025-04-28 RX ORDER — SODIUM CHLOR/HYPOCHLOROUS ACID 0.033 %
SOLUTION, IRRIGATION IRRIGATION PRN
OUTPATIENT
Start: 2025-04-28

## 2025-04-28 RX ORDER — GENTAMICIN SULFATE 1 MG/G
OINTMENT TOPICAL PRN
OUTPATIENT
Start: 2025-04-28

## 2025-04-28 RX ORDER — LIDOCAINE HYDROCHLORIDE 20 MG/ML
JELLY TOPICAL PRN
Status: DISCONTINUED | OUTPATIENT
Start: 2025-04-28 | End: 2025-04-30 | Stop reason: HOSPADM

## 2025-04-28 RX ORDER — TRIAMCINOLONE ACETONIDE 1 MG/G
OINTMENT TOPICAL PRN
OUTPATIENT
Start: 2025-04-28

## 2025-04-28 RX ORDER — GINSENG 100 MG
CAPSULE ORAL PRN
OUTPATIENT
Start: 2025-04-28

## 2025-04-28 RX ORDER — BACITRACIN ZINC AND POLYMYXIN B SULFATE 500; 1000 [USP'U]/G; [USP'U]/G
OINTMENT TOPICAL PRN
OUTPATIENT
Start: 2025-04-28

## 2025-04-28 RX ORDER — LIDOCAINE HYDROCHLORIDE 40 MG/ML
SOLUTION TOPICAL PRN
OUTPATIENT
Start: 2025-04-28

## 2025-04-28 RX ORDER — NEOMYCIN/BACITRACIN/POLYMYXINB 3.5-400-5K
OINTMENT (GRAM) TOPICAL PRN
OUTPATIENT
Start: 2025-04-28

## 2025-04-28 RX ADMIN — LIDOCAINE HYDROCHLORIDE: 20 JELLY TOPICAL at 08:26

## 2025-04-28 NOTE — WOUND CARE
Order for V.A.C.®  Negative Pressure Wound Therapy  Please fax this form to [facility] at [phone]  [facility] Customer Service: [phone]      Ordering Center:   [facility]  [address] [place] [address] [address]ANY CRA[address] [place]  [facility] [id number] [phone]  WOUND CARE Dept: [phone]   FAX NUMBER [phone]  Patient Information:   April Tubbs  [address] [facility]  [place] [id number] [phone]   : 1960  AGE: 65 y.o.     GENDER: female   TODAYS DATE:  2025  Insurance:   PRIMARY INSUR[place]:  [place]  [place]  Effective Date: 2021  [id number] - ([place] [employer])    Payer/Plan Subscr  Sex Relation Sub. Ins. ID Effective Group Num   1. [place] NARAYAN TUBBS* 1960 Female Self [id number]                                    [id number], [facility] [id number]       Patient Wound Information:     Duration of the V.A.C.®  Negative Pressure Wound Therapy: 4 Month which includes up to 15 dressings per wound and up to 10 canisters per month    Goal at the completion of V.A.C.®  Negative Pressure Therapy: Assist in granulation tissue formation     Will HomeCare provide V.A.C.®  Therapy?  Yes Homecare will provide VAC Therapy. The date in which Homecare will initiate VAC Therapy is when wound vac and supplies arrive to patient for placement at next wound care center visit.     Equipment for Delivery:     Delivery Location V.A.C.® Therapy Pump: Patient's Home Address located under Patient Information on the top of this form    Up to 15 dressings per wound, per month  VAC Canisters: Up to 10 canisters per month  V.A.C.® Dressing: Other VAC Dressing Peel and Place size Small- [id number]    Clinical Information by Wound Type:     Was NPWT initiated in an inpatient facility? No or  Has the patient been on NPWT anytime during the last 60 days? No     Is the patient’s nutritional status compromised? Yes action(s) taken Protein Supplements    Please yulisa all

## 2025-04-28 NOTE — PATIENT INSTRUCTIONS
[facility] Wound Care and Hyperbaric Oxygen Therapy   Physician Orders and Discharge Instructions  [address] [place] [address]  [address]  [place] [address]  Telephone: [phone]      FAX [phone]    NAME:  April Tubbs  YOB: 1960  MEDICAL RECORD NUMBER:  [id number]  DATE:  4/28/2025    Discharge condition: Stable    Discharge to: Home    Left via:Private automobile    Accompanied by: Self    [employer] Wound Vac Peel and Place     Dressing Orders: Abdomen Wound  Soap and water wash  Dakins moist 2x2 gauze tucked into the wound, Cover with dry 2x2 and medipore  tape or silicone border, change twice daily  High protein diet unless restricted by your Family Practitioner  We will try to get a peel and place wound vac sent to your house this week    [facility] follow up visit __________1 week___________________  (Please note your next appointment above and if you are unable to keep, kindly give a 24 hour notice. Thank you.)    If you experience any of the following, please call the Wound Care Center during business hours:    * Increase in Pain  * Temperature over 101  * Increase in drainage from your wound  * Drainage with a foul odor  * Bleeding  * Increase in swelling  * Need for compression bandage changes due to slippage, breakthrough drainage.    If you need medical attention outside of the business hours of the Wound Care Centers please contact your PCP or go to the nearest emergency room.

## 2025-04-28 NOTE — PROGRESS NOTES
[facility] Wound Care Center   Progress Note and Procedure Note      April Tubbs  MEDICAL RECORD NUMBER:  [id number]  AGE: 65 y.o.   GENDER: female  : 1960  EPISODE DATE:  2025    Subjective:     Chief Complaint   Patient presents with    Wound Check         HISTORY of PRESENT ILLNESS HPI     April Tubbs is a 65 y.o. female who presents today for wound/ulcer evaluation.   History of Wound Context: Pt with Abdominal wound here for eval/treat  Wound/Ulcer Pain Timing/Severity: none  Quality of pain: N/A  Severity:  0 / 10   Modifying Factors: None  Associated Signs/Symptoms: none    Ulcer Identification:  Ulcer Type: non-healing surgical  Contributing Factors: chronic pressure and obesity    Wound:  surgical/pressure        PAST MEDICAL HISTORY        Diagnosis Date    Allergic rhinitis     Ankle fracture     3 year ago; increasing difficulty walking; has bone fragments    Arthritis     sees dr. silva    Braveena as ambulation aid     right foot per dr. silva    CPAP (continuous positive airway pressure) dependence     Diabetes (HCC)     Hiatal hernia 2017    History of kidney cancer     Hyperlipidemia     Hypertension     Hypothyroidism     Murmur     Obesity     ZOILA (obstructive sleep apnea)     AHI:  28.5 per PSG, 2014    PLMD (periodic limb movement disorder)     Renal mass     cat scan done 18; to see dr. todd coming up    Thyroid disease     Type 2 diabetes mellitus without complication (HCC)     Type II or unspecified type diabetes mellitus without mention of complication, not stated as uncontrolled        PAST SURGICAL HISTORY    Past Surgical History:   Procedure Laterality Date    BREAST BIOPSY Right     CARPAL TUNNEL RELEASE      bilateral    CHOLECYSTECTOMY      HYSTERECTOMY (CERVIX STATUS UNKNOWN)      CT LAPAROSCOPY RADICAL NEPHRECTOMY Left 10/3/2018    NEPHRECTOMY LAPAROSCOPIC HAND ASSISTED performed by Keith Todd MD at [facility] OR    CT LAPS SURG

## 2025-04-30 DIAGNOSIS — K44.9 HIATAL HERNIA: ICD-10-CM

## 2025-04-30 RX ORDER — PANTOPRAZOLE SODIUM 40 MG/1
40 TABLET, DELAYED RELEASE ORAL DAILY
Qty: 90 TABLET | Refills: 1 | Status: SHIPPED | OUTPATIENT
Start: 2025-04-30

## 2025-05-05 ENCOUNTER — HOSPITAL ENCOUNTER (OUTPATIENT)
Dept: WOUND CARE | Age: 65
Discharge: HOME | End: 2025-05-05
Attending: SURGERY
Payer: COMMERCIAL

## 2025-05-05 VITALS
HEART RATE: 71 BPM | TEMPERATURE: 97.7 F | DIASTOLIC BLOOD PRESSURE: 85 MMHG | RESPIRATION RATE: 18 BRPM | SYSTOLIC BLOOD PRESSURE: 158 MMHG

## 2025-05-05 DIAGNOSIS — S31.109D OPEN WOUND OF ABDOMINAL WALL, SUBSEQUENT ENCOUNTER: Primary | ICD-10-CM

## 2025-05-05 PROCEDURE — 97605 NEG PRS WND THER DME<=50SQCM: CPT

## 2025-05-05 PROCEDURE — 11042 DBRDMT SUBQ TIS 1ST 20SQCM/<: CPT | Performed by: SURGERY

## 2025-05-05 PROCEDURE — 11042 DBRDMT SUBQ TIS 1ST 20SQCM/<: CPT

## 2025-05-05 RX ORDER — LIDOCAINE HYDROCHLORIDE 20 MG/ML
JELLY TOPICAL PRN
OUTPATIENT
Start: 2025-05-05

## 2025-05-05 RX ORDER — MUPIROCIN 20 MG/G
OINTMENT TOPICAL PRN
OUTPATIENT
Start: 2025-05-05

## 2025-05-05 RX ORDER — BETAMETHASONE DIPROPIONATE 0.5 MG/G
CREAM TOPICAL PRN
OUTPATIENT
Start: 2025-05-05

## 2025-05-05 RX ORDER — GINSENG 100 MG
CAPSULE ORAL PRN
OUTPATIENT
Start: 2025-05-05

## 2025-05-05 RX ORDER — NEOMYCIN/BACITRACIN/POLYMYXINB 3.5-400-5K
OINTMENT (GRAM) TOPICAL PRN
OUTPATIENT
Start: 2025-05-05

## 2025-05-05 RX ORDER — BACITRACIN ZINC AND POLYMYXIN B SULFATE 500; 1000 [USP'U]/G; [USP'U]/G
OINTMENT TOPICAL PRN
OUTPATIENT
Start: 2025-05-05

## 2025-05-05 RX ORDER — LIDOCAINE 40 MG/G
CREAM TOPICAL PRN
OUTPATIENT
Start: 2025-05-05

## 2025-05-05 RX ORDER — LIDOCAINE HYDROCHLORIDE 40 MG/ML
SOLUTION TOPICAL PRN
OUTPATIENT
Start: 2025-05-05

## 2025-05-05 RX ORDER — TRIAMCINOLONE ACETONIDE 1 MG/G
OINTMENT TOPICAL PRN
OUTPATIENT
Start: 2025-05-05

## 2025-05-05 RX ORDER — LIDOCAINE HYDROCHLORIDE 20 MG/ML
JELLY TOPICAL PRN
Status: DISCONTINUED | OUTPATIENT
Start: 2025-05-05 | End: 2025-05-07 | Stop reason: HOSPADM

## 2025-05-05 RX ORDER — SODIUM CHLOR/HYPOCHLOROUS ACID 0.033 %
SOLUTION, IRRIGATION IRRIGATION PRN
OUTPATIENT
Start: 2025-05-05

## 2025-05-05 RX ORDER — SILVER SULFADIAZINE 10 MG/G
CREAM TOPICAL PRN
OUTPATIENT
Start: 2025-05-05

## 2025-05-05 RX ORDER — LIDOCAINE 50 MG/G
OINTMENT TOPICAL PRN
OUTPATIENT
Start: 2025-05-05

## 2025-05-05 RX ORDER — GENTAMICIN SULFATE 1 MG/G
OINTMENT TOPICAL PRN
OUTPATIENT
Start: 2025-05-05

## 2025-05-05 RX ORDER — CLOBETASOL PROPIONATE 0.5 MG/G
OINTMENT TOPICAL PRN
OUTPATIENT
Start: 2025-05-05

## 2025-05-05 RX ADMIN — LIDOCAINE HYDROCHLORIDE: 20 JELLY TOPICAL at 08:57

## 2025-05-05 NOTE — PROGRESS NOTES
[facility] Wound Care Center   Progress Note and Procedure Note      April Tubbs  MEDICAL RECORD NUMBER:  [id number]  AGE: 65 y.o.   GENDER: female  : 1960  EPISODE DATE:  2025    Subjective:     Chief Complaint   Patient presents with    Wound Check     Abdomen wound check         HISTORY of PRESENT ILLNESS HPI     April Tubbs is a 65 y.o. female who presents today for wound/ulcer evaluation.   Wound Context: Pt with abdominal wound here for eval/treat  Wound/Ulcer Pain Timing/Severity: none  Quality of pain: N/A  Severity:  0 / 10   Modifying Factors: None  Associated Signs/Symptoms: none    Ulcer Identification:  Ulcer Type: non-healing surgical  Contributing Factors: none    Wound: Surgical incision        PAST MEDICAL HISTORY        Diagnosis Date    Allergic rhinitis     Ankle fracture     3 year ago; increasing difficulty walking; has bone fragments    Arthritis     sees dr. silva    Braces as ambulation aid     right foot per dr. silva    CPAP (continuous positive airway pressure) dependence     Diabetes (HCC)     Hiatal hernia 2017    History of kidney cancer     Hyperlipidemia     Hypertension     Hypothyroidism     Murmur     Obesity     ZOILA (obstructive sleep apnea)     AHI:  28.5 per PSG, 2014    PLMD (periodic limb movement disorder)     Renal mass     cat scan done 18; to see dr. todd coming up    Thyroid disease     Type 2 diabetes mellitus without complication (HCC)     Type II or unspecified type diabetes mellitus without mention of complication, not stated as uncontrolled        PAST SURGICAL HISTORY    Past Surgical History:   Procedure Laterality Date    BREAST BIOPSY Right     CARPAL TUNNEL RELEASE      bilateral    CHOLECYSTECTOMY      HYSTERECTOMY (CERVIX STATUS UNKNOWN)      SC LAPAROSCOPY RADICAL NEPHRECTOMY Left 10/3/2018    NEPHRECTOMY LAPAROSCOPIC HAND ASSISTED performed by Keith Todd MD at [facility] OR    SC LAPS SURG 
signed by Sandie Yu RN on 5/5/2025 at 9:27 AM

## 2025-05-05 NOTE — PATIENT INSTRUCTIONS
[facility] Wound Care and Hyperbaric Oxygen Therapy   Physician Orders and Discharge Instructions  [address] [place] [address]  [address]  [place] [address]  Telephone: [phone]      FAX [phone]    NAME:  April Tubbs  YOB: 1960  MEDICAL RECORD NUMBER:  [id number]  DATE:  5/5/2025    Discharge condition: Stable    Discharge to: Home    Left via:Private automobile    Accompanied by: Self    [employer] Wound Vac Peel and Place    Dressing Orders: Abdomen Wound  Wash with soap and water  Apply wound vac as follows: Apply Skin Prep to periwound. Apply drape - window pane to surrounding area (periwound).   (Use duoderm instead of drape to prevent skin breakdown. Use ostomy paste to fill any creases to prevent leakage. If skin becomes red due to tape irritation please apply skin prep. Then apply light dusting of ostomy powder or antifungal powder to periwound. Then blot with skin prep to form crusting to protect skin. May repeat skin prep, powdering steps until proper crusting is made. )  Then apply drape to periwound.   DO NOT PUT FOAM ON GOOD SKIN. Apply black foam to wound bed. Set wound vac to 125 mmHG, continuous. Change wound vac dressing weekly  Reapply vac dressing if vac stops working or dressing begins to leak or cannot be reinforced.       Alternative dressing: Wash with soap and water. Apply 1/4 STR Dakins moistened gauze to wound bed. Cover with gauze. Secure with roll gauze and medipore tape. Change twice daily .    [facility] follow up visit ___________1 week__________________  (Please note your next appointment above and if you are unable to keep, kindly give a 24 hour notice. Thank you.)    If you experience any of the following, please call the Wound Care Center during business hours:    * Increase in Pain  * Temperature over 101  * Increase in drainage from your wound  * Drainage with a foul odor  * Bleeding  * Increase in swelling  * Need for compression bandage changes due

## 2025-05-12 ENCOUNTER — HOSPITAL ENCOUNTER (OUTPATIENT)
Dept: WOUND CARE | Age: 65
Discharge: HOME | End: 2025-05-12
Attending: SURGERY
Payer: COMMERCIAL

## 2025-05-12 VITALS
HEIGHT: 67 IN | RESPIRATION RATE: 18 BRPM | SYSTOLIC BLOOD PRESSURE: 141 MMHG | DIASTOLIC BLOOD PRESSURE: 80 MMHG | BODY MASS INDEX: 45.99 KG/M2 | HEART RATE: 76 BPM | WEIGHT: 293 LBS | TEMPERATURE: 97.7 F

## 2025-05-12 DIAGNOSIS — S31.109D OPEN WOUND OF ABDOMINAL WALL, SUBSEQUENT ENCOUNTER: Primary | ICD-10-CM

## 2025-05-12 PROCEDURE — 99212 OFFICE O/P EST SF 10 MIN: CPT

## 2025-05-12 PROCEDURE — 99212 OFFICE O/P EST SF 10 MIN: CPT | Performed by: SURGERY

## 2025-05-12 RX ORDER — SODIUM CHLOR/HYPOCHLOROUS ACID 0.033 %
SOLUTION, IRRIGATION IRRIGATION PRN
OUTPATIENT
Start: 2025-05-12

## 2025-05-12 RX ORDER — BACITRACIN ZINC AND POLYMYXIN B SULFATE 500; 1000 [USP'U]/G; [USP'U]/G
OINTMENT TOPICAL PRN
OUTPATIENT
Start: 2025-05-12

## 2025-05-12 RX ORDER — LIDOCAINE 40 MG/G
CREAM TOPICAL PRN
OUTPATIENT
Start: 2025-05-12

## 2025-05-12 RX ORDER — LIDOCAINE HYDROCHLORIDE 20 MG/ML
JELLY TOPICAL PRN
OUTPATIENT
Start: 2025-05-12

## 2025-05-12 RX ORDER — TRIAMCINOLONE ACETONIDE 1 MG/G
OINTMENT TOPICAL PRN
OUTPATIENT
Start: 2025-05-12

## 2025-05-12 RX ORDER — NEOMYCIN/BACITRACIN/POLYMYXINB 3.5-400-5K
OINTMENT (GRAM) TOPICAL PRN
OUTPATIENT
Start: 2025-05-12

## 2025-05-12 RX ORDER — LIDOCAINE HYDROCHLORIDE 40 MG/ML
SOLUTION TOPICAL PRN
OUTPATIENT
Start: 2025-05-12

## 2025-05-12 RX ORDER — BETAMETHASONE DIPROPIONATE 0.5 MG/G
CREAM TOPICAL PRN
OUTPATIENT
Start: 2025-05-12

## 2025-05-12 RX ORDER — MUPIROCIN 20 MG/G
OINTMENT TOPICAL PRN
OUTPATIENT
Start: 2025-05-12

## 2025-05-12 RX ORDER — LIDOCAINE HYDROCHLORIDE 20 MG/ML
JELLY TOPICAL PRN
Status: DISCONTINUED | OUTPATIENT
Start: 2025-05-12 | End: 2025-05-14 | Stop reason: HOSPADM

## 2025-05-12 RX ORDER — GENTAMICIN SULFATE 1 MG/G
OINTMENT TOPICAL PRN
OUTPATIENT
Start: 2025-05-12

## 2025-05-12 RX ORDER — GINSENG 100 MG
CAPSULE ORAL PRN
OUTPATIENT
Start: 2025-05-12

## 2025-05-12 RX ORDER — LIDOCAINE 50 MG/G
OINTMENT TOPICAL PRN
OUTPATIENT
Start: 2025-05-12

## 2025-05-12 RX ORDER — CLOBETASOL PROPIONATE 0.5 MG/G
OINTMENT TOPICAL PRN
OUTPATIENT
Start: 2025-05-12

## 2025-05-12 RX ORDER — SILVER SULFADIAZINE 10 MG/G
CREAM TOPICAL PRN
OUTPATIENT
Start: 2025-05-12

## 2025-05-12 RX ADMIN — LIDOCAINE HYDROCHLORIDE: 20 JELLY TOPICAL at 08:43

## 2025-05-12 NOTE — PATIENT INSTRUCTIONS
[facility] Wound Care and Hyperbaric Oxygen Therapy   Physician Orders and Discharge Instructions  [address] [place] [address]  [address]  [place] [address]  Telephone: [phone]      FAX [phone]    NAME:  April Tubbs  YOB: 1960  MEDICAL RECORD NUMBER:  [id number]  DATE:  5/12/2025    Discharge condition: Stable    Discharge to: Home    Left via:Private automobile    Accompanied by: Self    [employer] Wound Vac Peel and Place    Dressing Orders: Abdomen Wound  Wash with soap and water  Apply wound vac as follows: Apply Skin Prep to periwound. Apply drape - window pane to surrounding area  (periwound).  (Use duoderm instead of drape to prevent skin breakdown. Use ostomy paste to fill any creases to prevent leakage. If  skin becomes red due to tape irritation please apply skin prep. Then apply light dusting of ostomy powder or antifungal  powder to periwound. Then blot with skin prep to form crusting to protect skin. May repeat skin prep, powdering steps  until proper crusting is made. )  Then apply drape to periwound.  DO NOT PUT FOAM ON GOOD SKIN. Apply black foam to wound bed. Set wound vac to 125 mmHG, continuous.  Change wound vac dressing weekly  Reapply vac dressing if vac stops working or dressing begins to leak or cannot be reinforced.    Alternative dressing: Wash with soap and water. Apply 1/4 STR Dakins moistened gauze to wound bed. Cover with  gauze. Secure with roll gauze and medipore tape. Change twice daily .    [facility] follow up visit __________1 week___________________  (Please note your next appointment above and if you are unable to keep, kindly give a 24 hour notice. Thank you.)    If you experience any of the following, please call the Wound Care Center during business hours:    * Increase in Pain  * Temperature over 101  * Increase in drainage from your wound  * Drainage with a foul odor  * Bleeding  * Increase in swelling  * Need for compression bandage changes due

## 2025-05-12 NOTE — PROGRESS NOTES
[facility] Wound Care Center   Progress Note and Procedure Note      April Tubbs  MEDICAL RECORD NUMBER:  [id number]  AGE: 65 y.o.   GENDER: female  : 1960  EPISODE DATE:  2025    Subjective:     Chief Complaint   Patient presents with    Wound Check     Pt presents for recheck of abdomen wound         HISTORY of PRESENT ILLNESS HPI     April Tubbs is a 65 y.o. female who presents today for wound/ulcer evaluation.   History of Wound Context: Pt with abdominal wound here for eval/treat  Wound/Ulcer Pain Timing/Severity: none  Quality of pain: N/A  Severity:  0 / 10   Modifying Factors: None  Associated Signs/Symptoms: none    Ulcer Identification:  Ulcer Type: non-healing surgical  Contributing Factors: none    Wound: External surgical dehiscence        PAST MEDICAL HISTORY        Diagnosis Date    Allergic rhinitis     Ankle fracture     3 year ago; increasing difficulty walking; has bone fragments    Arthritis     sees dr. silva    Braveena as ambulation aid     right foot per dr. silva    CPAP (continuous positive airway pressure) dependence     Diabetes (HCC)     Hiatal hernia 2017    History of kidney cancer     Hyperlipidemia     Hypertension     Hypothyroidism     Murmur     Obesity     ZOILA (obstructive sleep apnea)     AHI:  28.5 per PSG, 2014    PLMD (periodic limb movement disorder)     Renal mass     cat scan done 18; to see dr. todd coming up    Thyroid disease     Type 2 diabetes mellitus without complication (HCC)     Type II or unspecified type diabetes mellitus without mention of complication, not stated as uncontrolled        PAST SURGICAL HISTORY    Past Surgical History:   Procedure Laterality Date    BREAST BIOPSY Right     CARPAL TUNNEL RELEASE      bilateral    CHOLECYSTECTOMY      HYSTERECTOMY (CERVIX STATUS UNKNOWN)      VT LAPAROSCOPY RADICAL NEPHRECTOMY Left 10/3/2018    NEPHRECTOMY LAPAROSCOPIC HAND ASSISTED performed by Keith DOZIER

## 2025-05-19 ENCOUNTER — HOSPITAL ENCOUNTER (OUTPATIENT)
Dept: WOUND CARE | Age: 65
Discharge: HOME | End: 2025-05-19
Attending: SURGERY
Payer: COMMERCIAL

## 2025-05-19 VITALS
TEMPERATURE: 98 F | BODY MASS INDEX: 45.99 KG/M2 | HEIGHT: 67 IN | SYSTOLIC BLOOD PRESSURE: 126 MMHG | DIASTOLIC BLOOD PRESSURE: 74 MMHG | HEART RATE: 68 BPM | RESPIRATION RATE: 18 BRPM | WEIGHT: 293 LBS

## 2025-05-19 DIAGNOSIS — S31.109D OPEN WOUND OF ABDOMINAL WALL, SUBSEQUENT ENCOUNTER: Primary | ICD-10-CM

## 2025-05-19 PROCEDURE — 97597 DBRDMT OPN WND 1ST 20 CM/<: CPT

## 2025-05-19 PROCEDURE — 97597 DBRDMT OPN WND 1ST 20 CM/<: CPT | Performed by: SURGERY

## 2025-05-19 RX ORDER — MUPIROCIN 20 MG/G
OINTMENT TOPICAL PRN
OUTPATIENT
Start: 2025-05-19

## 2025-05-19 RX ORDER — GENTAMICIN SULFATE 1 MG/G
OINTMENT TOPICAL PRN
OUTPATIENT
Start: 2025-05-19

## 2025-05-19 RX ORDER — GINSENG 100 MG
CAPSULE ORAL PRN
OUTPATIENT
Start: 2025-05-19

## 2025-05-19 RX ORDER — NEOMYCIN/BACITRACIN/POLYMYXINB 3.5-400-5K
OINTMENT (GRAM) TOPICAL PRN
OUTPATIENT
Start: 2025-05-19

## 2025-05-19 RX ORDER — SODIUM CHLOR/HYPOCHLOROUS ACID 0.033 %
SOLUTION, IRRIGATION IRRIGATION PRN
OUTPATIENT
Start: 2025-05-19

## 2025-05-19 RX ORDER — SILVER SULFADIAZINE 10 MG/G
CREAM TOPICAL PRN
OUTPATIENT
Start: 2025-05-19

## 2025-05-19 RX ORDER — CLOBETASOL PROPIONATE 0.5 MG/G
OINTMENT TOPICAL PRN
OUTPATIENT
Start: 2025-05-19

## 2025-05-19 RX ORDER — LIDOCAINE HYDROCHLORIDE 20 MG/ML
JELLY TOPICAL PRN
OUTPATIENT
Start: 2025-05-19

## 2025-05-19 RX ORDER — LISINOPRIL 40 MG/1
40 TABLET ORAL DAILY
Qty: 90 TABLET | Refills: 1 | OUTPATIENT
Start: 2025-05-19

## 2025-05-19 RX ORDER — BACITRACIN ZINC AND POLYMYXIN B SULFATE 500; 1000 [USP'U]/G; [USP'U]/G
OINTMENT TOPICAL PRN
OUTPATIENT
Start: 2025-05-19

## 2025-05-19 RX ORDER — BETAMETHASONE DIPROPIONATE 0.5 MG/G
CREAM TOPICAL PRN
OUTPATIENT
Start: 2025-05-19

## 2025-05-19 RX ORDER — TRIAMCINOLONE ACETONIDE 1 MG/G
OINTMENT TOPICAL PRN
OUTPATIENT
Start: 2025-05-19

## 2025-05-19 RX ORDER — LIDOCAINE 50 MG/G
OINTMENT TOPICAL PRN
OUTPATIENT
Start: 2025-05-19

## 2025-05-19 RX ORDER — LIDOCAINE HYDROCHLORIDE 40 MG/ML
SOLUTION TOPICAL PRN
OUTPATIENT
Start: 2025-05-19

## 2025-05-19 RX ORDER — LIDOCAINE 40 MG/G
CREAM TOPICAL PRN
OUTPATIENT
Start: 2025-05-19

## 2025-05-19 NOTE — PROGRESS NOTES
(TRESIBA FLEXTOUCH) 100 UNIT/ML SOPN Inject 22 Units into the skin nightly (Patient taking differently: Inject 24 Units into the skin nightly) 7 Adjustable Dose Pre-filled Pen Syringe 5    Misc. Devices MISC 1 pair of Diabetic Shoes w/ 3 sets of heat moldable inserts ICD 10: E11.9 1 each 0    Continuous Glucose Transmitter (DEXCOM G6 TRANSMITTER) MISC USE AS DIRECTED EVERY 3 MONTHS 1 each 3    atenolol (TENORMIN) 50 MG tablet TAKE ONE-HALF TABLET BY MOUTH EVERY MORNING AND ONE-HALF TABLET EVERY EVENING. 90 tablet 3    Continuous Glucose Sensor (DEXCOM G6 SENSOR) MISC 1 Device by Does not apply route every 10 days 9 each 3    semaglutide, 2 MG/DOSE, (OZEMPIC) 8 MG/3ML SOPN sc injection Inject 2 mg into the skin every 7 days 3 mL 11    triamcinolone (KENALOG) 0.1 % cream Apply topically 2 times daily. 45 g 1    sodium bicarbonate 325 MG tablet Take 1 tablet by mouth 2 times daily      blood glucose test strips (CONTOUR NEXT TEST) strip TEST FIVE TIMES DAILY 500 strip 5    ondansetron (ZOFRAN) 8 MG tablet Take 1 tablet by mouth every 8 hours as needed for Nausea or Other (diarrhea) (Patient not taking: Reported on 4/22/2025) 12 tablet 0    vitamin D (ERGOCALCIFEROL) 1.25 MG (92648 UT) CAPS capsule TAKE 1 CAPSULE BY MOUTH WEEKLY      Multiple Vitamins-Calcium (TGT DAILY MULTIVITAMIN WOMENS) TABS 1 tablet Oral Daily      Omega-3 Fatty Acids (FISH OIL) 1000 MG capsule 1 capsule      fexofenadine (ALLEGRA) 180 MG tablet 1 tablet as needed Orally Once a day      Continuous Blood Gluc  (DEXCOM G6 ) JAYA 1 Device by Does not apply route daily 1 each 3    glucagon 1 MG injection Inject 1 mg into the muscle once for 1 dose 1 kit 0    B-D UF III MINI PEN NEEDLES 31G X 5 MM MISC USE FOUR TIMES DAILY AS DIRECTED 400 each 3    Cholecalciferol (VITAMIN D3) 1.25 MG (18043 UT) CAPS Take 1 capsule by mouth once a week      clotrimazole-betamethasone (LOTRISONE) 1-0.05 % cream Apply topically 2 times daily. 15 g 0

## 2025-05-20 DIAGNOSIS — E11.42 TYPE 2 DIABETES MELLITUS WITH DIABETIC POLYNEUROPATHY, WITH LONG-TERM CURRENT USE OF INSULIN (HCC): ICD-10-CM

## 2025-05-20 DIAGNOSIS — Z79.4 TYPE 2 DIABETES MELLITUS WITH DIABETIC POLYNEUROPATHY, WITH LONG-TERM CURRENT USE OF INSULIN (HCC): ICD-10-CM

## 2025-05-20 RX ORDER — PROCHLORPERAZINE 25 MG/1
SUPPOSITORY RECTAL
Qty: 1 EACH | Refills: 3 | Status: SHIPPED | OUTPATIENT
Start: 2025-05-20

## 2025-05-20 RX ORDER — SEMAGLUTIDE 2.68 MG/ML
INJECTION, SOLUTION SUBCUTANEOUS
Qty: 3 ML | Refills: 11 | Status: SHIPPED | OUTPATIENT
Start: 2025-05-20

## 2025-05-20 RX ORDER — PROCHLORPERAZINE 25 MG/1
SUPPOSITORY RECTAL
Qty: 9 EACH | Refills: 3 | Status: SHIPPED | OUTPATIENT
Start: 2025-05-20

## 2025-05-27 ENCOUNTER — HOSPITAL ENCOUNTER (OUTPATIENT)
Dept: WOUND CARE | Age: 65
Discharge: HOME | End: 2025-05-27
Attending: SURGERY
Payer: COMMERCIAL

## 2025-05-27 VITALS
DIASTOLIC BLOOD PRESSURE: 69 MMHG | RESPIRATION RATE: 18 BRPM | BODY MASS INDEX: 45.99 KG/M2 | HEIGHT: 67 IN | TEMPERATURE: 98 F | HEART RATE: 76 BPM | WEIGHT: 293 LBS | SYSTOLIC BLOOD PRESSURE: 131 MMHG

## 2025-05-27 DIAGNOSIS — S31.109D OPEN WOUND OF ABDOMINAL WALL, SUBSEQUENT ENCOUNTER: Primary | ICD-10-CM

## 2025-05-27 PROCEDURE — 99212 OFFICE O/P EST SF 10 MIN: CPT

## 2025-05-27 PROCEDURE — 99212 OFFICE O/P EST SF 10 MIN: CPT | Performed by: SURGERY

## 2025-05-27 RX ORDER — LIDOCAINE 50 MG/G
OINTMENT TOPICAL PRN
OUTPATIENT
Start: 2025-05-27

## 2025-05-27 RX ORDER — NEOMYCIN/BACITRACIN/POLYMYXINB 3.5-400-5K
OINTMENT (GRAM) TOPICAL PRN
OUTPATIENT
Start: 2025-05-27

## 2025-05-27 RX ORDER — LIDOCAINE HYDROCHLORIDE 20 MG/ML
JELLY TOPICAL PRN
OUTPATIENT
Start: 2025-05-27

## 2025-05-27 RX ORDER — BACITRACIN ZINC AND POLYMYXIN B SULFATE 500; 1000 [USP'U]/G; [USP'U]/G
OINTMENT TOPICAL PRN
OUTPATIENT
Start: 2025-05-27

## 2025-05-27 RX ORDER — CLOBETASOL PROPIONATE 0.5 MG/G
OINTMENT TOPICAL PRN
OUTPATIENT
Start: 2025-05-27

## 2025-05-27 RX ORDER — TRIAMCINOLONE ACETONIDE 1 MG/G
OINTMENT TOPICAL PRN
OUTPATIENT
Start: 2025-05-27

## 2025-05-27 RX ORDER — LIDOCAINE HYDROCHLORIDE 40 MG/ML
SOLUTION TOPICAL PRN
OUTPATIENT
Start: 2025-05-27

## 2025-05-27 RX ORDER — MUPIROCIN 20 MG/G
OINTMENT TOPICAL PRN
OUTPATIENT
Start: 2025-05-27

## 2025-05-27 RX ORDER — LIDOCAINE 40 MG/G
CREAM TOPICAL PRN
OUTPATIENT
Start: 2025-05-27

## 2025-05-27 RX ORDER — GINSENG 100 MG
CAPSULE ORAL PRN
OUTPATIENT
Start: 2025-05-27

## 2025-05-27 RX ORDER — SODIUM CHLOR/HYPOCHLOROUS ACID 0.033 %
SOLUTION, IRRIGATION IRRIGATION PRN
OUTPATIENT
Start: 2025-05-27

## 2025-05-27 RX ORDER — BETAMETHASONE DIPROPIONATE 0.5 MG/G
CREAM TOPICAL PRN
OUTPATIENT
Start: 2025-05-27

## 2025-05-27 RX ORDER — SILVER SULFADIAZINE 10 MG/G
CREAM TOPICAL PRN
OUTPATIENT
Start: 2025-05-27

## 2025-05-27 RX ORDER — GENTAMICIN SULFATE 1 MG/G
OINTMENT TOPICAL PRN
OUTPATIENT
Start: 2025-05-27

## 2025-05-27 NOTE — PLAN OF CARE
Problem: Wound:  Goal: Will show signs of wound healing; wound closure and no evidence of infection  Description: Will show signs of wound healing; wound closure and no evidence of infection  5/27/2025 0810 by Bill Santamaria RN  Outcome: Completed  5/27/2025 0800 by Mansi Nelson RN  Outcome: Progressing     Problem: Weight control:  Goal: Ability to maintain an optimal weight for height and age will be supported  Description: Ability to maintain an optimal weight for height and age will be supported  5/27/2025 0810 by Bill Santamaria RN  Outcome: Completed  5/27/2025 0800 by Mansi Nelson RN  Outcome: Progressing     Problem: Falls - Risk of:  Goal: Will remain free from falls  Description: Will remain free from falls  5/27/2025 0810 by Bill Santamaria RN  Outcome: Completed  5/27/2025 0800 by Mansi Nelson RN  Outcome: Progressing     Problem: Blood Glucose:  Goal: Ability to maintain appropriate glucose levels will improve  Description: Ability to maintain appropriate glucose levels will improve  5/27/2025 0810 by Bill Santamaria RN  Outcome: Completed  5/27/2025 0800 by Mansi Nelson RN  Outcome: Progressing

## 2025-05-27 NOTE — PATIENT INSTRUCTIONS
[facility]   Physician Orders and Discharge Instructions  [address] [place] [address] Drive  [address]  [place] [address]  Telephone: [phone]      FAX [phone]    NAME:  April Tubbs  YOB: 1960  MEDICAL RECORD NUMBER:  [id number]  DATE:  5/27/2025    Discharge condition: Stable    Discharge to: Home    Left via:Private automobile    Accompanied by: Self     D/C wound vac and send back to the company in the box and label provided     Dressing Orders: Abdomen Wound  Healed, Moisturize and protect  Follow up with your Family Practitioner as instructed     [facility] follow up visit ____________PRN_________________  (Please note your next appointment above and if you are unable to keep, kindly give a 24 hour notice. Thank you.)    If you experience any of the following, please call the Wound Care Center during business hours:    * Increase in Pain  * Temperature over 101  * Increase in drainage from your wound  * Drainage with a foul odor  * Bleeding  * Increase in swelling  * Need for compression bandage changes due to slippage, breakthrough drainage.    If you need medical attention outside of the business hours of the Wound Care Centers please contact your PCP or go to the nearest emergency room.

## 2025-05-27 NOTE — PROGRESS NOTES
[facility]   Progress Note and Procedure Note      April Tubbs  MEDICAL RECORD NUMBER:  [id number]  AGE: 65 y.o.   GENDER: female  : 1960  EPISODE DATE:  2025    Subjective:     Chief Complaint   Patient presents with    Wound Check     Patient presents today for recheck mid abdomen wound.         HISTORY of PRESENT ILLNESS HPI     April Tubbs is a 65 y.o. female who presents today for wound/ulcer evaluation.   History of Wound Context: Pt with abdominal wound here for eval/treat  Wound/Ulcer Pain Timing/Severity: none  Quality of pain: N/A  Severity:  0 / 10   Modifying Factors: None  Associated Signs/Symptoms: none    Ulcer Identification:  Ulcer Type: pressure  Contributing Factors: none    Wound:  abdominal wound        PAST MEDICAL HISTORY        Diagnosis Date    Allergic rhinitis     Ankle fracture     3 year ago; increasing difficulty walking; has bone fragments    Arthritis     sees dr. silva    Braveena as ambulation aid     right foot per dr. silva    CPAP (continuous positive airway pressure) dependence     Diabetes (HCC)     Hiatal hernia 2017    History of kidney cancer     Hyperlipidemia     Hypertension     Hypothyroidism     Murmur     Obesity     ZOILA (obstructive sleep apnea)     AHI:  28.5 per PS[facility], 2014    PLMD (periodic limb movement disorder)     Renal mass     cat scan done 18; to see dr. todd coming up    Thyroid disease     Type 2 diabetes mellitus without complication (HCC)     Type II or unspecified type diabetes mellitus without mention of complication, not stated as uncontrolled        PAST SURGICAL HISTORY    Past Surgical History:   Procedure Laterality Date    BREAST BIOPSY Right     CARPAL TUNNEL RELEASE      bilateral    CHOLECYSTECTOMY      HYSTERECTOMY (CERVIX STATUS UNKNOWN)      MA LAPAROSCOPY RADICAL NEPHRECTOMY Left 10/3/2018    NEPHRECTOMY LAPAROSCOPIC HAND ASSISTED performed by Keith Todd MD at [facility]

## 2025-06-03 RX ORDER — ATORVASTATIN CALCIUM 40 MG/1
40 TABLET, FILM COATED ORAL DAILY
Qty: 90 TABLET | Refills: 1 | OUTPATIENT
Start: 2025-06-03

## 2025-06-09 RX ORDER — ATENOLOL 50 MG/1
TABLET ORAL
Qty: 90 TABLET | Refills: 1 | Status: SHIPPED | OUTPATIENT
Start: 2025-06-09

## 2025-06-20 DIAGNOSIS — Z79.4 TYPE 2 DIABETES MELLITUS WITH DIABETIC POLYNEUROPATHY, WITH LONG-TERM CURRENT USE OF INSULIN (HCC): ICD-10-CM

## 2025-06-20 DIAGNOSIS — R53.83 OTHER FATIGUE: ICD-10-CM

## 2025-06-20 DIAGNOSIS — E78.00 HYPERCHOLESTEROLEMIA: ICD-10-CM

## 2025-06-20 DIAGNOSIS — E11.42 TYPE 2 DIABETES MELLITUS WITH DIABETIC POLYNEUROPATHY, WITH LONG-TERM CURRENT USE OF INSULIN (HCC): ICD-10-CM

## 2025-06-20 LAB
ALBUMIN SERPL-MCNC: 4 G/DL (ref 3.5–5.2)
ALP SERPL-CCNC: 111 U/L (ref 35–104)
ALT SERPL-CCNC: 19 U/L (ref 10–35)
ANION GAP SERPL CALCULATED.3IONS-SCNC: 11 MMOL/L (ref 8–16)
AST SERPL-CCNC: 24 U/L (ref 10–35)
BASOPHILS # BLD: 0 K/UL (ref 0–0.2)
BASOPHILS NFR BLD: 0.5 % (ref 0–1)
BILIRUB SERPL-MCNC: 0.5 MG/DL (ref 0.2–1.2)
BUN SERPL-MCNC: 21 MG/DL (ref 8–23)
CALCIUM SERPL-MCNC: 10.2 MG/DL (ref 8.8–10.2)
CHLORIDE SERPL-SCNC: 102 MMOL/L (ref 98–107)
CHOLEST SERPL-MCNC: 132 MG/DL (ref 0–199)
CO2 SERPL-SCNC: 27 MMOL/L (ref 22–29)
CREAT SERPL-MCNC: 1.2 MG/DL (ref 0.5–0.9)
EOSINOPHIL # BLD: 0.1 K/UL (ref 0–0.6)
EOSINOPHIL NFR BLD: 1.2 % (ref 0–5)
ERYTHROCYTE [DISTWIDTH] IN BLOOD BY AUTOMATED COUNT: 13.9 % (ref 11.5–14.5)
GLUCOSE SERPL-MCNC: 141 MG/DL (ref 70–99)
HBA1C MFR BLD: 6.7 % (ref 4–5.6)
HCT VFR BLD AUTO: 38.5 % (ref 37–47)
HDLC SERPL-MCNC: 54 MG/DL (ref 40–60)
HGB BLD-MCNC: 12.3 G/DL (ref 12–16)
IMM GRANULOCYTES # BLD: 0 K/UL
LDLC SERPL CALC-MCNC: 56 MG/DL
LYMPHOCYTES # BLD: 1.4 K/UL (ref 1.1–4.5)
LYMPHOCYTES NFR BLD: 18.2 % (ref 20–40)
MCH RBC QN AUTO: 28.8 PG (ref 27–31)
MCHC RBC AUTO-ENTMCNC: 31.9 G/DL (ref 33–37)
MCV RBC AUTO: 90.2 FL (ref 81–99)
MONOCYTES # BLD: 0.5 K/UL (ref 0–0.9)
MONOCYTES NFR BLD: 6.3 % (ref 0–10)
NEUTROPHILS # BLD: 5.6 K/UL (ref 1.5–7.5)
NEUTS SEG NFR BLD: 73.5 % (ref 50–65)
PLATELET # BLD AUTO: 240 K/UL (ref 130–400)
PMV BLD AUTO: 10.9 FL (ref 9.4–12.3)
POTASSIUM SERPL-SCNC: 4.8 MMOL/L (ref 3.5–5.1)
PROT SERPL-MCNC: 7.3 G/DL (ref 6.4–8.3)
RBC # BLD AUTO: 4.27 M/UL (ref 4.2–5.4)
SODIUM SERPL-SCNC: 140 MMOL/L (ref 136–145)
TRIGL SERPL-MCNC: 109 MG/DL (ref 0–149)
WBC # BLD AUTO: 7.6 K/UL (ref 4.8–10.8)

## 2025-07-01 ENCOUNTER — OFFICE VISIT (OUTPATIENT)
Dept: PRIMARY CARE CLINIC | Age: 65
End: 2025-07-01
Payer: COMMERCIAL

## 2025-07-01 ENCOUNTER — TELEPHONE (OUTPATIENT)
Dept: PRIMARY CARE CLINIC | Age: 65
End: 2025-07-01

## 2025-07-01 VITALS
BODY MASS INDEX: 60.77 KG/M2 | OXYGEN SATURATION: 97 % | SYSTOLIC BLOOD PRESSURE: 128 MMHG | HEART RATE: 78 BPM | DIASTOLIC BLOOD PRESSURE: 86 MMHG | WEIGHT: 293 LBS | TEMPERATURE: 97.8 F

## 2025-07-01 DIAGNOSIS — E11.42 DIABETIC PERIPHERAL NEUROPATHY (HCC): ICD-10-CM

## 2025-07-01 DIAGNOSIS — E03.9 PRIMARY HYPOTHYROIDISM: ICD-10-CM

## 2025-07-01 DIAGNOSIS — E11.42 TYPE 2 DIABETES MELLITUS WITH DIABETIC POLYNEUROPATHY, WITH LONG-TERM CURRENT USE OF INSULIN (HCC): Primary | ICD-10-CM

## 2025-07-01 DIAGNOSIS — I10 ESSENTIAL (PRIMARY) HYPERTENSION: ICD-10-CM

## 2025-07-01 DIAGNOSIS — E78.00 HYPERCHOLESTEROLEMIA: ICD-10-CM

## 2025-07-01 DIAGNOSIS — K21.9 GASTROESOPHAGEAL REFLUX DISEASE WITHOUT ESOPHAGITIS: ICD-10-CM

## 2025-07-01 DIAGNOSIS — Z79.4 TYPE 2 DIABETES MELLITUS WITH DIABETIC POLYNEUROPATHY, WITH LONG-TERM CURRENT USE OF INSULIN (HCC): Primary | ICD-10-CM

## 2025-07-01 PROCEDURE — 3079F DIAST BP 80-89 MM HG: CPT | Performed by: FAMILY MEDICINE

## 2025-07-01 PROCEDURE — 99214 OFFICE O/P EST MOD 30 MIN: CPT | Performed by: FAMILY MEDICINE

## 2025-07-01 PROCEDURE — 3044F HG A1C LEVEL LT 7.0%: CPT | Performed by: FAMILY MEDICINE

## 2025-07-01 PROCEDURE — 3074F SYST BP LT 130 MM HG: CPT | Performed by: FAMILY MEDICINE

## 2025-07-01 RX ORDER — ONDANSETRON 8 MG/1
8 TABLET, FILM COATED ORAL EVERY 8 HOURS PRN
Qty: 12 TABLET | Refills: 0 | Status: SHIPPED
Start: 2025-07-01 | End: 2025-07-01 | Stop reason: CLARIF

## 2025-07-01 RX ORDER — ONDANSETRON 8 MG/1
8 TABLET, FILM COATED ORAL EVERY 8 HOURS PRN
Qty: 12 TABLET | Refills: 0 | Status: SHIPPED | OUTPATIENT
Start: 2025-07-01

## 2025-07-01 ASSESSMENT — ENCOUNTER SYMPTOMS
ABDOMINAL PAIN: 0
ANAL BLEEDING: 0
CHEST TIGHTNESS: 0
SHORTNESS OF BREATH: 0
CONSTIPATION: 0
DIARRHEA: 0
COUGH: 0
NAUSEA: 1

## 2025-07-01 NOTE — PROGRESS NOTES
[facility]  [address]  [address]  [place] [address]  Dept: [phone]  Dept Fax: [phone]  Loc: [phone]    April Tubbs is a 65 y.o. female who presents today for her medical conditions/complaints as noted below.  April Tubbs is here for 3 Month Follow-Up  -     Subjective:   CC:  Here today to discuss the following:    Some nausea since yesterday.  No fever or chills.  No diarrhea.  No abdominal pain.  She is requesting a refill of Zofran    Diabetes Mellitus  Has been compliant with medications.   No side effects of medications since last visit.   No polyuria, polydipsia, or vision changes since last visit.   No symptomatic episodes of hypoglycemia.     Hyperlipidemia  Tolerating cholesterol medication without adverse effects.    Hypertension  Compliant with medications.  No adverse effects from medication.  No lightheadedness, palpitations, or chest pain.  Hypothyroidism  Symptoms are stable.  No temperature intolerance, fatigue, or mood disturbance from baseline. Complaint with current medication.    Gastroesophageal Reflux Disease  Symptoms currently under control.   Medication adequately controls symptoms.  No hematochezia or melena.           Review of Systems   Constitutional:  Negative for chills and fever.   HENT:  Negative for congestion.    Respiratory:  Negative for cough, chest tightness and shortness of breath.    Cardiovascular:  Negative for chest pain, palpitations and leg swelling.   Gastrointestinal:  Positive for nausea. Negative for abdominal pain, anal bleeding, constipation and diarrhea.   Genitourinary:  Negative for difficulty urinating.   Psychiatric/Behavioral: Negative.       Objective:   /86   Pulse 78   Temp 97.8 °F (36.6 °C)   Wt (!) 176 kg (388 lb)   LMP 01/01/2002   SpO2 97%   BMI 60.77 kg/m²   BP Readings from Last 3 Encounters:   07/01/25 128/86   05/27/25 131/69   05/19/25 126/74    Wt

## 2025-07-01 NOTE — TELEPHONE ENCOUNTER
----- Message from Dr. Jose Lechuga MD sent at 7/1/2025  2:16 PM CDT -----  Regarding: Diabetic shoes  Prescription for diabetic shoes.  Can you please send that to [facility].  Thanks

## 2025-08-13 RX ORDER — LISINOPRIL 40 MG/1
40 TABLET ORAL DAILY
Qty: 90 TABLET | Refills: 1 | Status: SHIPPED | OUTPATIENT
Start: 2025-08-13

## 2025-09-02 RX ORDER — ATORVASTATIN CALCIUM 40 MG/1
40 TABLET, FILM COATED ORAL DAILY
Qty: 90 TABLET | Refills: 1 | Status: SHIPPED | OUTPATIENT
Start: 2025-09-02

## (undated) DEVICE — GLOVE SURG SZ 85 L12IN FNGR ORTHO 126MIL CRM LTX FREE

## (undated) DEVICE — SUTURE PDS II SZ 1 L54IN ABSRB VLT L65MM TP-1 1/2 CIR Z879G

## (undated) DEVICE — MASK,OXYGEN,MED CONC,ADLT,7' TUB, UC: Brand: PENDING

## (undated) DEVICE — GLOVE SURG SZ 75 CRM LTX FREE POLYISOPRENE POLYMER BEAD ANTI

## (undated) DEVICE — THE CHANNEL CLEANING BRUSH IS A NYLON FLEXI BRUSH ATTACHED TO A FLEXIBLE PLASTIC SHEATH DESIGNED TO SAFELY REMOVE DEBRIS FROM FLEXIBLE ENDOSCOPES.

## (undated) DEVICE — MINOR CDS: Brand: MEDLINE INDUSTRIES, INC.

## (undated) DEVICE — POSITIONER ARM RESTRN INTOP DISP

## (undated) DEVICE — SNAR POLYP CAPTIVATOR RND STFF 2.4 240CM 10MM 1P/U

## (undated) DEVICE — SUTURE ABSORBABLE BRAIDED 0 CT-1 8X27 IN UD VICRYL JJ41G

## (undated) DEVICE — KIT CHOLGM POLYUR W/ KARLAN BLLN CATH 4FR L60CM 5MM INTRO

## (undated) DEVICE — SET IV PMP 1 PRT CK VLV SPL SEPT PRTS 2 PC M LL 20 GTT LEN

## (undated) DEVICE — DRAPE SLUSH DISC W44XL66IN ST FOR RND BSIN HUSH SLUSH SYS

## (undated) DEVICE — SUTURE VCRL SZ 1 L36IN ABSRB UD CTX L48MM 1/2 CIR J977H

## (undated) DEVICE — SENSR O2 OXIMAX FNGR A/ 18IN NONSTR

## (undated) DEVICE — SNAR POLYP SENSATION MICRO OVL 13 240X40

## (undated) DEVICE — GOWN,PREVENTION PLUS,XL,ST,24/CS: Brand: MEDLINE

## (undated) DEVICE — SUTURE SZ 0 27IN 5/8 CIR UR-6  TAPER PT VIOLET ABSRB VICRYL J603H

## (undated) DEVICE — SUTURE MNCRYL STRATAFIX PS 4-0 30CM

## (undated) DEVICE — AGENT HEMSTAT W4XL8IN OXIDIZED REGENERATED CELOS ABSRB

## (undated) DEVICE — MINI ENDOCUT SCISSOR TIP, DISPOSABLE: Brand: RENEW

## (undated) DEVICE — THE SINGLE USE ETRAP – POLYP TRAP IS USED FOR SUCTION RETRIEVAL OF ENDOSCOPICALLY REMOVED POLYPS.: Brand: ETRAP

## (undated) DEVICE — SUTURE VCRL SZ 3-0 L36IN ABSRB UD L36MM CT-1 1/2 CIR J944H

## (undated) DEVICE — SYSTEM SKIN CLSR 22CM DERMBND PRINEO

## (undated) DEVICE — SUTURE VCRL SZ 3-0 L27IN ABSRB UD L26MM SH 1/2 CIR J416H

## (undated) DEVICE — BLADE LARYNSCP HNDL MAC 3 DISP CURAVIEW LED

## (undated) DEVICE — APPLICATOR ENDOSCP L34CM W/ S STL CANN PLAS OBT STYL FOR

## (undated) DEVICE — SPONGE LAP W12XL12IN WHT STRUNG RADPQ PREWASHED ST

## (undated) DEVICE — SUTURE ETHBND EXCEL SZ 1 L30IN NONABSORBABLE GRN L48MM CTX X865H

## (undated) DEVICE — SUTURE PERMAHAND SZ 0 L30IN NONABSORBABLE BLK L26MM SH 1/2 K834H

## (undated) DEVICE — YANKAUER,BULB TIP WITH VENT: Brand: ARGYLE

## (undated) DEVICE — GLOVE SURG SZ 75 L12IN FNGR THK134MIL BRN LTX BEAD CUF ORTH

## (undated) DEVICE — SOLUTION IV IRRIG POUR BRL 0.9% SODIUM CHL 2F7124

## (undated) DEVICE — Device: Brand: DEFENDO AIR/WATER/SUCTION AND BIOPSY VALVE

## (undated) DEVICE — DUPLOCATH APPLICATION CATHETER WITH M.I.S. ADAPTOR: Brand: DUPLOCATH

## (undated) DEVICE — TBG SMPL FLTR LINE NASL 02/C02 A/ BX/100

## (undated) DEVICE — TUBE ET 7.5MM NSL ORAL BASIC CUF INTMED MURPHY EYE RADPQ

## (undated) DEVICE — PUMP SUC IRR TBNG L10FT W/ HNDPC ASSEMB STRYKEFLOW 2

## (undated) DEVICE — TROCAR ENDOSCP L100MM DIA12MM BLDELSS OBT RADLUC STBL SL

## (undated) DEVICE — BLADE SURG NO10 C STL DISP ST

## (undated) DEVICE — GLOVE ORTHO 7 1/2   MSG9475

## (undated) DEVICE — TRAY PROC CHOLE

## (undated) DEVICE — TOWEL,OR,DSP,ST,BLUE,DLX,4/PK,20PK/CS: Brand: MEDLINE

## (undated) DEVICE — SET EXTN TBNG IV STD BOR 30IN NO STPCOCK

## (undated) DEVICE — C-ARM: Brand: UNBRANDED

## (undated) DEVICE — ENDOPATH ECHELON VASCULAR  RELOADS, WHITE, 35MM: Brand: ECHELON ENDOPATH

## (undated) DEVICE — SUTURE VCRL SZ 2-0 L36IN ABSRB UD L36MM CT-1 1/2 CIR J945H

## (undated) DEVICE — APPLIER CLP M L L11.4IN DIA10MM ENDOSCP ROT MULT FOR LIG

## (undated) DEVICE — SUTURE VCRL SZ 3-0 L18IN ABSRB UD L26MM SH 1/2 CIR J864D

## (undated) DEVICE — SUTURE CHROMIC GUT SZ 3-0 L36IN ABSRB BRN L26MM SH 1/2 CIR G172H

## (undated) DEVICE — SECTO® DISSECTOR, ONE-PIECE GAUZE, 5 MM, 380 MM, SINGLE-USE, (10/BX): Brand: SYMMETRY SURGICAL

## (undated) DEVICE — ADHESIVE SKIN CLSR 0.7ML TOP DERMBND ADV

## (undated) DEVICE — PACK,UNIVERSAL,NO GOWNS: Brand: MEDLINE

## (undated) DEVICE — SPONGE LAP W18XL18IN WHT COT 4 PLY FLD STRUNG RADPQ DISP ST

## (undated) DEVICE — SUTURE PERMAHAND SZ 2-0 L30IN NONABSORBABLE BLK SILK W/O A305H

## (undated) DEVICE — SUTURE CHROMIC GUT SZ 2-0 L27IN ABSRB BRN L36MM CT-1 1/2 811H

## (undated) DEVICE — Z DISCONTINUED PER MEDLINE USE 2718072 DRESSING FOAM W5XL12.5CM SIL ADH THN BORDED CNFRM LO PROF

## (undated) DEVICE — APPLIER CLP L SHFT DIA12MM 20 ROT MULT LIGACLP

## (undated) DEVICE — BINDER ABD 2XL H12XL60 75IN UNISX STD E 4 PNL DISPOSABLE

## (undated) DEVICE — TISSUE RETRIEVAL SYSTEM: Brand: INZII RETRIEVAL SYSTEM

## (undated) DEVICE — SUTURE ETHBND SZ 1 L18IN NONABSORBABLE CTX L48MM 1/2 CIR CX30D

## (undated) DEVICE — CUFF,BP,DISP,1 TUBE,ADULT,HP: Brand: MEDLINE

## (undated) DEVICE — CONTRAST IOTHALAMATE MEGLUMINE 60% 50 ML INJ CONRAY 60

## (undated) DEVICE — SUTURE PERMAHAND SZ 0 L30IN NONABSORBABLE BLK SILK BRAID A306H

## (undated) DEVICE — GENERAL LAP CDS

## (undated) DEVICE — JELLY LUBRICATING 4OZ FLIP TOP TB E Z

## (undated) DEVICE — DISCONTINUED USE 394504 SYRINGE LUER LOCK TIP 12 ML

## (undated) DEVICE — GOWN,PREVENTION PLUS,2XL,ST,22/CS: Brand: MEDLINE

## (undated) DEVICE — Device

## (undated) DEVICE — ACCESS PLATFORM FOR MINIMALLY INVASIVE SURGERY.: Brand: GELPORT® LAPAROSCOPIC  SYSTEM

## (undated) DEVICE — SUREFIT, DUAL DISPERSIVE ELECTRODE, CONTACT QUALITY MONITOR: Brand: SUREFIT

## (undated) DEVICE — 3M™ IOBAN™ 2 ANTIMICROBIAL INCISE DRAPE 6650EZ: Brand: IOBAN™ 2

## (undated) DEVICE — DRESSING FOAM W10XL10CM SIL THN LO PROF SAFETAC BORD

## (undated) DEVICE — EXCEL 10FT (3.05 M) INSUFFLATION TUBING SET WITH 0.1 MICRON FILTER: Brand: EXCEL

## (undated) DEVICE — ASTOUND STANDARD SURGICAL GOWN, XXL: Brand: CONVERTORS

## (undated) DEVICE — SHEARS ENDOSCP L36CM DIA5MM ULTRASONIC CRV TIP ADAPTIVE

## (undated) DEVICE — TROCAR: Brand: KII® SLEEVE

## (undated) DEVICE — DISCONTINUED USE 127221 SUTURE SILK PRMHND ETHLN BR BLK REV 2-0 18 685H

## (undated) DEVICE — SUTURE MCRYL SZ 4-0 L18IN ABSRB UD L19MM PS-2 3/8 CIR PRIM Y496G

## (undated) DEVICE — STAPLER SKIN L320MM 35MM VASC TISS 12 FIRING B FRM PWR

## (undated) DEVICE — SOLUTION IV 1000ML 0.9% SOD CHL PH 5 INJ USP VIAFLX PLAS

## (undated) DEVICE — KIT URIN CATHETER 16 FR 5 CC M INDWL SIL